# Patient Record
Sex: MALE | Race: WHITE | NOT HISPANIC OR LATINO | Employment: OTHER | URBAN - METROPOLITAN AREA
[De-identification: names, ages, dates, MRNs, and addresses within clinical notes are randomized per-mention and may not be internally consistent; named-entity substitution may affect disease eponyms.]

---

## 2017-06-15 ENCOUNTER — TELEPHONE (OUTPATIENT)
Dept: SURGERY | Facility: CLINIC | Age: 77
End: 2017-06-15

## 2017-06-16 ENCOUNTER — TELEPHONE (OUTPATIENT)
Dept: SURGERY | Facility: CLINIC | Age: 77
End: 2017-06-16

## 2017-07-12 ENCOUNTER — INITIAL CONSULT (OUTPATIENT)
Dept: SURGERY | Facility: CLINIC | Age: 77
End: 2017-07-12
Payer: MEDICARE

## 2017-07-12 VITALS
BODY MASS INDEX: 28.77 KG/M2 | HEIGHT: 66 IN | WEIGHT: 179 LBS | TEMPERATURE: 99 F | DIASTOLIC BLOOD PRESSURE: 66 MMHG | HEART RATE: 72 BPM | SYSTOLIC BLOOD PRESSURE: 124 MMHG

## 2017-07-12 DIAGNOSIS — R16.0 LIVER MASS, LEFT LOBE: ICD-10-CM

## 2017-07-12 DIAGNOSIS — K83.1 BILE DUCT STRICTURE: Primary | ICD-10-CM

## 2017-07-12 DIAGNOSIS — R93.2 ABNORMAL FINDINGS ON DIAGNOSTIC IMAGING OF LIVER AND BILIARY TRACT: ICD-10-CM

## 2017-07-12 DIAGNOSIS — K83.09 CHOLANGITIS: ICD-10-CM

## 2017-07-12 DIAGNOSIS — K83.8 DILATED INTRAHEPATIC BILE DUCT: ICD-10-CM

## 2017-07-12 PROCEDURE — 1159F MED LIST DOCD IN RCRD: CPT | Mod: S$GLB,,, | Performed by: NURSE PRACTITIONER

## 2017-07-12 PROCEDURE — 99205 OFFICE O/P NEW HI 60 MIN: CPT | Mod: S$GLB,,, | Performed by: NURSE PRACTITIONER

## 2017-07-12 PROCEDURE — 99999 PR PBB SHADOW E&M-EST. PATIENT-LVL III: CPT | Mod: PBBFAC,,, | Performed by: NURSE PRACTITIONER

## 2017-07-12 PROCEDURE — 1126F AMNT PAIN NOTED NONE PRSNT: CPT | Mod: S$GLB,,, | Performed by: NURSE PRACTITIONER

## 2017-07-12 RX ORDER — ESCITALOPRAM OXALATE 10 MG/1
10 TABLET ORAL DAILY
Status: ON HOLD | COMMUNITY
End: 2021-01-01 | Stop reason: ALTCHOICE

## 2017-07-12 RX ORDER — ASPIRIN 81 MG/1
81 TABLET ORAL DAILY
COMMUNITY
End: 2017-11-17

## 2017-07-12 RX ORDER — ALPRAZOLAM 0.25 MG/1
0.25 TABLET ORAL 2 TIMES DAILY PRN
COMMUNITY
End: 2019-04-18

## 2017-07-12 RX ORDER — LEVOTHYROXINE SODIUM 137 UG/1
137 TABLET ORAL DAILY
COMMUNITY

## 2017-07-12 RX ORDER — ISOSORBIDE DINITRATE 30 MG/1
20 TABLET ORAL 3 TIMES DAILY
Status: ON HOLD | COMMUNITY
End: 2017-10-18 | Stop reason: HOSPADM

## 2017-07-12 RX ORDER — CARVEDILOL 3.12 MG/1
3.12 TABLET ORAL 2 TIMES DAILY WITH MEALS
Status: ON HOLD | COMMUNITY
End: 2017-10-18 | Stop reason: HOSPADM

## 2017-07-12 RX ORDER — PANTOPRAZOLE SODIUM 40 MG/1
40 TABLET, DELAYED RELEASE ORAL 2 TIMES DAILY
COMMUNITY

## 2017-07-12 RX ORDER — ATORVASTATIN CALCIUM 80 MG/1
80 TABLET, FILM COATED ORAL DAILY
COMMUNITY
End: 2018-01-05

## 2017-07-12 RX ORDER — INDOMETHACIN 25 MG/1
25 CAPSULE ORAL 2 TIMES DAILY WITH MEALS
Status: ON HOLD | COMMUNITY
End: 2017-12-18 | Stop reason: HOSPADM

## 2017-07-12 NOTE — LETTER
July 13, 2017      Lyla Bryan MD  [address]  [place]  [facility] [place] [address]           [facility]  [address]  [place] [address]  Phone: [phone]          Patient: Robby Soriano   MR Number: [id number]   YOB: 1940   Date of Visit: 7/12/2017       Dear Dr. Lyla Bryan:    Thank you for referring Robby Soriano to Dr. Quach for evaluation. Attached you will find relevant portions of our assessment and plan of care.    If you have questions, please do not hesitate to call us. We look forward to following Robby Soriano along with you.    Sincerely,    Melissa Gallego, TOYIN    Enclosure      If you would like to receive this communication electronically, please contact [email or web address] or [phone] to request more information on [employer] access.    For providers and/or their staff who would like to refer a patient to [facility], please contact us through our one-stop-shop provider referral line, [facility], at [phone].    If you feel you have received this communication in error or would no longer like to receive these types of communications, please e-mail [email or web address]

## 2017-07-12 NOTE — PROGRESS NOTES
"History & Physical    SUBJECTIVE:     History of Present Illness:  Mr. Giles is a very pleasant 77 year old male referred by Dr. 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     Per Dr. Quach's note of same date: "outside ERC report and all imaging personally reviewed.   He presented with acute onset of cholangitis, WITHOUT jaundice. Imaging shows a mass in segment IV of the liver extending close to the hilum. Dr Nguyen found a stricture of the upper bile duct on ERC and with Spy Glass it was fibrotic. Biopsy is non-diagnostic but cholangiocarcinoma is strongly suspected on clinical grounds.   Outside MRCP is suboptimal with regard to the upstream right hepatic ducts and they also are not well visualized on ERC. The common and right PV and right HA do not appear involved. We will repeat MRC and, if needed, perform right PTC to define the upstream right biliary anatomy and if the sectoral right ducts are not involved, this should be resectable. Discussed with patient and family.    He has a sizeable but asymptomatic lower ML incisional hernia from open prostatectomy done in 2005. This is easily reducible. "    He feels well currently.  No recurrent episodes of f/c's since stent placed.  Eating well, stable weight, good energy.  %.  ECOG 0.  Builds wooden swings as a hobby which keeps him very active.      Review of patient's allergies indicates:  No Known Allergies    Current Outpatient Prescriptions   Medication Sig Dispense Refill    alprazolam (XANAX) 0.25 MG tablet Take 0.25 mg by mouth 3 (three) times daily.      aspirin (ECOTRIN) 81 MG EC tablet Take 81 mg by mouth once daily.      atorvastatin (LIPITOR) 80 MG tablet Take 80 mg by mouth once daily.      carvedilol (COREG) 3.125 MG tablet Take 3.125 mg by mouth 2 (two) times daily with meals.      escitalopram oxalate (LEXAPRO) 10 MG tablet Take 10 mg by mouth once daily.      indomethacin (INDOCIN) 25 MG capsule Take 25 mg by mouth 2 (two) times " "daily with meals.      isosorbide dinitrate (ISORDIL) 30 MG Tab Take 20 mg by mouth 3 (three) times daily.      levothyroxine (SYNTHROID) 100 MCG tablet Take 100 mcg by mouth once daily.      pantoprazole (PROTONIX) 40 MG tablet Take 40 mg by mouth once daily.       No current facility-administered medications for this visit.        Past Medical History:   Diagnosis Date    Cancer     Prostate/s/p prostatectomy    Coronary artery disease     GERD (gastroesophageal reflux disease)     Hyperlipidemia     Hypertension     Hypothyroidism      Past Surgical History:   Procedure Laterality Date    CAROTID ENDARTERECTOMY Bilateral     CORONARY ARTERY BYPASS GRAFT      PROSTATE SURGERY       History reviewed. No pertinent family history.  Social History   Substance Use Topics    Smoking status: Former Smoker     Types: Cigarettes     Start date: 1/1/1956     Quit date: 6/18/2013    Smokeless tobacco: Never Used    Alcohol use No        Review of Systems:  Review of Systems   Constitutional: Positive for chills and fever. Negative for activity change, appetite change, fatigue and unexpected weight change.        No additional F/C since stent   HENT: Negative.    Eyes: Negative.    Respiratory: Negative for cough, shortness of breath and wheezing.    Cardiovascular: Negative for chest pain, palpitations and leg swelling.   Gastrointestinal: Negative for abdominal distention, abdominal pain, constipation, diarrhea, nausea and vomiting.   Genitourinary: Negative.    Musculoskeletal: Negative.    Skin: Negative.    Neurological: Negative.    Psychiatric/Behavioral: Negative.        OBJECTIVE:     Vital Signs (Most Recent)  Temp: 98.6 °F (37 °C) (07/12/17 1058)  Pulse: 72 (07/12/17 1058)  BP: 124/66 (07/12/17 1058)  5' 6" (1.676 m)  81.2 kg (179 lb 0.2 oz)     Physical Exam:  Physical Exam   Constitutional: He is oriented to person, place, and time. He appears well-developed and well-nourished.   HENT:   Head: " Normocephalic and atraumatic.   Eyes: Conjunctivae are normal. No scleral icterus.   Neck: Normal range of motion. Neck supple.   Cardiovascular: Normal rate, regular rhythm, normal heart sounds and intact distal pulses.    Pulmonary/Chest: Effort normal and breath sounds normal.   Abdominal: Soft. Bowel sounds are normal. A hernia is present.       Incisional hernia   Musculoskeletal: Normal range of motion.   Neurological: He is alert and oriented to person, place, and time.   Skin: Skin is warm and dry.   Psychiatric: He has a normal mood and affect.         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   CAD, PVD s/p CABG and daisy carotid endarterectomy.    Lower abdominal incisional hernia.  Easily reducible.     PLAN:  Need better imaging of upstream bile duct anatomy.  Will obtain better MRCP.    If does not answer the question, will need PTC.   If confined to left side,will likely proceed with left sided hepatectomy.    RTC after MRCP to finalize plan.  Dr. Quach was available today and did presonally meet and evaluate patient and view recent diagnostic test and images.    He feels he will likely require surgery.    He will need cardiac clearance prior.

## 2017-07-12 NOTE — PROGRESS NOTES
Patient seen with Ida Gallego NP; outside ERC report and all imaging personally reviewed.   He presented with acute onset of cholangitis, WITHOUT jaundice. Imaging shows a mass in segment IV of the liver extending close to the hilum. Dr Nguyen found a stricture of the upper bile duct on ERC and with Spy Glass it was fibrotic. Biopsy is non-diagnostic but cholangiocarcinoma is strongly suspected on clinical grounds.    Outside MRCP is suboptimal with regard to the upstream right hepatic ducts and they also are not well visualized on ERC. The common and right PV and right HA do not appear involved. We will repeat MRC and, if needed, perform right PTC to define the upstream right biliary anatomy and if the sectoral right ducts are not involved, this should be resectable. Discussed with patient and family.     He has a sizeable but asymptomatic lower ML incisional hernia from open prostatectomy done in 2005. This is easily reducible.

## 2017-07-13 PROBLEM — R16.0 LIVER MASS, LEFT LOBE: Status: ACTIVE | Noted: 2017-07-13

## 2017-07-13 PROBLEM — K83.1 BILE DUCT STRICTURE: Status: ACTIVE | Noted: 2017-07-13

## 2017-07-14 ENCOUNTER — HOSPITAL ENCOUNTER (OUTPATIENT)
Dept: RADIOLOGY | Facility: HOSPITAL | Age: 77
Discharge: HOME | End: 2017-07-14
Attending: NURSE PRACTITIONER
Payer: MEDICARE

## 2017-07-14 DIAGNOSIS — K83.09 CHOLANGITIS: ICD-10-CM

## 2017-07-14 DIAGNOSIS — K83.1 BILE DUCT STRICTURE: ICD-10-CM

## 2017-07-14 DIAGNOSIS — R93.2 ABNORMAL FINDINGS ON DIAGNOSTIC IMAGING OF LIVER AND BILIARY TRACT: ICD-10-CM

## 2017-07-14 DIAGNOSIS — K83.8 DILATED INTRAHEPATIC BILE DUCT: ICD-10-CM

## 2017-07-14 PROCEDURE — 74181 MRI ABDOMEN W/O CONTRAST: CPT | Mod: TC

## 2017-07-14 PROCEDURE — 74181 MRI ABDOMEN W/O CONTRAST: CPT | Mod: 26,,, | Performed by: RADIOLOGY

## 2017-07-14 PROCEDURE — 76376 3D RENDER W/INTRP POSTPROCES: CPT | Mod: 26,,, | Performed by: RADIOLOGY

## 2017-07-17 ENCOUNTER — DOCUMENTATION ONLY (OUTPATIENT)
Dept: SURGERY | Facility: CLINIC | Age: 77
End: 2017-07-17

## 2017-07-18 NOTE — PATIENT CARE CONFERENCE
"[facility]    Upper GI Tumor Board Note      Date: 7/17/17  MRN: [id number]  [place] [facility] [place]er[place] Julián    Summary: "He presented with acute onset of cholangitis, WITHOUT jaundice. Imaging shows a mass in segment IV of the liver extending close to the hilum. Dr Nguyen found a stricture of the upper bile duct on ERC and with Spy Glass it was fibrotic. Biopsy is non-diagnostic but cholangiocarcinoma is strongly suspected on clinical grounds.     Outside MRCP is suboptimal with regard to the upstream right hepatic ducts and they also are not well visualized on ERC. The common and right PV and right HA do not appear involved. We will repeat MRC and, if needed, perform right PTC to define the upstream right biliary anatomy and if the sectoral right ducts are not involved, this should be resectable."    Updated MRCP images viewed.  Right side appears unaffected. Image quality good.      Recommendations: Left hepatectomy.      Consult: Surgery (done)    Metastatic site(s): none    [facility] Treatment Guidelines reviewed and care plan is consistent with guidelines, i.e., NCCN, NCL, PDQ, ASCO, AUA, etc.    Presentation at Cancer Conference: Prospective    Discussed possible entry into Clinical Trial: No    Physician Name: Melissa Gallego NP  "

## 2017-07-20 ENCOUNTER — TELEPHONE (OUTPATIENT)
Dept: SURGERY | Facility: CLINIC | Age: 77
End: 2017-07-20

## 2017-07-20 DIAGNOSIS — R16.0 LIVER MASS, LEFT LOBE: Primary | ICD-10-CM

## 2017-07-24 ENCOUNTER — OFFICE VISIT (OUTPATIENT)
Dept: SURGERY | Facility: CLINIC | Age: 77
End: 2017-07-24
Payer: MEDICARE

## 2017-07-24 ENCOUNTER — RESEARCH ENCOUNTER (OUTPATIENT)
Dept: RESEARCH | Facility: HOSPITAL | Age: 77
End: 2017-07-24

## 2017-07-24 ENCOUNTER — LAB VISIT (OUTPATIENT)
Dept: LAB | Facility: HOSPITAL | Age: 77
End: 2017-07-24
Attending: SURGERY
Payer: MEDICARE

## 2017-07-24 VITALS
DIASTOLIC BLOOD PRESSURE: 61 MMHG | TEMPERATURE: 98 F | HEART RATE: 69 BPM | OXYGEN SATURATION: 96 % | WEIGHT: 184.06 LBS | SYSTOLIC BLOOD PRESSURE: 116 MMHG | BODY MASS INDEX: 30.67 KG/M2 | HEIGHT: 65 IN

## 2017-07-24 DIAGNOSIS — C22.1 CHOLANGIOCARCINOMA: Primary | ICD-10-CM

## 2017-07-24 DIAGNOSIS — R16.0 LIVER MASS, LEFT LOBE: ICD-10-CM

## 2017-07-24 LAB
ALBUMIN SERPL BCP-MCNC: 3.1 G/DL
ALP SERPL-CCNC: 93 U/L
ALT SERPL W/O P-5'-P-CCNC: 13 U/L
ANION GAP SERPL CALC-SCNC: 8 MMOL/L
AST SERPL-CCNC: 13 U/L
BASOPHILS # BLD AUTO: 0.04 K/UL
BASOPHILS NFR BLD: 0.5 %
BILIRUB SERPL-MCNC: 0.6 MG/DL
BUN SERPL-MCNC: 24 MG/DL
CALCIUM SERPL-MCNC: 9.1 MG/DL
CHLORIDE SERPL-SCNC: 103 MMOL/L
CO2 SERPL-SCNC: 27 MMOL/L
CREAT SERPL-MCNC: 1.3 MG/DL
DIFFERENTIAL METHOD: ABNORMAL
EOSINOPHIL # BLD AUTO: 0.3 K/UL
EOSINOPHIL NFR BLD: 4.2 %
ERYTHROCYTE [DISTWIDTH] IN BLOOD BY AUTOMATED COUNT: 15.1 %
EST. GFR  (AFRICAN AMERICAN): >60 ML/MIN/1.73 M^2
EST. GFR  (NON AFRICAN AMERICAN): 52.6 ML/MIN/1.73 M^2
GLUCOSE SERPL-MCNC: 130 MG/DL
HCT VFR BLD AUTO: 31.8 %
HGB BLD-MCNC: 10.2 G/DL
LYMPHOCYTES # BLD AUTO: 1.9 K/UL
LYMPHOCYTES NFR BLD: 25.9 %
MCH RBC QN AUTO: 29.1 PG
MCHC RBC AUTO-ENTMCNC: 32.1 G/DL
MCV RBC AUTO: 91 FL
MONOCYTES # BLD AUTO: 0.7 K/UL
MONOCYTES NFR BLD: 8.7 %
NEUTROPHILS # BLD AUTO: 4.5 K/UL
NEUTROPHILS NFR BLD: 60.4 %
PLATELET # BLD AUTO: 113 K/UL
PMV BLD AUTO: 11.3 FL
POTASSIUM SERPL-SCNC: 4.8 MMOL/L
PROT SERPL-MCNC: 7.2 G/DL
RBC # BLD AUTO: 3.5 M/UL
SODIUM SERPL-SCNC: 138 MMOL/L
WBC # BLD AUTO: 7.46 K/UL

## 2017-07-24 PROCEDURE — 99999 PR PBB SHADOW E&M-EST. PATIENT-LVL III: CPT | Mod: PBBFAC,,, | Performed by: NURSE PRACTITIONER

## 2017-07-24 PROCEDURE — 99024 POSTOP FOLLOW-UP VISIT: CPT | Mod: S$GLB,,, | Performed by: NURSE PRACTITIONER

## 2017-07-24 NOTE — PROGRESS NOTES
Pt and three family members were approached by Grisel Ojeda in clinic regarding participation in [employer] program (IRB#[id number]). Pt was agreeable.   The ICF was reviewed with pt. The discussion included:   - participation is voluntary;   - pt can change his mind about participating at any time;   - if he changes his mind about participation, he can call us at contact info in ICF and we will discard samples remaining;   - samples that have been used prior to his notification will still be included in research;   - specimens collected will include blood, urine and tissue;   - blood and urine will be collected pre-operatively if possible;   - tissue will be collected from Pathology after routine tests have been conducted;   - Dr. Quach will perform procedure per his usual protocol - participation in [employer] program will not change amount of tissue removed;   - specimens will be stored with unique code that can only be linked to pt by [employer] staff;   - all medical information released to researchers will be stripped of identifiers;   - samples will not be released to outside researchers unless approved by internal committee;   - there is a small risk of loss of confidentiality, but we make every effort to ensure privacy;   - no other physical risks outside of those involved in standard of care procedure.     Pt did not have any questions. Pt willingly and independently signed ICF for [employer]. A copy of signed ICF will be mailed to pt with instructions to call with any questions that may arise or if he should change his mind regarding participation in [employer] program.

## 2017-07-26 NOTE — PROGRESS NOTES
Please see consult note dated 7/13/17 and Conference note dated 7/18/17.  He is seeing his cardiologist tomorrow for clearance.  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    Detailed discussion re: rational for surgery, details of surgery, expected PO course and associated risks.  Once all questions answered to his satisfaction, informed consent obtained.

## 2017-08-02 ENCOUNTER — TELEPHONE (OUTPATIENT)
Dept: SURGERY | Facility: CLINIC | Age: 77
End: 2017-08-02

## 2017-08-02 DIAGNOSIS — R16.0 LIVER MASS, LEFT LOBE: Primary | ICD-10-CM

## 2017-08-02 RX ORDER — ENOXAPARIN SODIUM 100 MG/ML
40 INJECTION SUBCUTANEOUS ONCE
Status: CANCELLED | OUTPATIENT
Start: 2017-08-02

## 2017-08-02 RX ORDER — SODIUM CHLORIDE 9 MG/ML
INJECTION, SOLUTION INTRAVENOUS CONTINUOUS
Status: CANCELLED | OUTPATIENT
Start: 2017-08-02

## 2017-08-02 RX ORDER — METOPROLOL TARTRATE 25 MG/1
50 TABLET, FILM COATED ORAL ONCE
Status: CANCELLED | OUTPATIENT
Start: 2017-08-02

## 2017-08-03 ENCOUNTER — HOSPITAL ENCOUNTER (INPATIENT)
Facility: HOSPITAL | Age: 77
LOS: 5 days | Discharge: HOME | DRG: 405 | End: 2017-08-08
Attending: SURGERY | Admitting: SURGERY
Payer: MEDICARE

## 2017-08-03 ENCOUNTER — ANESTHESIA EVENT (OUTPATIENT)
Dept: SURGERY | Facility: HOSPITAL | Age: 77
DRG: 405 | End: 2017-08-03
Payer: MEDICARE

## 2017-08-03 ENCOUNTER — ANESTHESIA (OUTPATIENT)
Dept: SURGERY | Facility: HOSPITAL | Age: 77
DRG: 405 | End: 2017-08-03
Payer: MEDICARE

## 2017-08-03 DIAGNOSIS — C80.1 MALIGNANT OBSTRUCTIVE JAUNDICE: ICD-10-CM

## 2017-08-03 DIAGNOSIS — K83.1 MALIGNANT OBSTRUCTIVE JAUNDICE: ICD-10-CM

## 2017-08-03 DIAGNOSIS — K83.1 BILE DUCT STRICTURE: ICD-10-CM

## 2017-08-03 DIAGNOSIS — R16.0 LIVER MASS, LEFT LOBE: ICD-10-CM

## 2017-08-03 DIAGNOSIS — C24.0 CHOLANGIOCARCINOMA AT HEPATIC HILUM: ICD-10-CM

## 2017-08-03 PROBLEM — I10 ESSENTIAL HYPERTENSION: Status: ACTIVE | Noted: 2017-08-03

## 2017-08-03 PROBLEM — I25.810 CORONARY ARTERY DISEASE INVOLVING CORONARY BYPASS GRAFT OF NATIVE HEART WITHOUT ANGINA PECTORIS: Status: ACTIVE | Noted: 2017-08-03

## 2017-08-03 LAB
ABO + RH BLD: NORMAL
ANION GAP SERPL CALC-SCNC: 11 MMOL/L
BASOPHILS # BLD AUTO: 0.01 K/UL
BASOPHILS # BLD AUTO: 0.02 K/UL
BASOPHILS NFR BLD: 0.1 %
BASOPHILS NFR BLD: 0.4 %
BLD GP AB SCN CELLS X3 SERPL QL: NORMAL
BUN SERPL-MCNC: 17 MG/DL
CALCIUM SERPL-MCNC: 8.5 MG/DL
CHLORIDE SERPL-SCNC: 108 MMOL/L
CO2 SERPL-SCNC: 21 MMOL/L
CREAT SERPL-MCNC: 1.2 MG/DL
DIFFERENTIAL METHOD: ABNORMAL
DIFFERENTIAL METHOD: ABNORMAL
EOSINOPHIL # BLD AUTO: 0 K/UL
EOSINOPHIL # BLD AUTO: 0.1 K/UL
EOSINOPHIL NFR BLD: 0.3 %
EOSINOPHIL NFR BLD: 0.9 %
ERYTHROCYTE [DISTWIDTH] IN BLOOD BY AUTOMATED COUNT: 14.9 %
ERYTHROCYTE [DISTWIDTH] IN BLOOD BY AUTOMATED COUNT: 15.2 %
EST. GFR  (AFRICAN AMERICAN): >60 ML/MIN/1.73 M^2
EST. GFR  (NON AFRICAN AMERICAN): 58 ML/MIN/1.73 M^2
GLUCOSE SERPL-MCNC: 114 MG/DL (ref 70–110)
GLUCOSE SERPL-MCNC: 140 MG/DL
GLUCOSE SERPL-MCNC: 166 MG/DL (ref 70–110)
GLUCOSE SERPL-MCNC: 172 MG/DL (ref 70–110)
GLUCOSE SERPL-MCNC: 180 MG/DL (ref 70–110)
GLUCOSE SERPL-MCNC: 191 MG/DL (ref 70–110)
GRAM STN SPEC: NORMAL
GRAM STN SPEC: NORMAL
HCO3 UR-SCNC: 22.4 MMOL/L (ref 24–28)
HCO3 UR-SCNC: 22.6 MMOL/L (ref 24–28)
HCO3 UR-SCNC: 22.8 MMOL/L (ref 24–28)
HCO3 UR-SCNC: 23.6 MMOL/L (ref 24–28)
HCO3 UR-SCNC: 25 MMOL/L (ref 24–28)
HCT VFR BLD AUTO: 22 %
HCT VFR BLD AUTO: 32.1 %
HCT VFR BLD CALC: 22 %PCV (ref 36–54)
HCT VFR BLD CALC: 23 %PCV (ref 36–54)
HCT VFR BLD CALC: 25 %PCV (ref 36–54)
HCT VFR BLD CALC: 26 %PCV (ref 36–54)
HCT VFR BLD CALC: 27 %PCV (ref 36–54)
HGB BLD-MCNC: 10.5 G/DL
HGB BLD-MCNC: 7.3 G/DL
INR PPP: 1.1
LYMPHOCYTES # BLD AUTO: 0.5 K/UL
LYMPHOCYTES # BLD AUTO: 0.5 K/UL
LYMPHOCYTES NFR BLD: 7.6 %
LYMPHOCYTES NFR BLD: 8.5 %
MAGNESIUM SERPL-MCNC: 1.5 MG/DL
MCH RBC QN AUTO: 28.6 PG
MCH RBC QN AUTO: 29.1 PG
MCHC RBC AUTO-ENTMCNC: 32.7 G/DL
MCHC RBC AUTO-ENTMCNC: 33.2 G/DL
MCV RBC AUTO: 86 FL
MCV RBC AUTO: 89 FL
MONOCYTES # BLD AUTO: 0.2 K/UL
MONOCYTES # BLD AUTO: 0.6 K/UL
MONOCYTES NFR BLD: 3.2 %
MONOCYTES NFR BLD: 8.5 %
NEUTROPHILS # BLD AUTO: 4.6 K/UL
NEUTROPHILS # BLD AUTO: 5.8 K/UL
NEUTROPHILS NFR BLD: 83.2 %
NEUTROPHILS NFR BLD: 87 %
PCO2 BLDA: 35.8 MMHG (ref 35–45)
PCO2 BLDA: 36.7 MMHG (ref 35–45)
PCO2 BLDA: 38.7 MMHG (ref 35–45)
PCO2 BLDA: 39.5 MMHG (ref 35–45)
PCO2 BLDA: 40.7 MMHG (ref 35–45)
PH SMN: 7.38 [PH] (ref 7.35–7.45)
PH SMN: 7.38 [PH] (ref 7.35–7.45)
PH SMN: 7.4 [PH] (ref 7.35–7.45)
PHOSPHATE SERPL-MCNC: 3.7 MG/DL
PLATELET # BLD AUTO: 168 K/UL
PLATELET # BLD AUTO: 193 K/UL
PMV BLD AUTO: 10 FL
PMV BLD AUTO: 10.4 FL
PO2 BLDA: 130 MMHG (ref 80–100)
PO2 BLDA: 158 MMHG (ref 80–100)
PO2 BLDA: 181 MMHG (ref 80–100)
PO2 BLDA: 184 MMHG (ref 80–100)
PO2 BLDA: 188 MMHG (ref 80–100)
POC BE: -1 MMOL/L
POC BE: -2 MMOL/L
POC BE: -2 MMOL/L
POC BE: -3 MMOL/L
POC BE: 0 MMOL/L
POC IONIZED CALCIUM: 1.04 MMOL/L (ref 1.06–1.42)
POC IONIZED CALCIUM: 1.06 MMOL/L (ref 1.06–1.42)
POC IONIZED CALCIUM: 1.12 MMOL/L (ref 1.06–1.42)
POC IONIZED CALCIUM: 1.18 MMOL/L (ref 1.06–1.42)
POC IONIZED CALCIUM: 1.24 MMOL/L (ref 1.06–1.42)
POC SATURATED O2: 100 % (ref 95–100)
POC SATURATED O2: 99 % (ref 95–100)
POC SATURATED O2: 99 % (ref 95–100)
POC TCO2: 23 MMOL/L (ref 23–27)
POC TCO2: 24 MMOL/L (ref 23–27)
POC TCO2: 24 MMOL/L (ref 23–27)
POC TCO2: 25 MMOL/L (ref 23–27)
POC TCO2: 26 MMOL/L (ref 23–27)
POCT GLUCOSE: 152 MG/DL (ref 70–110)
POTASSIUM BLD-SCNC: 4 MMOL/L (ref 3.5–5.1)
POTASSIUM BLD-SCNC: 4.3 MMOL/L (ref 3.5–5.1)
POTASSIUM BLD-SCNC: 4.4 MMOL/L (ref 3.5–5.1)
POTASSIUM BLD-SCNC: 4.5 MMOL/L (ref 3.5–5.1)
POTASSIUM BLD-SCNC: 4.6 MMOL/L (ref 3.5–5.1)
POTASSIUM SERPL-SCNC: 4.7 MMOL/L
PROTHROMBIN TIME: 11.8 SEC
RBC # BLD AUTO: 2.55 M/UL
RBC # BLD AUTO: 3.61 M/UL
SAMPLE: ABNORMAL
SODIUM BLD-SCNC: 138 MMOL/L (ref 136–145)
SODIUM BLD-SCNC: 139 MMOL/L (ref 136–145)
SODIUM BLD-SCNC: 139 MMOL/L (ref 136–145)
SODIUM BLD-SCNC: 140 MMOL/L (ref 136–145)
SODIUM BLD-SCNC: 140 MMOL/L (ref 136–145)
SODIUM SERPL-SCNC: 140 MMOL/L
WBC # BLD AUTO: 5.28 K/UL
WBC # BLD AUTO: 6.96 K/UL

## 2017-08-03 PROCEDURE — 25000003 PHARM REV CODE 250: Performed by: SURGERY

## 2017-08-03 PROCEDURE — P9045 ALBUMIN (HUMAN), 5%, 250 ML: HCPCS | Performed by: NURSE ANESTHETIST, CERTIFIED REGISTERED

## 2017-08-03 PROCEDURE — 71000039 HC RECOVERY, EACH ADD'L HOUR: Performed by: SURGERY

## 2017-08-03 PROCEDURE — 86920 COMPATIBILITY TEST SPIN: CPT

## 2017-08-03 PROCEDURE — S0028 INJECTION, FAMOTIDINE, 20 MG: HCPCS | Performed by: NURSE ANESTHETIST, CERTIFIED REGISTERED

## 2017-08-03 PROCEDURE — 86900 BLOOD TYPING SEROLOGIC ABO: CPT

## 2017-08-03 PROCEDURE — 37000009 HC ANESTHESIA EA ADD 15 MINS: Performed by: SURGERY

## 2017-08-03 PROCEDURE — 83735 ASSAY OF MAGNESIUM: CPT

## 2017-08-03 PROCEDURE — 87075 CULTR BACTERIA EXCEPT BLOOD: CPT

## 2017-08-03 PROCEDURE — 87070 CULTURE OTHR SPECIMN AEROBIC: CPT

## 2017-08-03 PROCEDURE — 88304 TISSUE EXAM BY PATHOLOGIST: CPT | Mod: 26,,, | Performed by: PATHOLOGY

## 2017-08-03 PROCEDURE — P9021 RED BLOOD CELLS UNIT: HCPCS

## 2017-08-03 PROCEDURE — 0FT40ZZ RESECTION OF GALLBLADDER, OPEN APPROACH: ICD-10-PCS | Performed by: SURGERY

## 2017-08-03 PROCEDURE — 87076 CULTURE ANAEROBE IDENT EACH: CPT

## 2017-08-03 PROCEDURE — 63600175 PHARM REV CODE 636 W HCPCS: Performed by: SURGERY

## 2017-08-03 PROCEDURE — 0F150ZB BYPASS RIGHT HEPATIC DUCT TO SMALL INTESTINE, OPEN APPROACH: ICD-10-PCS | Performed by: SURGERY

## 2017-08-03 PROCEDURE — 27000221 HC OXYGEN, UP TO 24 HOURS

## 2017-08-03 PROCEDURE — 27000191 HC C-V MONITORING

## 2017-08-03 PROCEDURE — 36000709 HC OR TIME LEV III EA ADD 15 MIN: Performed by: SURGERY

## 2017-08-03 PROCEDURE — 94760 N-INVAS EAR/PLS OXIMETRY 1: CPT

## 2017-08-03 PROCEDURE — C9290 INJ, BUPIVACAINE LIPOSOME: HCPCS | Performed by: SURGERY

## 2017-08-03 PROCEDURE — 25000242 PHARM REV CODE 250 ALT 637 W/ HCPCS: Performed by: ANESTHESIOLOGY

## 2017-08-03 PROCEDURE — 37000008 HC ANESTHESIA 1ST 15 MINUTES: Performed by: SURGERY

## 2017-08-03 PROCEDURE — 27800505 HC CATH, RADIAL ARTERY KIT: Performed by: NURSE ANESTHETIST, CERTIFIED REGISTERED

## 2017-08-03 PROCEDURE — 20600001 HC STEP DOWN PRIVATE ROOM

## 2017-08-03 PROCEDURE — 25000003 PHARM REV CODE 250: Performed by: NURSE ANESTHETIST, CERTIFIED REGISTERED

## 2017-08-03 PROCEDURE — 88305 TISSUE EXAM BY PATHOLOGIST: CPT | Performed by: PATHOLOGY

## 2017-08-03 PROCEDURE — 63600175 PHARM REV CODE 636 W HCPCS: Performed by: NURSE ANESTHETIST, CERTIFIED REGISTERED

## 2017-08-03 PROCEDURE — D9220A PRA ANESTHESIA: Mod: CRNA,,, | Performed by: NURSE ANESTHETIST, CERTIFIED REGISTERED

## 2017-08-03 PROCEDURE — 0FT90ZZ RESECTION OF COMMON BILE DUCT, OPEN APPROACH: ICD-10-PCS | Performed by: SURGERY

## 2017-08-03 PROCEDURE — 85610 PROTHROMBIN TIME: CPT

## 2017-08-03 PROCEDURE — C1751 CATH, INF, PER/CENT/MIDLINE: HCPCS | Performed by: NURSE ANESTHETIST, CERTIFIED REGISTERED

## 2017-08-03 PROCEDURE — 76998 US GUIDE INTRAOP: CPT | Mod: 26,,, | Performed by: SURGERY

## 2017-08-03 PROCEDURE — 82962 GLUCOSE BLOOD TEST: CPT | Performed by: SURGERY

## 2017-08-03 PROCEDURE — 87186 SC STD MICRODIL/AGAR DIL: CPT | Mod: 59

## 2017-08-03 PROCEDURE — D9220A PRA ANESTHESIA: Mod: ANES,,, | Performed by: ANESTHESIOLOGY

## 2017-08-03 PROCEDURE — 36556 INSERT NON-TUNNEL CV CATH: CPT | Mod: 59,,, | Performed by: ANESTHESIOLOGY

## 2017-08-03 PROCEDURE — 84100 ASSAY OF PHOSPHORUS: CPT

## 2017-08-03 PROCEDURE — C1729 CATH, DRAINAGE: HCPCS | Performed by: SURGERY

## 2017-08-03 PROCEDURE — 94799 UNLISTED PULMONARY SVC/PX: CPT

## 2017-08-03 PROCEDURE — 47711 EXCISION OF BILE DUCT TUMOR: CPT | Mod: 51,,, | Performed by: SURGERY

## 2017-08-03 PROCEDURE — 36620 INSERTION CATHETER ARTERY: CPT | Mod: 59,,, | Performed by: ANESTHESIOLOGY

## 2017-08-03 PROCEDURE — 88331 PATH CONSLTJ SURG 1 BLK 1SPC: CPT | Mod: 26,,, | Performed by: PATHOLOGY

## 2017-08-03 PROCEDURE — 47780 FUSE BILE DUCTS AND BOWEL: CPT | Mod: 51,,, | Performed by: SURGERY

## 2017-08-03 PROCEDURE — 47125 PARTIAL REMOVAL OF LIVER: CPT | Mod: ,,, | Performed by: SURGERY

## 2017-08-03 PROCEDURE — 87102 FUNGUS ISOLATION CULTURE: CPT

## 2017-08-03 PROCEDURE — 85025 COMPLETE CBC W/AUTO DIFF WBC: CPT | Mod: 91

## 2017-08-03 PROCEDURE — 71000033 HC RECOVERY, INTIAL HOUR: Performed by: SURGERY

## 2017-08-03 PROCEDURE — 80048 BASIC METABOLIC PNL TOTAL CA: CPT

## 2017-08-03 PROCEDURE — 27100025 HC TUBING, SET FLUID WARMER: Performed by: NURSE ANESTHETIST, CERTIFIED REGISTERED

## 2017-08-03 PROCEDURE — 87205 SMEAR GRAM STAIN: CPT

## 2017-08-03 PROCEDURE — 0FB20ZZ EXCISION OF LEFT LOBE LIVER, OPEN APPROACH: ICD-10-PCS | Performed by: SURGERY

## 2017-08-03 PROCEDURE — 88307 TISSUE EXAM BY PATHOLOGIST: CPT | Mod: 26,,, | Performed by: PATHOLOGY

## 2017-08-03 PROCEDURE — 36000708 HC OR TIME LEV III 1ST 15 MIN: Performed by: SURGERY

## 2017-08-03 PROCEDURE — 27201423 OPTIME MED/SURG SUP & DEVICES STERILE SUPPLY: Performed by: SURGERY

## 2017-08-03 PROCEDURE — 87077 CULTURE AEROBIC IDENTIFY: CPT

## 2017-08-03 PROCEDURE — 86850 RBC ANTIBODY SCREEN: CPT

## 2017-08-03 PROCEDURE — 94640 AIRWAY INHALATION TREATMENT: CPT

## 2017-08-03 RX ORDER — NALOXONE HCL 0.4 MG/ML
0.02 VIAL (ML) INJECTION
Status: DISCONTINUED | OUTPATIENT
Start: 2017-08-03 | End: 2017-08-08 | Stop reason: HOSPADM

## 2017-08-03 RX ORDER — PANTOPRAZOLE SODIUM 40 MG/10ML
40 INJECTION, POWDER, LYOPHILIZED, FOR SOLUTION INTRAVENOUS
Status: DISCONTINUED | OUTPATIENT
Start: 2017-08-04 | End: 2017-08-03

## 2017-08-03 RX ORDER — PHENYLEPHRINE HYDROCHLORIDE 10 MG/ML
INJECTION INTRAVENOUS
Status: DISCONTINUED | OUTPATIENT
Start: 2017-08-03 | End: 2017-08-03

## 2017-08-03 RX ORDER — GLYCOPYRROLATE 0.2 MG/ML
INJECTION INTRAMUSCULAR; INTRAVENOUS
Status: DISCONTINUED | OUTPATIENT
Start: 2017-08-03 | End: 2017-08-03

## 2017-08-03 RX ORDER — FENTANYL CITRATE 50 UG/ML
INJECTION, SOLUTION INTRAMUSCULAR; INTRAVENOUS
Status: DISCONTINUED | OUTPATIENT
Start: 2017-08-03 | End: 2017-08-03

## 2017-08-03 RX ORDER — ROCURONIUM BROMIDE 10 MG/ML
INJECTION, SOLUTION INTRAVENOUS
Status: DISCONTINUED | OUTPATIENT
Start: 2017-08-03 | End: 2017-08-03

## 2017-08-03 RX ORDER — PROPOFOL 10 MG/ML
VIAL (ML) INTRAVENOUS
Status: DISCONTINUED | OUTPATIENT
Start: 2017-08-03 | End: 2017-08-03

## 2017-08-03 RX ORDER — BUPIVACAINE HYDROCHLORIDE 2.5 MG/ML
INJECTION, SOLUTION EPIDURAL; INFILTRATION; INTRACAUDAL
Status: DISCONTINUED | OUTPATIENT
Start: 2017-08-03 | End: 2017-08-03 | Stop reason: HOSPADM

## 2017-08-03 RX ORDER — METOPROLOL TARTRATE 25 MG/1
50 TABLET, FILM COATED ORAL ONCE
Status: DISCONTINUED | OUTPATIENT
Start: 2017-08-03 | End: 2017-08-03

## 2017-08-03 RX ORDER — IPRATROPIUM BROMIDE AND ALBUTEROL SULFATE 2.5; .5 MG/3ML; MG/3ML
3 SOLUTION RESPIRATORY (INHALATION)
Status: COMPLETED | OUTPATIENT
Start: 2017-08-04 | End: 2017-08-05

## 2017-08-03 RX ORDER — CARVEDILOL 3.12 MG/1
3.12 TABLET ORAL 2 TIMES DAILY WITH MEALS
Status: DISCONTINUED | OUTPATIENT
Start: 2017-08-03 | End: 2017-08-08 | Stop reason: HOSPADM

## 2017-08-03 RX ORDER — HYDROMORPHONE HCL IN 0.9% NACL 6 MG/30 ML
PATIENT CONTROLLED ANALGESIA SYRINGE INTRAVENOUS
Status: DISPENSED
Start: 2017-08-03 | End: 2017-08-04

## 2017-08-03 RX ORDER — DIPHENHYDRAMINE HYDROCHLORIDE 50 MG/ML
12.5 INJECTION INTRAMUSCULAR; INTRAVENOUS EVERY 4 HOURS PRN
Status: DISCONTINUED | OUTPATIENT
Start: 2017-08-03 | End: 2017-08-08 | Stop reason: HOSPADM

## 2017-08-03 RX ORDER — PANTOPRAZOLE SODIUM 40 MG/1
40 TABLET, DELAYED RELEASE ORAL DAILY
Status: DISCONTINUED | OUTPATIENT
Start: 2017-08-04 | End: 2017-08-05

## 2017-08-03 RX ORDER — ASPIRIN 81 MG/1
81 TABLET ORAL DAILY
Status: DISCONTINUED | OUTPATIENT
Start: 2017-08-04 | End: 2017-08-05

## 2017-08-03 RX ORDER — ENOXAPARIN SODIUM 100 MG/ML
40 INJECTION SUBCUTANEOUS ONCE
Status: COMPLETED | OUTPATIENT
Start: 2017-08-03 | End: 2017-08-03

## 2017-08-03 RX ORDER — LIDOCAINE HCL/PF 100 MG/5ML
SYRINGE (ML) INTRAVENOUS
Status: DISCONTINUED | OUTPATIENT
Start: 2017-08-03 | End: 2017-08-03

## 2017-08-03 RX ORDER — CEFOXITIN SODIUM 2 G/50ML
2 INJECTION, SOLUTION INTRAVENOUS ONCE
Status: COMPLETED | OUTPATIENT
Start: 2017-08-03 | End: 2017-08-03

## 2017-08-03 RX ORDER — ONDANSETRON 2 MG/ML
INJECTION INTRAMUSCULAR; INTRAVENOUS
Status: DISCONTINUED | OUTPATIENT
Start: 2017-08-03 | End: 2017-08-03

## 2017-08-03 RX ORDER — SODIUM CHLORIDE 0.9 % (FLUSH) 0.9 %
3 SYRINGE (ML) INJECTION
Status: DISCONTINUED | OUTPATIENT
Start: 2017-08-03 | End: 2017-08-03

## 2017-08-03 RX ORDER — SODIUM CHLORIDE 9 MG/ML
INJECTION, SOLUTION INTRAVENOUS CONTINUOUS
Status: DISCONTINUED | OUTPATIENT
Start: 2017-08-03 | End: 2017-08-03

## 2017-08-03 RX ORDER — METOPROLOL TARTRATE 1 MG/ML
INJECTION, SOLUTION INTRAVENOUS
Status: DISCONTINUED | OUTPATIENT
Start: 2017-08-03 | End: 2017-08-03

## 2017-08-03 RX ORDER — IPRATROPIUM BROMIDE AND ALBUTEROL SULFATE 2.5; .5 MG/3ML; MG/3ML
3 SOLUTION RESPIRATORY (INHALATION) EVERY 4 HOURS PRN
Status: DISCONTINUED | OUTPATIENT
Start: 2017-08-03 | End: 2017-08-08 | Stop reason: HOSPADM

## 2017-08-03 RX ORDER — ALBUTEROL SULFATE 0.83 MG/ML
2.5 SOLUTION RESPIRATORY (INHALATION) EVERY 4 HOURS PRN
Status: DISCONTINUED | OUTPATIENT
Start: 2017-08-03 | End: 2017-08-08 | Stop reason: HOSPADM

## 2017-08-03 RX ORDER — HYDROMORPHONE HYDROCHLORIDE 2 MG/ML
INJECTION, SOLUTION INTRAMUSCULAR; INTRAVENOUS; SUBCUTANEOUS
Status: DISCONTINUED | OUTPATIENT
Start: 2017-08-03 | End: 2017-08-03

## 2017-08-03 RX ORDER — ALBUMIN HUMAN 50 G/1000ML
SOLUTION INTRAVENOUS CONTINUOUS PRN
Status: DISCONTINUED | OUTPATIENT
Start: 2017-08-03 | End: 2017-08-03

## 2017-08-03 RX ORDER — HYDROMORPHONE HYDROCHLORIDE 1 MG/ML
0.2 INJECTION, SOLUTION INTRAMUSCULAR; INTRAVENOUS; SUBCUTANEOUS EVERY 5 MIN PRN
Status: DISCONTINUED | OUTPATIENT
Start: 2017-08-03 | End: 2017-08-03 | Stop reason: HOSPADM

## 2017-08-03 RX ORDER — DEXAMETHASONE SODIUM PHOSPHATE 4 MG/ML
INJECTION, SOLUTION INTRA-ARTICULAR; INTRALESIONAL; INTRAMUSCULAR; INTRAVENOUS; SOFT TISSUE
Status: DISCONTINUED | OUTPATIENT
Start: 2017-08-03 | End: 2017-08-03

## 2017-08-03 RX ORDER — PROMETHAZINE HYDROCHLORIDE 25 MG/1
25 SUPPOSITORY RECTAL EVERY 6 HOURS PRN
Status: DISCONTINUED | OUTPATIENT
Start: 2017-08-03 | End: 2017-08-08 | Stop reason: HOSPADM

## 2017-08-03 RX ORDER — SODIUM CHLORIDE 9 MG/ML
INJECTION, SOLUTION INTRAVENOUS CONTINUOUS
Status: DISCONTINUED | OUTPATIENT
Start: 2017-08-03 | End: 2017-08-06

## 2017-08-03 RX ORDER — LEVOTHYROXINE SODIUM 100 UG/1
100 TABLET ORAL DAILY
Status: DISCONTINUED | OUTPATIENT
Start: 2017-08-04 | End: 2017-08-08 | Stop reason: HOSPADM

## 2017-08-03 RX ORDER — LABETALOL HYDROCHLORIDE 5 MG/ML
INJECTION, SOLUTION INTRAVENOUS
Status: DISCONTINUED | OUTPATIENT
Start: 2017-08-03 | End: 2017-08-03

## 2017-08-03 RX ORDER — NEOSTIGMINE METHYLSULFATE 1 MG/ML
INJECTION, SOLUTION INTRAVENOUS
Status: DISCONTINUED | OUTPATIENT
Start: 2017-08-03 | End: 2017-08-03

## 2017-08-03 RX ORDER — KETAMINE HYDROCHLORIDE 100 MG/ML
INJECTION, SOLUTION INTRAMUSCULAR; INTRAVENOUS
Status: DISCONTINUED | OUTPATIENT
Start: 2017-08-03 | End: 2017-08-03

## 2017-08-03 RX ORDER — ONDANSETRON 2 MG/ML
4 INJECTION INTRAMUSCULAR; INTRAVENOUS EVERY 8 HOURS PRN
Status: DISCONTINUED | OUTPATIENT
Start: 2017-08-03 | End: 2017-08-08 | Stop reason: HOSPADM

## 2017-08-03 RX ORDER — HYDROMORPHONE HCL IN 0.9% NACL 6 MG/30 ML
PATIENT CONTROLLED ANALGESIA SYRINGE INTRAVENOUS CONTINUOUS
Status: DISCONTINUED | OUTPATIENT
Start: 2017-08-03 | End: 2017-08-04

## 2017-08-03 RX ORDER — ENOXAPARIN SODIUM 100 MG/ML
40 INJECTION SUBCUTANEOUS
Status: DISCONTINUED | OUTPATIENT
Start: 2017-08-04 | End: 2017-08-08 | Stop reason: HOSPADM

## 2017-08-03 RX ORDER — FAMOTIDINE 10 MG/ML
INJECTION INTRAVENOUS
Status: DISCONTINUED | OUTPATIENT
Start: 2017-08-03 | End: 2017-08-03

## 2017-08-03 RX ORDER — ATORVASTATIN CALCIUM 20 MG/1
80 TABLET, FILM COATED ORAL DAILY
Status: DISCONTINUED | OUTPATIENT
Start: 2017-08-04 | End: 2017-08-05

## 2017-08-03 RX ADMIN — KETAMINE HYDROCHLORIDE 10 MG: 100 INJECTION, SOLUTION, CONCENTRATE INTRAMUSCULAR; INTRAVENOUS at 12:08

## 2017-08-03 RX ADMIN — FENTANYL CITRATE 25 MCG: 50 INJECTION, SOLUTION INTRAMUSCULAR; INTRAVENOUS at 10:08

## 2017-08-03 RX ADMIN — CEFOXITIN SODIUM 2 G: 2 INJECTION, SOLUTION INTRAVENOUS at 12:08

## 2017-08-03 RX ADMIN — LIDOCAINE HYDROCHLORIDE 75 MG: 20 INJECTION, SOLUTION INTRAVENOUS at 07:08

## 2017-08-03 RX ADMIN — EPHEDRINE SULFATE 10 MG: 50 INJECTION, SOLUTION INTRAMUSCULAR; INTRAVENOUS; SUBCUTANEOUS at 09:08

## 2017-08-03 RX ADMIN — PHENYLEPHRINE HYDROCHLORIDE 100 MCG: 10 INJECTION INTRAVENOUS at 10:08

## 2017-08-03 RX ADMIN — PHENYLEPHRINE HYDROCHLORIDE 100 MCG: 10 INJECTION INTRAVENOUS at 11:08

## 2017-08-03 RX ADMIN — FENTANYL CITRATE 25 MCG: 50 INJECTION, SOLUTION INTRAMUSCULAR; INTRAVENOUS at 01:08

## 2017-08-03 RX ADMIN — KETAMINE HYDROCHLORIDE 10 MG: 100 INJECTION, SOLUTION, CONCENTRATE INTRAMUSCULAR; INTRAVENOUS at 10:08

## 2017-08-03 RX ADMIN — METOPROLOL TARTRATE 1 MG: 5 INJECTION, SOLUTION INTRAVENOUS at 11:08

## 2017-08-03 RX ADMIN — METOPROLOL TARTRATE 1 MG: 5 INJECTION, SOLUTION INTRAVENOUS at 10:08

## 2017-08-03 RX ADMIN — KETAMINE HYDROCHLORIDE 20 MG: 100 INJECTION, SOLUTION, CONCENTRATE INTRAMUSCULAR; INTRAVENOUS at 09:08

## 2017-08-03 RX ADMIN — EPHEDRINE SULFATE 10 MG: 50 INJECTION, SOLUTION INTRAMUSCULAR; INTRAVENOUS; SUBCUTANEOUS at 08:08

## 2017-08-03 RX ADMIN — CARVEDILOL 3.12 MG: 3.12 TABLET, FILM COATED ORAL at 08:08

## 2017-08-03 RX ADMIN — DEXAMETHASONE SODIUM PHOSPHATE 4 MG: 4 INJECTION, SOLUTION INTRAMUSCULAR; INTRAVENOUS at 08:08

## 2017-08-03 RX ADMIN — FENTANYL CITRATE 25 MCG: 50 INJECTION, SOLUTION INTRAMUSCULAR; INTRAVENOUS at 07:08

## 2017-08-03 RX ADMIN — CALCIUM CHLORIDE 0.5 G: 100 INJECTION, SOLUTION INTRAVENOUS at 12:08

## 2017-08-03 RX ADMIN — ROCURONIUM BROMIDE 10 MG: 10 INJECTION, SOLUTION INTRAVENOUS at 10:08

## 2017-08-03 RX ADMIN — NEOSTIGMINE METHYLSULFATE 5 MG: 1 INJECTION INTRAVENOUS at 01:08

## 2017-08-03 RX ADMIN — SODIUM CHLORIDE, SODIUM GLUCONATE, SODIUM ACETATE, POTASSIUM CHLORIDE, MAGNESIUM CHLORIDE, SODIUM PHOSPHATE, DIBASIC, AND POTASSIUM PHOSPHATE: .53; .5; .37; .037; .03; .012; .00082 INJECTION, SOLUTION INTRAVENOUS at 12:08

## 2017-08-03 RX ADMIN — ROCURONIUM BROMIDE 50 MG: 10 INJECTION, SOLUTION INTRAVENOUS at 07:08

## 2017-08-03 RX ADMIN — PHENYLEPHRINE HYDROCHLORIDE 200 MCG: 10 INJECTION INTRAVENOUS at 11:08

## 2017-08-03 RX ADMIN — PHENYLEPHRINE HYDROCHLORIDE 100 MCG: 10 INJECTION INTRAVENOUS at 12:08

## 2017-08-03 RX ADMIN — PIPERACILLIN SODIUM,TAZOBACTAM SODIUM 4.5 G: 4; .5 INJECTION, POWDER, FOR SOLUTION INTRAVENOUS at 03:08

## 2017-08-03 RX ADMIN — FENTANYL CITRATE 50 MCG: 50 INJECTION, SOLUTION INTRAMUSCULAR; INTRAVENOUS at 09:08

## 2017-08-03 RX ADMIN — CEFOXITIN SODIUM 2 G: 2 INJECTION, SOLUTION INTRAVENOUS at 08:08

## 2017-08-03 RX ADMIN — SODIUM CHLORIDE, SODIUM GLUCONATE, SODIUM ACETATE, POTASSIUM CHLORIDE, MAGNESIUM CHLORIDE, SODIUM PHOSPHATE, DIBASIC, AND POTASSIUM PHOSPHATE: .53; .5; .37; .037; .03; .012; .00082 INJECTION, SOLUTION INTRAVENOUS at 11:08

## 2017-08-03 RX ADMIN — METOPROLOL TARTRATE 2 MG: 5 INJECTION, SOLUTION INTRAVENOUS at 01:08

## 2017-08-03 RX ADMIN — ROCURONIUM BROMIDE 30 MG: 10 INJECTION, SOLUTION INTRAVENOUS at 08:08

## 2017-08-03 RX ADMIN — EPHEDRINE SULFATE 10 MG: 50 INJECTION, SOLUTION INTRAMUSCULAR; INTRAVENOUS; SUBCUTANEOUS at 12:08

## 2017-08-03 RX ADMIN — EPHEDRINE SULFATE 5 MG: 50 INJECTION, SOLUTION INTRAMUSCULAR; INTRAVENOUS; SUBCUTANEOUS at 10:08

## 2017-08-03 RX ADMIN — ROCURONIUM BROMIDE 5 MG: 10 INJECTION, SOLUTION INTRAVENOUS at 12:08

## 2017-08-03 RX ADMIN — FENTANYL CITRATE 125 MCG: 50 INJECTION, SOLUTION INTRAMUSCULAR; INTRAVENOUS at 07:08

## 2017-08-03 RX ADMIN — METOPROLOL TARTRATE 1 MG: 5 INJECTION, SOLUTION INTRAVENOUS at 01:08

## 2017-08-03 RX ADMIN — FENTANYL CITRATE 50 MCG: 50 INJECTION, SOLUTION INTRAMUSCULAR; INTRAVENOUS at 08:08

## 2017-08-03 RX ADMIN — EPHEDRINE SULFATE 10 MG: 50 INJECTION, SOLUTION INTRAMUSCULAR; INTRAVENOUS; SUBCUTANEOUS at 10:08

## 2017-08-03 RX ADMIN — FAMOTIDINE 20 MG: 10 INJECTION, SOLUTION INTRAVENOUS at 08:08

## 2017-08-03 RX ADMIN — SODIUM CHLORIDE, SODIUM GLUCONATE, SODIUM ACETATE, POTASSIUM CHLORIDE, MAGNESIUM CHLORIDE, SODIUM PHOSPHATE, DIBASIC, AND POTASSIUM PHOSPHATE: .53; .5; .37; .037; .03; .012; .00082 INJECTION, SOLUTION INTRAVENOUS at 08:08

## 2017-08-03 RX ADMIN — ENOXAPARIN SODIUM 40 MG: 100 INJECTION SUBCUTANEOUS at 06:08

## 2017-08-03 RX ADMIN — SODIUM CHLORIDE: 0.9 INJECTION, SOLUTION INTRAVENOUS at 02:08

## 2017-08-03 RX ADMIN — KETAMINE HYDROCHLORIDE 30 MG: 100 INJECTION, SOLUTION, CONCENTRATE INTRAMUSCULAR; INTRAVENOUS at 07:08

## 2017-08-03 RX ADMIN — ALBUMIN (HUMAN): 12.5 SOLUTION INTRAVENOUS at 11:08

## 2017-08-03 RX ADMIN — ALBUTEROL SULFATE 2.5 MG: 2.5 SOLUTION RESPIRATORY (INHALATION) at 07:08

## 2017-08-03 RX ADMIN — PIPERACILLIN SODIUM,TAZOBACTAM SODIUM 4.5 G: 4; .5 INJECTION, POWDER, FOR SOLUTION INTRAVENOUS at 11:08

## 2017-08-03 RX ADMIN — ONDANSETRON 4 MG: 2 INJECTION INTRAMUSCULAR; INTRAVENOUS at 08:08

## 2017-08-03 RX ADMIN — HYDROMORPHONE HYDROCHLORIDE 0.4 MG: 2 INJECTION, SOLUTION INTRAMUSCULAR; INTRAVENOUS; SUBCUTANEOUS at 01:08

## 2017-08-03 RX ADMIN — SODIUM CHLORIDE 70 ML/HR: 0.9 INJECTION, SOLUTION INTRAVENOUS at 06:08

## 2017-08-03 RX ADMIN — LABETALOL HYDROCHLORIDE 5 MG: 5 INJECTION, SOLUTION INTRAVENOUS at 01:08

## 2017-08-03 RX ADMIN — Medication: at 02:08

## 2017-08-03 RX ADMIN — ROCURONIUM BROMIDE 10 MG: 10 INJECTION, SOLUTION INTRAVENOUS at 09:08

## 2017-08-03 RX ADMIN — GLYCOPYRROLATE 0.6 MG: 0.2 INJECTION, SOLUTION INTRAMUSCULAR; INTRAVENOUS at 01:08

## 2017-08-03 RX ADMIN — CEFOXITIN SODIUM 2 G: 2 INJECTION, SOLUTION INTRAVENOUS at 10:08

## 2017-08-03 RX ADMIN — PROPOFOL 150 MG: 10 INJECTION, EMULSION INTRAVENOUS at 07:08

## 2017-08-03 RX ADMIN — Medication 10 MG: at 10:08

## 2017-08-03 RX ADMIN — FENTANYL CITRATE 50 MCG: 50 INJECTION, SOLUTION INTRAMUSCULAR; INTRAVENOUS at 10:08

## 2017-08-03 NOTE — NURSING TRANSFER
Nursing Transfer Note      8/3/2017     Transfer From: PACU    Transfer via stretcher    Transfer with O2, cardiac monitoring, IVF's, A-line, Ibarra    Transported by RN and PCT    Medicines sent: N/A    Chart send with patient: Yes    Notified: Family in room    Patient reassessed at: 8/3/17 1606    Upon arrival to floor: cardiac monitor applied, patient oriented to room, call bell in reach and bed in lowest position

## 2017-08-03 NOTE — ANESTHESIA PROCEDURE NOTES
Central Line    Diagnosis: Hepatic mass  Patient location during procedure: done in OR  Procedure start time: 8/3/2017 7:41 AM  Timeout: 8/3/2017 7:40 AM  Procedure end time: 8/3/2017 7:50 AM  Staffing  Anesthesiologist: NENA OCONNOR JR  Resident/CRNA: SUGAR CARRASCO  Performed: resident/CRNA   Anesthesiologist was present at the time of the procedure.  Preanesthetic Checklist  Completed: patient identified, site marked, surgical consent, pre-op evaluation, timeout performed, IV checked, risks and benefits discussed, monitors and equipment checked and anesthesia consent given  Indication  Indication: hemodynamic monitoring, vascular access, med administration     Anesthesia   general anesthesia    Central Line  Skin Prep: skin prepped with ChloraPrep, skin prep agent completely dried prior to procedure  maximum sterile barriers used during central venous catheter insertion  hand hygiene performed prior to central venous catheter insertion  Location: right internal jugular,   Catheter type: triple lumen  Catheter Size: 12 Fr  Inserted Catheter Length: 15 cm  Ultrasound: vascular probe with ultrasound  Vessel Caliber: large, patent  Vascular Doppler:  not done, compressibility normal  Needle advanced into vessel with real time Ultrasound guidance.  Guidewire confirmed in vessel.  Sterile sheath used.   Manometry: Venous cannualation confirmed by visual estimation of blood vessel pressure using manometry.  Insertion Attempts: 1   Securement:line sutured, chlorhexidine patch, sterile dressing applied and blood return through all ports     Post-Procedure  Adverse Events:none

## 2017-08-03 NOTE — BRIEF OP NOTE
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

## 2017-08-03 NOTE — BRIEF OP NOTE
[facility]  Brief Operative Note    SUMMARY     Surgery Date: 8/3/2017     Surgeon(s) and Role:     * Liberty Lentz MD - Resident - Assisting     * Dallas Quach MD - Primary        Pre-op Diagnosis:  Liver mass, left lobe [R16.0]    Post-op Diagnosis:  Post-Op Diagnosis Codes:     * Liver mass, left lobe [R16.0]    Procedure(s) (LRB):  RESECTION-HEPATIC; Resection Bile Duct (Left)  JERSON-N-Y    Anesthesia: General    Description of Procedure: Left lobectomy with resection of middle and left hepatic veins. Resection of common bile duct and anastomosis of right hepatic duct with a hepaticojejunostomy. Arbuckle maneuver x 15minutes.     Description of the findings of the procedure: Frozen path revealed a negative bile duct margin. 2 units of prbc given intraop.     Estimated Blood Loss: 800 mL         Specimens:   Specimen (12h ago through future)    Start     Ordered    08/03/17 1335  Specimen to Pathology - Surgery  Once     Comments:  Specimen to pathology: 1.) Gallbladder (permanent)2.) Hailey hepatis lymph node (permanent)3.) Distal bile duct margin (frozen)4.) Left liver with common hepatic duct and proximal right hepatic duct (permanent)5.) Proximal right hepatic duct margin. 2 blue sutures sunday right sectoral margin (frozen)      08/03/17 8745

## 2017-08-03 NOTE — PROGRESS NOTES
CXR complete. Pt tolerated well. Pt released from anesthesia, to be transferred to room 607, [facility]. Wife updated and sent to pt's room.     1515- spoke to circulating RN, Irasema, who returned page for Surgery resident who was scrubbed in case, per MD, pt must remain strict NPO, but may have chloraseptic spray prn sore throat.

## 2017-08-03 NOTE — NURSING TRANSFER
Nursing Transfer Note      8/3/2017     Transfer To: 607 from pacu 11    Transfer via bed    Transfer with 2L to O2 and CM    Transported by pacu RN and PCT    Medicines sent: none    Chart send with patient: Yes    Notified: spouse

## 2017-08-03 NOTE — PROGRESS NOTES
Respiratory at bedside for incentive spirometer teaching. 1250 preop.   0645- Labs not drawn. Pt refused [employer].

## 2017-08-03 NOTE — ANESTHESIA PREPROCEDURE EVALUATION
08/03/2017  Robby Soriano is a 77 y.o., male.    Anesthesia Evaluation         Review of Systems  Cardiovascular:   Hypertension CAD     Hepatic/GI:   GERD    Endocrine:   Hypothyroidism        Physical Exam  General:  Obesity    Airway/Jaw/Neck:  Airway Findings: Mouth Opening: Normal Tongue: Normal  General Airway Assessment: Adult  Mallampati: II  TM Distance: Normal, at least 6 cm        Eyes/Ears/Nose:  EYES/EARS/NOSE FINDINGS: Normal   Dental:  Dental Findings: upper partial dentures, lower partial dentures   Chest/Lungs:  Chest/Lungs Clear    Heart/Vascular:  Heart Findings: Normal Heart murmur: negative Vascular Findings: Normal    Abdomen:  Abdomen Findings: Normal    Musculoskeletal:  Musculoskeletal Findings: Normal   Skin:  Skin Findings: Normal    Mental Status:  Mental Status Findings: Normal        Anesthesia Plan  Type of Anesthesia, risks & benefits discussed:  Anesthesia Type:  general  Patient's Preference:   Intra-op Monitoring Plan:   Intra-op Monitoring Plan Comments:   Post Op Pain Control Plan:   Post Op Pain Control Plan Comments:   Induction:   IV  Beta Blocker:  Patient is on a Beta-Blocker and has received one dose within the past 24 hours (No further documentation required).       Informed Consent: Patient understands risks and agrees with Anesthesia plan.  Questions answered. Anesthesia consent signed with patient.  ASA Score: 3     Day of Surgery Review of History & Physical:    H&P update referred to the surgeon.         Ready For Surgery From Anesthesia Perspective.

## 2017-08-03 NOTE — TRANSFER OF CARE
"Anesthesia Transfer of Care Note    Patient: Robby Soriano    Procedure(s) Performed: Procedure(s) (LRB):  RESECTION-HEPATIC; Resection Bile Duct (Left)  JERSON-N-Y    Patient location: PACU    Anesthesia Type: epidural    Transport from OR: Transported from OR on 6-10 L/min O2 by face mask with adequate spontaneous ventilation    Post pain: adequate analgesia    Post assessment: no apparent anesthetic complications    Post vital signs: stable    Level of consciousness: sedated    Nausea/Vomiting: no nausea/vomiting    Complications: none    Transfer of care protocol was followed      Last vitals:   Visit Vitals  BP (!) 174/82   Pulse 80   Temp 36.6 °C (97.9 °F) (Axillary)   Resp 18   Ht 5' 5" (1.651 m)   Wt 83 kg (183 lb)   SpO2 100%   BMI 30.45 kg/m²     "

## 2017-08-03 NOTE — OR NURSING
Family updated at 0854 via Cora, nurse liaison.   Family updated at 1051 via Cora, nurse liaison.   Family updated at 1255 via Cora, nurse liaison.    Specimen to pathology:   1.) Gallbladder (permanent)  2.) Hailey hepatis lymph node (permanent)  3.) Distal bile duct margin (frozen)  4.) Left liver with common hepatic duct and proximal right hepatic duct (permanent)  5.) Proximal right hepatic duct margin. 2 blue sutures sunday right sectoral margin (frozen)

## 2017-08-03 NOTE — INTERVAL H&P NOTE
The patient has been examined and the H&P has been reviewed:    I concur with the findings and no changes have occurred since H&P was written.   No recurrence of fever and chills. No other interval symptoms  He took all of his regular meds at 3 am except for his baby ASA and his lipitor  Cardiologist's note read  Vitals:    08/03/17 0559   BP: (!) 144/61   Pulse: 63   Resp: 16   Temp: 97.6 °F (36.4 °C)     Chest clear to ausc  Heart: RR&R with no m  Neuro: alert, oriented    Operative plan reviewed again with patient and family. He understands and agrees    Anesthesia/Surgery risks, benefits and alternative options discussed and understood by patient/family.          Active Hospital Problems    Diagnosis  POA    Liver mass, left lobe [R16.0]  Yes      Resolved Hospital Problems    Diagnosis Date Resolved POA   No resolved problems to display.

## 2017-08-04 PROBLEM — D64.9 ANEMIA: Status: ACTIVE | Noted: 2017-08-04

## 2017-08-04 PROBLEM — I25.810 CORONARY ARTERY DISEASE INVOLVING CORONARY BYPASS GRAFT OF NATIVE HEART WITHOUT ANGINA PECTORIS: Status: ACTIVE | Noted: 2017-08-04

## 2017-08-04 LAB
ALBUMIN SERPL BCP-MCNC: 2.8 G/DL
ALP SERPL-CCNC: 67 U/L
ALT SERPL W/O P-5'-P-CCNC: 659 U/L
ANION GAP SERPL CALC-SCNC: 10 MMOL/L
AST SERPL-CCNC: 665 U/L
BASOPHILS # BLD AUTO: 0.02 K/UL
BASOPHILS NFR BLD: 0.2 %
BILIRUB SERPL-MCNC: 1.5 MG/DL
BUN SERPL-MCNC: 22 MG/DL
CALCIUM SERPL-MCNC: 8.6 MG/DL
CHLORIDE SERPL-SCNC: 107 MMOL/L
CO2 SERPL-SCNC: 21 MMOL/L
CREAT SERPL-MCNC: 1.2 MG/DL
DIFFERENTIAL METHOD: ABNORMAL
EOSINOPHIL # BLD AUTO: 0 K/UL
EOSINOPHIL NFR BLD: 0 %
ERYTHROCYTE [DISTWIDTH] IN BLOOD BY AUTOMATED COUNT: 15 %
EST. GFR  (AFRICAN AMERICAN): >60 ML/MIN/1.73 M^2
EST. GFR  (NON AFRICAN AMERICAN): 58 ML/MIN/1.73 M^2
GLUCOSE SERPL-MCNC: 130 MG/DL
HCT VFR BLD AUTO: 32.3 %
HGB BLD-MCNC: 10.5 G/DL
INR PPP: 1.2
LYMPHOCYTES # BLD AUTO: 0.9 K/UL
LYMPHOCYTES NFR BLD: 8 %
MAGNESIUM SERPL-MCNC: 1.5 MG/DL
MCH RBC QN AUTO: 29.2 PG
MCHC RBC AUTO-ENTMCNC: 32.5 G/DL
MCV RBC AUTO: 90 FL
MONOCYTES # BLD AUTO: 1.1 K/UL
MONOCYTES NFR BLD: 9.7 %
NEUTROPHILS # BLD AUTO: 9.4 K/UL
NEUTROPHILS NFR BLD: 81.9 %
PHOSPHATE SERPL-MCNC: 4.6 MG/DL
PLATELET # BLD AUTO: 153 K/UL
PMV BLD AUTO: 10.3 FL
POTASSIUM SERPL-SCNC: 4.7 MMOL/L
PROT SERPL-MCNC: 5.8 G/DL
PROTHROMBIN TIME: 12.1 SEC
RBC # BLD AUTO: 3.6 M/UL
SODIUM SERPL-SCNC: 138 MMOL/L
WBC # BLD AUTO: 11.51 K/UL

## 2017-08-04 PROCEDURE — 97535 SELF CARE MNGMENT TRAINING: CPT

## 2017-08-04 PROCEDURE — 80053 COMPREHEN METABOLIC PANEL: CPT

## 2017-08-04 PROCEDURE — G8978 MOBILITY CURRENT STATUS: HCPCS | Mod: CK

## 2017-08-04 PROCEDURE — 25000003 PHARM REV CODE 250: Performed by: STUDENT IN AN ORGANIZED HEALTH CARE EDUCATION/TRAINING PROGRAM

## 2017-08-04 PROCEDURE — 85610 PROTHROMBIN TIME: CPT

## 2017-08-04 PROCEDURE — 25000003 PHARM REV CODE 250: Performed by: SURGERY

## 2017-08-04 PROCEDURE — 94640 AIRWAY INHALATION TREATMENT: CPT

## 2017-08-04 PROCEDURE — 25000242 PHARM REV CODE 250 ALT 637 W/ HCPCS: Performed by: NURSE PRACTITIONER

## 2017-08-04 PROCEDURE — 94770 HC EXHALED C02 TEST: CPT

## 2017-08-04 PROCEDURE — 85025 COMPLETE CBC W/AUTO DIFF WBC: CPT

## 2017-08-04 PROCEDURE — 83735 ASSAY OF MAGNESIUM: CPT

## 2017-08-04 PROCEDURE — 27000221 HC OXYGEN, UP TO 24 HOURS

## 2017-08-04 PROCEDURE — 20600001 HC STEP DOWN PRIVATE ROOM

## 2017-08-04 PROCEDURE — 99900035 HC TECH TIME PER 15 MIN (STAT)

## 2017-08-04 PROCEDURE — 94760 N-INVAS EAR/PLS OXIMETRY 1: CPT

## 2017-08-04 PROCEDURE — 97161 PT EVAL LOW COMPLEX 20 MIN: CPT

## 2017-08-04 PROCEDURE — 97165 OT EVAL LOW COMPLEX 30 MIN: CPT

## 2017-08-04 PROCEDURE — 63600175 PHARM REV CODE 636 W HCPCS: Performed by: STUDENT IN AN ORGANIZED HEALTH CARE EDUCATION/TRAINING PROGRAM

## 2017-08-04 PROCEDURE — G8979 MOBILITY GOAL STATUS: HCPCS | Mod: CJ

## 2017-08-04 PROCEDURE — 94799 UNLISTED PULMONARY SVC/PX: CPT

## 2017-08-04 PROCEDURE — 84100 ASSAY OF PHOSPHORUS: CPT

## 2017-08-04 PROCEDURE — 63600175 PHARM REV CODE 636 W HCPCS: Performed by: SURGERY

## 2017-08-04 RX ORDER — OXYCODONE HYDROCHLORIDE 5 MG/1
5 TABLET ORAL EVERY 4 HOURS PRN
Status: DISCONTINUED | OUTPATIENT
Start: 2017-08-04 | End: 2017-08-08 | Stop reason: HOSPADM

## 2017-08-04 RX ORDER — MAGNESIUM SULFATE HEPTAHYDRATE 40 MG/ML
2 INJECTION, SOLUTION INTRAVENOUS ONCE
Status: COMPLETED | OUTPATIENT
Start: 2017-08-04 | End: 2017-08-04

## 2017-08-04 RX ORDER — TAMSULOSIN HYDROCHLORIDE 0.4 MG/1
0.4 CAPSULE ORAL DAILY
Status: DISCONTINUED | OUTPATIENT
Start: 2017-08-05 | End: 2017-08-08 | Stop reason: HOSPADM

## 2017-08-04 RX ORDER — OXYCODONE HYDROCHLORIDE 5 MG/1
10 TABLET ORAL EVERY 4 HOURS PRN
Status: DISCONTINUED | OUTPATIENT
Start: 2017-08-04 | End: 2017-08-08 | Stop reason: HOSPADM

## 2017-08-04 RX ORDER — HYDROMORPHONE HYDROCHLORIDE 1 MG/ML
0.5 INJECTION, SOLUTION INTRAMUSCULAR; INTRAVENOUS; SUBCUTANEOUS EVERY 6 HOURS PRN
Status: DISCONTINUED | OUTPATIENT
Start: 2017-08-04 | End: 2017-08-08 | Stop reason: HOSPADM

## 2017-08-04 RX ADMIN — SODIUM CHLORIDE: 0.9 INJECTION, SOLUTION INTRAVENOUS at 06:08

## 2017-08-04 RX ADMIN — MAGNESIUM SULFATE IN WATER 2 G: 40 INJECTION, SOLUTION INTRAVENOUS at 06:08

## 2017-08-04 RX ADMIN — CARVEDILOL 3.12 MG: 3.12 TABLET, FILM COATED ORAL at 08:08

## 2017-08-04 RX ADMIN — SODIUM CHLORIDE 500 ML: 0.9 INJECTION, SOLUTION INTRAVENOUS at 05:08

## 2017-08-04 RX ADMIN — CARVEDILOL 3.12 MG: 3.12 TABLET, FILM COATED ORAL at 04:08

## 2017-08-04 RX ADMIN — OXYCODONE HYDROCHLORIDE 10 MG: 5 TABLET ORAL at 08:08

## 2017-08-04 RX ADMIN — IPRATROPIUM BROMIDE AND ALBUTEROL SULFATE 3 ML: .5; 3 SOLUTION RESPIRATORY (INHALATION) at 12:08

## 2017-08-04 RX ADMIN — PANTOPRAZOLE SODIUM 40 MG: 40 TABLET, DELAYED RELEASE ORAL at 08:08

## 2017-08-04 RX ADMIN — OXYCODONE HYDROCHLORIDE 10 MG: 5 TABLET ORAL at 11:08

## 2017-08-04 RX ADMIN — ASPIRIN 81 MG: 81 TABLET, COATED ORAL at 08:08

## 2017-08-04 RX ADMIN — PIPERACILLIN SODIUM,TAZOBACTAM SODIUM 4.5 G: 4; .5 INJECTION, POWDER, FOR SOLUTION INTRAVENOUS at 02:08

## 2017-08-04 RX ADMIN — HYDROMORPHONE HYDROCHLORIDE 0.5 MG: 1 INJECTION, SOLUTION INTRAMUSCULAR; INTRAVENOUS; SUBCUTANEOUS at 01:08

## 2017-08-04 RX ADMIN — LEVOTHYROXINE SODIUM 100 MCG: 100 TABLET ORAL at 06:08

## 2017-08-04 RX ADMIN — OXYCODONE HYDROCHLORIDE 10 MG: 5 TABLET ORAL at 10:08

## 2017-08-04 RX ADMIN — OXYCODONE HYDROCHLORIDE 10 MG: 5 TABLET ORAL at 04:08

## 2017-08-04 RX ADMIN — HYDROMORPHONE HYDROCHLORIDE 0.5 MG: 1 INJECTION, SOLUTION INTRAMUSCULAR; INTRAVENOUS; SUBCUTANEOUS at 06:08

## 2017-08-04 RX ADMIN — IPRATROPIUM BROMIDE AND ALBUTEROL SULFATE 3 ML: .5; 3 SOLUTION RESPIRATORY (INHALATION) at 07:08

## 2017-08-04 RX ADMIN — ATORVASTATIN CALCIUM 80 MG: 20 TABLET, FILM COATED ORAL at 08:08

## 2017-08-04 RX ADMIN — ENOXAPARIN SODIUM 40 MG: 100 INJECTION SUBCUTANEOUS at 04:08

## 2017-08-04 RX ADMIN — PIPERACILLIN SODIUM,TAZOBACTAM SODIUM 4.5 G: 4; .5 INJECTION, POWDER, FOR SOLUTION INTRAVENOUS at 08:08

## 2017-08-04 NOTE — PLAN OF CARE
Problem: Patient Care Overview  Goal: Plan of Care Review  Outcome: Ongoing (interventions implemented as appropriate)  Review care plan with patient. Patient verbalized understand. Patient AAO. Reviewed medication with patient. Patient ambulated in monreal. Patient up to chair for several hours. Patient remains free from falls or injury at this time. Midline incision remains intact. KAIDEN output recorded. V/S stable, on room air. IVF infusing. Tolerating clear liquid diet. Pain controlled with pain medication. Frequent rounds made. Call light in reach. Bed in low position. TM

## 2017-08-04 NOTE — OP NOTE
DATE OF PROCEDURE:  08/03/2017    OPERATING SURGEON:  Dallas Quach M.D.    ASSISTANT:  Liberty Lentz M.D. 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    OPERATIVE PROCEDURES:  Left hepatic resection; resection of extrahepatic bile   duct; Eron-Y right hepaticojejunostomy.    PROCEDURE IN DETAIL:  The patient is placed in the supine position on the   operating table.  Adequate general endotracheal anesthesia is induced.  The   abdomen is prepped and draped in sterile fashion and entered through an upper   midline incision.  The abdomen is explored.  There is no ascites.  Peritoneal   surfaces and omentum are unremarkable.  There is a large malignant appearing   mass in the left liver, centered in segment 4.  The background appearance and   texture of the right liver is normal.  The patient has a suprapubic midline   hernia, which is not incarcerated.  No other significant findings are noted.    The gallbladder is removed.  Intraoperative hepatic ultrasound is performed.  No   lesions in the right liver are noted.  The large left liver lesion extends to   the middle hepatic vein.  There is no evidence of hepatic vein tumor thrombus or   portal vein tumor thrombus.  The tumor does appear to be resectable.  The hilar   plate is lowered.  On the left aspect of the hilar plate, it is evident that   the tumor is extending into the soft tissue of the abby hepatis and involves   the hepatic duct bifurcation, but the right hepatic duct above the bifurcation   is soft and does not appear to be involved.  The hepatic artery is isolated.    The right hepatic artery is mobilized to the liver and is not involved by tumor.    The left hepatic artery is divided between 2-0 silk ties with an additional   3-0 silk suture ligature on the proximal stump.   The common bile duct is   encircled with a vessel loop and beneath it, the common portal vein, left portal   vein, and right portal vein are isolated.  The bifurcation itself is normal,   but the left portal vein is involved about 1 cm from the bifurcation.  Clamps   are applied across the left portal vein at its takeoff and the vein is divided   and both stumps are oversewn with 4-0 Prolene.  The common bile duct is then   divided.  Cultures are obtained.  The indwelling stent is removed.  The distal   margin is submitted for frozen section and is benign.  The proximal and distal   stumps are oversewn with 2-0 silk.  The common duct is mobilized up toward the   hepatic hilum.  The right hepatic duct is divided about 1.5 cm above the hepatic   duct hilum at a point where the duct is entirely soft.  A circumferential   proximal margin is obtained and submitted to Pathology and is benign.  This   extends our resection up to the sectoral division into the anterior and   posterior right sectoral divisions.  The liver has demarcated nicely.  I have   previously partially mobilized the right liver to bring it more into the field.    Dissection around the hepatic veins is performed and the space between the   middle and right hepatic vein is opened.  Using the ultrasound and palpation to   guide us, the capsule of the liver is scored to complete a left hepatic   resection including the middle hepatic vein and this will provide a 1 to 1.5 cm   gross margin around the tumor.  Parenchymal transection is carried out using the   LigaSure.  Additional hemostasis is obtained with clips, with 2-0 silk ties,   and with the Bovie.  Portal inflow occlusion of 15 minutes duration is carried   out.  The middle and left hepatic veins are taken using the Endo-ZULEYKA vascular   stapler.  Blood loss is modest.  Low CVP technique is used.  After hemostasis is   assured, we liberalized the patient's intravenous fluids.  The patient was    anemic before the surgery and he does require 2 units of packed red blood cells.    A Eron limb of jejunum is then fashioned and brought in retrocolic fashion to   the hepatic hilum.  Two right hepaticojejunostomies are performed using 5-0 PDS   and loupe magnification anastomosing both the anterior and posterior right   sectoral ducts.  The anastomosis is technically difficult because of the high   location, but it is satisfactory.  Small bowel continuity is restored with an   enteroenterostomy.  The transverse enterotomies are closed with interrupted 3-0   Vicryl.  The mesenteric defect is closed with interrupted 3-0 silk.  The   mesocolon is tacked around the jejunal limb with interrupted 3-0 silk.  The   operative field is inspected.  Hemostasis is good.  A Nu-Knit patch is applied   to the transection margin and the liver.  A 15 Federico drain is brought through a   separate stab incision and left adjacent to our anastomosis and the resection   margin.  This has been a difficult procedure.  The patient has tolerated it   well, but in view of the length and complexity of the procedure, I elected not   to proceed with repair of his suprapubic midline hernia.  We will apply a Velcro   binder to the patient postoperatively to help stabilize his lower abdominal   wall.  Exparel solution is infiltrated into the deep muscular layers on either   side of the incision.  The abdominal incision is closed in layers in the usual   fashion.  A sterile dressing is applied.  The patient tolerates the procedure   well and is brought to the Recovery Room in stable condition.      JODI  dd: 08/03/2017 19:03:51 (CDT)  td: 08/03/2017 19:51:19 (IVANA)  Doc ID   #[id number]  Job ID #[id number]    CC:

## 2017-08-04 NOTE — PLAN OF CARE
Problem: Physical Therapy Goal  Goal: Physical Therapy Goal  Goals to be met by: 2017     Patient will increase functional independence with mobility by performin. Supine to sit with Set-up [place]  2. Sit to supine with Set-up [place]  3. Sit to stand transfer with Supervision  4. Bed to chair transfer with Supervision with/without AD  5. Gait  x 200 feet with Supervision with/without RW  6. Lower extremity exercise program x20 reps per handout, with independence    Outcome: Ongoing (interventions implemented as appropriate)  Goals set

## 2017-08-04 NOTE — PT/OT/SLP EVAL
"Occupational Therapy  Evaluation    Robby Soriano   MRN: [id number]   Admitting Diagnosis: Cholangiocarcinoma at hepatic hilum    OT Date of Treatment: 08/04/17   OT Start Time: 1109  OT Stop Time: 1140  OT Total Time (min): 31 min    Billable Minutes:  Evaluation 15  Self Care/Home Management 16    Diagnosis: Cholangiocarcinoma at hepatic hilum   S/p liver resection    Past Medical History:   Diagnosis Date    Cancer     Prostate/s/p prostatectomy    Coronary artery disease     GERD (gastroesophageal reflux disease)     Hyperlipidemia     Hypertension     Hypothyroidism       Past Surgical History:   Procedure Laterality Date    CAROTID ENDARTERECTOMY Bilateral     CORONARY ARTERY BYPASS GRAFT      PROSTATE SURGERY       General Precautions: Standard, fall  Orthopedic Precautions: N/A  Braces: N/A    Do you have any cultural, spiritual, Mu-ism conflicts, given your current situation?: no     Patient History:  Living Environment  Living Environment Comment: Pt lives w/ wife in a trailer w/ 6 MARIAH and ramp access, walk in shower w/ built in shower seat. Pt was (I) w/ ADL and ambulation. Pt owns 3 prong "chair" cane and rollator but has not been using them.  Equipment Currently Used at Home: none    Dominant hand: right    Subjective:  Communicated with NSG prior to session.  "It's hard to get back in the bed."  Chief Complaint: abd pain  Patient/Family stated goals: to return to home    Pain/Comfort  Pain Rating 1: 3/10  Location 1: abdomen  Pain Addressed 1: Pre-medicate for activity, Reposition, Distraction  Pain Rating Post-Intervention 1: 3/10    Objective:  Patient found with: KAIDEN drain, peripheral IV, pulse ox (continuous)    Cognitive Exam:  Oriented to: Person, Place, Time and Situation  Follows Commands/attention: Follows multistep  commands  Communication: clear/fluent  Memory:  No Deficits noted  Safety awareness/insight to disability: intact  Coping skills/emotional control: Appropriate to " "situation    Sensation:   Intact    Upper Extremity Range of Motion:  Right Upper Extremity: WFL  Left Upper Extremity: WFL    Upper Extremity Strength:  Right Upper Extremity: WFL  Left Upper Extremity: WFL   Strength: WFL    Fine motor coordination:   Intact    Gross motor coordination: WFL    Functional Mobility:  Bed Mobility:  Supine to Sit: Stand by Assistance    Transfers:  Sit <> Stand Assistance: Contact Guard Assistance  Sit <> Stand Assistive Device: Rolling Walker  Bed <> Chair Technique:  (functional mobility)  Bed <> Chair Transfer Assistance: Contact Guard Assistance  Bed <> Chair Assistive Device: Rolling Walker    Activities of Daily Living:  Feeding Level of Assistance: Set-up Assistance  UE Dressing Level of Assistance: Minimum assistance  LE Dressing Level of Assistance: Moderate assistance  Grooming Position: Standing  Grooming Level of Assistance: Contact guard assistance (simulated)      Therapeutic Activities and Exercises:  OT kasia. Pt instructed on safety w/ LBD.    AM-PAC 6 CLICK ADL  How much help from another person does this patient currently need?  1 = Unable, Total/Dependent Assistance  2 = A lot, Maximum/Moderate Assistance  3 = A little, Minimum/Contact Guard/Supervision  4 = None, Modified [place]/Independent    Putting on and taking off regular lower body clothing? : 2  Bathing (including washing, rinsing, drying)?: 3  Toileting, which includes using toilet, bedpan, or urinal? : 3  Putting on and taking off regular upper body clothing?: 3  Taking care of personal grooming such as brushing teeth?: 3  Eating meals?: 4  Total Score: 18    AM-PAC Raw Score CMS "G-Code Modifier Level of Impairment Assistance   6 % Total / Unable   7 - 9 CM 80 - 100% Maximal Assist   10-14 CL 60 - 80% Moderate Assist   15 - 19 CK 40 - 60% Moderate Assist   20 - 22 CJ 20 - 40% Minimal Assist   23 CI 1-20% SBA / CGA   24 CH 0% Independent/ Mod I     Patient left up in chair with all lines " intact, call button in reach and wife present    Assessment:  Robby Soriano is a 77 y.o. 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 Pt will benefit from OT services to increase (I) and safety w/ ADL and t/f's.    Rehab identified problem list/impairments: Rehab identified problem list/impairments: weakness, impaired endurance, impaired self care skills, impaired functional mobilty, gait instability, impaired balance, decreased safety awareness, pain, impaired skin    Rehab potential is good.    Activity tolerance: Good    Discharge recommendations: Discharge Facility/Level Of Care Needs: home with home health     Barriers to discharge: Barriers to Discharge: None    Equipment recommendations:  (TBD)     GOALS:    Occupational Therapy Goals        Problem: Occupational Therapy Goal    Goal Priority Disciplines Outcome Interventions   Occupational Therapy Goal     OT, PT/OT Ongoing (interventions implemented as appropriate)    Description:  Goals to be met by: 2 weeks     Patient will increase functional independence with ADLs by performing:    UE Dressing with Modified [place].  LE Dressing with Supervision.  Grooming while standing with Supervision.  Toileting from bedside commode with Supervision for hygiene and clothing management.   Toilet transfer to bedside commode with Supervision w/ AD.                  PLAN:  Patient to be seen 3 x/week to address the above listed problems via self-care/home management, therapeutic activities, therapeutic exercises  Plan of Care expires: 09/03/17  Plan of Care reviewed with: patient, spouse  ELLEN Harmon  08/04/2017

## 2017-08-04 NOTE — PROGRESS NOTES
Valenzuela removed at 0830. Patient hasn't voided. Bladder scanned 209ml. Paging on-call.  On-call Robby Rutherford ordered  bolus and if no urine in an 1 1/2 place valenzuela.

## 2017-08-04 NOTE — ANESTHESIA POSTPROCEDURE EVALUATION
"Anesthesia Post Evaluation    Patient: Robby Soriano    Procedure(s) Performed: Procedure(s) (LRB):  RESECTION-HEPATIC; Resection Bile Duct (Left)  JERSON-N-Y    Final Anesthesia Type: general  Patient location during evaluation: PACU  Patient participation: Yes- Able to Participate  Level of consciousness: awake and alert  Post-procedure vital signs: reviewed and stable  Pain management: adequate  Airway patency: patent  PONV status at discharge: No PONV  Anesthetic complications: no      Cardiovascular status: blood pressure returned to baseline  Respiratory status: spontaneous ventilation and room air  Hydration status: euvolemic  Follow-up not needed.        Visit Vitals  BP (!) 156/65 (BP Location: Left arm, Patient Position: Lying, BP Method: Automatic)   Pulse 73   Temp 36.6 °C (97.8 °F) (Oral)   Resp 16   Ht 5' 5" (1.651 m)   Wt 83 kg (183 lb)   SpO2 97%   BMI 30.45 kg/m²       Pain/Jeremy Score: Pain Assessment Performed: Yes (8/4/2017  7:20 AM)  Presence of Pain: complains of pain/discomfort (8/4/2017  7:20 AM)  Pain Rating Prior to Med Admin: 6 (8/4/2017  8:11 AM)  Pain Rating Post Med Admin: 0 (8/3/2017  5:29 AM)  Jeremy Score: 9 (8/3/2017  3:00 PM)      "

## 2017-08-04 NOTE — PROGRESS NOTES
[facility]  General Surgery  Progress Note    Subjective:     History of Present Illness:  No notes on file    Post-Op Info:  Procedure(s) (LRB):  RESECTION-HEPATIC; Resection Bile Duct (Left)  ERON-N-Y   1 Day Post-Op     Interval History: Pod 1 s/p hepatic resection, Eron-Y reconstruction. NAEON. Pain controlled, on pathway    Medications:  Continuous Infusions:   sodium chloride 0.9% 125 mL/hr at 08/03/17 1429     Scheduled Meds:   albuterol-ipratropium 2.5mg-0.5mg/3mL  3 mL Nebulization Q6H WAKE    aspirin  81 mg Oral Daily    atorvastatin  80 mg Oral Daily    carvedilol  3.125 mg Oral BID WM    enoxparin  40 mg Subcutaneous Q24H    levothyroxine  100 mcg Oral Daily    magnesium sulfate IVPB  2 g Intravenous Once    pantoprazole  40 mg Oral Daily    piperacillin-tazobactam 4.5 g in dextrose 5 % 100 mL IVPB (ready to mix system)  4.5 g Intravenous Q8H     PRN Meds:albuterol sulfate, albuterol-ipratropium 2.5mg-0.5mg/3mL, diphenhydrAMINE, HYDROmorphone, naloxone, ondansetron, oxycodone, oxycodone, promethazine     Review of patient's allergies indicates:  No Known Allergies  Objective:     Vital Signs (Most Recent):  Temp: 97.8 °F (36.6 °C) (08/04/17 0720)  Pulse: 72 (08/04/17 0720)  Resp: 15 (08/04/17 0720)  BP: (!) 156/65 (08/04/17 0720)  SpO2: 98 % (08/04/17 0720) Vital Signs (24h Range):  Temp:  [97.8 °F (36.6 °C)-98.8 °F (37.1 °C)] 97.8 °F (36.6 °C)  Pulse:  [68-80] 72  Resp:  [10-18] 15  SpO2:  [95 %-100 %] 98 %  BP: (127-174)/(63-82) 156/65  Arterial Line BP: (121)/(35) 121/35     Weight: 83 kg (183 lb)  Body mass index is 30.45 kg/m².    Intake/Output - Last 3 Shifts       08/02 0700 - 08/03 0659 08/03 0700 - 08/04 0659 08/04 0700 - 08/05 0659    P.O.  0     I.V. (mL/kg) 150 (1.8) 5610.4 (67.6)     Blood  700     IV Piggyback  200     Total Intake(mL/kg) 150 (1.8) 6510.4 (78.4)     Urine (mL/kg/hr)  1550 (0.8)     Drains  190 (0.1)     Blood  800 (0.4)     Total Output   2540       Net +150 +3970.4                   Physical Exam  A&O, NAD  RRR  No signs of respiratory distress  ABD: s/AppTTP, non-distended  Dressing c/d/i    Significant Labs:  CBC:   Recent Labs  Lab 08/04/17  0404   WBC 11.51   RBC 3.60*   HGB 10.5*   HCT 32.3*      MCV 90   MCH 29.2   MCHC 32.5     CMP:   Recent Labs  Lab 08/04/17  0404   *   CALCIUM 8.6*   ALBUMIN 2.8*   PROT 5.8*      K 4.7   CO2 21*      BUN 22   CREATININE 1.2   ALKPHOS 67   *   *   BILITOT 1.5*       Significant Diagnostics:  I have reviewed all pertinent imaging results/findings within the past 24 hours.    Assessment/Plan:     Coronary artery disease involving coronary bypass graft of native heart without angina pectoris    Home meds        Essential hypertension    monitoring  Will resume home meds if needed        Liver mass, left lobe    S/p hepatic resection, hepaticoj  Pathway D1    - d/c valenzuela,, IVF @ 80, d/c central line  - CLD today  - OOB with Pt/OT  - DVT/GI ppx              Robby Rutherford MD  General Surgery  [facility]

## 2017-08-04 NOTE — PLAN OF CARE
Problem: Patient Care Overview  Goal: Plan of Care Review  Outcome: Ongoing (interventions implemented as appropriate)  POC reviewed with pt and spouse, both acknowledged understanding. Pt AAO x 4 during the shift. Pt remains free of falls/injuries. Pt on telemetry remains SR. Pt tolerating NPO diet. Pt's incisional pain controlled with prescribed meds, pca pump. Pt voids per urinal and output recorded. KAIDEN drain bloody output recorded. Pt wearing SCD pumps and was instructed to use the IS. No acute events throughout shift. No distress noted, will continue to monitor.

## 2017-08-04 NOTE — SUBJECTIVE & OBJECTIVE
Interval History: Pod 1 s/p hepatic resection, Eron-Y reconstruction. NAEON. Pain controlled, on pathway    Medications:  Continuous Infusions:   sodium chloride 0.9% 125 mL/hr at 08/03/17 1429     Scheduled Meds:   albuterol-ipratropium 2.5mg-0.5mg/3mL  3 mL Nebulization Q6H WAKE    aspirin  81 mg Oral Daily    atorvastatin  80 mg Oral Daily    carvedilol  3.125 mg Oral BID WM    enoxparin  40 mg Subcutaneous Q24H    levothyroxine  100 mcg Oral Daily    magnesium sulfate IVPB  2 g Intravenous Once    pantoprazole  40 mg Oral Daily    piperacillin-tazobactam 4.5 g in dextrose 5 % 100 mL IVPB (ready to mix system)  4.5 g Intravenous Q8H     PRN Meds:albuterol sulfate, albuterol-ipratropium 2.5mg-0.5mg/3mL, diphenhydrAMINE, HYDROmorphone, naloxone, ondansetron, oxycodone, oxycodone, promethazine     Review of patient's allergies indicates:  No Known Allergies  Objective:     Vital Signs (Most Recent):  Temp: 97.8 °F (36.6 °C) (08/04/17 0720)  Pulse: 72 (08/04/17 0720)  Resp: 15 (08/04/17 0720)  BP: (!) 156/65 (08/04/17 0720)  SpO2: 98 % (08/04/17 0720) Vital Signs (24h Range):  Temp:  [97.8 °F (36.6 °C)-98.8 °F (37.1 °C)] 97.8 °F (36.6 °C)  Pulse:  [68-80] 72  Resp:  [10-18] 15  SpO2:  [95 %-100 %] 98 %  BP: (127-174)/(63-82) 156/65  Arterial Line BP: (121)/(35) 121/35     Weight: 83 kg (183 lb)  Body mass index is 30.45 kg/m².    Intake/Output - Last 3 Shifts       08/02 0700 - 08/03 0659 08/03 0700 - 08/04 0659 08/04 0700 - 08/05 0659    P.O.  0     I.V. (mL/kg) 150 (1.8) 5610.4 (67.6)     Blood  700     IV Piggyback  200     Total Intake(mL/kg) 150 (1.8) 6510.4 (78.4)     Urine (mL/kg/hr)  1550 (0.8)     Drains  190 (0.1)     Blood  800 (0.4)     Total Output   2540      Net +150 +3970.4                   Physical Exam  A&O, NAD  RRR  No signs of respiratory distress  ABD: s/AppTTP, non-distended  Dressing c/d/i    Significant Labs:  CBC:   Recent Labs  Lab 08/04/17  0404   WBC 11.51   RBC 3.60*   HGB  10.5*   HCT 32.3*      MCV 90   MCH 29.2   MCHC 32.5     CMP:   Recent Labs  Lab 08/04/17  0404   *   CALCIUM 8.6*   ALBUMIN 2.8*   PROT 5.8*      K 4.7   CO2 21*      BUN 22   CREATININE 1.2   ALKPHOS 67   *   *   BILITOT 1.5*       Significant Diagnostics:  I have reviewed all pertinent imaging results/findings within the past 24 hours.

## 2017-08-04 NOTE — PLAN OF CARE
Patient is a 77 year old male admitted from home and underwent Left Liver Resection, Resection of Extrahepatic Bile Duct, Eron Y Right Hepaticojejunostomy 8/3/2017.  Patient is expected to discharge home with no needs +/- 8/8/2017.    Patient lives in a one story home with a ramp to enter with his wife, Allyn.  Patient's son will drive them home and will obtain anything his parents need after discharge.  Education on pain control, IS, activity and nutrition done and [facility] Healthcare Packet given with understanding verbalized.  Will continue to follow for needs.    PCP  Lyla Bryan MD  [address] [facility] [place] [address]  [phone]      [facility] - [place], [place] - [address]  [address]  [place] [address]  Phone: [phone] Fax: [phone]      Extended Emergency Contact Information  Primary Emergency Contact: BoAllyn  Address: [address] [place] [address] [facility]  Home Phone: [phone]  Relation: Spouse  Secondary Emergency Contact: Robby Canas Jr  Address: [address] [place] [address] [place]  Mobile Phone: [phone]  Relation: Son       08/04/17 1306   Discharge Assessment   Assessment Type Discharge Planning Assessment   Confirmed/corrected address and phone number on facesheet? Yes   Assessment information obtained from? Patient   Expected Length of Stay (days) 5   Communicated expected length of stay with patient/caregiver yes   Prior to hospitilization cognitive status: Alert/Oriented   Prior to hospitalization functional status: Independent   Current cognitive status: Alert/Oriented   Current Functional Status: Independent;Assistive Equipment;Needs Assistance   Arrived From home or self-care   Lives With spouse   Able to Return to Prior Arrangements yes   Is patient able to care for self after discharge? Yes   How many people do you have in your home that can  help with your care after discharge? 1   Who are your caregiver(s) and their phone number(s)? wife   Patient's perception of discharge disposition home or selfcare   Does the patient currently use HME? No   Equipment Currently Used at Home cane, quad;rollator   Do you have any problems affording any of your prescribed medications? No   Is the patient taking medications as prescribed? yes   Do you have any financial concerns preventing you from receiving the healthcare you need? No   Does the patient have transportation to healthcare appointments? Yes   Transportation Available family or friend will provide   Discharge Plan A Home with family   Discharge Plan B Home with family;Home Health   Patient/Family In Agreement With Plan yes

## 2017-08-04 NOTE — PT/OT/SLP EVAL
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  S/p liver resection    Past Medical History:   Diagnosis Date    Cancer     Prostate/s/p prostatectomy    Coronary artery disease     GERD (gastroesophageal reflux disease)     Hyperlipidemia     Hypertension     Hypothyroidism       Past Surgical History:   Procedure Laterality Date    CAROTID ENDARTERECTOMY Bilateral     CORONARY ARTERY BYPASS GRAFT      PROSTATE SURGERY         Referring physician: Liberty Lentz MD  Date referred to PT: 8/3/2017    General Precautions: Standard, fall  Orthopedic Precautions: N/A   Braces:              Patient History:  Lives With: spouse  Living Arrangements: mobile home  Home Accessibility: stairs to enter home (ramp access)  Number of Stairs to Enter Home: 5  Stair Railings at Home: outside, present at both sides  Living Environment Comment: Pt.'s spouse available to assist pt. as needed.  Equipment Currently Used at Home: rollator (built-in shower seat)      Previous Level of Function:  Ambulation Skills: independent  Transfer Skills: independent  ADL Skills: independent  Work/Leisure Activity: independent    Subjective:  Communicated with nursing prior to session.    Chief Complaint: abd. pain  Patient goals: to go home    Pain/Comfort  Pain Rating 1: 3/10  Location 1: abdomen  Pain Addressed 1: Pre-medicate for activity, Reposition, Cessation of Activity  Pain Rating Post-Intervention 1: 3/10      Objective:   Patient found with: KAIDEN drain, peripheral IV, telemetry, pulse ox (continuous)     Cognitive Exam:  Oriented to: Person, Place, Time and Situation    Follows Commands/attention: Follows multistep  commands  Communication: clear/fluent  Safety  awareness/insight to disability: intact    Physical Exam:  Postural examination/scapula alignment: No postural abnormalities identified    Skin integrity: Visible skin intact  Edema: None noted     Sensation:   Intact    Upper Extremity Range of Motion:  Right Upper Extremity: WFL  Left Upper Extremity: WFL    Upper Extremity Strength:  Right Upper Extremity: WFL  Left Upper Extremity: WFL    Lower Extremity Range of Motion:  Right Lower Extremity: WFL  Left Lower Extremity: WFL    Lower Extremity Strength:  Right Lower Extremity: WFL  Left Lower Extremity: WFL     Fine motor coordination:  Intact    Gross motor coordination: WFL    Functional Mobility:  Bed Mobility:  Rolling/Turning Right: Stand by assistance  Scooting/Bridging: Stand by Assistance  Supine to Sit: Stand by Assistance    Transfers:  Sit <> Stand Assistance: Contact Guard Assistance  Sit <> Stand Assistive Device: Rolling Walker  Bed <> Chair Technique: Stand Pivot  Bed <> Chair Assistance: Contact Guard Assistance  Bed <> Chair Assistive Device: Rolling Walker    Gait:   Gait Distance: 150'  Assistance 1: Contact Guard Assistance  Gait Assistive Device: Rolling walker  Gait Pattern: swing-through gait  Gait Deviation(s): decreased katherine, decreased stride length    Stairs:      Balance:   Static Sit: GOOD: Takes MODERATE challenges from all directions  Dynamic Sit: FAIR+: Maintains balance through MINIMAL excursions of active trunk motion  Static Stand: GOOD: Takes MODERATE challenges from all directions  Dynamic stand: FAIR: Needs CONTACT GUARD during gait    Therapeutic Activities and Exercises:  Discussed PT POC    AM-PAC 6 CLICK MOBILITY  How much help from another person does this patient currently need?   1 = Unable, Total/Dependent Assistance  2 = A lot, Maximum/Moderate Assistance  3 = A little, Minimum/Contact Guard/Supervision  4 = None, Modified Stark/Independent    Turning over in bed (including adjusting bedclothes, sheets and  blankets)?: 4  Sitting down on and standing up from a chair with arms (e.g., wheelchair, bedside commode, etc.): 3  Moving from lying on back to sitting on the side of the bed?: 3  Moving to and from a bed to a chair (including a wheelchair)?: 3  Need to walk in hospital room?: 3  Climbing 3-5 steps with a railing?: 3  Total Score: 19     AM-PAC Raw Score CMS G-Code Modifier Level of Impairment Assistance   6 % Total / Unable   7 - 9 CM 80 - 100% Maximal Assist   10 - 14 CL 60 - 80% Moderate Assist   15 - 19 CK 40 - 60% Moderate Assist   20 - 22 CJ 20 - 40% Minimal Assist   23 CI 1-20% SBA / CGA   24 CH 0% Independent/ Mod I     Patient left up in chair with all lines intact and call button in reach.    Assessment:   Robby Soriano is a 77 y.o. 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 pain. Pt. cooperative and tolerated treatment well. Pt. would benefit from continued PT to increase strength/endurance and improve functional mobility.    Rehab identified problem list/impairments: Rehab identified problem list/impairments: weakness, impaired endurance, impaired self care skills, impaired functional mobilty, gait instability, impaired balance, pain    Rehab potential is good.    Activity tolerance: Good    Discharge recommendations: Discharge Facility/Level Of Care Needs: home     Barriers to discharge: Barriers to Discharge: None    Equipment recommendations: Equipment Needed After Discharge: none     GOALS:    Physical Therapy Goals        Problem: Physical Therapy Goal    Goal Priority Disciplines Outcome Goal Variances Interventions   Physical Therapy Goal     PT/OT, PT Ongoing (interventions implemented as appropriate)     Description:  Goals to be met by: 2017     Patient will increase functional independence with mobility by performin. Supine to sit with Set-up Eaton  2. Sit to supine with Set-up Eaton  3. Sit to stand transfer  with Supervision  4. Bed to chair transfer with Supervision with/without AD  5. Gait  x 200 feet with Supervision with/without RW  6. Lower extremity exercise program x20 reps per handout, with independence                      PLAN:    Patient to be seen 3 x/week to address the above listed problems via gait training, therapeutic activities, therapeutic exercises  Plan of Care expires: 09/03/17  Plan of Care reviewed with: patient, spouse    Functional Assessment Tool Used: AM-PAC  Score: 19  Functional Limitation: Mobility: Walking and moving around  Mobility: Walking and Moving Around Current Status (): CK  Mobility: Walking and Moving Around Goal Status (): URI Vela, PT  08/04/2017

## 2017-08-04 NOTE — ASSESSMENT & PLAN NOTE
S/p hepatic resection, hepaticoj  Pathway D1    - d/c valenzuela,, IVF @ 80, d/c central line  - CLD today  - OOB with Pt/OT  - DVT/GI ppx

## 2017-08-05 PROBLEM — N99.89 POSTOPERATIVE URINARY RETENTION: Status: ACTIVE | Noted: 2017-08-05

## 2017-08-05 PROBLEM — R33.8 POSTOPERATIVE URINARY RETENTION: Status: ACTIVE | Noted: 2017-08-05

## 2017-08-05 LAB
ALBUMIN SERPL BCP-MCNC: 2.6 G/DL
ALP SERPL-CCNC: 80 U/L
ALT SERPL W/O P-5'-P-CCNC: 802 U/L
ANION GAP SERPL CALC-SCNC: 7 MMOL/L
AST SERPL-CCNC: 510 U/L
BACTERIA SPEC AEROBE CULT: NORMAL
BASOPHILS # BLD AUTO: 0.04 K/UL
BASOPHILS NFR BLD: 0.4 %
BILIRUB FLD-MCNC: 9.6 MG/DL
BILIRUB SERPL-MCNC: 1.6 MG/DL
BODY FLUID SOURCE, BILIRUBIN: NORMAL
BUN SERPL-MCNC: 22 MG/DL
CALCIUM SERPL-MCNC: 8.2 MG/DL
CHLORIDE SERPL-SCNC: 107 MMOL/L
CO2 SERPL-SCNC: 23 MMOL/L
CREAT SERPL-MCNC: 1 MG/DL
DIFFERENTIAL METHOD: ABNORMAL
EOSINOPHIL # BLD AUTO: 0 K/UL
EOSINOPHIL NFR BLD: 0.3 %
ERYTHROCYTE [DISTWIDTH] IN BLOOD BY AUTOMATED COUNT: 15 %
EST. GFR  (AFRICAN AMERICAN): >60 ML/MIN/1.73 M^2
EST. GFR  (NON AFRICAN AMERICAN): >60 ML/MIN/1.73 M^2
GLUCOSE SERPL-MCNC: 125 MG/DL
HCT VFR BLD AUTO: 29.8 %
HGB BLD-MCNC: 9.8 G/DL
LYMPHOCYTES # BLD AUTO: 0.9 K/UL
LYMPHOCYTES NFR BLD: 8.2 %
MAGNESIUM SERPL-MCNC: 1.9 MG/DL
MCH RBC QN AUTO: 28.7 PG
MCHC RBC AUTO-ENTMCNC: 32.9 G/DL
MCV RBC AUTO: 87 FL
MONOCYTES # BLD AUTO: 1.3 K/UL
MONOCYTES NFR BLD: 11.4 %
NEUTROPHILS # BLD AUTO: 8.8 K/UL
NEUTROPHILS NFR BLD: 79.3 %
PHOSPHATE SERPL-MCNC: 2.6 MG/DL
PLATELET # BLD AUTO: 137 K/UL
PMV BLD AUTO: 10.4 FL
POTASSIUM SERPL-SCNC: 4.4 MMOL/L
PROT SERPL-MCNC: 5.7 G/DL
RBC # BLD AUTO: 3.41 M/UL
SODIUM SERPL-SCNC: 137 MMOL/L
WBC # BLD AUTO: 11.03 K/UL

## 2017-08-05 PROCEDURE — 84100 ASSAY OF PHOSPHORUS: CPT

## 2017-08-05 PROCEDURE — 63600175 PHARM REV CODE 636 W HCPCS: Performed by: STUDENT IN AN ORGANIZED HEALTH CARE EDUCATION/TRAINING PROGRAM

## 2017-08-05 PROCEDURE — 94760 N-INVAS EAR/PLS OXIMETRY 1: CPT

## 2017-08-05 PROCEDURE — S0028 INJECTION, FAMOTIDINE, 20 MG: HCPCS | Performed by: STUDENT IN AN ORGANIZED HEALTH CARE EDUCATION/TRAINING PROGRAM

## 2017-08-05 PROCEDURE — 25000003 PHARM REV CODE 250: Performed by: SURGERY

## 2017-08-05 PROCEDURE — 36415 COLL VENOUS BLD VENIPUNCTURE: CPT

## 2017-08-05 PROCEDURE — 82247 BILIRUBIN TOTAL: CPT

## 2017-08-05 PROCEDURE — 63600175 PHARM REV CODE 636 W HCPCS: Performed by: SURGERY

## 2017-08-05 PROCEDURE — 27000221 HC OXYGEN, UP TO 24 HOURS

## 2017-08-05 PROCEDURE — 94799 UNLISTED PULMONARY SVC/PX: CPT

## 2017-08-05 PROCEDURE — 80053 COMPREHEN METABOLIC PANEL: CPT

## 2017-08-05 PROCEDURE — 25000242 PHARM REV CODE 250 ALT 637 W/ HCPCS: Performed by: NURSE PRACTITIONER

## 2017-08-05 PROCEDURE — 85025 COMPLETE CBC W/AUTO DIFF WBC: CPT

## 2017-08-05 PROCEDURE — 20600001 HC STEP DOWN PRIVATE ROOM

## 2017-08-05 PROCEDURE — 94640 AIRWAY INHALATION TREATMENT: CPT

## 2017-08-05 PROCEDURE — 25000003 PHARM REV CODE 250: Performed by: STUDENT IN AN ORGANIZED HEALTH CARE EDUCATION/TRAINING PROGRAM

## 2017-08-05 PROCEDURE — 83735 ASSAY OF MAGNESIUM: CPT

## 2017-08-05 RX ORDER — DEXTROSE MONOHYDRATE, SODIUM CHLORIDE, AND POTASSIUM CHLORIDE 50; 1.49; 4.5 G/1000ML; G/1000ML; G/1000ML
INJECTION, SOLUTION INTRAVENOUS CONTINUOUS
Status: DISCONTINUED | OUTPATIENT
Start: 2017-08-05 | End: 2017-08-08

## 2017-08-05 RX ORDER — LABETALOL HYDROCHLORIDE 5 MG/ML
15 INJECTION, SOLUTION INTRAVENOUS EVERY 4 HOURS PRN
Status: DISCONTINUED | OUTPATIENT
Start: 2017-08-05 | End: 2017-08-08 | Stop reason: HOSPADM

## 2017-08-05 RX ORDER — FAMOTIDINE 10 MG/ML
20 INJECTION INTRAVENOUS 2 TIMES DAILY
Status: DISCONTINUED | OUTPATIENT
Start: 2017-08-05 | End: 2017-08-08 | Stop reason: HOSPADM

## 2017-08-05 RX ORDER — KETOROLAC TROMETHAMINE 15 MG/ML
30 INJECTION, SOLUTION INTRAMUSCULAR; INTRAVENOUS EVERY 8 HOURS
Status: COMPLETED | OUTPATIENT
Start: 2017-08-05 | End: 2017-08-06

## 2017-08-05 RX ORDER — LANOLIN ALCOHOL/MO/W.PET/CERES
800 CREAM (GRAM) TOPICAL DAILY
Status: COMPLETED | OUTPATIENT
Start: 2017-08-05 | End: 2017-08-05

## 2017-08-05 RX ORDER — SODIUM,POTASSIUM PHOSPHATES 280-250MG
2 POWDER IN PACKET (EA) ORAL ONCE
Status: COMPLETED | OUTPATIENT
Start: 2017-08-05 | End: 2017-08-05

## 2017-08-05 RX ORDER — LABETALOL HYDROCHLORIDE 5 MG/ML
15 INJECTION, SOLUTION INTRAVENOUS EVERY 4 HOURS
Status: DISCONTINUED | OUTPATIENT
Start: 2017-08-05 | End: 2017-08-05

## 2017-08-05 RX ORDER — HYDRALAZINE HYDROCHLORIDE 20 MG/ML
10 INJECTION INTRAMUSCULAR; INTRAVENOUS EVERY 6 HOURS PRN
Status: DISCONTINUED | OUTPATIENT
Start: 2017-08-05 | End: 2017-08-08 | Stop reason: HOSPADM

## 2017-08-05 RX ADMIN — TAMSULOSIN HYDROCHLORIDE 0.4 MG: 0.4 CAPSULE ORAL at 09:08

## 2017-08-05 RX ADMIN — MAGNESIUM OXIDE TAB 400 MG (241.3 MG ELEMENTAL MG) 800 MG: 400 (241.3 MG) TAB at 09:08

## 2017-08-05 RX ADMIN — KETOROLAC TROMETHAMINE 30 MG: 15 INJECTION, SOLUTION INTRAMUSCULAR; INTRAVENOUS at 09:08

## 2017-08-05 RX ADMIN — OXYCODONE HYDROCHLORIDE 5 MG: 5 TABLET ORAL at 06:08

## 2017-08-05 RX ADMIN — ASPIRIN 81 MG: 81 TABLET, COATED ORAL at 09:08

## 2017-08-05 RX ADMIN — PIPERACILLIN SODIUM,TAZOBACTAM SODIUM 4.5 G: 4; .5 INJECTION, POWDER, FOR SOLUTION INTRAVENOUS at 11:08

## 2017-08-05 RX ADMIN — CARVEDILOL 3.12 MG: 3.12 TABLET, FILM COATED ORAL at 09:08

## 2017-08-05 RX ADMIN — LEVOTHYROXINE SODIUM 100 MCG: 100 TABLET ORAL at 06:08

## 2017-08-05 RX ADMIN — OXYCODONE HYDROCHLORIDE 10 MG: 5 TABLET ORAL at 10:08

## 2017-08-05 RX ADMIN — SODIUM CHLORIDE: 0.9 INJECTION, SOLUTION INTRAVENOUS at 04:08

## 2017-08-05 RX ADMIN — DEXTROSE MONOHYDRATE, SODIUM CHLORIDE, AND POTASSIUM CHLORIDE: 50; 4.5; 1.49 INJECTION, SOLUTION INTRAVENOUS at 05:08

## 2017-08-05 RX ADMIN — IPRATROPIUM BROMIDE AND ALBUTEROL SULFATE 3 ML: .5; 3 SOLUTION RESPIRATORY (INHALATION) at 07:08

## 2017-08-05 RX ADMIN — FAMOTIDINE 20 MG: 10 INJECTION, SOLUTION INTRAVENOUS at 09:08

## 2017-08-05 RX ADMIN — CARVEDILOL 3.12 MG: 3.12 TABLET, FILM COATED ORAL at 05:08

## 2017-08-05 RX ADMIN — HYDROMORPHONE HYDROCHLORIDE 0.5 MG: 1 INJECTION, SOLUTION INTRAMUSCULAR; INTRAVENOUS; SUBCUTANEOUS at 12:08

## 2017-08-05 RX ADMIN — IPRATROPIUM BROMIDE AND ALBUTEROL SULFATE 3 ML: .5; 3 SOLUTION RESPIRATORY (INHALATION) at 01:08

## 2017-08-05 RX ADMIN — POTASSIUM & SODIUM PHOSPHATES POWDER PACK 280-160-250 MG 2 PACKET: 280-160-250 PACK at 09:08

## 2017-08-05 RX ADMIN — PANTOPRAZOLE SODIUM 40 MG: 40 TABLET, DELAYED RELEASE ORAL at 09:08

## 2017-08-05 RX ADMIN — PIPERACILLIN SODIUM,TAZOBACTAM SODIUM 4.5 G: 4; .5 INJECTION, POWDER, FOR SOLUTION INTRAVENOUS at 04:08

## 2017-08-05 RX ADMIN — ENOXAPARIN SODIUM 40 MG: 100 INJECTION SUBCUTANEOUS at 04:08

## 2017-08-05 RX ADMIN — ATORVASTATIN CALCIUM 80 MG: 20 TABLET, FILM COATED ORAL at 09:08

## 2017-08-05 RX ADMIN — KETOROLAC TROMETHAMINE 30 MG: 15 INJECTION, SOLUTION INTRAMUSCULAR; INTRAVENOUS at 04:08

## 2017-08-05 RX ADMIN — LABETALOL HYDROCHLORIDE 15 MG: 5 INJECTION INTRAVENOUS at 10:08

## 2017-08-05 RX ADMIN — PIPERACILLIN SODIUM,TAZOBACTAM SODIUM 4.5 G: 4; .5 INJECTION, POWDER, FOR SOLUTION INTRAVENOUS at 12:08

## 2017-08-05 NOTE — PLAN OF CARE
Problem: Patient Care Overview  Goal: Plan of Care Review  Outcome: Ongoing (interventions implemented as appropriate)  POC reviewed with patient, who verbalized understanding. AAOx4. Remains free of falls and injury. VSS, BP >160, MD aware, order >170 BP hydralazine order in place. Abdominal ML with Telfa in place, x1 KAIDEN drain, with serosanginous drainage. Ibarra in place, due to, urinary retention.Tolerating clear liquid diet. Denies nausea. Moderate incisional pain, due to, falling onto bed earlier in the day, sight still intact, pain controlled with PRN pain medication.  IVF infusing.  No BM. Up with assist to BC. Telemetry monitor in room. TEDS and SCDS in place. No acute events. No distress noted. Spouse at bedside. Call bell in reach. Will continue to monitor.

## 2017-08-05 NOTE — PLAN OF CARE
Problem: Patient Care Overview  Goal: Plan of Care Review  Outcome: Ongoing (interventions implemented as appropriate)  Patient A/Ox4. Care plan reviewed with patient-verbalizes understanding. VSS. Patient NPO d/t gastric distention-NGT placed today (Left nostril), patient tolerated well. Pain well managed with medication. Incision clean, dry, and intact. KAIDEN drain intact and patent-small output. Patient no longer on Tele. Patient moved from PCU room 607 to room 604-ambulated with walker and 1 assist. Patient ambulates/up with assist. Patient remains free of falls. Spouse at bedside. Patient states no other needs at this time. WCTM.

## 2017-08-05 NOTE — PROGRESS NOTES
[facility]  General Surgery  Progress Note    Subjective:     History of Present Illness:  No notes on file    Post-Op Info:  Procedure(s) (LRB):  RESECTION-HEPATIC; Resection Bile Duct (Left)  ERON-N-Y   2 Days Post-Op     Interval History: Pod 2 s/p hepatic resection, Eron-Y reconstruction. NAEON. Pain controlled, on pathway    Medications:  Continuous Infusions:   sodium chloride 0.9% 125 mL/hr at 08/05/17 0451     Scheduled Meds:   albuterol-ipratropium 2.5mg-0.5mg/3mL  3 mL Nebulization Q6H WAKE    aspirin  81 mg Oral Daily    atorvastatin  80 mg Oral Daily    carvedilol  3.125 mg Oral BID WM    enoxparin  40 mg Subcutaneous Q24H    levothyroxine  100 mcg Oral Daily    magnesium oxide  800 mg Oral Daily    pantoprazole  40 mg Oral Daily    piperacillin-tazobactam 4.5 g in dextrose 5 % 100 mL IVPB (ready to mix system)  4.5 g Intravenous Q8H    potassium, sodium phosphates  2 packet Oral Once    tamsulosin  0.4 mg Oral Daily     PRN Meds:albuterol sulfate, albuterol-ipratropium 2.5mg-0.5mg/3mL, diphenhydrAMINE, hydrALAZINE, HYDROmorphone, naloxone, ondansetron, oxycodone, oxycodone, promethazine     Review of patient's allergies indicates:  No Known Allergies  Objective:     Vital Signs (Most Recent):  Temp: 97.5 °F (36.4 °C) (08/05/17 0807)  Pulse: 90 (08/05/17 0709)  Resp: 19 (08/05/17 0709)  BP: (!) 152/69 (08/05/17 0807)  SpO2: 97 % (08/05/17 0709) Vital Signs (24h Range):  Temp:  [97.3 °F (36.3 °C)-99 °F (37.2 °C)] 97.5 °F (36.4 °C)  Pulse:  [] 90  Resp:  [16-20] 19  SpO2:  [90 %-98 %] 97 %  BP: ()/(52-76) 152/69     Weight: 83 kg (183 lb)  Body mass index is 30.45 kg/m².    Intake/Output - Last 3 Shifts       08/03 0700 - 08/04 0659 08/04 0700 - 08/05 0659 08/05 0700 - 08/06 0659    P.O. 0 1320     I.V. (mL/kg) 5610.4 (67.6) 1979.2 (23.8)     Blood 700      IV Piggyback 200 200     Total Intake(mL/kg) 6510.4 (78.4) 3499.2 (42.2)     Urine (mL/kg/hr) 1550 (0.8)  675 (0.3) 200 (1.3)    Drains 190 (0.1) 190 (0.1)     Blood 800 (0.4)      Total Output 2540 865 200    Net +3970.4 +2634.2 -200                 Physical Exam    A&O, NAD  RRR  No signs of respiratory distress  ABD: s/AppTTP, non-distended  Incision c/d/i    Significant Labs:  CBC:     Recent Labs  Lab 08/05/17  0504   WBC 11.03   RBC 3.41*   HGB 9.8*   HCT 29.8*   *   MCV 87   MCH 28.7   MCHC 32.9     CMP:     Recent Labs  Lab 08/05/17  0504   *   CALCIUM 8.2*   ALBUMIN 2.6*   PROT 5.7*      K 4.4   CO2 23      BUN 22   CREATININE 1.0   ALKPHOS 80   *   *   BILITOT 1.6*       Significant Diagnostics:  I have reviewed all pertinent imaging results/findings within the past 24 hours.    Assessment/Plan:     Postoperative urinary retention    Restarted valenzuela    - flomax        Coronary artery disease involving coronary bypass graft of native heart without angina pectoris    Home meds        Essential hypertension    monitoring  Will resume home meds if needed        Liver mass, left lobe    S/p hepatic resection, hepatico-j  Pathway D2    - KAIDEN output darker today, will send for bili  - FLD   - restarted valenzuela  - CLD today  - OOB with Pt/OT  - DVT/GI ppx              Robby Rutherford MD  General Surgery  [facility]

## 2017-08-05 NOTE — SUBJECTIVE & OBJECTIVE
Interval History: Pod 2 s/p hepatic resection, Eron-Y reconstruction. NAEON. Pain controlled, on pathway    Medications:  Continuous Infusions:   sodium chloride 0.9% 125 mL/hr at 08/05/17 0451     Scheduled Meds:   albuterol-ipratropium 2.5mg-0.5mg/3mL  3 mL Nebulization Q6H WAKE    aspirin  81 mg Oral Daily    atorvastatin  80 mg Oral Daily    carvedilol  3.125 mg Oral BID WM    enoxparin  40 mg Subcutaneous Q24H    levothyroxine  100 mcg Oral Daily    magnesium oxide  800 mg Oral Daily    pantoprazole  40 mg Oral Daily    piperacillin-tazobactam 4.5 g in dextrose 5 % 100 mL IVPB (ready to mix system)  4.5 g Intravenous Q8H    potassium, sodium phosphates  2 packet Oral Once    tamsulosin  0.4 mg Oral Daily     PRN Meds:albuterol sulfate, albuterol-ipratropium 2.5mg-0.5mg/3mL, diphenhydrAMINE, hydrALAZINE, HYDROmorphone, naloxone, ondansetron, oxycodone, oxycodone, promethazine     Review of patient's allergies indicates:  No Known Allergies  Objective:     Vital Signs (Most Recent):  Temp: 97.5 °F (36.4 °C) (08/05/17 0807)  Pulse: 90 (08/05/17 0709)  Resp: 19 (08/05/17 0709)  BP: (!) 152/69 (08/05/17 0807)  SpO2: 97 % (08/05/17 0709) Vital Signs (24h Range):  Temp:  [97.3 °F (36.3 °C)-99 °F (37.2 °C)] 97.5 °F (36.4 °C)  Pulse:  [] 90  Resp:  [16-20] 19  SpO2:  [90 %-98 %] 97 %  BP: ()/(52-76) 152/69     Weight: 83 kg (183 lb)  Body mass index is 30.45 kg/m².    Intake/Output - Last 3 Shifts       08/03 0700 - 08/04 0659 08/04 0700 - 08/05 0659 08/05 0700 - 08/06 0659    P.O. 0 1320     I.V. (mL/kg) 5610.4 (67.6) 1979.2 (23.8)     Blood 700      IV Piggyback 200 200     Total Intake(mL/kg) 6510.4 (78.4) 3499.2 (42.2)     Urine (mL/kg/hr) 1550 (0.8) 675 (0.3) 200 (1.3)    Drains 190 (0.1) 190 (0.1)     Blood 800 (0.4)      Total Output 2540 865 200    Net +3970.4 +2634.2 -200                 Physical Exam    A&O, NAD  RRR  No signs of respiratory distress  ABD: s/AppTTP,  non-distended  Incision c/d/i    Significant Labs:  CBC:     Recent Labs  Lab 08/05/17  0504   WBC 11.03   RBC 3.41*   HGB 9.8*   HCT 29.8*   *   MCV 87   MCH 28.7   MCHC 32.9     CMP:     Recent Labs  Lab 08/05/17  0504   *   CALCIUM 8.2*   ALBUMIN 2.6*   PROT 5.7*      K 4.4   CO2 23      BUN 22   CREATININE 1.0   ALKPHOS 80   *   *   BILITOT 1.6*       Significant Diagnostics:  I have reviewed all pertinent imaging results/findings within the past 24 hours.

## 2017-08-06 PROBLEM — K56.7 POSTOPERATIVE ILEUS: Status: ACTIVE | Noted: 2017-08-06

## 2017-08-06 PROBLEM — K91.89 POSTOPERATIVE ILEUS: Status: ACTIVE | Noted: 2017-08-06

## 2017-08-06 LAB
ALBUMIN SERPL BCP-MCNC: 2.4 G/DL
ALP SERPL-CCNC: 84 U/L
ALT SERPL W/O P-5'-P-CCNC: 514 U/L
ANION GAP SERPL CALC-SCNC: 7 MMOL/L
AST SERPL-CCNC: 190 U/L
BASOPHILS # BLD AUTO: 0.03 K/UL
BASOPHILS NFR BLD: 0.3 %
BILIRUB SERPL-MCNC: 1.1 MG/DL
BUN SERPL-MCNC: 17 MG/DL
CALCIUM SERPL-MCNC: 8.2 MG/DL
CHLORIDE SERPL-SCNC: 106 MMOL/L
CO2 SERPL-SCNC: 24 MMOL/L
CREAT SERPL-MCNC: 0.9 MG/DL
DIFFERENTIAL METHOD: ABNORMAL
EOSINOPHIL # BLD AUTO: 0.4 K/UL
EOSINOPHIL NFR BLD: 4.4 %
ERYTHROCYTE [DISTWIDTH] IN BLOOD BY AUTOMATED COUNT: 14.7 %
EST. GFR  (AFRICAN AMERICAN): >60 ML/MIN/1.73 M^2
EST. GFR  (NON AFRICAN AMERICAN): >60 ML/MIN/1.73 M^2
GLUCOSE SERPL-MCNC: 125 MG/DL
HCT VFR BLD AUTO: 28.6 %
HGB BLD-MCNC: 9.4 G/DL
LYMPHOCYTES # BLD AUTO: 1.2 K/UL
LYMPHOCYTES NFR BLD: 12.1 %
MAGNESIUM SERPL-MCNC: 1.9 MG/DL
MCH RBC QN AUTO: 29.1 PG
MCHC RBC AUTO-ENTMCNC: 32.9 G/DL
MCV RBC AUTO: 89 FL
MONOCYTES # BLD AUTO: 0.9 K/UL
MONOCYTES NFR BLD: 8.9 %
NEUTROPHILS # BLD AUTO: 7.3 K/UL
NEUTROPHILS NFR BLD: 74.1 %
PHOSPHATE SERPL-MCNC: 1.8 MG/DL
PLATELET # BLD AUTO: 117 K/UL
PMV BLD AUTO: 10.2 FL
POCT GLUCOSE: 159 MG/DL (ref 70–110)
POTASSIUM SERPL-SCNC: 3.9 MMOL/L
PROT SERPL-MCNC: 5.5 G/DL
RBC # BLD AUTO: 3.23 M/UL
SODIUM SERPL-SCNC: 137 MMOL/L
WBC # BLD AUTO: 9.84 K/UL

## 2017-08-06 PROCEDURE — 20600001 HC STEP DOWN PRIVATE ROOM

## 2017-08-06 PROCEDURE — 25000003 PHARM REV CODE 250: Performed by: SURGERY

## 2017-08-06 PROCEDURE — 84100 ASSAY OF PHOSPHORUS: CPT

## 2017-08-06 PROCEDURE — 25000003 PHARM REV CODE 250: Performed by: STUDENT IN AN ORGANIZED HEALTH CARE EDUCATION/TRAINING PROGRAM

## 2017-08-06 PROCEDURE — 63600175 PHARM REV CODE 636 W HCPCS: Performed by: SURGERY

## 2017-08-06 PROCEDURE — 85025 COMPLETE CBC W/AUTO DIFF WBC: CPT

## 2017-08-06 PROCEDURE — S0028 INJECTION, FAMOTIDINE, 20 MG: HCPCS | Performed by: STUDENT IN AN ORGANIZED HEALTH CARE EDUCATION/TRAINING PROGRAM

## 2017-08-06 PROCEDURE — 83735 ASSAY OF MAGNESIUM: CPT

## 2017-08-06 PROCEDURE — 80053 COMPREHEN METABOLIC PANEL: CPT

## 2017-08-06 PROCEDURE — 63600175 PHARM REV CODE 636 W HCPCS: Performed by: STUDENT IN AN ORGANIZED HEALTH CARE EDUCATION/TRAINING PROGRAM

## 2017-08-06 PROCEDURE — 36415 COLL VENOUS BLD VENIPUNCTURE: CPT

## 2017-08-06 RX ORDER — BISACODYL 10 MG
10 SUPPOSITORY, RECTAL RECTAL ONCE
Status: COMPLETED | OUTPATIENT
Start: 2017-08-06 | End: 2017-08-06

## 2017-08-06 RX ORDER — ASPIRIN 81 MG/1
81 TABLET ORAL DAILY
Status: DISCONTINUED | OUTPATIENT
Start: 2017-08-06 | End: 2017-08-08 | Stop reason: HOSPADM

## 2017-08-06 RX ORDER — ATORVASTATIN CALCIUM 20 MG/1
80 TABLET, FILM COATED ORAL DAILY
Status: DISCONTINUED | OUTPATIENT
Start: 2017-08-06 | End: 2017-08-08 | Stop reason: HOSPADM

## 2017-08-06 RX ORDER — ISOSORBIDE DINITRATE 10 MG/1
20 TABLET ORAL 3 TIMES DAILY
Status: DISCONTINUED | OUTPATIENT
Start: 2017-08-06 | End: 2017-08-06

## 2017-08-06 RX ADMIN — KETOROLAC TROMETHAMINE 30 MG: 15 INJECTION, SOLUTION INTRAMUSCULAR; INTRAVENOUS at 06:08

## 2017-08-06 RX ADMIN — PIPERACILLIN SODIUM,TAZOBACTAM SODIUM 4.5 G: 4; .5 INJECTION, POWDER, FOR SOLUTION INTRAVENOUS at 05:08

## 2017-08-06 RX ADMIN — LEVOTHYROXINE SODIUM 100 MCG: 100 TABLET ORAL at 06:08

## 2017-08-06 RX ADMIN — DEXTROSE MONOHYDRATE, SODIUM CHLORIDE, AND POTASSIUM CHLORIDE: 50; 4.5; 1.49 INJECTION, SOLUTION INTRAVENOUS at 12:08

## 2017-08-06 RX ADMIN — BISACODYL 10 MG: 10 SUPPOSITORY RECTAL at 10:08

## 2017-08-06 RX ADMIN — ASPIRIN 81 MG: 81 TABLET, COATED ORAL at 10:08

## 2017-08-06 RX ADMIN — DEXTROSE MONOHYDRATE, SODIUM CHLORIDE, AND POTASSIUM CHLORIDE: 50; 4.5; 1.49 INJECTION, SOLUTION INTRAVENOUS at 10:08

## 2017-08-06 RX ADMIN — ENOXAPARIN SODIUM 40 MG: 100 INJECTION SUBCUTANEOUS at 05:08

## 2017-08-06 RX ADMIN — TAMSULOSIN HYDROCHLORIDE 0.4 MG: 0.4 CAPSULE ORAL at 09:08

## 2017-08-06 RX ADMIN — LABETALOL HYDROCHLORIDE 15 MG: 5 INJECTION INTRAVENOUS at 08:08

## 2017-08-06 RX ADMIN — SODIUM PHOSPHATE, MONOBASIC, MONOHYDRATE 39.99 MMOL: 276; 142 INJECTION, SOLUTION INTRAVENOUS at 11:08

## 2017-08-06 RX ADMIN — CARVEDILOL 3.12 MG: 3.12 TABLET, FILM COATED ORAL at 10:08

## 2017-08-06 RX ADMIN — ATORVASTATIN CALCIUM 80 MG: 20 TABLET, FILM COATED ORAL at 10:08

## 2017-08-06 RX ADMIN — FAMOTIDINE 20 MG: 10 INJECTION, SOLUTION INTRAVENOUS at 10:08

## 2017-08-06 RX ADMIN — PIPERACILLIN SODIUM,TAZOBACTAM SODIUM 4.5 G: 4; .5 INJECTION, POWDER, FOR SOLUTION INTRAVENOUS at 12:08

## 2017-08-06 RX ADMIN — ISOSORBIDE DINITRATE 20 MG: 10 TABLET ORAL at 01:08

## 2017-08-06 RX ADMIN — CARVEDILOL 3.12 MG: 3.12 TABLET, FILM COATED ORAL at 05:08

## 2017-08-06 RX ADMIN — PIPERACILLIN SODIUM,TAZOBACTAM SODIUM 4.5 G: 4; .5 INJECTION, POWDER, FOR SOLUTION INTRAVENOUS at 07:08

## 2017-08-06 RX ADMIN — FAMOTIDINE 20 MG: 10 INJECTION, SOLUTION INTRAVENOUS at 08:08

## 2017-08-06 NOTE — SUBJECTIVE & OBJECTIVE
Interval History: Pod 3 s/p hepatic resection, Eron-Y reconstruction. C/o abdominal distension and belching yesterday, no N/V. KUB showed likely delayed gastric emptying. Now NPO and NG tube. Improved this morning    Medications:  Continuous Infusions:   dextrose 5 % and 0.45 % NaCl with KCl 20 mEq 125 mL/hr at 08/06/17 0011     Scheduled Meds:   aspirin  81 mg Oral Daily    atorvastatin  80 mg Oral Daily    carvedilol  3.125 mg Oral BID WM    enoxparin  40 mg Subcutaneous Q24H    famotidine (PF)  20 mg Intravenous BID    isosorbide dinitrate  20 mg Oral TID    levothyroxine  100 mcg Oral Daily    piperacillin-tazobactam 4.5 g in dextrose 5 % 100 mL IVPB (ready to mix system)  4.5 g Intravenous Q8H    tamsulosin  0.4 mg Oral Daily     PRN Meds:albuterol sulfate, albuterol-ipratropium 2.5mg-0.5mg/3mL, diphenhydrAMINE, hydrALAZINE, HYDROmorphone, labetalol, naloxone, ondansetron, oxycodone, oxycodone, promethazine     Review of patient's allergies indicates:  No Known Allergies  Objective:     Vital Signs (Most Recent):  Temp: 97.6 °F (36.4 °C) (08/06/17 0756)  Pulse: 78 (08/06/17 0756)  Resp: 14 (08/06/17 0756)  BP: (!) 144/66 (08/06/17 0756)  SpO2: 95 % (08/06/17 0756) Vital Signs (24h Range):  Temp:  [97.6 °F (36.4 °C)-99 °F (37.2 °C)] 97.6 °F (36.4 °C)  Pulse:  [] 78  Resp:  [14-18] 14  SpO2:  [93 %-97 %] 95 %  BP: (142-175)/(58-80) 144/66     Weight: 83 kg (183 lb)  Body mass index is 30.45 kg/m².    Intake/Output - Last 3 Shifts       08/04 0700 - 08/05 0659 08/05 0700 - 08/06 0659 08/06 0700 - 08/07 0659    P.O. 1320      I.V. (mL/kg) 1979.2 (23.8) 1375 (16.6)     Blood       IV Piggyback 200 200     Total Intake(mL/kg) 3499.2 (42.2) 1575 (19)     Urine (mL/kg/hr) 675 (0.3) 1775 (0.9)     Drains 190 (0.1) 1240 (0.6)     Blood       Total Output 865 3015      Net +2634.2 -1440                   Physical Exam    A&O, NAD  RRR  No signs of respiratory distress  NGT in place with bilious  output  ABD: s/AppTTP, non-distended  Incision c/d/i    Significant Labs:  CBC:     Recent Labs  Lab 08/06/17  0432   WBC 9.84   RBC 3.23*   HGB 9.4*   HCT 28.6*   *   MCV 89   MCH 29.1   MCHC 32.9     CMP:     Recent Labs  Lab 08/06/17  0432   *   CALCIUM 8.2*   ALBUMIN 2.4*   PROT 5.5*      K 3.9   CO2 24      BUN 17   CREATININE 0.9   ALKPHOS 84   *   *   BILITOT 1.1*       Significant Diagnostics:  I have reviewed all pertinent imaging results/findings within the past 24 hours.

## 2017-08-06 NOTE — PLAN OF CARE
Problem: Patient Care Overview  Goal: Plan of Care Review  Outcome: Ongoing (interventions implemented as appropriate)  Patient A/Ox4. Care plan reviewed with patient-verbalizes understanding. VSS.  No complaints of pain. Incision clean, dry, and intact. KAIDEN drain intact and patent. Moderate ss output. NGT intact and patent. Output moderate. No reports of nausea. Patient on Tele-NSR. Patient's BP down to 90/55 after taking isosorbide,resolved spontaneously after approx 30 min. Patient ambulates/up with assist. Patient remains free of falls. Patient OOB with assistance, ambulates to bathroom. Spouse at bedside. Patient states no other needs at this time. WCTM.

## 2017-08-06 NOTE — PROGRESS NOTES
[facility]  General Surgery  Progress Note    Subjective:     History of Present Illness:  No notes on file    Post-Op Info:  Procedure(s) (LRB):  RESECTION-HEPATIC; Resection Bile Duct (Left)  ERON-N-Y   3 Days Post-Op     Interval History: Pod 3 s/p hepatic resection, Eron-Y reconstruction. C/o abdominal distension and belching yesterday, no N/V. KUB showed likely delayed gastric emptying. Now NPO and NG tube. Improved this morning    Medications:  Continuous Infusions:   dextrose 5 % and 0.45 % NaCl with KCl 20 mEq 125 mL/hr at 08/06/17 0011     Scheduled Meds:   aspirin  81 mg Oral Daily    atorvastatin  80 mg Oral Daily    carvedilol  3.125 mg Oral BID WM    enoxparin  40 mg Subcutaneous Q24H    famotidine (PF)  20 mg Intravenous BID    isosorbide dinitrate  20 mg Oral TID    levothyroxine  100 mcg Oral Daily    piperacillin-tazobactam 4.5 g in dextrose 5 % 100 mL IVPB (ready to mix system)  4.5 g Intravenous Q8H    tamsulosin  0.4 mg Oral Daily     PRN Meds:albuterol sulfate, albuterol-ipratropium 2.5mg-0.5mg/3mL, diphenhydrAMINE, hydrALAZINE, HYDROmorphone, labetalol, naloxone, ondansetron, oxycodone, oxycodone, promethazine     Review of patient's allergies indicates:  No Known Allergies  Objective:     Vital Signs (Most Recent):  Temp: 97.6 °F (36.4 °C) (08/06/17 0756)  Pulse: 78 (08/06/17 0756)  Resp: 14 (08/06/17 0756)  BP: (!) 144/66 (08/06/17 0756)  SpO2: 95 % (08/06/17 0756) Vital Signs (24h Range):  Temp:  [97.6 °F (36.4 °C)-99 °F (37.2 °C)] 97.6 °F (36.4 °C)  Pulse:  [] 78  Resp:  [14-18] 14  SpO2:  [93 %-97 %] 95 %  BP: (142-175)/(58-80) 144/66     Weight: 83 kg (183 lb)  Body mass index is 30.45 kg/m².    Intake/Output - Last 3 Shifts       08/04 0700 - 08/05 0659 08/05 0700 - 08/06 0659 08/06 0700 - 08/07 0659    P.O. 1320      I.V. (mL/kg) 1979.2 (23.8) 1375 (16.6)     Blood       IV Piggyback 200 200     Total Intake(mL/kg) 3499.2 (42.2) 1575 (19)     Urine  (mL/kg/hr) 675 (0.3) 1775 (0.9)     Drains 190 (0.1) 1240 (0.6)     Blood       Total Output 865 3015      Net +2634.2 -1440                   Physical Exam    A&O, NAD  RRR  No signs of respiratory distress  NGT in place with bilious output  ABD: s/AppTTP, non-distended  Incision c/d/i    Significant Labs:  CBC:     Recent Labs  Lab 08/06/17  0432   WBC 9.84   RBC 3.23*   HGB 9.4*   HCT 28.6*   *   MCV 89   MCH 29.1   MCHC 32.9     CMP:     Recent Labs  Lab 08/06/17  0432   *   CALCIUM 8.2*   ALBUMIN 2.4*   PROT 5.5*      K 3.9   CO2 24      BUN 17   CREATININE 0.9   ALKPHOS 84   *   *   BILITOT 1.1*       Significant Diagnostics:  I have reviewed all pertinent imaging results/findings within the past 24 hours.    Assessment/Plan:     Postoperative ileus    Improving on NGT and NPO    - f/u outputs to d/c drain later        Postoperative urinary retention    D/c valenzuela today, f/u voiding  - flomax        Coronary artery disease involving coronary bypass graft of native heart without angina pectoris    Home meds        Essential hypertension    monitoring  Home meds        Liver mass, left lobe    S/p hepatic resection, hepatico-j  Pathway D3,  Now with ileus    - NPO  - NGT  - OOB with Pt/OT  - DVT/GI ppx              Robby Rutherford MD  General Surgery  [facility]

## 2017-08-06 NOTE — ASSESSMENT & PLAN NOTE
S/p hepatic resection, hepatico-j  Pathway D3,  Now with ileus    - NPO  - NGT  - OOB with Pt/OT  - DVT/GI ppx

## 2017-08-06 NOTE — PLAN OF CARE
Problem: Patient Care Overview  Goal: Plan of Care Review  Outcome: Ongoing (interventions implemented as appropriate)  POC reviewed with patient, who verbalized understanding. AAOx4. Remains free of falls and injury. VSS, BP >160, MD aware, Dr. Rutherford, ordered 15mg labetalol PRN. Patient BP < 160 achieved. Abdominal ML with staples intact, x1 KAIDEN drain, with serosanginous drainage. Ibarra in place, due to, urinary retention with positive urine output. NPO with NG placed to suction with moderate light brown output. Denies nausea, belching. Moderate incisional pain relieved with Toradol scheduled.  IVF infusing.  No BM. Up with assist to BC. Telemetry monitor SR. TEDS and SCDS in place. No acute events. No distress noted. Spouse at bedside. Call bell in reach. Will continue to monitor.

## 2017-08-07 LAB
ALBUMIN SERPL BCP-MCNC: 2.4 G/DL
ALP SERPL-CCNC: 82 U/L
ALT SERPL W/O P-5'-P-CCNC: 314 U/L
ANION GAP SERPL CALC-SCNC: 7 MMOL/L
AST SERPL-CCNC: 72 U/L
BACTERIA SPEC ANAEROBE CULT: NORMAL
BASOPHILS # BLD AUTO: 0.04 K/UL
BASOPHILS NFR BLD: 0.5 %
BILIRUB SERPL-MCNC: 1.3 MG/DL
BLD PROD TYP BPU: NORMAL
BLOOD UNIT EXPIRATION DATE: NORMAL
BLOOD UNIT TYPE CODE: 6200
BLOOD UNIT TYPE: NORMAL
BUN SERPL-MCNC: 12 MG/DL
CALCIUM SERPL-MCNC: 8.1 MG/DL
CHLORIDE SERPL-SCNC: 103 MMOL/L
CO2 SERPL-SCNC: 24 MMOL/L
CODING SYSTEM: NORMAL
CREAT SERPL-MCNC: 0.9 MG/DL
CRP SERPL-MCNC: 129.95 MG/L
DIFFERENTIAL METHOD: ABNORMAL
DISPENSE STATUS: NORMAL
EOSINOPHIL # BLD AUTO: 0.3 K/UL
EOSINOPHIL NFR BLD: 3.1 %
ERYTHROCYTE [DISTWIDTH] IN BLOOD BY AUTOMATED COUNT: 15 %
EST. GFR  (AFRICAN AMERICAN): >60 ML/MIN/1.73 M^2
EST. GFR  (NON AFRICAN AMERICAN): >60 ML/MIN/1.73 M^2
GLUCOSE SERPL-MCNC: 137 MG/DL
HCT VFR BLD AUTO: 26.6 %
HGB BLD-MCNC: 9 G/DL
LYMPHOCYTES # BLD AUTO: 1 K/UL
LYMPHOCYTES NFR BLD: 12.3 %
MAGNESIUM SERPL-MCNC: 1.7 MG/DL
MCH RBC QN AUTO: 28.8 PG
MCHC RBC AUTO-ENTMCNC: 33.8 G/DL
MCV RBC AUTO: 85 FL
MONOCYTES # BLD AUTO: 0.8 K/UL
MONOCYTES NFR BLD: 9.5 %
NEUTROPHILS # BLD AUTO: 6 K/UL
NEUTROPHILS NFR BLD: 74.3 %
PHOSPHATE SERPL-MCNC: 1.8 MG/DL
PLATELET # BLD AUTO: 137 K/UL
PMV BLD AUTO: 10.2 FL
POTASSIUM SERPL-SCNC: 3.5 MMOL/L
PROT SERPL-MCNC: 5.4 G/DL
RBC # BLD AUTO: 3.12 M/UL
SODIUM SERPL-SCNC: 134 MMOL/L
TRANS ERYTHROCYTES VOL PATIENT: NORMAL ML
WBC # BLD AUTO: 8 K/UL

## 2017-08-07 PROCEDURE — 25000003 PHARM REV CODE 250: Performed by: SURGERY

## 2017-08-07 PROCEDURE — G8980 MOBILITY D/C STATUS: HCPCS | Mod: CK

## 2017-08-07 PROCEDURE — 97110 THERAPEUTIC EXERCISES: CPT

## 2017-08-07 PROCEDURE — 20600001 HC STEP DOWN PRIVATE ROOM

## 2017-08-07 PROCEDURE — S0028 INJECTION, FAMOTIDINE, 20 MG: HCPCS | Performed by: STUDENT IN AN ORGANIZED HEALTH CARE EDUCATION/TRAINING PROGRAM

## 2017-08-07 PROCEDURE — G8979 MOBILITY GOAL STATUS: HCPCS | Mod: CJ

## 2017-08-07 PROCEDURE — 80053 COMPREHEN METABOLIC PANEL: CPT

## 2017-08-07 PROCEDURE — 84100 ASSAY OF PHOSPHORUS: CPT

## 2017-08-07 PROCEDURE — 85025 COMPLETE CBC W/AUTO DIFF WBC: CPT

## 2017-08-07 PROCEDURE — 63600175 PHARM REV CODE 636 W HCPCS: Performed by: STUDENT IN AN ORGANIZED HEALTH CARE EDUCATION/TRAINING PROGRAM

## 2017-08-07 PROCEDURE — 36415 COLL VENOUS BLD VENIPUNCTURE: CPT

## 2017-08-07 PROCEDURE — 63600175 PHARM REV CODE 636 W HCPCS: Performed by: SURGERY

## 2017-08-07 PROCEDURE — 25000003 PHARM REV CODE 250: Performed by: STUDENT IN AN ORGANIZED HEALTH CARE EDUCATION/TRAINING PROGRAM

## 2017-08-07 PROCEDURE — 97116 GAIT TRAINING THERAPY: CPT

## 2017-08-07 PROCEDURE — 97535 SELF CARE MNGMENT TRAINING: CPT

## 2017-08-07 PROCEDURE — 86141 C-REACTIVE PROTEIN HS: CPT

## 2017-08-07 PROCEDURE — 83735 ASSAY OF MAGNESIUM: CPT

## 2017-08-07 RX ORDER — MAGNESIUM SULFATE HEPTAHYDRATE 40 MG/ML
2 INJECTION, SOLUTION INTRAVENOUS ONCE
Status: COMPLETED | OUTPATIENT
Start: 2017-08-07 | End: 2017-08-07

## 2017-08-07 RX ADMIN — DEXTROSE MONOHYDRATE, SODIUM CHLORIDE, AND POTASSIUM CHLORIDE: 50; 4.5; 1.49 INJECTION, SOLUTION INTRAVENOUS at 01:08

## 2017-08-07 RX ADMIN — PIPERACILLIN SODIUM,TAZOBACTAM SODIUM 4.5 G: 4; .5 INJECTION, POWDER, FOR SOLUTION INTRAVENOUS at 03:08

## 2017-08-07 RX ADMIN — SODIUM PHOSPHATE, MONOBASIC, MONOHYDRATE 39.99 MMOL: 276; 142 INJECTION, SOLUTION INTRAVENOUS at 09:08

## 2017-08-07 RX ADMIN — CARVEDILOL 3.12 MG: 3.12 TABLET, FILM COATED ORAL at 08:08

## 2017-08-07 RX ADMIN — PIPERACILLIN SODIUM,TAZOBACTAM SODIUM 4.5 G: 4; .5 INJECTION, POWDER, FOR SOLUTION INTRAVENOUS at 12:08

## 2017-08-07 RX ADMIN — HYDRALAZINE HYDROCHLORIDE 10 MG: 20 INJECTION INTRAMUSCULAR; INTRAVENOUS at 12:08

## 2017-08-07 RX ADMIN — ASPIRIN 81 MG: 81 TABLET, COATED ORAL at 08:08

## 2017-08-07 RX ADMIN — HYDROMORPHONE HYDROCHLORIDE 0.5 MG: 1 INJECTION, SOLUTION INTRAMUSCULAR; INTRAVENOUS; SUBCUTANEOUS at 04:08

## 2017-08-07 RX ADMIN — ENOXAPARIN SODIUM 40 MG: 100 INJECTION SUBCUTANEOUS at 04:08

## 2017-08-07 RX ADMIN — CARVEDILOL 3.12 MG: 3.12 TABLET, FILM COATED ORAL at 04:08

## 2017-08-07 RX ADMIN — OXYCODONE HYDROCHLORIDE 10 MG: 5 TABLET ORAL at 08:08

## 2017-08-07 RX ADMIN — OXYCODONE HYDROCHLORIDE 10 MG: 5 TABLET ORAL at 01:08

## 2017-08-07 RX ADMIN — TAMSULOSIN HYDROCHLORIDE 0.4 MG: 0.4 CAPSULE ORAL at 08:08

## 2017-08-07 RX ADMIN — ATORVASTATIN CALCIUM 80 MG: 20 TABLET, FILM COATED ORAL at 08:08

## 2017-08-07 RX ADMIN — MAGNESIUM SULFATE IN WATER 2 G: 40 INJECTION, SOLUTION INTRAVENOUS at 08:08

## 2017-08-07 RX ADMIN — LEVOTHYROXINE SODIUM 100 MCG: 100 TABLET ORAL at 06:08

## 2017-08-07 RX ADMIN — FAMOTIDINE 20 MG: 10 INJECTION, SOLUTION INTRAVENOUS at 08:08

## 2017-08-07 RX ADMIN — HYDRALAZINE HYDROCHLORIDE 10 MG: 20 INJECTION INTRAMUSCULAR; INTRAVENOUS at 01:08

## 2017-08-07 NOTE — PROGRESS NOTES
[facility]  General Surgery  Progress Note    Subjective:     History of Present Illness:  No notes on file    Post-Op Info:  Procedure(s) (LRB):  RESECTION-HEPATIC; Resection Bile Duct (Left)  ERON-N-Y   4 Days Post-Op     Interval History: Pod 4 s/p hepatic resection, Eron-Y reconstruction. Improved on NGT with lower output today. GUILHERME DLE CASTILLO    Medications:  Continuous Infusions:   dextrose 5 % and 0.45 % NaCl with KCl 20 mEq 100 mL/hr at 08/06/17 2216     Scheduled Meds:   aspirin  81 mg Oral Daily    atorvastatin  80 mg Oral Daily    carvedilol  3.125 mg Oral BID WM    enoxparin  40 mg Subcutaneous Q24H    famotidine (PF)  20 mg Intravenous BID    levothyroxine  100 mcg Oral Daily    piperacillin-tazobactam 4.5 g in dextrose 5 % 100 mL IVPB (ready to mix system)  4.5 g Intravenous Q8H    tamsulosin  0.4 mg Oral Daily     PRN Meds:albuterol sulfate, albuterol-ipratropium 2.5mg-0.5mg/3mL, diphenhydrAMINE, hydrALAZINE, HYDROmorphone, labetalol, naloxone, ondansetron, oxycodone, oxycodone, promethazine     Review of patient's allergies indicates:  No Known Allergies  Objective:     Vital Signs (Most Recent):  Temp: 98 °F (36.7 °C) (08/07/17 0426)  Pulse: 74 (08/07/17 0700)  Resp: 16 (08/07/17 0426)  BP: (!) 150/69 (08/07/17 0426)  SpO2: 95 % (08/07/17 0426) Vital Signs (24h Range):  Temp:  [97.4 °F (36.3 °C)-98.1 °F (36.7 °C)] 98 °F (36.7 °C)  Pulse:  [71-89] 74  Resp:  [14-17] 16  SpO2:  [93 %-96 %] 95 %  BP: ()/(54-91) 150/69     Weight: 83 kg (183 lb)  Body mass index is 30.45 kg/m².    Intake/Output - Last 3 Shifts       08/05 0700 - 08/06 0659 08/06 0700 - 08/07 0659 08/07 0700 - 08/08 0659    P.O.  0     I.V. (mL/kg) 1375 (16.6) 1370 (16.5)     IV Piggyback 200      Total Intake(mL/kg) 1575 (19) 1370 (16.5)     Urine (mL/kg/hr) 1775 (0.9) 1075 (0.5)     Drains 1240 (0.6) 505 (0.3)     Stool  0 (0)     Total Output 3015 1580      Net -1440 -210             Urine Occurrence   1 x     Stool Occurrence  1 x           Physical Exam    A&O, NAD  RRR  No signs of respiratory distress  NGT in place with bilious output  ABD: s/AppTTP, non-distended  Incision c/d/i    Significant Labs:  CBC:     Recent Labs  Lab 08/07/17  0500   WBC 8.00   RBC 3.12*   HGB 9.0*   HCT 26.6*   *   MCV 85   MCH 28.8   MCHC 33.8     CMP:     Recent Labs  Lab 08/07/17  0500   *   CALCIUM 8.1*   ALBUMIN 2.4*   PROT 5.4*   *   K 3.5   CO2 24      BUN 12   CREATININE 0.9   ALKPHOS 82   *   AST 72*   BILITOT 1.3*       Significant Diagnostics:  I have reviewed all pertinent imaging results/findings within the past 24 hours.    Assessment/Plan:     Postoperative ileus    resolving  -D/c NGT today        Postoperative urinary retention    resolved  - flomax        Coronary artery disease involving coronary bypass graft of native heart without angina pectoris    Home meds        Essential hypertension    monitoring  Home meds        Liver mass, left lobe    S/p hepatic resection, hepatico-j  Pathway D4,  Now with resolving ileus    - d/c NGT today  - Will resume CLD if tolerating NG out  - OOB with Pt/OT  - DVT/GI ppx              Robby Rutherford MD  General Surgery  [facility]

## 2017-08-07 NOTE — ASSESSMENT & PLAN NOTE
S/p hepatic resection, hepatico-j  Pathway D4,  Now with resolving ileus    - d/c NGT today  - Will resume CLD if tolerating NG out  - OOB with Pt/OT  - DVT/GI ppx

## 2017-08-07 NOTE — SUBJECTIVE & OBJECTIVE
Interval History: Pod 4 s/p hepatic resection, Eron-Y reconstruction. Improved on NGT with lower output today. GUILHERME DEL CASTILLO    Medications:  Continuous Infusions:   dextrose 5 % and 0.45 % NaCl with KCl 20 mEq 100 mL/hr at 08/06/17 2216     Scheduled Meds:   aspirin  81 mg Oral Daily    atorvastatin  80 mg Oral Daily    carvedilol  3.125 mg Oral BID WM    enoxparin  40 mg Subcutaneous Q24H    famotidine (PF)  20 mg Intravenous BID    levothyroxine  100 mcg Oral Daily    piperacillin-tazobactam 4.5 g in dextrose 5 % 100 mL IVPB (ready to mix system)  4.5 g Intravenous Q8H    tamsulosin  0.4 mg Oral Daily     PRN Meds:albuterol sulfate, albuterol-ipratropium 2.5mg-0.5mg/3mL, diphenhydrAMINE, hydrALAZINE, HYDROmorphone, labetalol, naloxone, ondansetron, oxycodone, oxycodone, promethazine     Review of patient's allergies indicates:  No Known Allergies  Objective:     Vital Signs (Most Recent):  Temp: 98 °F (36.7 °C) (08/07/17 0426)  Pulse: 74 (08/07/17 0700)  Resp: 16 (08/07/17 0426)  BP: (!) 150/69 (08/07/17 0426)  SpO2: 95 % (08/07/17 0426) Vital Signs (24h Range):  Temp:  [97.4 °F (36.3 °C)-98.1 °F (36.7 °C)] 98 °F (36.7 °C)  Pulse:  [71-89] 74  Resp:  [14-17] 16  SpO2:  [93 %-96 %] 95 %  BP: ()/(54-91) 150/69     Weight: 83 kg (183 lb)  Body mass index is 30.45 kg/m².    Intake/Output - Last 3 Shifts       08/05 0700 - 08/06 0659 08/06 0700 - 08/07 0659 08/07 0700 - 08/08 0659    P.O.  0     I.V. (mL/kg) 1375 (16.6) 1370 (16.5)     IV Piggyback 200      Total Intake(mL/kg) 1575 (19) 1370 (16.5)     Urine (mL/kg/hr) 1775 (0.9) 1075 (0.5)     Drains 1240 (0.6) 505 (0.3)     Stool  0 (0)     Total Output 3015 1580      Net -1440 -210             Urine Occurrence  1 x     Stool Occurrence  1 x           Physical Exam    A&O, NAD  RRR  No signs of respiratory distress  NGT in place with bilious output  ABD: s/AppTTP, non-distended  Incision c/d/i    Significant Labs:  CBC:     Recent Labs  Lab  08/07/17  0500   WBC 8.00   RBC 3.12*   HGB 9.0*   HCT 26.6*   *   MCV 85   MCH 28.8   MCHC 33.8     CMP:     Recent Labs  Lab 08/07/17  0500   *   CALCIUM 8.1*   ALBUMIN 2.4*   PROT 5.4*   *   K 3.5   CO2 24      BUN 12   CREATININE 0.9   ALKPHOS 82   *   AST 72*   BILITOT 1.3*       Significant Diagnostics:  I have reviewed all pertinent imaging results/findings within the past 24 hours.

## 2017-08-07 NOTE — PT/OT/SLP PROGRESS
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      Pain/Comfort  Pain Rating 1:  (pt. did not rate)    Objective:   Patient found with: peripheral IV    Functional Mobility:  Bed Mobility:   Rolling/Turning to Left:  (pt. found in bedside chair)    Transfers:  Sit <> Stand Assistance: Stand By Assistance  Sit <> Stand Assistive Device: Rolling Walker  Bed <> Chair Technique: Stand Pivot  Bed <> Chair Assistance: Stand By Assistance  Bed <> Chair Assistive Device: Rolling Walker    Gait:   Gait Distance: 250'  Assistance 1: Stand by Assistance, Contact Guard Assistance  Gait Assistive Device: Rolling walker  Gait Pattern: swing-through gait  Gait Deviation(s): decreased katherine, decreased stride length    Stairs:      Balance:   Static Sit: FAIR+: Able to take MINIMAL challenges from all directions  Dynamic Sit: FAIR+: Maintains balance through MINIMAL excursions of active trunk motion  Static Stand: FAIR+: Takes MINIMAL challenges from all directions  Dynamic stand: FAIR: Needs CONTACT GUARD during gait     Therapeutic Activities and Exercises:  Pt. Performed (B) LE therex x10 reps seated in bedside chair with AROM     AM-PAC 6 CLICK MOBILITY  How much help from another person does this patient currently need?   1 = Unable, Total/Dependent Assistance  2 = A lot, Maximum/Moderate Assistance  3 = A little, Minimum/Contact Guard/Supervision  4 = None, Modified [place]/Independent    Turning over in bed (including adjusting bedclothes, sheets and blankets)?: 4  Sitting down on and  standing up from a chair with arms (e.g., wheelchair, bedside commode, etc.): 3  Moving from lying on back to sitting on the side of the bed?: 3  Moving to and from a bed to a chair (including a wheelchair)?: 3  Need to walk in hospital room?: 3  Climbing 3-5 steps with a railing?: 3  Total Score: 19    AM-PAC Raw Score CMS G-Code Modifier Level of Impairment Assistance   6 % Total / Unable   7 - 9 CM 80 - 100% Maximal Assist   10 - 14 CL 60 - 80% Moderate Assist   15 - 19 CK 40 - 60% Moderate Assist   20 - 22 CJ 20 - 40% Minimal Assist   23 CI 1-20% SBA / CGA   24 CH 0% Independent/ Mod I     Patient left up in chair with all lines intact and call button in reach.    Assessment:  Robby Soriano is a 77 y.o. 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 Pt. cooperative and tolerated treatment well. Pt. progressing with mobility. Pt. would benefit from continued PT to address functional deficits.    Rehab identified problem list/impairments: Rehab identified problem list/impairments: weakness, impaired endurance, pain, impaired functional mobilty, gait instability, impaired balance    Rehab potential is good.    Activity tolerance: Fair    Discharge recommendations: Discharge Facility/Level Of Care Needs: home     Barriers to discharge: Barriers to Discharge: None    Equipment recommendations: Equipment Needed After Discharge: none     GOALS:    Physical Therapy Goals        Problem: Physical Therapy Goal    Goal Priority Disciplines Outcome Goal Variances Interventions   Physical Therapy Goal     PT/OT, PT Ongoing (interventions implemented as appropriate)     Description:  Goals to be met by: 2017     Patient will increase functional independence with mobility by performin. Supine to sit with Set-up [place]  2. Sit to supine with Set-up [place]  3. Sit to stand transfer with Supervision  4. Bed to chair transfer with  Supervision with/without AD  5. Gait  x 200 feet with Supervision with/without RW  6. Lower extremity exercise program x20 reps per handout, with independence                      PLAN:    Patient to be seen 3 x/week  to address the above listed problems via gait training, therapeutic activities, therapeutic exercises  Plan of Care expires: 09/03/17  Plan of Care reviewed with: patient, spouse         Robby Centenokins, PT  08/07/2017

## 2017-08-07 NOTE — PLAN OF CARE
Patient is POD #4 from Liver Resection, Eron Y Hepaticojejunostomy.  Patient with resolving ileus from the weekend, NG tube discontinued, will start on clear liquids later today.  Patient is expected to discharge home with no needs +/- 8/9/2017.  Will continue to follow for needs.       08/07/17 1235   Right Care Assessment   Can the patient answer the patient profile reliably? Yes, cognitively intact   How often would a person be available to care for the patient? Whenever needed   Describe the patient's ability to walk at the present time. Minor restrictions or changes   How does the patient rate their overall health at the present time? Good   Number of comorbid conditions (as recorded on the chart) Three   During the past month, has the patient often been bothered by feeling down, depressed or hopeless? No   During the past month, has the patient often been bothered by little interest or pleasure in doing things? No

## 2017-08-07 NOTE — PLAN OF CARE
Problem: Physical Therapy Goal  Goal: Physical Therapy Goal  Goals to be met by: 2017     Patient will increase functional independence with mobility by performin. Supine to sit with Set-up [place]  2. Sit to supine with Set-up [place]  3. Sit to stand transfer with Supervision  4. Bed to chair transfer with Supervision with/without AD  5. Gait  x 200 feet with Supervision with/without RW  6. Lower extremity exercise program x20 reps per handout, with independence     Outcome: Ongoing (interventions implemented as appropriate)  Pt. Progressing towards goals

## 2017-08-07 NOTE — PLAN OF CARE
Problem: Patient Care Overview  Goal: Plan of Care Review  Outcome: Ongoing (interventions implemented as appropriate)  POC reviewed with patient, who verbalized understanding. AAOx4. Remains free of falls and injury. VSS, BP >160, PRN hypertension medication given. Patient BP < 160 achieved. Abdominal ML with staples intact, x1 KAIDEN drain, with serosanginous drainage. NPO with NG placed to suction with small light brown output. Denies nausea. Patient states back pain relieved with position change.  IVF infusing. x1 BM. Up with assist to BC. Telemetry monitor SR. TEDS and SCDS in place. No acute events. No distress noted. Spouse at bedside. Call bell in reach. Will continue to monitor.

## 2017-08-07 NOTE — PT/OT/SLP PROGRESS
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  Im ready to walk.  I cant eat.  Pain/Comfort  Pain Rating 1: 3/10  Location 1: abdomen  Pain Addressed 1: Reposition, Distraction  Pain Rating Post-Intervention 1: 3/10    Objective:  Patient found with: peripheral IV, KAIDEN drain     Functional Mobility:     Transfers:   Sit <> Stand Assistance: Contact Guard Assistance  Sit <> Stand Assistive Device: Rolling Walker    Functional Ambulation: Patient with functional mobility in room, bathroom and hallway with rolling walker.   Activities of Daily Living:     UE Dressing Level of Assistance: Contact guard  LE Dressing Level of Assistance: Minimum assistance  Grooming Position: Standing at sink  Grooming Level of Assistance: Contact guard assistance   Therapeutic Activities and Exercises:  Patient provided therex of 50 alternating forward punches, 40 alternating  upward punches and extended arm alphabet with good return demonstration.       AM-PAC 6 CLICK ADL   How much help from another person does this patient currently need?   1 = Unable, Total/Dependent Assistance  2 = A lot, Maximum/Moderate Assistance  3 = A little, Minimum/Contact Guard/Supervision  4 = None, Modified [place]/Independent    Putting on and taking off regular lower body clothing? : 3  Bathing (including washing, rinsing, drying)?: 3  Toileting, which includes using toilet, bedpan, or urinal? : 3  Putting on and taking off regular upper body clothing?:  "3  Taking care of personal grooming such as brushing teeth?: 3  Eating meals?: 4  Total Score: 19     AM-PAC Raw Score CMS "G-Code Modifier Level of Impairment Assistance   6 % Total / Unable   7 - 8 CM 80 - 100% Maximal Assist   9-13 CL 60 - 80% Moderate Assist   14 - 19 CK 40 - 60% Moderate Assist   20 - 22 CJ 20 - 40% Minimal Assist   23 CI 1-20% SBA / CGA   24 CH 0% Independent/ Mod I       Patient left up in chair with all lines intact, call button in reach and wife present    ASSESSMENT:  Robby Soriano is a 77 y.o. 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 Patient continues to require assistance with lower body dressing in sitting and standing.    Rehab identified problem list/impairments: Rehab identified problem list/impairments: weakness, impaired endurance, impaired self care skills, impaired functional mobilty, gait instability, impaired balance, decreased safety awareness, pain, impaired skin    Rehab potential is good.    Activity tolerance: Good    Discharge recommendations: Discharge Facility/Level Of Care Needs: home     Barriers to discharge: Barriers to Discharge: None    Equipment recommendations: none     GOALS:    Occupational Therapy Goals        Problem: Occupational Therapy Goal    Goal Priority Disciplines Outcome Interventions   Occupational Therapy Goal     OT, PT/OT Ongoing (interventions implemented as appropriate)    Description:  Goals to be met by: 2 weeks     Patient will increase functional independence with ADLs by performing:    UE Dressing with Modified [place].  LE Dressing with Supervision.  Grooming while standing with Supervision.  Toileting from bedside commode with Supervision for hygiene and clothing management.   Toilet transfer to bedside commode with Supervision w/ AD.                      Plan:  Patient to be seen 3 x/week to address the above listed problems via self-care/home management, therapeutic activities, therapeutic " exercises  Plan of Care expires: 09/03/17  Plan of Care reviewed with: patient, spouse         Blessing Roca, OT  08/07/2017

## 2017-08-07 NOTE — PLAN OF CARE
Problem: Patient Care Overview  Goal: Plan of Care Review  Outcome: Ongoing (interventions implemented as appropriate)  Patient education provided, spouse and patient verbalize understanding. AAOx4, VSS. Midline incision with staples CDI. KAIDEN drain with serosanguinous output recorded. Ambulated with patient in hallway X3 during shift, remains free of any falls/trauma. Patient tolerating clear liquid diet well, reports no N/V. Patient states pain as well controlled with PRN meds. Patient states no other needs at this time, call light in reach, WCTM.

## 2017-08-07 NOTE — PLAN OF CARE
Problem: Occupational Therapy Goal  Goal: Occupational Therapy Goal  Goals to be met by: 2 weeks     Patient will increase functional independence with ADLs by performing:    UE Dressing with Modified [place].  LE Dressing with Supervision.  Grooming while standing with Supervision.  Toileting from bedside commode with Supervision for hygiene and clothing management.   Toilet transfer to bedside commode with Supervision w/ AD.     Outcome: Ongoing (interventions implemented as appropriate)  Goals remain appropriate.

## 2017-08-08 VITALS
WEIGHT: 183 LBS | DIASTOLIC BLOOD PRESSURE: 62 MMHG | BODY MASS INDEX: 30.49 KG/M2 | RESPIRATION RATE: 16 BRPM | TEMPERATURE: 98 F | HEIGHT: 65 IN | SYSTOLIC BLOOD PRESSURE: 138 MMHG | HEART RATE: 88 BPM | OXYGEN SATURATION: 94 %

## 2017-08-08 PROBLEM — N99.89 POSTOPERATIVE URINARY RETENTION: Status: RESOLVED | Noted: 2017-08-05 | Resolved: 2017-08-08

## 2017-08-08 PROBLEM — K83.1 MALIGNANT OBSTRUCTIVE JAUNDICE: Status: RESOLVED | Noted: 2017-08-03 | Resolved: 2017-08-08

## 2017-08-08 PROBLEM — K91.89 POSTOPERATIVE ILEUS: Status: RESOLVED | Noted: 2017-08-06 | Resolved: 2017-08-08

## 2017-08-08 PROBLEM — K83.1 BILE DUCT STRICTURE: Status: RESOLVED | Noted: 2017-07-13 | Resolved: 2017-08-08

## 2017-08-08 PROBLEM — R16.0 LIVER MASS, LEFT LOBE: Status: RESOLVED | Noted: 2017-07-13 | Resolved: 2017-08-08

## 2017-08-08 PROBLEM — R33.8 POSTOPERATIVE URINARY RETENTION: Status: RESOLVED | Noted: 2017-08-05 | Resolved: 2017-08-08

## 2017-08-08 PROBLEM — C80.1 MALIGNANT OBSTRUCTIVE JAUNDICE: Status: RESOLVED | Noted: 2017-08-03 | Resolved: 2017-08-08

## 2017-08-08 PROBLEM — K56.7 POSTOPERATIVE ILEUS: Status: RESOLVED | Noted: 2017-08-06 | Resolved: 2017-08-08

## 2017-08-08 LAB
ALBUMIN SERPL BCP-MCNC: 2.3 G/DL
ALP SERPL-CCNC: 93 U/L
ALT SERPL W/O P-5'-P-CCNC: 223 U/L
ANION GAP SERPL CALC-SCNC: 6 MMOL/L
AST SERPL-CCNC: 49 U/L
BASOPHILS # BLD AUTO: 0.02 K/UL
BASOPHILS NFR BLD: 0.3 %
BILIRUB SERPL-MCNC: 1.4 MG/DL
BUN SERPL-MCNC: 10 MG/DL
CALCIUM SERPL-MCNC: 8.2 MG/DL
CHLORIDE SERPL-SCNC: 106 MMOL/L
CO2 SERPL-SCNC: 26 MMOL/L
CREAT SERPL-MCNC: 0.8 MG/DL
DIFFERENTIAL METHOD: ABNORMAL
EOSINOPHIL # BLD AUTO: 0.6 K/UL
EOSINOPHIL NFR BLD: 7.6 %
ERYTHROCYTE [DISTWIDTH] IN BLOOD BY AUTOMATED COUNT: 15.1 %
EST. GFR  (AFRICAN AMERICAN): >60 ML/MIN/1.73 M^2
EST. GFR  (NON AFRICAN AMERICAN): >60 ML/MIN/1.73 M^2
GLUCOSE SERPL-MCNC: 115 MG/DL
HCT VFR BLD AUTO: 28.2 %
HGB BLD-MCNC: 9.2 G/DL
LYMPHOCYTES # BLD AUTO: 1.2 K/UL
LYMPHOCYTES NFR BLD: 15 %
MAGNESIUM SERPL-MCNC: 2 MG/DL
MCH RBC QN AUTO: 28.8 PG
MCHC RBC AUTO-ENTMCNC: 32.6 G/DL
MCV RBC AUTO: 88 FL
MONOCYTES # BLD AUTO: 0.9 K/UL
MONOCYTES NFR BLD: 11 %
NEUTROPHILS # BLD AUTO: 5.3 K/UL
NEUTROPHILS NFR BLD: 66 %
PHOSPHATE SERPL-MCNC: 2.7 MG/DL
PLATELET # BLD AUTO: 161 K/UL
PMV BLD AUTO: 10.2 FL
POTASSIUM SERPL-SCNC: 3.6 MMOL/L
PROT SERPL-MCNC: 5.4 G/DL
RBC # BLD AUTO: 3.2 M/UL
SODIUM SERPL-SCNC: 138 MMOL/L
WBC # BLD AUTO: 7.98 K/UL

## 2017-08-08 PROCEDURE — 97530 THERAPEUTIC ACTIVITIES: CPT

## 2017-08-08 PROCEDURE — S0028 INJECTION, FAMOTIDINE, 20 MG: HCPCS | Performed by: STUDENT IN AN ORGANIZED HEALTH CARE EDUCATION/TRAINING PROGRAM

## 2017-08-08 PROCEDURE — 63600175 PHARM REV CODE 636 W HCPCS: Performed by: SURGERY

## 2017-08-08 PROCEDURE — 25000003 PHARM REV CODE 250: Performed by: STUDENT IN AN ORGANIZED HEALTH CARE EDUCATION/TRAINING PROGRAM

## 2017-08-08 PROCEDURE — 36415 COLL VENOUS BLD VENIPUNCTURE: CPT

## 2017-08-08 PROCEDURE — 83735 ASSAY OF MAGNESIUM: CPT

## 2017-08-08 PROCEDURE — 85025 COMPLETE CBC W/AUTO DIFF WBC: CPT

## 2017-08-08 PROCEDURE — 25000003 PHARM REV CODE 250: Performed by: SURGERY

## 2017-08-08 PROCEDURE — 97535 SELF CARE MNGMENT TRAINING: CPT

## 2017-08-08 PROCEDURE — 80053 COMPREHEN METABOLIC PANEL: CPT

## 2017-08-08 PROCEDURE — 84100 ASSAY OF PHOSPHORUS: CPT

## 2017-08-08 RX ORDER — ISOSORBIDE DINITRATE 10 MG/1
20 TABLET ORAL 3 TIMES DAILY
Status: DISCONTINUED | OUTPATIENT
Start: 2017-08-08 | End: 2017-08-08 | Stop reason: HOSPADM

## 2017-08-08 RX ORDER — OXYCODONE HYDROCHLORIDE 5 MG/1
5 TABLET ORAL EVERY 4 HOURS PRN
Qty: 31 TABLET | Refills: 0 | Status: SHIPPED | OUTPATIENT
Start: 2017-08-08 | End: 2017-12-13

## 2017-08-08 RX ORDER — POLYETHYLENE GLYCOL 3350 17 G/17G
17 POWDER, FOR SOLUTION ORAL DAILY
Qty: 119 G | Refills: 0 | Status: ON HOLD | OUTPATIENT
Start: 2017-08-08 | End: 2017-10-18 | Stop reason: HOSPADM

## 2017-08-08 RX ORDER — POTASSIUM CHLORIDE 20 MEQ/15ML
40 SOLUTION ORAL ONCE
Status: COMPLETED | OUTPATIENT
Start: 2017-08-08 | End: 2017-08-08

## 2017-08-08 RX ADMIN — POTASSIUM CHLORIDE 40 MEQ: 1.5 SOLUTION ORAL at 08:08

## 2017-08-08 RX ADMIN — PIPERACILLIN SODIUM,TAZOBACTAM SODIUM 4.5 G: 4; .5 INJECTION, POWDER, FOR SOLUTION INTRAVENOUS at 10:08

## 2017-08-08 RX ADMIN — ASPIRIN 81 MG: 81 TABLET, COATED ORAL at 09:08

## 2017-08-08 RX ADMIN — PIPERACILLIN SODIUM,TAZOBACTAM SODIUM 4.5 G: 4; .5 INJECTION, POWDER, FOR SOLUTION INTRAVENOUS at 12:08

## 2017-08-08 RX ADMIN — OXYCODONE HYDROCHLORIDE 10 MG: 5 TABLET ORAL at 10:08

## 2017-08-08 RX ADMIN — ATORVASTATIN CALCIUM 80 MG: 20 TABLET, FILM COATED ORAL at 09:08

## 2017-08-08 RX ADMIN — OXYCODONE HYDROCHLORIDE 5 MG: 5 TABLET ORAL at 04:08

## 2017-08-08 RX ADMIN — LEVOTHYROXINE SODIUM 100 MCG: 100 TABLET ORAL at 06:08

## 2017-08-08 RX ADMIN — TAMSULOSIN HYDROCHLORIDE 0.4 MG: 0.4 CAPSULE ORAL at 09:08

## 2017-08-08 RX ADMIN — CARVEDILOL 3.12 MG: 3.12 TABLET, FILM COATED ORAL at 10:08

## 2017-08-08 RX ADMIN — ISOSORBIDE DINITRATE 20 MG: 10 TABLET ORAL at 09:08

## 2017-08-08 RX ADMIN — FAMOTIDINE 20 MG: 10 INJECTION, SOLUTION INTRAVENOUS at 10:08

## 2017-08-08 RX ADMIN — DEXTROSE MONOHYDRATE, SODIUM CHLORIDE, AND POTASSIUM CHLORIDE: 50; 4.5; 1.49 INJECTION, SOLUTION INTRAVENOUS at 02:08

## 2017-08-08 NOTE — SUBJECTIVE & OBJECTIVE
Interval History: Pod 4 s/p hepatic resection, Eron-Y reconstruction. No N/V after Ng removal yesterday. Tolerating diet.GUILHERME DEL CASTILLO  Ready to go home    Medications:  Continuous Infusions:     Scheduled Meds:   aspirin  81 mg Oral Daily    atorvastatin  80 mg Oral Daily    carvedilol  3.125 mg Oral BID WM    enoxparin  40 mg Subcutaneous Q24H    famotidine (PF)  20 mg Intravenous BID    isosorbide dinitrate  20 mg Oral TID    levothyroxine  100 mcg Oral Daily    piperacillin-tazobactam 4.5 g in dextrose 5 % 100 mL IVPB (ready to mix system)  4.5 g Intravenous Q8H    tamsulosin  0.4 mg Oral Daily     PRN Meds:albuterol sulfate, albuterol-ipratropium 2.5mg-0.5mg/3mL, diphenhydrAMINE, hydrALAZINE, HYDROmorphone, labetalol, naloxone, ondansetron, oxycodone, oxycodone, promethazine     Review of patient's allergies indicates:  No Known Allergies  Objective:     Vital Signs (Most Recent):  Temp: 98 °F (36.7 °C) (08/08/17 1126)  Pulse: 90 (08/08/17 1126)  Resp: 16 (08/08/17 1126)  BP: (!) 112/55 (08/08/17 1126)  SpO2: (!) 94 % (08/08/17 1126) Vital Signs (24h Range):  Temp:  [97.8 °F (36.6 °C)-98.7 °F (37.1 °C)] 98 °F (36.7 °C)  Pulse:  [72-90] 90  Resp:  [16-17] 16  SpO2:  [94 %-97 %] 94 %  BP: (112-183)/(55-80) 112/55     Weight: 83 kg (183 lb)  Body mass index is 30.45 kg/m².    Intake/Output - Last 3 Shifts       08/06 0700 - 08/07 0659 08/07 0700 - 08/08 0659 08/08 0700 - 08/09 0659    P.O. 0 790     I.V. (mL/kg) 1370 (16.5) 2385 (28.7)     IV Piggyback  550     Total Intake(mL/kg) 1370 (16.5) 3725 (44.9)     Urine (mL/kg/hr) 1075 (0.5) 1300 (0.7)     Drains 505 (0.3) 220 (0.1)     Stool 0 (0)      Total Output 1580 1520      Net -210 +2205             Urine Occurrence 1 x      Stool Occurrence 1 x            Physical Exam    A&O, NAD  RRR  No signs of respiratory distress  ABD: s/AppTTP, non-distended  Incision c/d/i    Significant Labs:  CBC:     Recent Labs  Lab 08/08/17  0406   WBC 7.98   RBC 3.20*    HGB 9.2*   HCT 28.2*      MCV 88   MCH 28.8   MCHC 32.6     CMP:     Recent Labs  Lab 08/08/17  0406   *   CALCIUM 8.2*   ALBUMIN 2.3*   PROT 5.4*      K 3.6   CO2 26      BUN 10   CREATININE 0.8   ALKPHOS 93   *   AST 49*   BILITOT 1.4*       Significant Diagnostics:  I have reviewed all pertinent imaging results/findings within the past 24 hours.

## 2017-08-08 NOTE — ASSESSMENT & PLAN NOTE
S/p hepatic resection and hepaticojejunostomy     - Ileus resolved, tolerating diet  - Pain controlled  - Urinary retention resolved  - ready to go home today

## 2017-08-08 NOTE — PROGRESS NOTES
Pt discharged home with wife and son. Reviewed discharge instructions, prescriptions, follow-up appts, and conducted medication reconciliation. Pt expressed understanding and has no questions at this time. Pt instructed not to take narcotics when driving. VSS on room air. Wheel chair ordered. Will continue to monitor.

## 2017-08-08 NOTE — PHYSICIAN QUERY
PT Name: Robby Soriano  MR #: [id number]    Physician Query Form - Postoperative Relationship Clarification     CDS/: Kalpana NICHOLS, RN, CCDS              Contact information: [email or web address]    This form is a permanent document in the medical record.     Query Date: August 8, 2017    Dear Provider,    By submitting this query, we are merely seeking further clarification of documentation. Please utilize your independent clinical judgment when addressing the question(s) below.    The Medical Record contains the following:    Supporting Clinical Findings   Location in Medical Record      Postoperative ileus    Improving on NGT and NPO              - f/u outputs to d/c drain later     Postoperative urinary retention    D/c valenzuela today, f/u voiding              - flomax        General Surgery Progress 8/6/17       Please clarify the term post operative   [ xx ] Condition occurred in the post operative period (not a complication of surgery)   [  ] Condition is a complication of surgery   [  ] Other intended meaning (please specify) ____________________________

## 2017-08-08 NOTE — PLAN OF CARE
Patient discharged home today with no needs.    Future Appointments  Date Time Provider Department Center   8/21/2017 2:00 PM Melissa Gallego NP [facility] [place]          08/08/17 9385   Final Note   Assessment Type Final Discharge Note   Discharge Disposition Home   Hospital Follow Up  Appt(s) scheduled? Yes   Any social issues identified prior to discharge? No   Did you assess the readiness or willingness of the family or caregiver to support self management of care? Yes   Right Care Referral Info   Post Acute Recommendation No Care

## 2017-08-08 NOTE — PT/OT/SLP PROGRESS
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  "I'm going home today."  Pain/Comfort  Pain Rating 1: 3/10  Location 1: abdomen  Pain Addressed 1: Pre-medicate for activity, Reposition, Distraction  Pain Rating Post-Intervention 1: 3/10    Objective:  Patient found with: peripheral IV     Functional Mobility:  Transfers:   Sit <> Stand Assistance: Supervision  Sit <> Stand Assistive Device: Rolling Walker  Toilet Transfer Technique:  (functional mobility)  Toilet Transfer Assistance: Supervision  Toilet Transfer Assistive Device: Rolling Walker    Functional Ambulation: w/in room, bathroom and hallway w/ use of RW and (S).    Activities of Daily Living:  UE Dressing Level of Assistance: Modified independent  LE Dressing Level of Assistance: Supervision  Grooming Position: Standing  Grooming Level of Assistance: Supervision (simulated)  Toileting Where Assessed: Bedside commode  Toileting Level of Assistance: Modified independent      Therapeutic Activities and Exercises:  Pt performed standing / reaching tasks w/ (S) and use of RW.    AM-PAC 6 CLICK ADL   How much help from another person does this patient currently need?   1 = Unable, Total/Dependent Assistance  2 = A lot, Maximum/Moderate Assistance  3 = A little, Minimum/Contact Guard/Supervision  4 = None, Modified [place]/Independent    Putting on and taking off regular lower body clothing? : 3  Bathing (including washing, rinsing, drying)?: 3  Toileting, which includes using toilet, bedpan, or urinal? : 4  Putting on " "and taking off regular upper body clothing?: 4  Taking care of personal grooming such as brushing teeth?: 4  Eating meals?: 4  Total Score: 22     AM-PAC Raw Score CMS "G-Code Modifier Level of Impairment Assistance   6 % Total / Unable   7 - 8 CM 80 - 100% Maximal Assist   9-13 CL 60 - 80% Moderate Assist   14 - 19 CK 40 - 60% Moderate Assist   20 - 22 CJ 20 - 40% Minimal Assist   23 CI 1-20% SBA / CGA   24 CH 0% Independent/ Mod I     Patient left up in chair with all lines intact, call button in reach and wife present    ASSESSMENT:  Robby Soriano is a 77 y.o. 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 Pt is Mod (I) - (S) w/ ADL. Pt is safe to d/c home w/ wife.    Discharge recommendations: Discharge Facility/Level Of Care Needs: home     Barriers to discharge: Barriers to Discharge: None    Equipment recommendations: none     GOALS:    Occupational Therapy Goals     Not on file          Multidisciplinary Problems (Resolved)        Problem: Occupational Therapy Goal    Goal Priority Disciplines Outcome Interventions   Occupational Therapy Goal   (Resolved)     OT, PT/OT Outcome(s) achieved    Description:  Goals to be met by: 2 weeks     Patient will increase functional independence with ADLs by performing:    UE Dressing with Modified [place].  LE Dressing with Supervision.  Grooming while standing with Supervision.  Toileting from bedside commode with Supervision for hygiene and clothing management.   Toilet transfer to bedside commode with Supervision w/ AD.                  Plan:  D/C acute OT services.  Plan of Care reviewed with: patient, spouse  ELLEN Harmon  08/08/2017  "

## 2017-08-08 NOTE — PLAN OF CARE
Problem: Patient Care Overview  Goal: Plan of Care Review  Outcome: Ongoing (interventions implemented as appropriate)  POC reviewed with patient, who verbalized understanding. AAOx4. Remains free of falls and injury. VSS, BP >160, PRN hypertension medication given. VSS. Abdominal ML with staples intact, x1 KAIDEN drain, with serosanginous drainage. Tolerating clear liquids. Denies nausea. Patient states back pain relieved with position change and up in chair with PRN oral pain medication.  IVF infusing. Voiding per urinal, No BM, but passing gas. Up with assist to bathroom. TEDS and SCDS in place. No acute events. No distress noted. Spouse at bedside. Call bell in reach. Will continue to monitor.

## 2017-08-08 NOTE — PROGRESS NOTES
[facility]  General Surgery  Progress Note    Subjective:     History of Present Illness:  No notes on file    Post-Op Info:  Procedure(s) (LRB):  RESECTION-HEPATIC; Resection Bile Duct (Left)  ERON-N-Y   5 Days Post-Op     Interval History: Pod 4 s/p hepatic resection, Eron-Y reconstruction. No N/V after Ng removal yesterday. Tolerating diet.NAEON, GUILHERME  Ready to go home    Medications:  Continuous Infusions:     Scheduled Meds:   aspirin  81 mg Oral Daily    atorvastatin  80 mg Oral Daily    carvedilol  3.125 mg Oral BID WM    enoxparin  40 mg Subcutaneous Q24H    famotidine (PF)  20 mg Intravenous BID    isosorbide dinitrate  20 mg Oral TID    levothyroxine  100 mcg Oral Daily    piperacillin-tazobactam 4.5 g in dextrose 5 % 100 mL IVPB (ready to mix system)  4.5 g Intravenous Q8H    tamsulosin  0.4 mg Oral Daily     PRN Meds:albuterol sulfate, albuterol-ipratropium 2.5mg-0.5mg/3mL, diphenhydrAMINE, hydrALAZINE, HYDROmorphone, labetalol, naloxone, ondansetron, oxycodone, oxycodone, promethazine     Review of patient's allergies indicates:  No Known Allergies  Objective:     Vital Signs (Most Recent):  Temp: 98 °F (36.7 °C) (08/08/17 1126)  Pulse: 90 (08/08/17 1126)  Resp: 16 (08/08/17 1126)  BP: (!) 112/55 (08/08/17 1126)  SpO2: (!) 94 % (08/08/17 1126) Vital Signs (24h Range):  Temp:  [97.8 °F (36.6 °C)-98.7 °F (37.1 °C)] 98 °F (36.7 °C)  Pulse:  [72-90] 90  Resp:  [16-17] 16  SpO2:  [94 %-97 %] 94 %  BP: (112-183)/(55-80) 112/55     Weight: 83 kg (183 lb)  Body mass index is 30.45 kg/m².    Intake/Output - Last 3 Shifts       08/06 0700 - 08/07 0659 08/07 0700 - 08/08 0659 08/08 0700 - 08/09 0659    P.O. 0 790     I.V. (mL/kg) 1370 (16.5) 2385 (28.7)     IV Piggyback  550     Total Intake(mL/kg) 1370 (16.5) 3725 (44.9)     Urine (mL/kg/hr) 1075 (0.5) 1300 (0.7)     Drains 505 (0.3) 220 (0.1)     Stool 0 (0)      Total Output 1580 1520      Net -210 +2205             Urine  Occurrence 1 x      Stool Occurrence 1 x            Physical Exam    A&O, NAD  RRR  No signs of respiratory distress  ABD: s/AppTTP, non-distended  Incision c/d/i    Significant Labs:  CBC:     Recent Labs  Lab 08/08/17  0406   WBC 7.98   RBC 3.20*   HGB 9.2*   HCT 28.2*      MCV 88   MCH 28.8   MCHC 32.6     CMP:     Recent Labs  Lab 08/08/17  0406   *   CALCIUM 8.2*   ALBUMIN 2.3*   PROT 5.4*      K 3.6   CO2 26      BUN 10   CREATININE 0.8   ALKPHOS 93   *   AST 49*   BILITOT 1.4*       Significant Diagnostics:  I have reviewed all pertinent imaging results/findings within the past 24 hours.    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

## 2017-08-08 NOTE — DISCHARGE SUMMARY
[facility]  Department of Surgery  Discharge Summary      Patient Name: Robby Soriano  MRN: [id number]  Admission Date: 8/3/2017  Hospital Length of Stay: 5 days  Discharge Date and Time: 8/8/2017  1:13 PM  Attending Physician: No att. providers found   Discharging Provider: Robby Rutherford MD  Primary Care Provider: Lyla Bryan MD    HPI:   No notes on file    Procedure(s) (LRB):  RESECTION-HEPATIC; Resection Bile Duct (Left)  JERSON-N-Y      Hospital Course:   No notes on file   Pt was admitted following L hepatectomy and hepatico-jejunostomy reconstruction, the patient tolerated the procedure well without apparent complication. He was treated with prophylactic IV antibiotics for 5 days per culture results because of his previous biliary stent. He did well initially without apparent complication, but did develop an Ileus which was managed with NGT and NPO. This resolved quickly, however, and he was advanced to a clear liquid diet which he tolerated without further distension or nausea. Afterwards he resumed a regular diet without issue. 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 [C22.1] 08/03/2017 Yes    Coronary artery disease involving coronary bypass graft of native heart without angina pectoris [I25.810] 08/04/2017 Yes    Anemia [D64.9] 08/04/2017 Yes    Essential hypertension [I10] 08/03/2017 Yes      Problems Resolved During this Admission:    Diagnosis Date Noted Date Resolved POA    Postoperative ileus [K91.89, K56.7] 08/06/2017 08/08/2017 No    Postoperative urinary retention [N99.89, R33.8] 08/05/2017 08/08/2017 No    Malignant obstructive jaundice [K83.1, C80.1] 08/03/2017 08/08/2017 Yes    Liver mass, left lobe [R16.0] 07/13/2017 08/08/2017 Yes     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  Service: General Surgery      Discharged Condition: good    Disposition: Home or Self Care    Follow Up:  Follow-up Information     Dallas Quach MD. Schedule an appointment as soon as possible for a visit in 2 weeks.    Specialty:  General Surgery  Why:  For wound re-check  Contact information:  [address]  [facility] [id number]  [phone]                 Patient Instructions:     Diet general     Activity as tolerated     Shower on day dressing removed (No bath)   Order Comments: No bathing or swimming. Shower with normal soap and water.     Lifting restrictions   Order Comments: No lifting greater than 10 pounds for 6 weeks from day of surgery.  No pushing/pulling such as vacuuming or raking.  No straining, avoid constipation and take stool softeners as described and laxatives as needed.  No driving while on narcotics and until you can react quickly without pain.     Call MD for:  temperature >100.4     Call MD for:  persistent nausea and vomiting or diarrhea     Call MD for:  severe uncontrolled pain     Call MD for:  redness, tenderness, or signs of infection (pain, swelling, redness, odor or green/yellow discharge around incision site)     Call MD for:  difficulty breathing or increased cough     Call MD for:  severe persistent headache     Call MD for:  worsening rash     Call MD for:  persistent dizziness, light-headedness, or visual disturbances     Call MD for:  increased confusion or weakness     Remove dressing in 24 hours   Order Comments: Steri strips will fall off on their own       Medications:  Reconciled Home Medications:   Discharge Medication List as of 8/8/2017 11:15 AM      START taking these medications    Details   oxycodone (ROXICODONE) 5 MG immediate release tablet Take 1 tablet (5 mg total) by mouth every 4  (four) hours as needed for Pain., Starting Tue 8/8/2017, Print      polyethylene glycol (GLYCOLAX) 17 gram/dose powder Take 17 g by mouth once daily., Starting Tue 8/8/2017, Normal         CONTINUE these medications which have NOT CHANGED    Details   alprazolam (XANAX) 0.25 MG tablet Take 0.25 mg by mouth 3 (three) times daily., Historical Med      aspirin (ECOTRIN) 81 MG EC tablet Take 81 mg by mouth once daily., Historical Med      atorvastatin (LIPITOR) 80 MG tablet Take 80 mg by mouth once daily., Historical Med      carvedilol (COREG) 3.125 MG tablet Take 3.125 mg by mouth 2 (two) times daily with meals., Historical Med      escitalopram oxalate (LEXAPRO) 10 MG tablet Take 10 mg by mouth once daily., Historical Med      indomethacin (INDOCIN) 25 MG capsule Take 25 mg by mouth 2 (two) times daily with meals., Historical Med      isosorbide dinitrate (ISORDIL) 30 MG Tab Take 20 mg by mouth 3 (three) times daily., Historical Med      levothyroxine (SYNTHROID) 100 MCG tablet Take 100 mcg by mouth once daily., Historical Med      pantoprazole (PROTONIX) 40 MG tablet Take 40 mg by mouth once daily., Historical Med           Time spent on the discharge of patient: 15 minutes    Robby Rutherford MD  Department of Surgery  [facility]

## 2017-08-08 NOTE — PROGRESS NOTES
Tolerated full liquids well yesterday  Ambulating  Wants to go home  Chest clear  Abd: soft, non-distended. Incision looks good. Federico output serosanguinous with no bile. Will remove    Home today if he tolerated regular diet

## 2017-08-08 NOTE — PHYSICIAN QUERY
"PT Name: Robby Soriano  MR #: [id number]    Physician Query Form - Hematology Clarification      CDS/: Kalpana NICHOLS, RN, CCDS              Contact information: [email or web address]    This form is a permanent document in the medical record.      Query Date: August 8, 2017    By submitting this query, we are merely seeking further clarification of documentation. Please utilize your independent clinical judgment when addressing the question(s) below.    The Medical record contains the following:   Indicators  Supporting Clinical Findings Location in Medical Record    "Anemia" documented      x H & H =  Hemoglobin = 7.3--> 10.5        Hematocrit 22.0--> 32.1     Hematocrit = 27--> 23  Lab: Hematology 8/3/17     Lab: Point of Care 8/3/17 0826--> 1247    BP =                     HR=      "GI bleeding" documented      x Acute bleeding (Non GI site)  Estimated Blood Loss: 800 mL  Surgery Brief Op note 8/4/17 @ 0332 AM    x Transfusion(s)  Transfused 2 units PRBCs  Lab: Blood Bank 8/3/17    Treatment:      x Other:   Cholangiocarcinoma of left liver with involvement of   hepatic duct bifurcation.  Surgery Op note 8/3/17 @   9:19 PM     Provider, please specify diagnosis or diagnoses associated with above clinical findings.    [ xxx ] Acute blood loss anemia expected post-operatively    [  ] Acute blood loss anemia    [ xxx ] Anemia of chronic disease (Specify chronic disease)      [  ] Neoplastic disease      [  ] Other (Specify) _______________________________     [  ] Clinically Undetermined     [  ] Other Hematological Diagnosis (please specify): _________________________________    [  ] Clinically Undetermined       Please document in your progress notes daily for the duration of treatment, until resolved, and include in your discharge summary.                                                                                                      "

## 2017-08-09 NOTE — PT/OT/SLP DISCHARGE
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  Please refer to prior PT noted date on 2017 for functional status.     Assessment:   Patient has not met goals.  GOALS:    Physical Therapy Goals     Not on file          Multidisciplinary Problems (Resolved)        Problem: Physical Therapy Goal    Goal Priority Disciplines Outcome Goal Variances Interventions   Physical Therapy Goal   (Resolved)     PT/OT, PT Outcome(s) achieved     Description:  Goals to be met by: 2017     Patient will increase functional independence with mobility by performin. Supine to sit with Set-up Caret  2. Sit to supine with Set-up Caret  3. Sit to stand transfer with Supervision  4. Bed to chair transfer with Supervision with/without AD  5. Gait  x 200 feet with Supervision with/without RW  6. Lower extremity exercise program x20 reps per handout, with independence                    Reasons for Discontinuation of Therapy Services  Transfer to alternate level of care.      Plan:  Patient Discharged to: home. Pt. denied need for HH PT.     Robby Vela, PT  2017

## 2017-08-20 ENCOUNTER — TREATMENT PLANNING (OUTPATIENT)
Dept: SURGERY | Facility: HOSPITAL | Age: 77
End: 2017-08-20

## 2017-08-20 NOTE — PROGRESS NOTES
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 I called patient and informed him. Will speak to Dr Lawrence Nguyen-----does he need a course of corticosteroids?

## 2017-08-21 ENCOUNTER — OFFICE VISIT (OUTPATIENT)
Dept: SURGERY | Facility: CLINIC | Age: 77
End: 2017-08-21
Payer: MEDICARE

## 2017-08-21 ENCOUNTER — TELEPHONE (OUTPATIENT)
Dept: SURGERY | Facility: CLINIC | Age: 77
End: 2017-08-21

## 2017-08-21 ENCOUNTER — LAB VISIT (OUTPATIENT)
Dept: LAB | Facility: HOSPITAL | Age: 77
End: 2017-08-21
Payer: MEDICARE

## 2017-08-21 VITALS
TEMPERATURE: 98 F | DIASTOLIC BLOOD PRESSURE: 60 MMHG | WEIGHT: 175.69 LBS | BODY MASS INDEX: 29.27 KG/M2 | HEART RATE: 85 BPM | HEIGHT: 65 IN | SYSTOLIC BLOOD PRESSURE: 116 MMHG

## 2017-08-21 DIAGNOSIS — Z09 SURGERY FOLLOW-UP: Primary | ICD-10-CM

## 2017-08-21 DIAGNOSIS — C22.1 CHOLANGIOCARCINOMA: ICD-10-CM

## 2017-08-21 DIAGNOSIS — C22.1 CHOLANGIOCARCINOMA: Primary | ICD-10-CM

## 2017-08-21 LAB
ALBUMIN SERPL BCP-MCNC: 2.3 G/DL
ALP SERPL-CCNC: 202 U/L
ALT SERPL W/O P-5'-P-CCNC: 40 U/L
ANION GAP SERPL CALC-SCNC: 8 MMOL/L
AST SERPL-CCNC: 39 U/L
BASOPHILS # BLD AUTO: 0.06 K/UL
BASOPHILS NFR BLD: 0.8 %
BILIRUB SERPL-MCNC: 0.9 MG/DL
BUN SERPL-MCNC: 18 MG/DL
CALCIUM SERPL-MCNC: 8.5 MG/DL
CHLORIDE SERPL-SCNC: 97 MMOL/L
CO2 SERPL-SCNC: 25 MMOL/L
CREAT SERPL-MCNC: 1.1 MG/DL
DIFFERENTIAL METHOD: ABNORMAL
EOSINOPHIL # BLD AUTO: 0.2 K/UL
EOSINOPHIL NFR BLD: 2.1 %
ERYTHROCYTE [DISTWIDTH] IN BLOOD BY AUTOMATED COUNT: 15.7 %
EST. GFR  (AFRICAN AMERICAN): >60 ML/MIN/1.73 M^2
EST. GFR  (NON AFRICAN AMERICAN): >60 ML/MIN/1.73 M^2
GLUCOSE SERPL-MCNC: 132 MG/DL
HCT VFR BLD AUTO: 28.4 %
HGB BLD-MCNC: 9.5 G/DL
LYMPHOCYTES # BLD AUTO: 1.3 K/UL
LYMPHOCYTES NFR BLD: 18.8 %
MCH RBC QN AUTO: 28.8 PG
MCHC RBC AUTO-ENTMCNC: 33.5 G/DL
MCV RBC AUTO: 86 FL
MONOCYTES # BLD AUTO: 1 K/UL
MONOCYTES NFR BLD: 13.5 %
NEUTROPHILS # BLD AUTO: 4.6 K/UL
NEUTROPHILS NFR BLD: 64.5 %
PLATELET # BLD AUTO: 366 K/UL
PMV BLD AUTO: 9.3 FL
POTASSIUM SERPL-SCNC: 4.4 MMOL/L
PROT SERPL-MCNC: 6.6 G/DL
RBC # BLD AUTO: 3.3 M/UL
SODIUM SERPL-SCNC: 130 MMOL/L
WBC # BLD AUTO: 7.13 K/UL

## 2017-08-21 PROCEDURE — 80053 COMPREHEN METABOLIC PANEL: CPT

## 2017-08-21 PROCEDURE — 99024 POSTOP FOLLOW-UP VISIT: CPT | Mod: S$GLB,,, | Performed by: NURSE PRACTITIONER

## 2017-08-21 PROCEDURE — 36415 COLL VENOUS BLD VENIPUNCTURE: CPT

## 2017-08-21 PROCEDURE — 85025 COMPLETE CBC W/AUTO DIFF WBC: CPT

## 2017-08-21 PROCEDURE — 99999 PR PBB SHADOW E&M-EST. PATIENT-LVL III: CPT | Mod: PBBFAC,,, | Performed by: NURSE PRACTITIONER

## 2017-08-21 RX ORDER — ISOSORBIDE MONONITRATE 30 MG/1
TABLET, EXTENDED RELEASE ORAL
Status: ON HOLD | COMMUNITY
Start: 2017-08-09 | End: 2017-10-18 | Stop reason: HOSPADM

## 2017-08-23 ENCOUNTER — TELEPHONE (OUTPATIENT)
Dept: SURGERY | Facility: CLINIC | Age: 77
End: 2017-08-23

## 2017-08-23 DIAGNOSIS — K83.09 SCLEROSING CHOLANGITIS: Primary | ICD-10-CM

## 2017-08-23 NOTE — TELEPHONE ENCOUNTER
----- Message from Amando Agee sent at 8/23/2017  9:14 AM CDT -----  Contact: [place]//Dr Patrick Wood states that (s)he needs to speak with nurse in ref to pathology and op notes for the pt being faxed over//please call back at [phone] for any questions//thank you    Fax: [phone]

## 2017-08-28 ENCOUNTER — DOCUMENTATION ONLY (OUTPATIENT)
Dept: SURGERY | Facility: CLINIC | Age: 77
End: 2017-08-28

## 2017-08-28 NOTE — PROGRESS NOTES
Mr. Soriano presents to clinic for PO viist after undergoing a left hepatic resection with extrahepatic bile duct resection.  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      FINAL PATHOLOGIC DIAGNOSIS  1. GALL BLADDER, CHOLECYSTECTOMY:  Chronic cholecystitis  2. LYMPH NODE, SHANIKA HEPATIS, EXCISION:  Benign adipose tissue  No lymph node identified  3. DISTAL BILE DUCT MARGIN, RESECTION: Benign portion of bile duct with chronic inflammation  Negative for malignancy  4. 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 PROXIMAL RIGHT HEPATIC DUCT MARGIN, RESECTION:  Benign portion of bile duct with chronic inflammation  Negative for malignancy  Comment  Multiple sections from the liver mass show extensive acute and chronic inflammation (abscess forming mass).  Report There are frequent areas of bile ducts concentrically surrounded by fibrosis with lymphoplasmacystic infiltrate, which  is diffusely and strongly positive for IgG4 (more than 200/HPF). Focally phlebitis is also seen. There is absence of  liver parenchyma (collapse of parenchyma) in the area of mass formed by inflammatory infiltrate.  The surrounding liver parenchyma shows minimal fibrosis and minimal portal tract inflammation.  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  Please also correlate with clinical and imaging findings.    He is doing ok.  Making progress but very slowly.  Feels a bit weak.  Appetite has not  returned.  No N/V/F/C.    PE:  NAD  BBD cl  HRRR  Abd: stables removed.  No r/s/d.      PLAN:  Encouraged increased activity  Encouraged small, frequent high protein meals.    RTC 1 week  Will need IgG4 level.  Discuss in [facility] re: need for prednisone.

## 2017-08-29 NOTE — PATIENT CARE CONFERENCE
[facility]    Upper GI Tumor Board Note      Date: 8/28/17  MRN: [id number]  Site: Main  Presenter: Quach    Summary: He presented with acute onset of cholangitis, WITHOUT jaundice. Imaging shows a mass in segment IV of the liver extending close to the hilum. ERCP showed stricture of the upper bile duct on ERC and with Spy Glass it was fibrotic. Biopsy is non-diagnostic but cholangiocarcinoma is strongly suspected on clinical grounds.  Underwent hepatic resection and bile duct resection.      FINAL PATHOLOGIC DIAGNOSIS  1. GALL BLADDER, CHOLECYSTECTOMY:  Chronic cholecystitis  2. LYMPH NODE, SHANIKA HEPATIS, EXCISION:  Benign adipose tissue  No lymph node identified  3. DISTAL BILE DUCT MARGIN, RESECTION: Benign portion of bile duct with chronic inflammation  Negative for malignancy  4. 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 PROXIMAL RIGHT HEPATIC DUCT MARGIN, RESECTION:  Benign portion of bile duct with chronic inflammation  Negative for malignancy  Comment  Multiple sections from the liver mass show extensive acute and chronic inflammation (abscess forming mass).  There are frequent areas of bile ducts concentrically surrounded by fibrosis with lymphoplasmacystic infiltrate, which  is diffusely and strongly positive for IgG4 (more than 200/HPF). Focally phlebitis is also seen. There is absence of  liver parenchyma (collapse of parenchyma) in the area of mass formed by inflammatory infiltrate.  The surrounding liver parenchyma shows minimal fibrosis and minimal portal tract inflammation.  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  Please also correlate with clinical and imaging findings.    Recommendations: Check IgG4 level per tx.  Tx with Prednisone x 3 months and follow with close surveillance of LFTs and IgG4 levels.      [place] Treatment Guidelines reviewed and care plan is consistent with guidelines, i.e., NCCN, NCL, PDQ, ASCO, AUA, etc.    Presentation at Cancer Conference: Prospective    Discussed possible entry into Clinical Trial: No    Physician Name: Melissa Gallego NP

## 2017-08-31 ENCOUNTER — CONFERENCE (OUTPATIENT)
Dept: TRANSPLANT | Facility: CLINIC | Age: 77
End: 2017-08-31

## 2017-08-31 NOTE — TELEPHONE ENCOUNTER
Liver Pathology Conference:    Liver Biopsy: no cancer/ IgG schlerosing cholangitis forming a mass/  Plasma cells noted

## 2017-09-05 DIAGNOSIS — K83.09 SCLEROSING CHOLANGITIS: Primary | ICD-10-CM

## 2017-09-06 ENCOUNTER — LAB VISIT (OUTPATIENT)
Dept: LAB | Facility: HOSPITAL | Age: 77
End: 2017-09-06
Attending: SURGERY
Payer: MEDICARE

## 2017-09-06 ENCOUNTER — OFFICE VISIT (OUTPATIENT)
Dept: SURGERY | Facility: CLINIC | Age: 77
End: 2017-09-06
Payer: MEDICARE

## 2017-09-06 VITALS
BODY MASS INDEX: 27.85 KG/M2 | SYSTOLIC BLOOD PRESSURE: 108 MMHG | WEIGHT: 167.13 LBS | DIASTOLIC BLOOD PRESSURE: 63 MMHG | HEIGHT: 65 IN | HEART RATE: 85 BPM

## 2017-09-06 DIAGNOSIS — K83.09 SCLEROSING CHOLANGITIS: Primary | ICD-10-CM

## 2017-09-06 DIAGNOSIS — K83.09 AUTOIMMUNE CHOLANGITIS: ICD-10-CM

## 2017-09-06 DIAGNOSIS — K83.09 SCLEROSING CHOLANGITIS: ICD-10-CM

## 2017-09-06 DIAGNOSIS — Z09 POSTOP CHECK: ICD-10-CM

## 2017-09-06 PROBLEM — C24.0 CHOLANGIOCARCINOMA AT HEPATIC HILUM: Status: RESOLVED | Noted: 2017-08-03 | Resolved: 2017-09-06

## 2017-09-06 PROCEDURE — 82787 IGG 1 2 3 OR 4 EACH: CPT | Mod: 59

## 2017-09-06 PROCEDURE — 99999 PR PBB SHADOW E&M-EST. PATIENT-LVL III: CPT | Mod: PBBFAC,,, | Performed by: SURGERY

## 2017-09-06 PROCEDURE — 36415 COLL VENOUS BLD VENIPUNCTURE: CPT

## 2017-09-06 PROCEDURE — 99024 POSTOP FOLLOW-UP VISIT: CPT | Mod: S$GLB,,, | Performed by: SURGERY

## 2017-09-06 RX ORDER — PROMETHAZINE HYDROCHLORIDE 25 MG/1
25 TABLET ORAL EVERY 6 HOURS PRN
Qty: 40 TABLET | Refills: 1 | Status: SHIPPED | OUTPATIENT
Start: 2017-09-06 | End: 2017-11-27

## 2017-09-06 RX ORDER — ONDANSETRON 8 MG/1
8 TABLET, ORALLY DISINTEGRATING ORAL EVERY 6 HOURS PRN
Qty: 30 TABLET | Refills: 3 | Status: SHIPPED | OUTPATIENT
Start: 2017-09-06 | End: 2019-04-18

## 2017-09-06 RX ORDER — PREDNISONE 5 MG/1
35 TABLET ORAL DAILY
Qty: 196 TABLET | Refills: 0 | Status: SHIPPED | OUTPATIENT
Start: 2017-09-06 | End: 2017-09-16

## 2017-09-06 RX ORDER — PREDNISONE 20 MG/1
40 TABLET ORAL DAILY
Qty: 30 TABLET | Refills: 2 | Status: SHIPPED | OUTPATIENT
Start: 2017-09-06 | End: 2017-10-06

## 2017-09-06 RX ORDER — PREDNISONE 5 MG/1
35 TABLET ORAL DAILY
Qty: 196 TABLET | Refills: 0 | Status: SHIPPED | OUTPATIENT
Start: 2017-09-06 | End: 2017-09-06 | Stop reason: SDUPTHER

## 2017-09-06 RX ORDER — PREDNISONE 20 MG/1
40 TABLET ORAL DAILY
Qty: 60 TABLET | Refills: 0 | Status: SHIPPED | OUTPATIENT
Start: 2017-09-06 | End: 2017-09-06 | Stop reason: SDUPTHER

## 2017-09-06 NOTE — PROGRESS NOTES
"Patient seen today after Left hepatic resection with resection of extrahepatic bile duct and marci-en-Y right hepaticojejunostomy on 8/3/2017.    Doing well but still with complaints of some "nausea" though not actually vomiting or dry heaving, and really more a continued lack of appetite upon further questioning.  Actually improving on Marinol which he started just under a week ago.  Ate first sandwich yesterday.  Well-healed incision otherwise and weight stable today.    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      PLAN:  · Steroid taper prednisone 40 mg daily x1 month then decrease by 5 mg weekly.  · Rx given today.  · Return to clinic in 1 month with repeat LFTs and IgG4.     Stephanie Jama MD    "

## 2017-09-07 DIAGNOSIS — K83.09 AUTOIMMUNE CHOLANGITIS: Primary | ICD-10-CM

## 2017-09-07 LAB
FUNGUS SPEC CULT: NORMAL
IGG1 SER-MCNC: 618 MG/DL
IGG2 SER-MCNC: 510 MG/DL
IGG3 SER-MCNC: 72 MG/DL
IGG4 SER-MCNC: 67 MG/DL
IGG4 SER-MCNC: 67 MG/DL

## 2017-10-11 ENCOUNTER — HOSPITAL ENCOUNTER (INPATIENT)
Facility: HOSPITAL | Age: 77
LOS: 7 days | Discharge: HOME | DRG: 871 | End: 2017-10-18
Attending: SURGERY | Admitting: SURGERY
Payer: MEDICARE

## 2017-10-11 ENCOUNTER — OFFICE VISIT (OUTPATIENT)
Dept: SURGERY | Facility: CLINIC | Age: 77
DRG: 871 | End: 2017-10-11
Payer: MEDICARE

## 2017-10-11 VITALS
DIASTOLIC BLOOD PRESSURE: 64 MMHG | HEIGHT: 65 IN | TEMPERATURE: 98 F | SYSTOLIC BLOOD PRESSURE: 144 MMHG | HEART RATE: 90 BPM

## 2017-10-11 DIAGNOSIS — K83.09 AUTOIMMUNE CHOLANGITIS: ICD-10-CM

## 2017-10-11 DIAGNOSIS — K83.09 AUTOIMMUNE CHOLANGITIS: Primary | ICD-10-CM

## 2017-10-11 DIAGNOSIS — R06.09 EXERTIONAL DYSPNEA: ICD-10-CM

## 2017-10-11 DIAGNOSIS — R78.81 BACTEREMIA: ICD-10-CM

## 2017-10-11 DIAGNOSIS — R79.89 TROPONIN LEVEL ELEVATED: ICD-10-CM

## 2017-10-11 DIAGNOSIS — I25.10 CAD (CORONARY ARTERY DISEASE): ICD-10-CM

## 2017-10-11 DIAGNOSIS — I49.9 CARDIAC RHYTHM DISORDER OR DISTURBANCE OR CHANGE: ICD-10-CM

## 2017-10-11 DIAGNOSIS — D89.84 IGG4-RELATED SCLEROSING CHOLANGITIS: ICD-10-CM

## 2017-10-11 DIAGNOSIS — J96.02 ACUTE RESPIRATORY FAILURE WITH HYPOXIA AND HYPERCARBIA: ICD-10-CM

## 2017-10-11 DIAGNOSIS — K83.09 IGG4-RELATED SCLEROSING CHOLANGITIS: ICD-10-CM

## 2017-10-11 DIAGNOSIS — J96.01 ACUTE RESPIRATORY FAILURE WITH HYPOXIA AND HYPERCARBIA: ICD-10-CM

## 2017-10-11 DIAGNOSIS — I24.9 ACS (ACUTE CORONARY SYNDROME): ICD-10-CM

## 2017-10-11 DIAGNOSIS — K83.1 OBSTRUCTIVE JAUNDICE: Primary | ICD-10-CM

## 2017-10-11 DIAGNOSIS — I95.9 HYPOTENSION, UNSPECIFIED HYPOTENSION TYPE: ICD-10-CM

## 2017-10-11 DIAGNOSIS — R06.02 SHORTNESS OF BREATH: ICD-10-CM

## 2017-10-11 DIAGNOSIS — I25.810 CORONARY ARTERY DISEASE INVOLVING CORONARY BYPASS GRAFT OF NATIVE HEART WITHOUT ANGINA PECTORIS: ICD-10-CM

## 2017-10-11 LAB
ABO + RH BLD: NORMAL
ALBUMIN SERPL BCP-MCNC: 1.5 G/DL
ALP SERPL-CCNC: 202 U/L
ALT SERPL W/O P-5'-P-CCNC: 115 U/L
APTT BLDCRRT: 24.1 SEC
AST SERPL-CCNC: 107 U/L
BACTERIA #/AREA URNS AUTO: ABNORMAL /HPF
BILIRUB DIRECT SERPL-MCNC: 4.8 MG/DL
BILIRUB SERPL-MCNC: 8 MG/DL
BILIRUB UR QL STRIP: ABNORMAL
BLD GP AB SCN CELLS X3 SERPL QL: NORMAL
BNP SERPL-MCNC: 304 PG/ML
CA-I BLDV-SCNC: 1.07 MMOL/L
CLARITY UR REFRACT.AUTO: ABNORMAL
COLOR UR AUTO: ABNORMAL
CRP SERPL-MCNC: 236.3 MG/L
DIASTOLIC DYSFUNCTION: YES
ESTIMATED PA SYSTOLIC PRESSURE: 32.16
GLUCOSE UR QL STRIP: NEGATIVE
HGB UR QL STRIP: ABNORMAL
HYALINE CASTS UR QL AUTO: 1 /LPF
INR PPP: 1.1
KETONES UR QL STRIP: NEGATIVE
LACTATE SERPL-SCNC: 7.3 MMOL/L
LEUKOCYTE ESTERASE UR QL STRIP: NEGATIVE
MAGNESIUM SERPL-MCNC: 1.3 MG/DL
MICROSCOPIC COMMENT: ABNORMAL
MITRAL VALVE REGURGITATION: ABNORMAL
NITRITE UR QL STRIP: NEGATIVE
PH UR STRIP: 5 [PH] (ref 5–8)
PHOSPHATE SERPL-MCNC: 2.8 MG/DL
PROT SERPL-MCNC: 5 G/DL
PROT UR QL STRIP: ABNORMAL
PROTHROMBIN TIME: 11.4 SEC
RBC #/AREA URNS AUTO: 9 /HPF (ref 0–4)
RETIRED EF AND QEF - SEE NOTES: 60 (ref 55–65)
SP GR UR STRIP: 1.02 (ref 1–1.03)
TRICUSPID VALVE REGURGITATION: ABNORMAL
TROPONIN I SERPL DL<=0.01 NG/ML-MCNC: 0.11 NG/ML
URN SPEC COLLECT METH UR: ABNORMAL
UROBILINOGEN UR STRIP-ACNC: 4 EU/DL
WBC #/AREA URNS AUTO: 0 /HPF (ref 0–5)

## 2017-10-11 PROCEDURE — 93005 ELECTROCARDIOGRAM TRACING: CPT

## 2017-10-11 PROCEDURE — 93306 TTE W/DOPPLER COMPLETE: CPT | Mod: 26,,, | Performed by: INTERNAL MEDICINE

## 2017-10-11 PROCEDURE — 99024 POSTOP FOLLOW-UP VISIT: CPT | Mod: S$GLB,,, | Performed by: NURSE PRACTITIONER

## 2017-10-11 PROCEDURE — 83605 ASSAY OF LACTIC ACID: CPT

## 2017-10-11 PROCEDURE — S0077 INJECTION, CLINDAMYCIN PHOSP: HCPCS | Performed by: SURGERY

## 2017-10-11 PROCEDURE — A4216 STERILE WATER/SALINE, 10 ML: HCPCS | Performed by: NURSE PRACTITIONER

## 2017-10-11 PROCEDURE — 84484 ASSAY OF TROPONIN QUANT: CPT

## 2017-10-11 PROCEDURE — 85610 PROTHROMBIN TIME: CPT

## 2017-10-11 PROCEDURE — 86900 BLOOD TYPING SEROLOGIC ABO: CPT

## 2017-10-11 PROCEDURE — 87040 BLOOD CULTURE FOR BACTERIA: CPT

## 2017-10-11 PROCEDURE — 87077 CULTURE AEROBIC IDENTIFY: CPT | Mod: 59

## 2017-10-11 PROCEDURE — 86901 BLOOD TYPING SEROLOGIC RH(D): CPT

## 2017-10-11 PROCEDURE — 93010 ELECTROCARDIOGRAM REPORT: CPT | Mod: ,,, | Performed by: INTERNAL MEDICINE

## 2017-10-11 PROCEDURE — 99999 PR PBB SHADOW E&M-EST. PATIENT-LVL III: CPT | Mod: PBBFAC,,, | Performed by: NURSE PRACTITIONER

## 2017-10-11 PROCEDURE — 82330 ASSAY OF CALCIUM: CPT

## 2017-10-11 PROCEDURE — 87186 SC STD MICRODIL/AGAR DIL: CPT

## 2017-10-11 PROCEDURE — 94760 N-INVAS EAR/PLS OXIMETRY 1: CPT

## 2017-10-11 PROCEDURE — 94799 UNLISTED PULMONARY SVC/PX: CPT

## 2017-10-11 PROCEDURE — 25500020 PHARM REV CODE 255: Performed by: STUDENT IN AN ORGANIZED HEALTH CARE EDUCATION/TRAINING PROGRAM

## 2017-10-11 PROCEDURE — 87076 CULTURE ANAEROBE IDENT EACH: CPT

## 2017-10-11 PROCEDURE — P9045 ALBUMIN (HUMAN), 5%, 250 ML: HCPCS | Performed by: SURGERY

## 2017-10-11 PROCEDURE — 11000001 HC ACUTE MED/SURG PRIVATE ROOM

## 2017-10-11 PROCEDURE — 25000003 PHARM REV CODE 250: Performed by: STUDENT IN AN ORGANIZED HEALTH CARE EDUCATION/TRAINING PROGRAM

## 2017-10-11 PROCEDURE — 84100 ASSAY OF PHOSPHORUS: CPT | Mod: 91

## 2017-10-11 PROCEDURE — 85730 THROMBOPLASTIN TIME PARTIAL: CPT

## 2017-10-11 PROCEDURE — 80076 HEPATIC FUNCTION PANEL: CPT

## 2017-10-11 PROCEDURE — 94640 AIRWAY INHALATION TREATMENT: CPT

## 2017-10-11 PROCEDURE — 86140 C-REACTIVE PROTEIN: CPT

## 2017-10-11 PROCEDURE — 25500020 PHARM REV CODE 255: Performed by: SURGERY

## 2017-10-11 PROCEDURE — 83880 ASSAY OF NATRIURETIC PEPTIDE: CPT

## 2017-10-11 PROCEDURE — 63600175 PHARM REV CODE 636 W HCPCS: Performed by: SURGERY

## 2017-10-11 PROCEDURE — 86920 COMPATIBILITY TEST SPIN: CPT

## 2017-10-11 PROCEDURE — 93306 TTE W/DOPPLER COMPLETE: CPT

## 2017-10-11 PROCEDURE — 25000003 PHARM REV CODE 250: Performed by: NURSE PRACTITIONER

## 2017-10-11 PROCEDURE — 25000242 PHARM REV CODE 250 ALT 637 W/ HCPCS: Performed by: SURGERY

## 2017-10-11 PROCEDURE — 25000003 PHARM REV CODE 250: Performed by: SURGERY

## 2017-10-11 PROCEDURE — 81001 URINALYSIS AUTO W/SCOPE: CPT

## 2017-10-11 PROCEDURE — 99900035 HC TECH TIME PER 15 MIN (STAT)

## 2017-10-11 PROCEDURE — 36415 COLL VENOUS BLD VENIPUNCTURE: CPT

## 2017-10-11 PROCEDURE — 83735 ASSAY OF MAGNESIUM: CPT | Mod: 91

## 2017-10-11 RX ORDER — ATORVASTATIN CALCIUM 20 MG/1
80 TABLET, FILM COATED ORAL DAILY
Status: DISCONTINUED | OUTPATIENT
Start: 2017-10-12 | End: 2017-10-12

## 2017-10-11 RX ORDER — PREDNISONE 5 MG/1
TABLET ORAL
Status: ON HOLD | COMMUNITY
Start: 2017-10-02 | End: 2017-10-18 | Stop reason: HOSPADM

## 2017-10-11 RX ORDER — ALPRAZOLAM 0.25 MG/1
0.25 TABLET ORAL 3 TIMES DAILY PRN
Status: DISCONTINUED | OUTPATIENT
Start: 2017-10-11 | End: 2017-10-12

## 2017-10-11 RX ORDER — HEPARIN SODIUM 5000 [USP'U]/ML
5000 INJECTION, SOLUTION INTRAVENOUS; SUBCUTANEOUS EVERY 8 HOURS
Status: DISCONTINUED | OUTPATIENT
Start: 2017-10-12 | End: 2017-10-12

## 2017-10-11 RX ORDER — HYDROMORPHONE HYDROCHLORIDE 1 MG/ML
0.5 INJECTION, SOLUTION INTRAMUSCULAR; INTRAVENOUS; SUBCUTANEOUS EVERY 6 HOURS PRN
Status: DISCONTINUED | OUTPATIENT
Start: 2017-10-11 | End: 2017-10-11

## 2017-10-11 RX ORDER — SODIUM CHLORIDE 9 MG/ML
INJECTION, SOLUTION INTRAVENOUS CONTINUOUS
Status: DISCONTINUED | OUTPATIENT
Start: 2017-10-11 | End: 2017-10-13

## 2017-10-11 RX ORDER — LEVOTHYROXINE SODIUM 100 UG/1
100 TABLET ORAL DAILY
Status: DISCONTINUED | OUTPATIENT
Start: 2017-10-12 | End: 2017-10-12

## 2017-10-11 RX ORDER — ESCITALOPRAM OXALATE 10 MG/1
10 TABLET ORAL DAILY
Status: DISCONTINUED | OUTPATIENT
Start: 2017-10-12 | End: 2017-10-12

## 2017-10-11 RX ORDER — HYDROMORPHONE HYDROCHLORIDE 1 MG/ML
0.5 INJECTION, SOLUTION INTRAMUSCULAR; INTRAVENOUS; SUBCUTANEOUS EVERY 6 HOURS PRN
Status: DISCONTINUED | OUTPATIENT
Start: 2017-10-11 | End: 2017-10-12

## 2017-10-11 RX ORDER — FLUCONAZOLE 2 MG/ML
400 INJECTION, SOLUTION INTRAVENOUS
Status: DISCONTINUED | OUTPATIENT
Start: 2017-10-11 | End: 2017-10-15

## 2017-10-11 RX ORDER — ONDANSETRON 8 MG/1
8 TABLET, ORALLY DISINTEGRATING ORAL EVERY 8 HOURS PRN
Status: DISCONTINUED | OUTPATIENT
Start: 2017-10-11 | End: 2017-10-11

## 2017-10-11 RX ORDER — IPRATROPIUM BROMIDE AND ALBUTEROL SULFATE 2.5; .5 MG/3ML; MG/3ML
3 SOLUTION RESPIRATORY (INHALATION)
Status: DISCONTINUED | OUTPATIENT
Start: 2017-10-11 | End: 2017-10-13

## 2017-10-11 RX ORDER — HYDROMORPHONE HYDROCHLORIDE 1 MG/ML
1 INJECTION, SOLUTION INTRAMUSCULAR; INTRAVENOUS; SUBCUTANEOUS
Status: DISCONTINUED | OUTPATIENT
Start: 2017-10-11 | End: 2017-10-11

## 2017-10-11 RX ORDER — HYDROMORPHONE HYDROCHLORIDE 1 MG/ML
0.5 INJECTION, SOLUTION INTRAMUSCULAR; INTRAVENOUS; SUBCUTANEOUS
Status: DISCONTINUED | OUTPATIENT
Start: 2017-10-11 | End: 2017-10-11

## 2017-10-11 RX ORDER — CLINDAMYCIN PHOSPHATE 600 MG/50ML
600 INJECTION, SOLUTION INTRAVENOUS
Status: DISCONTINUED | OUTPATIENT
Start: 2017-10-11 | End: 2017-10-12

## 2017-10-11 RX ORDER — ONDANSETRON 2 MG/ML
4 INJECTION INTRAMUSCULAR; INTRAVENOUS EVERY 12 HOURS PRN
Status: DISCONTINUED | OUTPATIENT
Start: 2017-10-11 | End: 2017-10-11

## 2017-10-11 RX ORDER — DEXTROSE MONOHYDRATE, SODIUM CHLORIDE, AND POTASSIUM CHLORIDE 50; 1.49; 4.5 G/1000ML; G/1000ML; G/1000ML
INJECTION, SOLUTION INTRAVENOUS CONTINUOUS
Status: DISCONTINUED | OUTPATIENT
Start: 2017-10-11 | End: 2017-10-11

## 2017-10-11 RX ORDER — CARVEDILOL 3.12 MG/1
3.12 TABLET ORAL 2 TIMES DAILY WITH MEALS
Status: DISCONTINUED | OUTPATIENT
Start: 2017-10-11 | End: 2017-10-12

## 2017-10-11 RX ORDER — ALBUMIN HUMAN 50 G/1000ML
25 SOLUTION INTRAVENOUS ONCE
Status: COMPLETED | OUTPATIENT
Start: 2017-10-11 | End: 2017-10-11

## 2017-10-11 RX ORDER — SODIUM CHLORIDE 0.9 % (FLUSH) 0.9 %
3 SYRINGE (ML) INJECTION EVERY 8 HOURS
Status: DISCONTINUED | OUTPATIENT
Start: 2017-10-11 | End: 2017-10-18 | Stop reason: HOSPADM

## 2017-10-11 RX ORDER — ONDANSETRON 2 MG/ML
4 INJECTION INTRAMUSCULAR; INTRAVENOUS EVERY 6 HOURS PRN
Status: DISCONTINUED | OUTPATIENT
Start: 2017-10-11 | End: 2017-10-18 | Stop reason: HOSPADM

## 2017-10-11 RX ORDER — PREDNISONE 5 MG/1
5 TABLET ORAL DAILY
Status: DISCONTINUED | OUTPATIENT
Start: 2017-10-12 | End: 2017-10-11

## 2017-10-11 RX ORDER — SODIUM CHLORIDE 0.9 % (FLUSH) 0.9 %
3 SYRINGE (ML) INJECTION EVERY 8 HOURS
Status: DISCONTINUED | OUTPATIENT
Start: 2017-10-11 | End: 2017-10-11

## 2017-10-11 RX ORDER — ALPRAZOLAM 0.25 MG/1
0.25 TABLET ORAL 3 TIMES DAILY
Status: DISCONTINUED | OUTPATIENT
Start: 2017-10-11 | End: 2017-10-11

## 2017-10-11 RX ADMIN — Medication 3 ML: at 03:10

## 2017-10-11 RX ADMIN — SODIUM CHLORIDE, SODIUM LACTATE, POTASSIUM CHLORIDE, AND CALCIUM CHLORIDE 1000 ML: .6; .31; .03; .02 INJECTION, SOLUTION INTRAVENOUS at 12:10

## 2017-10-11 RX ADMIN — ALBUMIN (HUMAN) 25 G: 12.5 SOLUTION INTRAVENOUS at 03:10

## 2017-10-11 RX ADMIN — IPRATROPIUM BROMIDE AND ALBUTEROL SULFATE 3 ML: .5; 3 SOLUTION RESPIRATORY (INHALATION) at 09:10

## 2017-10-11 RX ADMIN — AMPICILLIN SODIUM AND SULBACTAM SODIUM 3 G: 2; 1 INJECTION, POWDER, FOR SOLUTION INTRAMUSCULAR; INTRAVENOUS at 07:10

## 2017-10-11 RX ADMIN — FLUCONAZOLE 400 MG: 2 INJECTION INTRAVENOUS at 03:10

## 2017-10-11 RX ADMIN — CLINDAMYCIN IN 5 PERCENT DEXTROSE 600 MG: 12 INJECTION, SOLUTION INTRAVENOUS at 10:10

## 2017-10-11 RX ADMIN — POTASSIUM PHOSPHATE, MONOBASIC AND POTASSIUM PHOSPHATE, DIBASIC 15 MMOL: 224; 236 INJECTION, SOLUTION, CONCENTRATE INTRAVENOUS at 10:10

## 2017-10-11 RX ADMIN — HYDROMORPHONE HYDROCHLORIDE 0.5 MG: 1 INJECTION, SOLUTION INTRAMUSCULAR; INTRAVENOUS; SUBCUTANEOUS at 10:10

## 2017-10-11 RX ADMIN — IOHEXOL 15 ML: 350 INJECTION, SOLUTION INTRAVENOUS at 03:10

## 2017-10-11 RX ADMIN — CARVEDILOL 3.12 MG: 3.12 TABLET, FILM COATED ORAL at 05:10

## 2017-10-11 RX ADMIN — SODIUM CHLORIDE: 0.9 INJECTION, SOLUTION INTRAVENOUS at 03:10

## 2017-10-11 RX ADMIN — HYDROMORPHONE HYDROCHLORIDE 0.5 MG: 1 INJECTION, SOLUTION INTRAMUSCULAR; INTRAVENOUS; SUBCUTANEOUS at 01:10

## 2017-10-11 RX ADMIN — CALCIUM GLUCONATE 1000 MG: 94 INJECTION, SOLUTION INTRAVENOUS at 11:10

## 2017-10-11 RX ADMIN — IOHEXOL 100 ML: 350 INJECTION, SOLUTION INTRAVENOUS at 07:10

## 2017-10-11 RX ADMIN — IOHEXOL 15 ML: 350 INJECTION, SOLUTION INTRAVENOUS at 02:10

## 2017-10-11 RX ADMIN — ONDANSETRON 4 MG: 2 INJECTION INTRAMUSCULAR; INTRAVENOUS at 05:10

## 2017-10-11 RX ADMIN — SODIUM CHLORIDE 1000 ML: 0.9 INJECTION, SOLUTION INTRAVENOUS at 01:10

## 2017-10-11 NOTE — H&P
[facility]  General Surgery  History & Physical    Patient Name: Robby Soriano  MRN: [id number]  Admission Date: 10/11/2017  Attending Physician: Dallas Quach MD   Primary Care Provider: Lyla Bryan MD    Patient information was obtained from patient and ER records.     Subjective:     Chief Complaint/Reason for Admission: Jaundice    History of Present Illness:  Mr. 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 He is currently on a steroid taper. He recuperated slowly but was doing well with forward progress until yesterday when he become easily fatigued and last night he had chills x 45 minutes.   He didnot feel well in clinic today. Of note, he also reports increasingly dark urine in the past week. Tbili was also noted to be 5.4 today in clinic.     No current facility-administered medications on file prior to encounter.      Current Outpatient Prescriptions on File Prior to Encounter   Medication Sig    alprazolam (XANAX) 0.25 MG tablet Take 0.25 mg by mouth 3 (three) times daily.    aspirin (ECOTRIN) 81 MG EC tablet Take 81 mg by mouth once daily.    atorvastatin (LIPITOR) 80 MG tablet Take 80 mg by mouth once daily.    carvedilol (COREG) 3.125 MG tablet Take 3.125 mg by mouth 2 (two) times daily with meals.    escitalopram oxalate (LEXAPRO) 10 MG tablet Take 10 mg by mouth once daily.    indomethacin (INDOCIN) 25 MG capsule Take 25 mg by mouth 2 (two) times daily with meals.    isosorbide dinitrate (ISORDIL) 30 MG Tab Take 20 mg by mouth 3 (three) times daily.    isosorbide mononitrate (IMDUR) 30 MG 24 hr tablet     levothyroxine (SYNTHROID) 100 MCG tablet Take 100 mcg by mouth once daily.    ondansetron (ZOFRAN-ODT) 8 MG TbDL Take 1 tablet (8 mg total) by mouth every 6 (six) hours as needed.     oxycodone (ROXICODONE) 5 MG immediate release tablet Take 1 tablet (5 mg total) by mouth every 4 (four) hours as needed for Pain.    pantoprazole (PROTONIX) 40 MG tablet Take 40 mg by mouth once daily.    polyethylene glycol (GLYCOLAX) 17 gram/dose powder Take 17 g by mouth once daily.    predniSONE (DELTASONE) 5 MG tablet     promethazine (PHENERGAN) 25 MG tablet Take 1 tablet (25 mg total) by mouth every 6 (six) hours as needed for Nausea.       Review of patient's allergies indicates:  No Known Allergies    Past Medical History:   Diagnosis Date    Cancer     Prostate/s/p prostatectomy    Coronary artery disease     GERD (gastroesophageal reflux disease)     Hyperlipidemia     Hypertension     Hypothyroidism      Past Surgical History:   Procedure Laterality Date    CAROTID ENDARTERECTOMY Bilateral     CORONARY ARTERY BYPASS GRAFT      PROSTATE SURGERY       Family History     None        Social History Main Topics    Smoking status: Former Smoker     Types: Cigarettes     Start date: 1/1/1956     Quit date: 6/18/2013    Smokeless tobacco: Never Used    Alcohol use No    Drug use: Unknown    Sexual activity: Not on file     Review of Systems   Constitutional: Positive for appetite change, fatigue and fever. Negative for chills, diaphoresis and unexpected weight change.   HENT: Negative.    Eyes: Negative.    Respiratory: Positive for shortness of breath. Negative for chest tightness and wheezing.    Cardiovascular: Negative.    Gastrointestinal: Negative.    Endocrine: Negative.    Genitourinary: Negative.    Musculoskeletal: Negative.    Skin: Positive for color change (jaundice).   Allergic/Immunologic: Negative.    Neurological: Negative.    Psychiatric/Behavioral: Negative.      Objective:     Vital Signs (Most Recent):  Temp: 98.3 °F (36.8 °C) (10/11/17 1552)  Pulse: 91 (10/11/17 1552)  Resp: 16 (10/11/17 1552)  BP: (!) 118/57 (10/11/17 1552)  SpO2: 95 % (10/11/17 1552) Vital Signs (24h  Range):  Temp:  [97.5 °F (36.4 °C)-98.4 °F (36.9 °C)] 98.3 °F (36.8 °C)  Pulse:  [] 91  Resp:  [16-18] 16  SpO2:  [95 %-96 %] 95 %  BP: (118-144)/(57-64) 118/57     Weight: 75.3 kg (165 lb 15 oz)  Body mass index is 27.61 kg/m².    Physical Exam   Constitutional: He is oriented to person, place, and time. He appears well-developed and well-nourished.   HENT:   Head: Atraumatic.   Eyes: EOM are normal.   Neck: Neck supple.   Cardiovascular: Normal rate and regular rhythm.    Pulmonary/Chest: Effort normal and breath sounds normal.   Abdominal: Soft. He exhibits no distension. There is no tenderness. There is no guarding.   Musculoskeletal: Normal range of motion.   Neurological: He is alert and oriented to person, place, and time.   Psychiatric: He has a normal mood and affect. His behavior is normal.       Significant Labs:  CBC:   Recent Labs  Lab 10/11/17  1007   WBC 12.52   RBC 3.47*   HGB 9.6*   HCT 30.4*   *   MCV 88   MCH 27.7   MCHC 31.6*     BMP:   Recent Labs  Lab 10/11/17  1007 10/11/17  1310   *  --    *  --    K 5.2*  --      --    CO2 22*  --    BUN 37*  --    CREATININE 1.3  --    CALCIUM 8.2*  --    MG 1.6 1.3*     LFTs:   Recent Labs  Lab 10/11/17  1310   *   *   ALKPHOS 202*   BILITOT 8.0*   PROT 5.0*   ALBUMIN 1.5*       Significant Diagnostics:  I have reviewed all pertinent imaging results/findings within the past 24 hours.    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 Will likely need MRCP in future  - Cont steroid taper    Kade Jansen MD  General Surgery  [facility]

## 2017-10-11 NOTE — PROGRESS NOTES
Mr. Giles presents for scheduled PO visit.  He had been making good forward progress with energy and appetite since last visit until yesterday.  Yesterday he felt fatigued and walked less than normal.  Last night he had an episode of chills for 45 minutes.    Today he is in a WC and is very cold and wrapped in blankets.  In NAD but appears ill.  Ashen color.  ? Scleral icterus.  States urine is darker lately.     I fear he may have recurrence of OJ.  WIll check labs, including IGG4 and bili.  Admit for possible recurrence of OJ and r/o cholangitis.

## 2017-10-11 NOTE — PROGRESS NOTES
History & Physical    SUBJECTIVE:     History of Present Illness:  Mr. Soriano present to clinic for PO visit today.    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    He was [place] [facility]  He recuperated slowly but was doing well with forward progress until yesterday when he become easily fatigued and last night he had chills x 45 minutes.   He does not feel well in clinic today.  He is in WC and wrapped in multiple blankets.   Reports dark urine.    Review of patient's allergies indicates:  No Known Allergies    No current facility-administered medications for this visit.      No current outpatient prescriptions on file.     Facility-Administered Medications Ordered in Other Visits   Medication Dose Route Frequency Provider Last Rate Last Dose    0.9%  NaCl infusion   Intravenous Continuous Gopal Mercedes MD        alprazolam tablet 0.25 mg  0.25 mg Oral TID Miriam Valderrama NP        [START ON 10/12/2017] atorvastatin tablet 80 mg  80 mg Oral Daily Miriam Valderrama NP        carvedilol tablet 3.125 mg  3.125 mg Oral BID WM Miriam Valderrama NP        dextrose 5 % and 0.45 % NaCl with KCl 20 mEq infusion   Intravenous Continuous Miriam Valderrama NP        [START ON 10/12/2017] escitalopram oxalate tablet 10 mg  10 mg Oral Daily Miriam Valderrama NP        hydromorphone injection 0.5 mg  0.5 mg Intravenous Q3H PRN Miriam Valderrama NP        HYDROmorphone injection 1 mg  1 mg Intravenous Q3H PRN Miriam Valderrama NP        lactated ringers bolus 1,000 mL  1,000 mL Intravenous Once Miriam Valderrama NP        [START ON 10/12/2017] levothyroxine tablet 100 mcg  100 mcg Oral Daily Miriam Valderrama NP        ondansetron disintegrating tablet 8 mg  8 mg Oral Q8H PRN Gopal Mercedes MD        ondansetron disintegrating tablet 8 mg  8 mg Oral Q8H  "PRN Miriam Valderrama NP        ondansetron injection 4 mg  4 mg Intravenous Q12H PRN Miriam Valderrama NP        [START ON 10/12/2017] predniSONE tablet 5 mg  5 mg Oral Daily Miriam Valderrama NP        promethazine (PHENERGAN) 6.25 mg in dextrose 5 % 50 mL IVPB  6.25 mg Intravenous Q6H PRN Gopal Mercedes MD        sodium chloride 0.9% flush 3 mL  3 mL Intravenous Q8H Gopal Mercedes MD        sodium chloride 0.9% flush 3 mL  3 mL Intravenous Q8H Miriam Valderrama NP           Past Medical History:   Diagnosis Date    Cancer     Prostate/s/p prostatectomy    Coronary artery disease     GERD (gastroesophageal reflux disease)     Hyperlipidemia     Hypertension     Hypothyroidism      Past Surgical History:   Procedure Laterality Date    CAROTID ENDARTERECTOMY Bilateral     CORONARY ARTERY BYPASS GRAFT      PROSTATE SURGERY       No family history on file.  Social History   Substance Use Topics    Smoking status: Former Smoker     Types: Cigarettes     Start date: 1/1/1956     Quit date: 6/18/2013    Smokeless tobacco: Never Used    Alcohol use No        Review of Systems:  Review of Systems   Constitutional: Positive for chills and fatigue.   HENT: Negative.    Respiratory: Negative.    Cardiovascular: Negative.    Gastrointestinal: Negative.    Genitourinary:        Dark urine   Musculoskeletal: Negative.    Skin: Positive for color change.   Neurological: Positive for weakness.   Psychiatric/Behavioral: Positive for dysphoric mood.       OBJECTIVE:     Vital Signs (Most Recent)  Temp: 98.4 °F (36.9 °C) (10/11/17 0931)  Pulse: 90 (10/11/17 0931)  BP: (!) 144/64 (10/11/17 0931)  5' 5" (1.651 m)        Physical Exam:  Physical Exam   Constitutional: He is oriented to person, place, and time.   In WC.  Looks ill   HENT:   Head: Normocephalic and atraumatic.   Eyes: Conjunctivae are normal. Scleral icterus is present.   Neck: Normal range of motion. Neck supple.   Cardiovascular: " Normal rate and regular rhythm.    Pulmonary/Chest: Effort normal and breath sounds normal.   Abdominal: Soft. Bowel sounds are normal. He exhibits no distension.   Musculoskeletal: Normal range of motion.   Neurological: He is alert and oriented to person, place, and time.   Skin: Skin is warm and dry.   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  New onset POJ    PLAN:  Admit for w/u and to r/o cholangitis: ERCP, CT, labs....   Notified hospital service and GI.

## 2017-10-12 PROBLEM — J96.02 ACUTE RESPIRATORY FAILURE WITH HYPOXIA AND HYPERCARBIA: Status: ACTIVE | Noted: 2017-10-12

## 2017-10-12 PROBLEM — J96.01 ACUTE RESPIRATORY FAILURE WITH HYPOXIA AND HYPERCARBIA: Status: ACTIVE | Noted: 2017-10-12

## 2017-10-12 PROBLEM — R06.02 SHORTNESS OF BREATH: Status: ACTIVE | Noted: 2017-10-12

## 2017-10-12 PROBLEM — I95.9 HYPOTENSION: Status: ACTIVE | Noted: 2017-10-12

## 2017-10-12 PROBLEM — I24.89 DEMAND ISCHEMIA OF MYOCARDIUM: Status: ACTIVE | Noted: 2017-10-12

## 2017-10-12 LAB
ALBUMIN SERPL BCP-MCNC: 1.6 G/DL
ALBUMIN SERPL BCP-MCNC: 1.8 G/DL
ALLENS TEST: ABNORMAL
ALLENS TEST: ABNORMAL
ALP SERPL-CCNC: 151 U/L
ALP SERPL-CCNC: 260 U/L
ALT SERPL W/O P-5'-P-CCNC: 245 U/L
ALT SERPL W/O P-5'-P-CCNC: 294 U/L
AMORPH CRY UR QL COMP ASSIST: NORMAL
ANION GAP SERPL CALC-SCNC: 18 MMOL/L
ANION GAP SERPL CALC-SCNC: 8 MMOL/L
ANISOCYTOSIS BLD QL SMEAR: SLIGHT
AST SERPL-CCNC: 302 U/L
AST SERPL-CCNC: 459 U/L
BACTERIA #/AREA URNS AUTO: NORMAL /HPF
BASO STIPL BLD QL SMEAR: ABNORMAL
BASOPHILS # BLD AUTO: 0 K/UL
BASOPHILS # BLD AUTO: 0 K/UL
BASOPHILS # BLD AUTO: 0.02 K/UL
BASOPHILS # BLD AUTO: ABNORMAL K/UL
BASOPHILS NFR BLD: 0 %
BASOPHILS NFR BLD: 0.1 %
BILIRUB SERPL-MCNC: 12.5 MG/DL
BILIRUB SERPL-MCNC: 18.1 MG/DL
BILIRUB UR QL STRIP: ABNORMAL
BLD PROD TYP BPU: NORMAL
BLD PROD TYP BPU: NORMAL
BLOOD UNIT EXPIRATION DATE: NORMAL
BLOOD UNIT EXPIRATION DATE: NORMAL
BLOOD UNIT TYPE CODE: 6200
BLOOD UNIT TYPE CODE: 6200
BLOOD UNIT TYPE: NORMAL
BLOOD UNIT TYPE: NORMAL
BUN SERPL-MCNC: 48 MG/DL
BUN SERPL-MCNC: 50 MG/DL
BURR CELLS BLD QL SMEAR: ABNORMAL
CA-I BLDV-SCNC: 0.93 MMOL/L
CALCIUM SERPL-MCNC: 7.5 MG/DL
CALCIUM SERPL-MCNC: 8.1 MG/DL
CHLORIDE SERPL-SCNC: 105 MMOL/L
CHLORIDE SERPL-SCNC: 109 MMOL/L
CK MB SERPL-MCNC: 41.2 NG/ML
CK MB SERPL-MCNC: 51.7 NG/ML
CK MB SERPL-MCNC: 57.8 NG/ML
CK MB SERPL-RTO: 7 %
CK MB SERPL-RTO: 7.1 %
CK MB SERPL-RTO: 7.5 %
CK SERPL-CCNC: 549 U/L
CK SERPL-CCNC: 727 U/L
CK SERPL-CCNC: 822 U/L
CLARITY UR REFRACT.AUTO: ABNORMAL
CO2 SERPL-SCNC: 12 MMOL/L
CO2 SERPL-SCNC: 19 MMOL/L
CODING SYSTEM: NORMAL
CODING SYSTEM: NORMAL
COLOR UR AUTO: ABNORMAL
CREAT SERPL-MCNC: 1 MG/DL
CREAT SERPL-MCNC: 1.3 MG/DL
DACRYOCYTES BLD QL SMEAR: ABNORMAL
DELSYS: ABNORMAL
DELSYS: ABNORMAL
DIFFERENTIAL METHOD: ABNORMAL
DISPENSE STATUS: NORMAL
DISPENSE STATUS: NORMAL
DOHLE BOD BLD QL SMEAR: PRESENT
DOHLE BOD BLD QL SMEAR: PRESENT
EOSINOPHIL # BLD AUTO: 0 K/UL
EOSINOPHIL # BLD AUTO: ABNORMAL K/UL
EOSINOPHIL NFR BLD: 0 %
EOSINOPHIL NFR BLD: 1 %
ERYTHROCYTE [DISTWIDTH] IN BLOOD BY AUTOMATED COUNT: 16.8 %
ERYTHROCYTE [DISTWIDTH] IN BLOOD BY AUTOMATED COUNT: 17 %
ERYTHROCYTE [DISTWIDTH] IN BLOOD BY AUTOMATED COUNT: 17.3 %
ERYTHROCYTE [DISTWIDTH] IN BLOOD BY AUTOMATED COUNT: 17.4 %
ERYTHROCYTE [DISTWIDTH] IN BLOOD BY AUTOMATED COUNT: 17.7 %
ERYTHROCYTE [DISTWIDTH] IN BLOOD BY AUTOMATED COUNT: 18.2 %
EST. GFR  (AFRICAN AMERICAN): >60 ML/MIN/1.73 M^2
EST. GFR  (AFRICAN AMERICAN): >60 ML/MIN/1.73 M^2
EST. GFR  (NON AFRICAN AMERICAN): 52.6 ML/MIN/1.73 M^2
EST. GFR  (NON AFRICAN AMERICAN): >60 ML/MIN/1.73 M^2
FLOW: 3
FLOW: 3
GLUCOSE SERPL-MCNC: 117 MG/DL
GLUCOSE SERPL-MCNC: 158 MG/DL
GLUCOSE UR QL STRIP: NEGATIVE
GRAM STN SPEC: NORMAL
HCO3 UR-SCNC: 15.2 MMOL/L (ref 24–28)
HCT VFR BLD AUTO: 20.9 %
HCT VFR BLD AUTO: 21.6 %
HCT VFR BLD AUTO: 23.6 %
HCT VFR BLD AUTO: 24.8 %
HCT VFR BLD AUTO: 25 %
HCT VFR BLD AUTO: 25 %
HCT VFR BLD CALC: 19 %PCV (ref 36–54)
HGB BLD-MCNC: 6.4 G/DL
HGB BLD-MCNC: 6.8 G/DL
HGB BLD-MCNC: 7.8 G/DL
HGB BLD-MCNC: 8 G/DL
HGB BLD-MCNC: 8 G/DL
HGB BLD-MCNC: 8.3 G/DL
HGB UR QL STRIP: ABNORMAL
HYALINE CASTS UR QL AUTO: 0 /LPF
HYPOCHROMIA BLD QL SMEAR: ABNORMAL
HYPOCHROMIA BLD QL SMEAR: ABNORMAL
INR PPP: 1.2
KETONES UR QL STRIP: NEGATIVE
LACTATE SERPL-SCNC: 2 MMOL/L
LACTATE SERPL-SCNC: 2.1 MMOL/L
LACTATE SERPL-SCNC: 5.1 MMOL/L
LACTATE SERPL-SCNC: 8.9 MMOL/L
LDH SERPL L TO P-CCNC: 8.7 MMOL/L (ref 0.36–1.25)
LEUKOCYTE ESTERASE UR QL STRIP: NEGATIVE
LYMPHOCYTES # BLD AUTO: 0.8 K/UL
LYMPHOCYTES # BLD AUTO: 1 K/UL
LYMPHOCYTES # BLD AUTO: 1.2 K/UL
LYMPHOCYTES # BLD AUTO: ABNORMAL K/UL
LYMPHOCYTES NFR BLD: 2 %
LYMPHOCYTES NFR BLD: 3.6 %
LYMPHOCYTES NFR BLD: 4 %
LYMPHOCYTES NFR BLD: 4 %
LYMPHOCYTES NFR BLD: 4.4 %
LYMPHOCYTES NFR BLD: 5 %
MAGNESIUM SERPL-MCNC: 2 MG/DL
MCH RBC QN AUTO: 28.7 PG
MCH RBC QN AUTO: 29 PG
MCH RBC QN AUTO: 29 PG
MCH RBC QN AUTO: 29.2 PG
MCHC RBC AUTO-ENTMCNC: 30.6 G/DL
MCHC RBC AUTO-ENTMCNC: 31.5 G/DL
MCHC RBC AUTO-ENTMCNC: 32 G/DL
MCHC RBC AUTO-ENTMCNC: 32.3 G/DL
MCHC RBC AUTO-ENTMCNC: 33.1 G/DL
MCHC RBC AUTO-ENTMCNC: 33.2 G/DL
MCV RBC AUTO: 88 FL
MCV RBC AUTO: 88 FL
MCV RBC AUTO: 90 FL
MCV RBC AUTO: 91 FL
MCV RBC AUTO: 93 FL
MCV RBC AUTO: 94 FL
METAMYELOCYTES NFR BLD MANUAL: 2 %
MICROSCOPIC COMMENT: NORMAL
MODE: ABNORMAL
MODE: ABNORMAL
MONOCYTES # BLD AUTO: 0.5 K/UL
MONOCYTES # BLD AUTO: 0.8 K/UL
MONOCYTES # BLD AUTO: 0.8 K/UL
MONOCYTES # BLD AUTO: ABNORMAL K/UL
MONOCYTES NFR BLD: 1 %
MONOCYTES NFR BLD: 2.6 %
MONOCYTES NFR BLD: 2.8 %
MONOCYTES NFR BLD: 3.4 %
MONOCYTES NFR BLD: 5 %
MONOCYTES NFR BLD: 6 %
NEUTROPHILS # BLD AUTO: 19.5 K/UL
NEUTROPHILS # BLD AUTO: 21.7 K/UL
NEUTROPHILS # BLD AUTO: 27.3 K/UL
NEUTROPHILS NFR BLD: 83 %
NEUTROPHILS NFR BLD: 87 %
NEUTROPHILS NFR BLD: 90 %
NEUTROPHILS NFR BLD: 92.2 %
NEUTROPHILS NFR BLD: 93.1 %
NEUTROPHILS NFR BLD: 93.1 %
NEUTS BAND NFR BLD MANUAL: 7 %
NEUTS BAND NFR BLD MANUAL: 7 %
NITRITE UR QL STRIP: NEGATIVE
NRBC BLD-RTO: ABNORMAL /100 WBC
OVALOCYTES BLD QL SMEAR: ABNORMAL
PCO2 BLDA: 27 MMHG (ref 35–45)
PH SMN: 7.36 [PH] (ref 7.35–7.45)
PH UR STRIP: 5 [PH] (ref 5–8)
PHOSPHATE SERPL-MCNC: 4.8 MG/DL
PLATELET # BLD AUTO: 114 K/UL
PLATELET # BLD AUTO: 132 K/UL
PLATELET # BLD AUTO: 60 K/UL
PLATELET # BLD AUTO: 80 K/UL
PLATELET # BLD AUTO: 83 K/UL
PLATELET # BLD AUTO: 85 K/UL
PLATELET BLD QL SMEAR: ABNORMAL
PMV BLD AUTO: 10.1 FL
PMV BLD AUTO: 10.1 FL
PMV BLD AUTO: 10.3 FL
PMV BLD AUTO: 10.3 FL
PMV BLD AUTO: 10.7 FL
PMV BLD AUTO: 9.9 FL
PO2 BLDA: 72 MMHG (ref 80–100)
POC BE: -10 MMOL/L
POC IONIZED CALCIUM: 1.1 MMOL/L (ref 1.06–1.42)
POC SATURATED O2: 94 % (ref 95–100)
POC TCO2: 16 MMOL/L (ref 23–27)
POCT GLUCOSE: 112 MG/DL (ref 70–110)
POIKILOCYTOSIS BLD QL SMEAR: SLIGHT
POLYCHROMASIA BLD QL SMEAR: ABNORMAL
POTASSIUM BLD-SCNC: 5.5 MMOL/L (ref 3.5–5.1)
POTASSIUM SERPL-SCNC: 4.5 MMOL/L
POTASSIUM SERPL-SCNC: 5.6 MMOL/L
PROT SERPL-MCNC: 4.6 G/DL
PROT SERPL-MCNC: 5.1 G/DL
PROT UR QL STRIP: ABNORMAL
PROTHROMBIN TIME: 12.3 SEC
RBC # BLD AUTO: 2.23 M/UL
RBC # BLD AUTO: 2.33 M/UL
RBC # BLD AUTO: 2.69 M/UL
RBC # BLD AUTO: 2.74 M/UL
RBC # BLD AUTO: 2.76 M/UL
RBC # BLD AUTO: 2.84 M/UL
RBC #/AREA URNS AUTO: 2 /HPF (ref 0–4)
SAMPLE: ABNORMAL
SAMPLE: ABNORMAL
SCHISTOCYTES BLD QL SMEAR: PRESENT
SITE: ABNORMAL
SITE: ABNORMAL
SODIUM BLD-SCNC: 135 MMOL/L (ref 136–145)
SODIUM SERPL-SCNC: 135 MMOL/L
SODIUM SERPL-SCNC: 136 MMOL/L
SP GR UR STRIP: >=1.03 (ref 1–1.03)
SP02: 94
SP02: 94
SPHEROCYTES BLD QL SMEAR: ABNORMAL
SQUAMOUS #/AREA URNS AUTO: 1 /HPF
TOXIC GRANULES BLD QL SMEAR: PRESENT
TOXIC GRANULES BLD QL SMEAR: PRESENT
TRANS ERYTHROCYTES VOL PATIENT: NORMAL ML
TRANS ERYTHROCYTES VOL PATIENT: NORMAL ML
TROPONIN I SERPL DL<=0.01 NG/ML-MCNC: 12.13 NG/ML
TROPONIN I SERPL DL<=0.01 NG/ML-MCNC: 16.07 NG/ML
TROPONIN I SERPL DL<=0.01 NG/ML-MCNC: 2.93 NG/ML
TROPONIN I SERPL DL<=0.01 NG/ML-MCNC: 28.15 NG/ML
URN SPEC COLLECT METH UR: ABNORMAL
UROBILINOGEN UR STRIP-ACNC: 4 EU/DL
WBC # BLD AUTO: 20.89 K/UL
WBC # BLD AUTO: 23.78 K/UL
WBC # BLD AUTO: 27.33 K/UL
WBC # BLD AUTO: 28.32 K/UL
WBC # BLD AUTO: 29.8 K/UL
WBC # BLD AUTO: 33.77 K/UL
WBC #/AREA URNS AUTO: 0 /HPF (ref 0–5)

## 2017-10-12 PROCEDURE — 25000003 PHARM REV CODE 250: Performed by: NURSE PRACTITIONER

## 2017-10-12 PROCEDURE — 20000000 HC ICU ROOM

## 2017-10-12 PROCEDURE — 83605 ASSAY OF LACTIC ACID: CPT

## 2017-10-12 PROCEDURE — 25000003 PHARM REV CODE 250: Performed by: SURGERY

## 2017-10-12 PROCEDURE — 82330 ASSAY OF CALCIUM: CPT

## 2017-10-12 PROCEDURE — 87040 BLOOD CULTURE FOR BACTERIA: CPT | Mod: 59

## 2017-10-12 PROCEDURE — 02HV33Z INSERTION OF INFUSION DEVICE INTO SUPERIOR VENA CAVA, PERCUTANEOUS APPROACH: ICD-10-PCS | Performed by: SURGERY

## 2017-10-12 PROCEDURE — 63600175 PHARM REV CODE 636 W HCPCS: Performed by: SURGERY

## 2017-10-12 PROCEDURE — 82803 BLOOD GASES ANY COMBINATION: CPT

## 2017-10-12 PROCEDURE — 27000221 HC OXYGEN, UP TO 24 HOURS

## 2017-10-12 PROCEDURE — A4216 STERILE WATER/SALINE, 10 ML: HCPCS | Performed by: NURSE PRACTITIONER

## 2017-10-12 PROCEDURE — 85014 HEMATOCRIT: CPT

## 2017-10-12 PROCEDURE — 25000003 PHARM REV CODE 250: Performed by: STUDENT IN AN ORGANIZED HEALTH CARE EDUCATION/TRAINING PROGRAM

## 2017-10-12 PROCEDURE — 84484 ASSAY OF TROPONIN QUANT: CPT

## 2017-10-12 PROCEDURE — 85007 BL SMEAR W/DIFF WBC COUNT: CPT | Mod: 91

## 2017-10-12 PROCEDURE — 83735 ASSAY OF MAGNESIUM: CPT

## 2017-10-12 PROCEDURE — 36430 TRANSFUSION BLD/BLD COMPNT: CPT

## 2017-10-12 PROCEDURE — 84484 ASSAY OF TROPONIN QUANT: CPT | Mod: 91

## 2017-10-12 PROCEDURE — 93005 ELECTROCARDIOGRAM TRACING: CPT

## 2017-10-12 PROCEDURE — 85025 COMPLETE CBC W/AUTO DIFF WBC: CPT | Mod: 91

## 2017-10-12 PROCEDURE — 85027 COMPLETE CBC AUTOMATED: CPT | Mod: 91

## 2017-10-12 PROCEDURE — 80053 COMPREHEN METABOLIC PANEL: CPT | Mod: 91

## 2017-10-12 PROCEDURE — 63600175 PHARM REV CODE 636 W HCPCS: Performed by: STUDENT IN AN ORGANIZED HEALTH CARE EDUCATION/TRAINING PROGRAM

## 2017-10-12 PROCEDURE — 83605 ASSAY OF LACTIC ACID: CPT | Mod: 91

## 2017-10-12 PROCEDURE — 82553 CREATINE MB FRACTION: CPT | Mod: 91

## 2017-10-12 PROCEDURE — 87186 SC STD MICRODIL/AGAR DIL: CPT | Mod: 59

## 2017-10-12 PROCEDURE — 87076 CULTURE ANAEROBE IDENT EACH: CPT

## 2017-10-12 PROCEDURE — 84100 ASSAY OF PHOSPHORUS: CPT

## 2017-10-12 PROCEDURE — 36600 WITHDRAWAL OF ARTERIAL BLOOD: CPT

## 2017-10-12 PROCEDURE — 87077 CULTURE AEROBIC IDENTIFY: CPT

## 2017-10-12 PROCEDURE — S0030 INJECTION, METRONIDAZOLE: HCPCS | Performed by: SURGERY

## 2017-10-12 PROCEDURE — 85610 PROTHROMBIN TIME: CPT

## 2017-10-12 PROCEDURE — 84295 ASSAY OF SERUM SODIUM: CPT

## 2017-10-12 PROCEDURE — 25000003 PHARM REV CODE 250

## 2017-10-12 PROCEDURE — 94640 AIRWAY INHALATION TREATMENT: CPT

## 2017-10-12 PROCEDURE — 81001 URINALYSIS AUTO W/SCOPE: CPT

## 2017-10-12 PROCEDURE — 99223 1ST HOSP IP/OBS HIGH 75: CPT | Mod: 24,25,, | Performed by: SURGERY

## 2017-10-12 PROCEDURE — 87205 SMEAR GRAM STAIN: CPT

## 2017-10-12 PROCEDURE — 87086 URINE CULTURE/COLONY COUNT: CPT

## 2017-10-12 PROCEDURE — 93010 ELECTROCARDIOGRAM REPORT: CPT | Mod: ,,, | Performed by: INTERNAL MEDICINE

## 2017-10-12 PROCEDURE — 25000242 PHARM REV CODE 250 ALT 637 W/ HCPCS: Performed by: SURGERY

## 2017-10-12 PROCEDURE — 80053 COMPREHEN METABOLIC PANEL: CPT

## 2017-10-12 PROCEDURE — 36415 COLL VENOUS BLD VENIPUNCTURE: CPT

## 2017-10-12 PROCEDURE — P9021 RED BLOOD CELLS UNIT: HCPCS

## 2017-10-12 PROCEDURE — 0F9030Z DRAINAGE OF LIVER WITH DRAINAGE DEVICE, PERCUTANEOUS APPROACH: ICD-10-PCS | Performed by: RADIOLOGY

## 2017-10-12 PROCEDURE — 36556 INSERT NON-TUNNEL CV CATH: CPT | Mod: 79,,, | Performed by: SURGERY

## 2017-10-12 PROCEDURE — 87070 CULTURE OTHR SPECIMN AEROBIC: CPT

## 2017-10-12 PROCEDURE — 87075 CULTR BACTERIA EXCEPT BLOOD: CPT | Mod: 59

## 2017-10-12 PROCEDURE — 84132 ASSAY OF SERUM POTASSIUM: CPT

## 2017-10-12 RX ORDER — MAGNESIUM SULFATE HEPTAHYDRATE 40 MG/ML
2 INJECTION, SOLUTION INTRAVENOUS
Status: DISCONTINUED | OUTPATIENT
Start: 2017-10-12 | End: 2017-10-16

## 2017-10-12 RX ORDER — LEVOTHYROXINE SODIUM ANHYDROUS 100 UG/5ML
50 INJECTION, POWDER, LYOPHILIZED, FOR SOLUTION INTRAVENOUS DAILY
Status: DISCONTINUED | OUTPATIENT
Start: 2017-10-12 | End: 2017-10-18

## 2017-10-12 RX ORDER — FENTANYL CITRATE 50 UG/ML
25 INJECTION, SOLUTION INTRAMUSCULAR; INTRAVENOUS ONCE
Status: COMPLETED | OUTPATIENT
Start: 2017-10-12 | End: 2017-10-12

## 2017-10-12 RX ORDER — LIDOCAINE HYDROCHLORIDE 20 MG/ML
INJECTION, SOLUTION INFILTRATION; PERINEURAL
Status: COMPLETED
Start: 2017-10-12 | End: 2017-10-12

## 2017-10-12 RX ORDER — IBUPROFEN 200 MG
200 TABLET ORAL EVERY 12 HOURS PRN
Status: DISCONTINUED | OUTPATIENT
Start: 2017-10-12 | End: 2017-10-15

## 2017-10-12 RX ORDER — CEFEPIME HYDROCHLORIDE 1 G/50ML
1 INJECTION, SOLUTION INTRAVENOUS
Status: CANCELLED | OUTPATIENT
Start: 2017-10-12

## 2017-10-12 RX ORDER — POTASSIUM CHLORIDE 7.45 MG/ML
40 INJECTION INTRAVENOUS
Status: DISCONTINUED | OUTPATIENT
Start: 2017-10-12 | End: 2017-10-12

## 2017-10-12 RX ORDER — POTASSIUM CHLORIDE 7.45 MG/ML
60 INJECTION INTRAVENOUS
Status: DISCONTINUED | OUTPATIENT
Start: 2017-10-12 | End: 2017-10-12

## 2017-10-12 RX ORDER — POTASSIUM CHLORIDE 7.45 MG/ML
80 INJECTION INTRAVENOUS
Status: DISCONTINUED | OUTPATIENT
Start: 2017-10-12 | End: 2017-10-12

## 2017-10-12 RX ORDER — MAGNESIUM SULFATE HEPTAHYDRATE 40 MG/ML
4 INJECTION, SOLUTION INTRAVENOUS
Status: DISCONTINUED | OUTPATIENT
Start: 2017-10-12 | End: 2017-10-16

## 2017-10-12 RX ORDER — METOPROLOL TARTRATE 1 MG/ML
5 INJECTION, SOLUTION INTRAVENOUS EVERY 4 HOURS
Status: DISCONTINUED | OUTPATIENT
Start: 2017-10-12 | End: 2017-10-12

## 2017-10-12 RX ORDER — METRONIDAZOLE 500 MG/100ML
500 INJECTION, SOLUTION INTRAVENOUS
Status: DISCONTINUED | OUTPATIENT
Start: 2017-10-12 | End: 2017-10-12

## 2017-10-12 RX ORDER — ALPRAZOLAM 0.25 MG/1
0.25 TABLET ORAL EVERY 12 HOURS PRN
Status: DISCONTINUED | OUTPATIENT
Start: 2017-10-12 | End: 2017-10-18 | Stop reason: HOSPADM

## 2017-10-12 RX ORDER — HYDROCODONE BITARTRATE AND ACETAMINOPHEN 500; 5 MG/1; MG/1
TABLET ORAL
Status: DISCONTINUED | OUTPATIENT
Start: 2017-10-12 | End: 2017-10-12

## 2017-10-12 RX ORDER — METOPROLOL TARTRATE 1 MG/ML
5 INJECTION, SOLUTION INTRAVENOUS EVERY 6 HOURS
Status: CANCELLED | OUTPATIENT
Start: 2017-10-12

## 2017-10-12 RX ORDER — FENTANYL CITRATE 50 UG/ML
50 INJECTION, SOLUTION INTRAMUSCULAR; INTRAVENOUS ONCE
Status: COMPLETED | OUTPATIENT
Start: 2017-10-12 | End: 2017-10-12

## 2017-10-12 RX ORDER — METOPROLOL TARTRATE 1 MG/ML
5 INJECTION, SOLUTION INTRAVENOUS EVERY 6 HOURS
Status: DISCONTINUED | OUTPATIENT
Start: 2017-10-12 | End: 2017-10-15

## 2017-10-12 RX ADMIN — PIPERACILLIN AND TAZOBACTAM 4.5 G: 4; .5 INJECTION, POWDER, LYOPHILIZED, FOR SOLUTION INTRAVENOUS; PARENTERAL at 09:10

## 2017-10-12 RX ADMIN — SODIUM CHLORIDE 1000 ML: 0.9 INJECTION, SOLUTION INTRAVENOUS at 10:10

## 2017-10-12 RX ADMIN — HEPARIN SODIUM 5000 UNITS: 5000 INJECTION, SOLUTION INTRAVENOUS; SUBCUTANEOUS at 05:10

## 2017-10-12 RX ADMIN — Medication 3 ML: at 02:10

## 2017-10-12 RX ADMIN — Medication 3 ML: at 09:10

## 2017-10-12 RX ADMIN — Medication 3 ML: at 05:10

## 2017-10-12 RX ADMIN — FLUCONAZOLE 400 MG: 2 INJECTION INTRAVENOUS at 03:10

## 2017-10-12 RX ADMIN — METRONIDAZOLE 500 MG: 500 INJECTION, SOLUTION INTRAVENOUS at 09:10

## 2017-10-12 RX ADMIN — AMPICILLIN SODIUM AND SULBACTAM SODIUM 3 G: 2; 1 INJECTION, POWDER, FOR SOLUTION INTRAMUSCULAR; INTRAVENOUS at 02:10

## 2017-10-12 RX ADMIN — VANCOMYCIN HYDROCHLORIDE 1750 MG: 10 INJECTION, POWDER, LYOPHILIZED, FOR SOLUTION INTRAVENOUS at 05:10

## 2017-10-12 RX ADMIN — FENTANYL CITRATE 50 MCG: 50 INJECTION, SOLUTION INTRAMUSCULAR; INTRAVENOUS at 05:10

## 2017-10-12 RX ADMIN — SODIUM CHLORIDE: 0.9 INJECTION, SOLUTION INTRAVENOUS at 09:10

## 2017-10-12 RX ADMIN — PIPERACILLIN AND TAZOBACTAM 4.5 G: 4; .5 INJECTION, POWDER, LYOPHILIZED, FOR SOLUTION INTRAVENOUS; PARENTERAL at 01:10

## 2017-10-12 RX ADMIN — IPRATROPIUM BROMIDE AND ALBUTEROL SULFATE 3 ML: .5; 3 SOLUTION RESPIRATORY (INHALATION) at 07:10

## 2017-10-12 RX ADMIN — SODIUM CHLORIDE 1000 ML: 0.9 INJECTION, SOLUTION INTRAVENOUS at 02:10

## 2017-10-12 RX ADMIN — HYDROCORTISONE SODIUM SUCCINATE 100 MG: 100 INJECTION, POWDER, FOR SOLUTION INTRAMUSCULAR; INTRAVENOUS at 06:10

## 2017-10-12 RX ADMIN — LIDOCAINE HYDROCHLORIDE 400 MG: 20 INJECTION, SOLUTION INFILTRATION; PERINEURAL at 06:10

## 2017-10-12 RX ADMIN — HYDROCORTISONE SODIUM SUCCINATE 100 MG: 100 INJECTION, POWDER, FOR SOLUTION INTRAMUSCULAR; INTRAVENOUS at 09:10

## 2017-10-12 RX ADMIN — IPRATROPIUM BROMIDE AND ALBUTEROL SULFATE 3 ML: .5; 3 SOLUTION RESPIRATORY (INHALATION) at 12:10

## 2017-10-12 RX ADMIN — PIPERACILLIN AND TAZOBACTAM 4.5 G: 4; .5 INJECTION, POWDER, LYOPHILIZED, FOR SOLUTION INTRAVENOUS; PARENTERAL at 05:10

## 2017-10-12 RX ADMIN — LEVOTHYROXINE SODIUM ANHYDROUS 50 MCG: 100 INJECTION, POWDER, LYOPHILIZED, FOR SOLUTION INTRAVENOUS at 08:10

## 2017-10-12 RX ADMIN — METOPROLOL TARTRATE 5 MG: 5 INJECTION INTRAVENOUS at 05:10

## 2017-10-12 RX ADMIN — FENTANYL CITRATE 25 MCG: 50 INJECTION, SOLUTION INTRAMUSCULAR; INTRAVENOUS at 05:10

## 2017-10-12 NOTE — NURSING
Pt to IR via bed at 1600 pm today w/ portable telemetry, pulse oxymetry and O2 via Venti-Mask at 55%.  Prior to departure, plan of care reviewed with pt and pt's wife.  Verbalization of understanding obtained.  Pt presents to IR, informed consent obtained, time-out procedure performed. IR staff perform procedure, and pt tolerates well.  Two grenade drains placed, drain #1 w/ brown drainage.  Drain #2  w/ bloody drainage.  Pt tolerates procedure well. Pt returns to SICU at 1800 pm via bed, and SICU service notified of completion of procedure. Pt's wife notified of pt's return to unit and presents to bedside. Post-procedure, plan of care reviewed but reinforcement of education required.

## 2017-10-12 NOTE — H&P
History & Physical  Surgical Intensive Care    SUBJECTIVE:     Chief Complaint/Reason for Admission: Hypotension and respiratory distress with positive blood cultures    History of Present Illness:  Patient is a 77 y.o. 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 Past medical history is significant for hypothyroidism, hypertension, hyperlipidemia, CAD s/p CABG, anemia and autoimmune cholangitis. Patient was seen in clinic on 10/11/2017 for post-op follow up. Patient stated he was fatigued, experiencing chills the night before follow up. He looked ashen in clinic and had scleral icterus. He was subsequently admitted to [facility]. On the floor, blood cultures came back positive for gram positive rods and gram negative rods. CT scan yesterday evening showed 7.2cm gas collection in the hepatic dome and an adjacent gas and fluid collection in the resection bed 2.7cm. Patient received 2L NS for fluid resuscitation. Patient became hypotensive on the floor, desated down to 80s. Lactic 8.9, Troponin elevated at 2.9. No changes on EKG. He was stepped up to the SICU. Noted to be quite jaundice with total bilirubin 18, increased from 5.4 in clinic. Started on broad spectrum antibiotics, placed on non-rebreather mask at 15L, placed right radial arterial catheter and right internal jugular central venous catheter. H/H decreased to 6.4/20.9 from 9.6/30.4, transfused 2 units pRBCs. Cardiology consulted for increased troponin, determined to be secondary to demand ischemia. IR called regarding fluid collection. Continue on broad spectrum antibiotics.    PTA Medications   Medication Sig    alprazolam (XANAX) 0.25 MG tablet Take 0.25 mg by mouth 3 (three) times daily.    aspirin (ECOTRIN) 81 MG EC tablet Take 81 mg by mouth once daily.    atorvastatin (LIPITOR) 80 MG tablet Take 80 mg by mouth once daily.    carvedilol (COREG) 3.125 MG tablet Take 3.125 mg by  mouth 2 (two) times daily with meals.    escitalopram oxalate (LEXAPRO) 10 MG tablet Take 10 mg by mouth once daily.    indomethacin (INDOCIN) 25 MG capsule Take 25 mg by mouth 2 (two) times daily with meals.    isosorbide dinitrate (ISORDIL) 30 MG Tab Take 20 mg by mouth 3 (three) times daily.    isosorbide mononitrate (IMDUR) 30 MG 24 hr tablet     levothyroxine (SYNTHROID) 100 MCG tablet Take 100 mcg by mouth once daily.    ondansetron (ZOFRAN-ODT) 8 MG TbDL Take 1 tablet (8 mg total) by mouth every 6 (six) hours as needed.    oxycodone (ROXICODONE) 5 MG immediate release tablet Take 1 tablet (5 mg total) by mouth every 4 (four) hours as needed for Pain.    pantoprazole (PROTONIX) 40 MG tablet Take 40 mg by mouth once daily.    polyethylene glycol (GLYCOLAX) 17 gram/dose powder Take 17 g by mouth once daily.    predniSONE (DELTASONE) 5 MG tablet     promethazine (PHENERGAN) 25 MG tablet Take 1 tablet (25 mg total) by mouth every 6 (six) hours as needed for Nausea.       Review of patient's allergies indicates:  No Known Allergies    Past Medical History:   Diagnosis Date    Cancer     Prostate/s/p prostatectomy    Coronary artery disease     GERD (gastroesophageal reflux disease)     Hyperlipidemia     Hypertension     Hypothyroidism      Past Surgical History:   Procedure Laterality Date    CAROTID ENDARTERECTOMY Bilateral     CORONARY ARTERY BYPASS GRAFT      PROSTATE SURGERY       History reviewed. No pertinent family history.  Social History   Substance Use Topics    Smoking status: Former Smoker     Types: Cigarettes     Start date: 1/1/1956     Quit date: 6/18/2013    Smokeless tobacco: Never Used    Alcohol use No        Review of Systems:  Review of Systems   Constitutional: Positive for chills and diaphoresis. Negative for fever.   Respiratory: Positive for shortness of breath. Negative for cough and wheezing.    Cardiovascular: Negative for chest pain and palpitations.    Gastrointestinal: Negative for abdominal pain, nausea and vomiting.   Musculoskeletal: Positive for myalgias.   Skin: Negative for rash.        Jaundice.   Neurological: Positive for weakness.     OBJECTIVE:     Vital Signs (Most Recent)  Temp: 97.6 °F (36.4 °C) (10/12/17 0715)  Pulse: 90 (10/12/17 0800)  Resp: 18 (10/12/17 0759)  BP: (!) 96/53 (10/12/17 0600)  SpO2: 99 % (10/12/17 0800)  Ventilator Data (Last 24H):     Oxygen Concentration (%):  [55] 55      Physical Exam   Constitutional: He appears well-developed and well-nourished.   HENT:   Head: Normocephalic and atraumatic.   Cardiovascular: Normal rate and normal heart sounds.  Exam reveals no friction rub.    No murmur heard.  Pulmonary/Chest: He is in respiratory distress. He has no wheezes. He has no rales.   Abdominal: Soft. He exhibits no distension. There is no tenderness.   Neurological: He is alert.   Lethargic but oriented x3, appropriately responds to questions.   Skin: Skin is warm. There is pallor.   Jaundice.     Lines/Drains:       Peripheral IV - Single Lumen 10/11/17 1400 Left Forearm (Active)   Site Assessment Clean;Dry;Intact;No redness;No swelling 10/12/2017  7:00 AM   Line Status Infusing 10/12/2017  7:00 AM   Dressing Status Clean;Dry;Intact 10/12/2017  7:00 AM   Dressing Change Due 10/15/17 10/11/2017  7:32 PM   Site Change Due 10/15/17 10/11/2017  7:32 PM   Reason Not Rotated Not due 10/12/2017  7:00 AM   Number of days: 0            Peripheral IV - Single Lumen 10/11/17 1945 Right Upper Arm (Active)   Site Assessment Clean;Dry;Intact 10/12/2017  7:00 AM   Line Status Infusing 10/12/2017  7:00 AM   Dressing Status Clean;Dry;Intact 10/12/2017  7:00 AM   Dressing Change Due 10/15/17 10/11/2017  7:32 PM   Site Change Due 10/15/17 10/11/2017  7:32 PM   Reason Not Rotated Not due 10/12/2017  7:00 AM   Number of days: 0            Urethral Catheter 10/12/17 0451 (Active)   Site Assessment Clean;Intact 10/12/2017  7:00 AM   Collection  "Container Urimeter 10/12/2017  7:00 AM   Securement Method secured to top of thigh w/ adhesive device 10/12/2017  7:00 AM   Catheter Care Performed yes 10/12/2017  7:00 AM   Reason for Continuing Urinary Catheterization Critically ill in ICU requiring intensive monitoring 10/12/2017  7:00 AM   CAUTI Prevention Bundle StatLock in place w 1" slack;Intact seal between catheter & drainage tubing;Drainage bag off the floor;Green sheeting clip in use;No dependent loops or kinks;Drainage bag not overfilled (<2/3 full);Drainage bag below bladder 10/12/2017  7:00 AM   Output (mL) 50 mL 10/12/2017  7:00 AM   Number of days: 0       Laboratory  CBC:   Recent Labs  Lab 10/12/17  0628   WBC 33.77*   RBC 2.23*   HGB 6.4*   HCT 20.9*   *   MCV 94   MCH 28.7   MCHC 30.6*     CMP:   Recent Labs  Lab 10/12/17  0403   *   CALCIUM 8.1*   ALBUMIN 1.8*   PROT 5.1*   *   K 5.6*   CO2 12*      BUN 50*   CREATININE 1.3   ALKPHOS 260*   *   *   BILITOT 18.1*     LFTs:   Recent Labs  Lab 10/12/17  0403   *   *   ALKPHOS 260*   BILITOT 18.1*   PROT 5.1*   ALBUMIN 1.8*     Coagulation:   Recent Labs  Lab 10/11/17  1310 10/12/17  0402   LABPROT 11.4 12.3   INR 1.1 1.2   APTT 24.1  --      Cardiac markers:   Recent Labs  Lab 10/12/17  0403 10/12/17  0628   CPKMB  --  41.2*   TROPONINI 2.930*  --      ABGs:   Recent Labs  Lab 10/12/17  0400   PH 7.360   PCO2 27.0*   PO2 72*   HCO3 15.2*   POCSATURATED 94*   BE -10       Recent Labs  Lab 10/12/17  0500   COLORU [place]   SPECGRAV >=1.030*   PHUR 5.0   PROTEINUA 1+*   BACTERIA None   NITRITE Negative   LEUKOCYTESUR Negative   UROBILINOGEN 4.0   HYALINECASTS 0       Chest X-Ray: X-ray Chest 1 View    Result Date: 10/12/2017   No significant detrimental interval change in the appearance of the chest since 10/12/17 at 4:05 AM.  No post procedure pneumothorax. Electronically signed by: Dallas Barrientos MD Date:     10/12/17 Time:    08:03     X-ray Chest 1 " View    Result Date: 10/12/2017  No interval detrimental change.  Persistent gas density projecting over the right upper quadrant, likely related to patient's known intrahepatic air/fluid collection. Electronically signed by: APRIL HIDALGO MD Date:     10/12/17 Time:    04:41     X-ray Chest 1 View    Result Date: 10/11/2017  No detrimental change or radiographic acute intrathoracic process seen on this single view. Electronically signed by: DASHAWN TANNER MD, MD Date:     10/11/17 Time:    15:22         ASSESSMENT/PLAN:   Patient is a 77 y.o. 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 Past medical history is significant for hypothyroidism, hypertension, hyperlipidemia, CAD s/p CABG, anemia and autoimmune cholangitis    Plan:    Neuro:   -not sedated, in no pain    Pulmonary:   -currently on non-rebreather, increased work of breathing but maintaining O2 sats>95%    Oxygen Concentration (%):  [55] 55    Recent Labs  Lab 10/12/17  0400   PH 7.360   PCO2 27.0*   PO2 72*   HCO3 15.2*   POCSATURATED 94*   BE -10       Cardiac:  -MAP goal >65  -HDS not requiring pressors, able to maintain BP with fluid resuscitation    Renal:   -Ibarra in place  - monitor UOP, urine blood tinged, urinalysis and culture pending   -Bun/Cr 50/1.3    Fluids/Electrolytes/Nutrition/GI:   -Nutritional status: NPO for possible procedure later today with IR   -replace lytes PRN  -maintenance fluids Ns@125ml/hr  -Bowel regimen per primary team  - GI prophylaxis per primary team    Hematology/Oncology:  -H/H 6.4/20.9, transfused 2 units pRBCs this morning, CBC q4  -INR/Plts 1.2/114  -Anticoagulation: held in anticipation of possibly going to IR today    Infectious Disease:   -hypotensive and hypoxic most likely secondary to sepsis, patient remains febrile  - blood cultures positive for gram positive and gram negative rods, urine cultures pending  -WBC 33.77  - lactate  critical at 8.9 on admission, now at 5.1, continue to trend  -Abx: fluconazole, zosyn, vanc  - possibly to IR today for drainage of liver abscess  - fluid boluses for sepsis as needed    Endocrine:  -Glucose goal of 120-150    Dispo:  - Septic step up from the floor, IR consulted for drainage of fluid collection, on broad spectrum antibiotics, aggressive fluid resuscitation, continue care in the ICU setting    Shayna Saeed, PGY-1  General Surgery  [phone]  10/12/2017

## 2017-10-12 NOTE — NURSING
Pt. C/o sob has some scattered exp wheezing , HR sustaining in at 137-140 after 1 liter fluid bolus, pt. Place on 02 at 2liters n/c surgery intern called and notified of pt. Change of status..stated she would be up to see pt. And put in new orders

## 2017-10-12 NOTE — CONSULTS
[facility]  Cardiology  Consult Note    Patient Name: Robby Soriano  MRN: [id number]  Admission Date: 10/11/2017  Hospital Length of Stay: 1 days  Code Status: Prior   Attending Provider: Dallas Quach MD   Consulting Provider: Anant Gomez MD  Primary Care Physician: Lyla Bryan MD  Principal Problem:Obstructive jaundice    Patient information was obtained from patient and past medical records.     Inpatient consult to Cardiology  Consult performed by: ANANT GOMEZ  Consult ordered by: ZAY MCDOWELL  Reason for consult: troponinemia        Subjective:     Chief Complaint:  Elevated troponin in light of sepsis     HPI:   Mr. 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 He was given 2L of NS, started on broad spectrum abx (van/zosyn/flagyl) after his blood cultures came back positive for gram positive and gram negative rods, and transferred from the floor to the unit for hypotension and acute hypoxia (desaturated down into the 80's). Of note, the patient's tbilirubin had increased from 8 to 18, he is anemic at 6.8 a drop from his baseline of 9 and his lactic acid drawn is 8.9. Currently, the patient complains of mild SOB (he is on a non-rebreather) and he denies any chest pain. He reports that prior to this hospitalization, he did not have any chest pain with exertion. He denies any leg swelling or orthopnea on this admission either.     Cardiology was consulted for his troponin level of 2.9.         Past Medical History:   Diagnosis Date    Cancer     Prostate/s/p  prostatectomy    Coronary artery disease     GERD (gastroesophageal reflux disease)     Hyperlipidemia     Hypertension     Hypothyroidism        Past Surgical History:   Procedure Laterality Date    CAROTID ENDARTERECTOMY Bilateral     CORONARY ARTERY BYPASS GRAFT      PROSTATE SURGERY         Review of patient's allergies indicates:  No Known Allergies    No current facility-administered medications on file prior to encounter.      Current Outpatient Prescriptions on File Prior to Encounter   Medication Sig    alprazolam (XANAX) 0.25 MG tablet Take 0.25 mg by mouth 3 (three) times daily.    aspirin (ECOTRIN) 81 MG EC tablet Take 81 mg by mouth once daily.    atorvastatin (LIPITOR) 80 MG tablet Take 80 mg by mouth once daily.    carvedilol (COREG) 3.125 MG tablet Take 3.125 mg by mouth 2 (two) times daily with meals.    escitalopram oxalate (LEXAPRO) 10 MG tablet Take 10 mg by mouth once daily.    indomethacin (INDOCIN) 25 MG capsule Take 25 mg by mouth 2 (two) times daily with meals.    isosorbide dinitrate (ISORDIL) 30 MG Tab Take 20 mg by mouth 3 (three) times daily.    isosorbide mononitrate (IMDUR) 30 MG 24 hr tablet     levothyroxine (SYNTHROID) 100 MCG tablet Take 100 mcg by mouth once daily.    ondansetron (ZOFRAN-ODT) 8 MG TbDL Take 1 tablet (8 mg total) by mouth every 6 (six) hours as needed.    oxycodone (ROXICODONE) 5 MG immediate release tablet Take 1 tablet (5 mg total) by mouth every 4 (four) hours as needed for Pain.    pantoprazole (PROTONIX) 40 MG tablet Take 40 mg by mouth once daily.    polyethylene glycol (GLYCOLAX) 17 gram/dose powder Take 17 g by mouth once daily.    predniSONE (DELTASONE) 5 MG tablet     promethazine (PHENERGAN) 25 MG tablet Take 1 tablet (25 mg total) by mouth every 6 (six) hours as needed for Nausea.     Family History     None        Social History Main Topics    Smoking status: Former Smoker     Types: Cigarettes     Start date: 1/1/1956     Quit  date: 6/18/2013    Smokeless tobacco: Never Used    Alcohol use No    Drug use: Unknown    Sexual activity: Not on file     Review of Systems   Constitution: Positive for chills and fever.   HENT: Negative for hoarse voice and sore throat.    Cardiovascular: Negative for chest pain and dyspnea on exertion.   Respiratory: Positive for shortness of breath.    Musculoskeletal: Negative for back pain, falls and joint pain.        Complained of Right arm pain   Gastrointestinal: Positive for jaundice. Negative for abdominal pain, diarrhea and nausea.   Genitourinary: Positive for hematuria.   Neurological: Negative for dizziness, headaches and numbness.     Objective:     Vital Signs (Most Recent):  Temp: 98 °F (36.7 °C) (10/12/17 0500)  Pulse: 97 (10/12/17 0530)  Resp: (!) 24 (10/12/17 0530)  BP: (!) 91/50 (10/12/17 0530)  SpO2: 100 % (10/12/17 0530) Vital Signs (24h Range):  Temp:  [97.5 °F (36.4 °C)-101.2 °F (38.4 °C)] 98 °F (36.7 °C)  Pulse:  [] 97  Resp:  [16-49] 24  SpO2:  [93 %-100 %] 100 %  BP: ()/(50-65) 91/50     Weight: 75.3 kg (166 lb 0.1 oz)  Body mass index is 26 kg/m².    SpO2: 100 %  O2 Device (Oxygen Therapy): nasal cannula      Intake/Output Summary (Last 24 hours) at 10/12/17 0617  Last data filed at 10/12/17 0500   Gross per 24 hour   Intake              250 ml   Output              525 ml   Net             -275 ml       Lines/Drains/Airways     Drain                 Urethral Catheter 10/12/17 0451 less than 1 day          Peripheral Intravenous Line                 Peripheral IV - Single Lumen 10/11/17 1400 Left Forearm less than 1 day         Peripheral IV - Single Lumen 10/11/17 1945 Right Upper Arm less than 1 day                Physical Exam   Constitutional: He is oriented to person, place, and time. He appears well-developed and well-nourished. He appears distressed (mild distress).   HENT:   Head: Normocephalic and atraumatic.   Eyes: EOM are normal. Pupils are equal, round,  and reactive to light.   Neck: Normal range of motion. Neck supple. No JVD present.   Cardiovascular: Regular rhythm, normal heart sounds and intact distal pulses.    No murmur heard.  Tachycardic    Pulmonary/Chest: Breath sounds normal.   tachypnic   Abdominal: Soft. Bowel sounds are normal. He exhibits distension. He exhibits no mass. There is no tenderness.   jaundiced    Musculoskeletal: Normal range of motion. He exhibits no edema.   Neurological: He is alert and oriented to person, place, and time. He has normal reflexes.   Skin: Skin is warm. No erythema. No pallor.   Warm extremities in keeping with sepsis   Vitals reviewed.      Significant Labs:   ABG:   Recent Labs  Lab 10/12/17  0400   PH 7.360   PCO2 27.0*   HCO3 15.2*   POCSATURATED 94*   BE -10   , Blood Culture:   Recent Labs  Lab 10/11/17  1309 10/11/17  1317   LABBLOO Gram stain leola bottle: Gram positive rods  Results called to and read back by:Enrique Palomo RN 10/11/2017  21:54  Gram stain aer bottle: Gram negative rods   Results called to and read back by:Enrique Palomo RN 10/12/2017  02:41 Gram stain leola bottle:  Gram variable rods  Results called to and read back by:Enrique Palomo RN 10/11/2017  23:09  Gram stain aer bottle: Gram negative rods   Results called to and read back by:Enrique Palomo RN 10/12/2017  02:41   , BMP:   Recent Labs  Lab 10/11/17  1007 10/11/17  1310 10/12/17  0403   *  --  117*   *  --  135*   K 5.2*  --  5.6*     --  105   CO2 22*  --  12*   BUN 37*  --  50*   CREATININE 1.3  --  1.3   CALCIUM 8.2*  --  8.1*   MG 1.6 1.3* 2.0   , CMP   Recent Labs  Lab 10/11/17  1007 10/11/17  1310 10/12/17  0403   *  --  135*   K 5.2*  --  5.6*     --  105   CO2 22*  --  12*   *  --  117*   BUN 37*  --  50*   CREATININE 1.3  --  1.3   CALCIUM 8.2*  --  8.1*   PROT 5.4* 5.0* 5.1*   ALBUMIN 1.6* 1.5* 1.8*   BILITOT 5.4* 8.0* 18.1*   ALKPHOS 213* 202* 260*   AST 91* 107* 459*   ALT 98*  115* 294*   ANIONGAP 11  --  18*   ESTGFRAFRICA >60.0  --  >60.0   EGFRNONAA 52.6*  --  52.6*   , CBC   Recent Labs  Lab 10/11/17  1007  10/12/17  0402   WBC 12.52  --  27.33*   HGB 9.6*  --  6.8*   HCT 30.4*  < > 21.6*   *  --  132*   < > = values in this interval not displayed., INR   Recent Labs  Lab 10/11/17  1310 10/12/17  0402   INR 1.1 1.2   , Lipid Panel No results for input(s): CHOL, HDL, LDLCALC, TRIG, CHOLHDL in the last 48 hours.,   Pathology Results  (Last 10 years)    None       and Troponin   Recent Labs  Lab 10/11/17  1310 10/12/17  0403   TROPONINI 0.112* 2.930*       Significant Imaging: Echocardiogram:   2D echo with color flow doppler:   Results for orders placed or performed during the hospital encounter of 10/11/17   2D echo with color flow doppler   Result Value Ref Range    EF 60 55 - 65    Mitral Valve Regurgitation TRIVIAL     Diastolic Dysfunction Yes (A)     Est. PA Systolic Pressure 32.16     Tricuspid Valve Regurgitation TRIVIAL    , EKG: shows sinus tachycardia with incomplete intraventicular conduction delay and X-Ray: CXR: X-Ray Chest 1 View (CXR):   Results for orders placed or performed during the hospital encounter of 10/11/17   X-Ray Chest 1 View    Narrative    Technique: AP chest radiograph.    Comparison: CT 10/11/2017, radiograph 10/11/2017.    Findings:    Mediastinal structures are midline.  Cardiac silhouette is stable in size.  There are postsurgical changes of previous CABG.  There is an elevated right hemidiaphragm.  There is slightly increased interstitial markings throughout both lungs, nonspecific.  No pneumothorax or pleural effusions.  No definite focal consolidation.  Rounded gas density projects over the right upper quadrant, presumably known air/fluid intrahepatic collection.  No acute osseous abnormalities.    Impression    No interval detrimental change.  Persistent gas density projecting over the right upper quadrant, likely related to patient's known  intrahepatic air/fluid collection.      Electronically signed by: ARPIL HIDALGO MD  Date:     10/12/17  Time:    04:41      Assessment and Plan:     Demand ischemia of myocardium    Patient is a 76 yo male with a past medical hx of CAD s/p CABG in 13 here with severe sepsis secondary to cholangitis and gram negative and positive bacteremia  - Troponin elevation to 2.9 in light of normal EF with LVH and presence of diastolic dysfunction likely related to increased O2 consumption in the setting of sepsis   - In addition patient is anemic with an unexplained drop in Hb from 9.6 to 6.8 (not enough of a drop to be explained by hematuria alone. Possible hepatic infarct? However blood of nearly 3 units? Regardless would transfuse to keep hb above 8.   - Unless contraindicated would continue aspirin.  - No need for statin at this time as there are elevated LFT's  - Not ACS, no need for heparin and DAPT at this time.   - Treat underlying sepsis as per primary team, IVF 30 cc's/kg at least, broad spectrum abx and source control (ERCP).    - Would consider patient for outpatient stress test will able to.            VTE Risk Mitigation         Ordered     heparin (porcine) injection 5,000 Units  Every 8 hours     Route:  Subcutaneous        10/11/17 1256     High Risk of VTE  Once      10/11/17 1141     Place sequential compression device  Until discontinued      10/11/17 1141     Place YONY hose  Until discontinued      10/11/17 1141          Thank you for your consult.  Please contact us if you have any additional questions.     Anant Bustamante MD  Cardiology   [facility]

## 2017-10-12 NOTE — ASSESSMENT & PLAN NOTE
- Cardiology consulted; appreciated recs  - 2D echo from 10/11/17: normal EF (60-65%) with severe LA enlargement  - Continue aspirin  - Transfuse blood for anemia

## 2017-10-12 NOTE — HPI
Mr. 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 He is currently on a steroid taper. He recuperated slowly but was doing well with forward progress until yesterday when he become easily fatigued and last night he had chills x 45 minutes.   He didnot feel well in clinic today. Of note, he also reports increasingly dark urine in the past week. Tbili was also noted to be 5.4 today in clinic.

## 2017-10-12 NOTE — PROCEDURES
"Robby Soriano is a 77 y.o. male patient.    Temp: 97.6 °F (36.4 °C) (10/12/17 0715)  Pulse: 90 (10/12/17 1000)  Resp: (!) 21 (10/12/17 1000)  BP: (!) 96/53 (10/12/17 0600)  SpO2: 99 % (10/12/17 1000)  Weight: 75.3 kg (166 lb 0.1 oz) (10/11/17 1932)  Height: 5' 7" (170.2 cm) (10/11/17 1932)       Arterial Line  Date/Time: 10/12/2017 10:03 AM  Location procedure was performed: [facility] CRITICAL CARE SURGERY  Performed by: ZAY MCDOWELL  Authorized by: ZAY MCDOWELL   Pre-op Diagnosis: hypotension  Post-operative diagnosis: hypotension  Consent Done: Yes  Consent: Verbal consent obtained. Written consent obtained.  Risks and benefits: risks, benefits and alternatives were discussed  Consent given by: patient  Patient understanding: patient states understanding of the procedure being performed  Patient consent: the patient's understanding of the procedure matches consent given  Procedure consent: procedure consent matches procedure scheduled  Relevant documents: relevant documents present and verified  Test results: test results available and properly labeled  Site marked: the operative site was marked  Required items: required blood products, implants, devices, and special equipment available  Patient identity confirmed: , MRN, name and verbally with patient  Time out: Immediately prior to procedure a "time out" was called to verify the correct patient, procedure, equipment, support staff and site/side marked as required.  Preparation: Patient was prepped and draped in the usual sterile fashion.  Indications: respiratory failure and hemodynamic monitoring  Location: right radial  Anesthesia: local infiltration    Anesthesia:  Local Anesthetic: lidocaine 2% without epinephrine  Anesthetic total: 3 mL  Patient sedated: no  Needle gauge: 20  Seldinger technique: Seldinger technique used  Number of attempts: 1  Complications: No  Estimated blood loss (mL): 2  Specimens: No  Implants: No  Post-procedure: line " sutured and dressing applied  Post-procedure CMS: normal  Patient tolerance: Patient tolerated the procedure well with no immediate complications        Shayna Saeed, PGY-1  General Surgery  [phone]

## 2017-10-12 NOTE — PROCEDURES
"Robby Soriano is a 77 y.o. male patient.    Temp: 97.6 °F (36.4 °C) (10/12/17 0715)  Pulse: 90 (10/12/17 1000)  Resp: (!) 21 (10/12/17 1000)  BP: (!) 96/53 (10/12/17 0600)  SpO2: 99 % (10/12/17 1000)  Weight: 75.3 kg (166 lb 0.1 oz) (10/11/17 1932)  Height: 5' 7" (170.2 cm) (10/11/17 1932)       Central Line  Date/Time: 10/12/2017 10:06 AM  Location procedure was performed: [facility] CRITICAL CARE SURGERY  Performed by: SHAYNA SAEED  Pre-operative Diagnosis: sepsis  Post-operative diagnosis: sepsis  Consent Done: Yes  Time out: Immediately prior to procedure a "time out" was called to verify the correct patient, procedure, equipment, support staff and site/side marked as required.  Indications: vascular access and med administration  Anesthesia: local infiltration    Anesthesia:  Local Anesthetic: lidocaine 2% without epinephrine  Preparation: skin prepped with ChloraPrep  Skin prep agent dried: skin prep agent completely dried prior to procedure  Sterile barriers: all five maximum sterile barriers used - cap, mask, sterile gown, sterile gloves, and large sterile sheet  Hand hygiene: hand hygiene performed prior to central venous catheter insertion  Location details: right internal jugular  Catheter type: triple lumen  Catheter size: 7 Fr  Catheter Length: 16cm    Ultrasound guidance: yes  Vessel Caliber: large, patent, compressibility normal  Vascular Doppler: not done  Needle advanced into vessel with real time Ultrasound guidance.  Guidewire confirmed in vessel.  Sterile sheath used.  Manometry: Yes  Number of attempts: 1  Assessment: placement verified by x-ray and no pneumothorax on x-ray  Complications: none  Estimated blood loss (mL): 5  Specimens: No  Implants: No  Post-procedure: line sutured,  chlorhexidine patch,  sterile dressing applied and blood return through all ports  Complications: No        Shayna Saeed, PGY-1  General Surgery  [phone]    "

## 2017-10-12 NOTE — ASSESSMENT & PLAN NOTE
76 yo M with autoimmune sclerosing cholangitis who underwent left hepatic resection; resection of extrahepatic bile duct; Eron-Y right hepaticojejunostomy on 8/3/17 who presented to clinic with chills, now with sepsis and hepatic abscess    Plan:  - Broad spectrum antibiotics (vanc and zosyn for now) for bacteremia with GNR and GP bacteria  - T bili elevated to 18.1---> possible PTC drain? But in setting of liver abscess, will hold off for now  - Keep valenzuela in place for strict I&Os  - Serial lactates  - GI prophylaxis  - Anemia may be due to chronic illness; may have been hemo-concentrated prior to admission; will give 2u PRBCs with history of CAD  - Central line and a-line for access and closer monitoring as well as frequent ABGs  - Stress dose steroids to be given IV  - Synthroid IV daily  - Continue ICU care; monitor respiratory status; may need to be intubated

## 2017-10-12 NOTE — SUBJECTIVE & OBJECTIVE
Interval History: Transferred to ICU for worsening sepsis with sustained tachycardia, fevers, and diaphoresis. T bili elevated to 18.1 (previously 8.0, direct bili 4.8) but denies pain or discomfort. Was short of breath but that has improved. Denies any nausea. Ibarra placed overnight.    Medications:  Continuous Infusions:   sodium chloride 0.9% 125 mL/hr at 10/11/17 1500     Scheduled Meds:   albuterol-ipratropium 2.5mg-0.5mg/3mL  3 mL Nebulization Q6H WAKE    fluconazole (DIFLUCAN) IVPB  400 mg Intravenous Q24H    heparin (porcine)  5,000 Units Subcutaneous Q8H    hydrocortisone sod succ (PF)  100 mg Intravenous Q8H    levothyroxine  50 mcg Intravenous Daily    magnesium sulfate IVPB  3 g Intravenous Once    metoprolol  5 mg Intravenous Q4H    metronidazole  500 mg Intravenous Q8H    piperacillin-tazobactam 4.5 g in dextrose 5 % 100 mL IVPB (ready to mix system)  4.5 g Intravenous Q8H    sodium chloride 0.9%  3 mL Intravenous Q8H    vancomycin (VANCOCIN) IVPB  1,250 mg Intravenous Q12H     PRN Meds:sodium chloride, alprazolam, calcium gluconate IVPB, calcium gluconate IVPB, calcium gluconate IVPB, HYDROmorphone, magnesium sulfate IVPB, magnesium sulfate IVPB, ondansetron, potassium chloride **AND** potassium chloride **AND** potassium chloride, promethazine (PHENERGAN) IVPB     Review of patient's allergies indicates:  No Known Allergies  Objective:     Vital Signs (Most Recent):  Temp: 97.6 °F (36.4 °C) (10/12/17 0715)  Pulse: 90 (10/12/17 0800)  Resp: 18 (10/12/17 0759)  BP: (!) 96/53 (10/12/17 0600)  SpO2: 99 % (10/12/17 0800) Vital Signs (24h Range):  Temp:  [97.5 °F (36.4 °C)-101.2 °F (38.4 °C)] 97.6 °F (36.4 °C)  Pulse:  [] 90  Resp:  [16-49] 18  SpO2:  [93 %-100 %] 99 %  BP: ()/(50-65) 96/53  Arterial Line BP: ()/(39-53) 95/42     Weight: 75.3 kg (166 lb 0.1 oz)  Body mass index is 26 kg/m².    Intake/Output - Last 3 Shifts       10/10 0700 - 10/11 0659 10/11 0700 - 10/12 0659  10/12 0700 - 10/13 0659    Blood  250     Total Intake(mL/kg)  250 (3.3)     Urine (mL/kg/hr)  575 50 (0.3)    Total Output   575 50    Net   -325 -50           Urine Occurrence  3 x           Physical Exam   Constitutional: He is oriented to person, place, and time. He appears well-developed. He appears distressed.   Mild distress this morning   HENT:   Head: Normocephalic and atraumatic.   Eyes: EOM are normal. Scleral icterus is present.   Neck: Neck supple.   Cardiovascular: Regular rhythm and normal heart sounds.  Tachycardia present.    Tachycardic in the 110s   Pulmonary/Chest: No respiratory distress. He has no wheezes.   Guppy breathing, noted and tachypneic   Abdominal: Soft. He exhibits no distension. There is no tenderness. There is no rebound.       Well-healed upper midline incision   Musculoskeletal: He exhibits no edema or deformity.   Neurological: He is alert and oriented to person, place, and time.   Skin: Skin is warm and dry.       Significant Labs:  CBC:   Recent Labs  Lab 10/12/17  0628   WBC 33.77*   RBC 2.23*   HGB 6.4*   HCT 20.9*   *   MCV 94   MCH 28.7   MCHC 30.6*     CMP:   Recent Labs  Lab 10/12/17  0403   *   CALCIUM 8.1*   ALBUMIN 1.8*   PROT 5.1*   *   K 5.6*   CO2 12*      BUN 50*   CREATININE 1.3   ALKPHOS 260*   *   *   BILITOT 18.1*     Coagulation:   Recent Labs  Lab 10/11/17  1310 10/12/17  0402   LABPROT 11.4 12.3   INR 1.1 1.2   APTT 24.1  --      ABGs:   Recent Labs  Lab 10/12/17  0400   PH 7.360   PCO2 27.0*   PO2 72*   HCO3 15.2*   POCSATURATED 94*   BE -10       Recent Labs  Lab 10/12/17  0500   COLORU [place]   SPECGRAV >=1.030*   PHUR 5.0   PROTEINUA 1+*   BACTERIA None   NITRITE Negative   LEUKOCYTESUR Negative   UROBILINOGEN 4.0   HYALINECASTS 0       Significant Diagnostics:  CXR 10/12/17:     Narrative     Right jugular origin vascular catheter is now noted, its tip in the superior vena cava just superior to its junction with  the right atrium.  No pneumothorax.  Postoperative changes are again noted in the thorax.  Heart size is normal, as is the appearance of the pulmonary vascularity.  Lung zones are clear, and free of superimposed air space consolidation or volume loss.  No pleural fluid of any substantial volume is seen on either side.  An oval subdiaphragmatic lucency in the right upper quadrant is again noted, and has been seen on a recent abdominal CT exam of 10/11/17 to relate to gas within the liver, likely a hepatic abscess.   Impression      No significant detrimental interval change in the appearance of the chest since 10/12/17 at 4:05 AM.  No post procedure pneumothorax.

## 2017-10-12 NOTE — NURSING
Pt. Currently has temp of 101.2 , surgery intern notified , ordered to re-notified surgery if temp goes beyond 101.5, will continue to monitor for further elevators

## 2017-10-12 NOTE — PROGRESS NOTES
[facility]  General Surgery  Progress Note    Subjective:     History of Present Illness:  Mr. 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 He is currently on a steroid taper. He recuperated slowly but was doing well with forward progress until yesterday when he become easily fatigued and last night he had chills x 45 minutes.   He didnot feel well in clinic today. Of note, he also reports increasingly dark urine in the past week. Tbili was also noted to be 5.4 today in clinic.     Post-Op Info:  * No surgery found *         Interval History: Transferred to ICU for worsening sepsis with sustained tachycardia, fevers, and diaphoresis. T bili elevated to 18.1 (previously 8.0, direct bili 4.8) but denies pain or discomfort. Was short of breath but that has improved. Denies any nausea. Ibarra placed overnight.    Medications:  Continuous Infusions:   sodium chloride 0.9% 125 mL/hr at 10/11/17 1500     Scheduled Meds:   albuterol-ipratropium 2.5mg-0.5mg/3mL  3 mL Nebulization Q6H WAKE    fluconazole (DIFLUCAN) IVPB  400 mg Intravenous Q24H    heparin (porcine)  5,000 Units Subcutaneous Q8H    hydrocortisone sod succ (PF)  100 mg Intravenous Q8H    levothyroxine  50 mcg Intravenous Daily    magnesium sulfate IVPB  3 g Intravenous Once    metoprolol  5 mg Intravenous Q4H    metronidazole  500 mg Intravenous Q8H    piperacillin-tazobactam 4.5 g in dextrose 5 % 100 mL IVPB (ready to mix system)  4.5 g Intravenous Q8H    sodium chloride 0.9%  3 mL Intravenous Q8H    vancomycin (VANCOCIN) IVPB  1,250 mg Intravenous Q12H     PRN Meds:sodium chloride, alprazolam, calcium gluconate IVPB, calcium gluconate IVPB, calcium gluconate IVPB, HYDROmorphone, magnesium sulfate IVPB, magnesium sulfate IVPB, ondansetron, potassium  chloride **AND** potassium chloride **AND** potassium chloride, promethazine (PHENERGAN) IVPB     Review of patient's allergies indicates:  No Known Allergies  Objective:     Vital Signs (Most Recent):  Temp: 97.6 °F (36.4 °C) (10/12/17 0715)  Pulse: 90 (10/12/17 0800)  Resp: 18 (10/12/17 0759)  BP: (!) 96/53 (10/12/17 0600)  SpO2: 99 % (10/12/17 0800) Vital Signs (24h Range):  Temp:  [97.5 °F (36.4 °C)-101.2 °F (38.4 °C)] 97.6 °F (36.4 °C)  Pulse:  [] 90  Resp:  [16-49] 18  SpO2:  [93 %-100 %] 99 %  BP: ()/(50-65) 96/53  Arterial Line BP: ()/(39-53) 95/42     Weight: 75.3 kg (166 lb 0.1 oz)  Body mass index is 26 kg/m².    Intake/Output - Last 3 Shifts       10/10 0700 - 10/11 0659 10/11 0700 - 10/12 0659 10/12 0700 - 10/13 0659    Blood  250     Total Intake(mL/kg)  250 (3.3)     Urine (mL/kg/hr)  575 50 (0.3)    Total Output   575 50    Net   -325 -50           Urine Occurrence  3 x           Physical Exam   Constitutional: He is oriented to person, place, and time. He appears well-developed. He appears distressed.   Mild distress this morning   HENT:   Head: Normocephalic and atraumatic.   Eyes: EOM are normal. Scleral icterus is present.   Neck: Neck supple.   Cardiovascular: Regular rhythm and normal heart sounds.  Tachycardia present.    Tachycardic in the 110s   Pulmonary/Chest: No respiratory distress. He has no wheezes.   Guppy breathing, noted and tachypneic   Abdominal: Soft. He exhibits no distension. There is no tenderness. There is no rebound.       Well-healed upper midline incision   Musculoskeletal: He exhibits no edema or deformity.   Neurological: He is alert and oriented to person, place, and time.   Skin: Skin is warm and dry.       Significant Labs:  CBC:   Recent Labs  Lab 10/12/17  0628   WBC 33.77*   RBC 2.23*   HGB 6.4*   HCT 20.9*   *   MCV 94   MCH 28.7   MCHC 30.6*     CMP:   Recent Labs  Lab 10/12/17  0403   *   CALCIUM 8.1*   ALBUMIN 1.8*   PROT 5.1*    *   K 5.6*   CO2 12*      BUN 50*   CREATININE 1.3   ALKPHOS 260*   *   *   BILITOT 18.1*     Coagulation:   Recent Labs  Lab 10/11/17  1310 10/12/17  0402   LABPROT 11.4 12.3   INR 1.1 1.2   APTT 24.1  --      ABGs:   Recent Labs  Lab 10/12/17  0400   PH 7.360   PCO2 27.0*   PO2 72*   HCO3 15.2*   POCSATURATED 94*   BE -10       Recent Labs  Lab 10/12/17  0500   COLORU Migdalia   SPECGRAV >=1.030*   PHUR 5.0   PROTEINUA 1+*   BACTERIA None   NITRITE Negative   LEUKOCYTESUR Negative   UROBILINOGEN 4.0   HYALINECASTS 0       Significant Diagnostics:  CXR 10/12/17:     Narrative     Right jugular origin vascular catheter is now noted, its tip in the superior vena cava just superior to its junction with the right atrium.  No pneumothorax.  Postoperative changes are again noted in the thorax.  Heart size is normal, as is the appearance of the pulmonary vascularity.  Lung zones are clear, and free of superimposed air space consolidation or volume loss.  No pleural fluid of any substantial volume is seen on either side.  An oval subdiaphragmatic lucency in the right upper quadrant is again noted, and has been seen on a recent abdominal CT exam of 10/11/17 to relate to gas within the liver, likely a hepatic abscess.   Impression      No significant detrimental interval change in the appearance of the chest since 10/12/17 at 4:05 AM.  No post procedure pneumothorax.     Assessment/Plan:     * Obstructive jaundice    78 yo M with autoimmune sclerosing cholangitis who underwent left hepatic resection; resection of extrahepatic bile duct; Eron-Y right hepaticojejunostomy on 8/3/17 who presented to clinic with chills, now with sepsis and hepatic abscess    Plan:  - Broad spectrum antibiotics (vanc and zosyn for now) for bacteremia with GNR and GP bacteria  - T bili elevated to 18.1---> possible PTC drain? But in setting of liver abscess, will hold off for now  - Keep valenzuela in place for strict I&Os  -  Serial lactates  - GI prophylaxis  - Anemia may be due to chronic illness; may have been hemo-concentrated prior to admission; will give 2u PRBCs with history of CAD  - Central line and a-line for access and closer monitoring as well as frequent ABGs  - Stress dose steroids to be given IV  - Synthroid IV daily  - Continue ICU care; monitor respiratory status; may need to be intubated        IgG4-related sclerosing cholangitis    - See above        Demand ischemia of myocardium    - Cardiology consulted; appreciated recs  - 2D echo from 10/11/17: normal EF (60-65%) with severe LA enlargement  - Continue aspirin  - Transfuse blood for anemia              Gopal Mercedes MD  General Surgery  [facility]

## 2017-10-12 NOTE — NURSING
0344 rapid response called pt.now  diaphrectic blood glucose taken 112, pt. Is still alert and oriented but fatigue....

## 2017-10-12 NOTE — PLAN OF CARE
Patient is a 77 year old male, well known to Dr Soriano's service, admitted from home through Dr Quach's clinic 10/11/2017.  Patient transferred to ICU this morning from the fifth floor with suspected sepsis.  Patient going to  today and is not expected to discharge until next week with needs to be determined.    Patient lives in a mobile home with a ramp to enter with his wife.  Patient's son will drive him home and wife will care for him and obtain anything he needs after discharge.  Will continue to follow for needs.    PCP  Lyla Bryan MD  [address] [facility] [place] [address]  [phone]      [id number] - [place], [place] - [address]  [address]  [place] [address]  Phone: [phone] Fax: [phone]    Extended Emergency Contact Information  Primary Emergency Contact: Allyn Soriano  Address: [address] [place] [address] [facility]  Home Phone: [phone]  Relation: Spouse  Secondary Emergency Contact: Robby Canas Jr  Address: [address] [place] [address] [place]  Mobile Phone: [phone]  Relation: Son       10/12/17 1459   Discharge Assessment   Assessment Type Discharge Planning Assessment   Confirmed/corrected address and phone number on facesheet? Yes   Assessment information obtained from? Patient   Expected Length of Stay (days) 5   Communicated expected length of stay with patient/caregiver yes   Prior to hospitilization cognitive status: Alert/Oriented   Prior to hospitalization functional status: Independent   Current cognitive status: Alert/Oriented   Current Functional Status: Independent;Assistive Equipment;Needs Assistance   Lives With spouse   Patient's perception of discharge disposition home or selfcare   Equipment Currently Used at Home cane, quad;rollator   Do you have any problems affording any of your prescribed medications? No   Is the patient taking  medications as prescribed? yes   Does the patient have transportation home? Yes   Discharge Plan A Home with family   Discharge Plan B Home with family;Home Health   Patient/Family In Agreement With Plan yes

## 2017-10-12 NOTE — NURSING
Pt. Heart sustaining in the 140's at rest, pt. Assessed nad noted and denies any discomforts, surgery intern notified stated she will be on the floor to see the pt. Will continue to monitor

## 2017-10-12 NOTE — HOSPITAL COURSE
Patient was started on aspirin and plavix and then started on argatroban as per Primary team for HIT? Platelets dropped from 120 to 60's.

## 2017-10-12 NOTE — PROCEDURES
Radiology Post-Procedure Note    Pre Op Diagnosis: hepatic abscess, hx of sclerosing cholangitis s/p hepatic resection    Post Op Diagnosis: same    Procedure: hepatic abscess drainage    Procedure performed by: Myriam LOPEZ, Rena Mcdonald    Written Informed Consent Obtained: Yes    Specimen Removed: YES thick dark brown fluid from the anterior collection and dark bloody fluid from the posterior/dome collection    Estimated Blood Loss: less than 50     Findings: CT was used for localization of two abnormal fluid collections. A needle was inserted into each fluid collection and thick dark brown fluid was aspirated from the anterior collection and dark bloody fluid was obtained from the posterior/dome collection.  A wire was inserted into each collection and the tract was dilated.  A 8.0 Monegasque all-purpose drainage catheter was inserted into each collection and a pigtail loop of the distal end was formed.  The catheters were sutured into place and approximately  5 mL fluid was removed from each collection initially for C/S.  Each catheter was connected to a suction bulb.     A specimens were sent to the lab for further analysis and culture.    The patient tolerated procedure well and there were no complications. Please see procedure report under Imaging for further details.    Harry Miguel MD  Staff Radiologist  Department of Radiology  Pager: [phone]

## 2017-10-12 NOTE — SUBJECTIVE & OBJECTIVE
Past Medical History:   Diagnosis Date    Cancer     Prostate/s/p prostatectomy    Coronary artery disease     GERD (gastroesophageal reflux disease)     Hyperlipidemia     Hypertension     Hypothyroidism        Past Surgical History:   Procedure Laterality Date    CAROTID ENDARTERECTOMY Bilateral     CORONARY ARTERY BYPASS GRAFT      PROSTATE SURGERY         Review of patient's allergies indicates:  No Known Allergies    No current facility-administered medications on file prior to encounter.      Current Outpatient Prescriptions on File Prior to Encounter   Medication Sig    alprazolam (XANAX) 0.25 MG tablet Take 0.25 mg by mouth 3 (three) times daily.    aspirin (ECOTRIN) 81 MG EC tablet Take 81 mg by mouth once daily.    atorvastatin (LIPITOR) 80 MG tablet Take 80 mg by mouth once daily.    carvedilol (COREG) 3.125 MG tablet Take 3.125 mg by mouth 2 (two) times daily with meals.    escitalopram oxalate (LEXAPRO) 10 MG tablet Take 10 mg by mouth once daily.    indomethacin (INDOCIN) 25 MG capsule Take 25 mg by mouth 2 (two) times daily with meals.    isosorbide dinitrate (ISORDIL) 30 MG Tab Take 20 mg by mouth 3 (three) times daily.    isosorbide mononitrate (IMDUR) 30 MG 24 hr tablet     levothyroxine (SYNTHROID) 100 MCG tablet Take 100 mcg by mouth once daily.    ondansetron (ZOFRAN-ODT) 8 MG TbDL Take 1 tablet (8 mg total) by mouth every 6 (six) hours as needed.    oxycodone (ROXICODONE) 5 MG immediate release tablet Take 1 tablet (5 mg total) by mouth every 4 (four) hours as needed for Pain.    pantoprazole (PROTONIX) 40 MG tablet Take 40 mg by mouth once daily.    polyethylene glycol (GLYCOLAX) 17 gram/dose powder Take 17 g by mouth once daily.    predniSONE (DELTASONE) 5 MG tablet     promethazine (PHENERGAN) 25 MG tablet Take 1 tablet (25 mg total) by mouth every 6 (six) hours as needed for Nausea.     Family History     None        Social History Main Topics    Smoking status:  Former Smoker     Types: Cigarettes     Start date: 1/1/1956     Quit date: 6/18/2013    Smokeless tobacco: Never Used    Alcohol use No    Drug use: Unknown    Sexual activity: Not on file     Review of Systems   Constitution: Positive for chills and fever.   HENT: Negative for hoarse voice and sore throat.    Cardiovascular: Negative for chest pain and dyspnea on exertion.   Respiratory: Positive for shortness of breath.    Musculoskeletal: Negative for back pain, falls and joint pain.        Complained of Right arm pain   Gastrointestinal: Positive for jaundice. Negative for abdominal pain, diarrhea and nausea.   Genitourinary: Positive for hematuria.   Neurological: Negative for dizziness, headaches and numbness.     Objective:     Vital Signs (Most Recent):  Temp: 98 °F (36.7 °C) (10/12/17 0500)  Pulse: 97 (10/12/17 0530)  Resp: (!) 24 (10/12/17 0530)  BP: (!) 91/50 (10/12/17 0530)  SpO2: 100 % (10/12/17 0530) Vital Signs (24h Range):  Temp:  [97.5 °F (36.4 °C)-101.2 °F (38.4 °C)] 98 °F (36.7 °C)  Pulse:  [] 97  Resp:  [16-49] 24  SpO2:  [93 %-100 %] 100 %  BP: ()/(50-65) 91/50     Weight: 75.3 kg (166 lb 0.1 oz)  Body mass index is 26 kg/m².    SpO2: 100 %  O2 Device (Oxygen Therapy): nasal cannula      Intake/Output Summary (Last 24 hours) at 10/12/17 0617  Last data filed at 10/12/17 0500   Gross per 24 hour   Intake              250 ml   Output              525 ml   Net             -275 ml       Lines/Drains/Airways     Drain                 Urethral Catheter 10/12/17 0451 less than 1 day          Peripheral Intravenous Line                 Peripheral IV - Single Lumen 10/11/17 1400 Left Forearm less than 1 day         Peripheral IV - Single Lumen 10/11/17 1945 Right Upper Arm less than 1 day                Physical Exam   Constitutional: He is oriented to person, place, and time. He appears well-developed and well-nourished. He appears distressed (mild distress).   HENT:   Head:  Normocephalic and atraumatic.   Eyes: EOM are normal. Pupils are equal, round, and reactive to light.   Neck: Normal range of motion. Neck supple. No JVD present.   Cardiovascular: Regular rhythm, normal heart sounds and intact distal pulses.    No murmur heard.  Tachycardic    Pulmonary/Chest: Breath sounds normal.   tachypnic   Abdominal: Soft. Bowel sounds are normal. He exhibits distension. He exhibits no mass. There is no tenderness.   jaundiced    Musculoskeletal: Normal range of motion. He exhibits no edema.   Neurological: He is alert and oriented to person, place, and time. He has normal reflexes.   Skin: Skin is warm. No erythema. No pallor.   Warm extremities in keeping with sepsis   Vitals reviewed.      Significant Labs:   ABG:   Recent Labs  Lab 10/12/17  0400   PH 7.360   PCO2 27.0*   HCO3 15.2*   POCSATURATED 94*   BE -10   , Blood Culture:   Recent Labs  Lab 10/11/17  1309 10/11/17  1317   LABBLOO Gram stain leola bottle: Gram positive rods  Results called to and read back by:Enrique Palomo RN 10/11/2017  21:54  Gram stain aer bottle: Gram negative rods   Results called to and read back by:Enrique Palomo RN 10/12/2017  02:41 Gram stain leola bottle:  Gram variable rods  Results called to and read back by:Enrique Palomo RN 10/11/2017  23:09  Gram stain aer bottle: Gram negative rods   Results called to and read back by:Enrique Palomo RN 10/12/2017  02:41   , BMP:   Recent Labs  Lab 10/11/17  1007 10/11/17  1310 10/12/17  0403   *  --  117*   *  --  135*   K 5.2*  --  5.6*     --  105   CO2 22*  --  12*   BUN 37*  --  50*   CREATININE 1.3  --  1.3   CALCIUM 8.2*  --  8.1*   MG 1.6 1.3* 2.0   , CMP   Recent Labs  Lab 10/11/17  1007 10/11/17  1310 10/12/17  0403   *  --  135*   K 5.2*  --  5.6*     --  105   CO2 22*  --  12*   *  --  117*   BUN 37*  --  50*   CREATININE 1.3  --  1.3   CALCIUM 8.2*  --  8.1*   PROT 5.4* 5.0* 5.1*   ALBUMIN 1.6* 1.5* 1.8*    BILITOT 5.4* 8.0* 18.1*   ALKPHOS 213* 202* 260*   AST 91* 107* 459*   ALT 98* 115* 294*   ANIONGAP 11  --  18*   ESTGFRAFRICA >60.0  --  >60.0   EGFRNONAA 52.6*  --  52.6*   , CBC   Recent Labs  Lab 10/11/17  1007  10/12/17  0402   WBC 12.52  --  27.33*   HGB 9.6*  --  6.8*   HCT 30.4*  < > 21.6*   *  --  132*   < > = values in this interval not displayed., INR   Recent Labs  Lab 10/11/17  1310 10/12/17  0402   INR 1.1 1.2   , Lipid Panel No results for input(s): CHOL, HDL, LDLCALC, TRIG, CHOLHDL in the last 48 hours.,   Pathology Results  (Last 10 years)    None       and Troponin   Recent Labs  Lab 10/11/17  1310 10/12/17  0403   TROPONINI 0.112* 2.930*       Significant Imaging: Echocardiogram:   2D echo with color flow doppler:   Results for orders placed or performed during the hospital encounter of 10/11/17   2D echo with color flow doppler   Result Value Ref Range    EF 60 55 - 65    Mitral Valve Regurgitation TRIVIAL     Diastolic Dysfunction Yes (A)     Est. PA Systolic Pressure 32.16     Tricuspid Valve Regurgitation TRIVIAL    , EKG: shows sinus tachycardia with incomplete intraventicular conduction delay and X-Ray: CXR: X-Ray Chest 1 View (CXR):   Results for orders placed or performed during the hospital encounter of 10/11/17   X-Ray Chest 1 View    Narrative    Technique: AP chest radiograph.    Comparison: CT 10/11/2017, radiograph 10/11/2017.    Findings:    Mediastinal structures are midline.  Cardiac silhouette is stable in size.  There are postsurgical changes of previous CABG.  There is an elevated right hemidiaphragm.  There is slightly increased interstitial markings throughout both lungs, nonspecific.  No pneumothorax or pleural effusions.  No definite focal consolidation.  Rounded gas density projects over the right upper quadrant, presumably known air/fluid intrahepatic collection.  No acute osseous abnormalities.    Impression    No interval detrimental change.  Persistent gas  density projecting over the right upper quadrant, likely related to patient's known intrahepatic air/fluid collection.      Electronically signed by: APRIL HIDALGO MD  Date:     10/12/17  Time:    04:41

## 2017-10-12 NOTE — NURSING
Patient was taken down for stress test, before he went I hung the albumin and the Diflucan. While he was gone to test the medications were stopped, and when he got back to room I restarted them. Then CT called for him and I was unable to hang anymore medications for the patient. I called the on call for Main and let her know that the patient had been out of the room for tests and I had been unable catch up on IV medications. I also let her know that the patient's urine sample was bright red, and that I was sending it for culture.

## 2017-10-12 NOTE — H&P
Inpatient Radiology Pre-procedure Note    History of Present Illness:    Robby Soriano is a 77 y.o. male who presents for CT guided liver abscess drainage.    Admission H&P reviewed.  Past Medical History:   Diagnosis Date    Cancer     Prostate/s/p prostatectomy    Coronary artery disease     GERD (gastroesophageal reflux disease)     Hyperlipidemia     Hypertension     Hypothyroidism      Past Surgical History:   Procedure Laterality Date    CAROTID ENDARTERECTOMY Bilateral     CORONARY ARTERY BYPASS GRAFT      PROSTATE SURGERY         Review of Systems:   As documented in primary team H&P    Home Meds:   Prior to Admission medications    Medication Sig Start Date End Date Taking? Authorizing Provider   alprazolam (XANAX) 0.25 MG tablet Take 0.25 mg by mouth 3 (three) times daily.    Historical Provider, MD   aspirin (ECOTRIN) 81 MG EC tablet Take 81 mg by mouth once daily.    Historical Provider, MD   atorvastatin (LIPITOR) 80 MG tablet Take 80 mg by mouth once daily.    Historical Provider, MD   carvedilol (COREG) 3.125 MG tablet Take 3.125 mg by mouth 2 (two) times daily with meals.    Historical Provider, MD   escitalopram oxalate (LEXAPRO) 10 MG tablet Take 10 mg by mouth once daily.    Historical Provider, MD   indomethacin (INDOCIN) 25 MG capsule Take 25 mg by mouth 2 (two) times daily with meals.    Historical Provider, MD   isosorbide dinitrate (ISORDIL) 30 MG Tab Take 20 mg by mouth 3 (three) times daily.    Historical Provider, MD   isosorbide mononitrate (IMDUR) 30 MG 24 hr tablet  8/9/17   Historical Provider, MD   levothyroxine (SYNTHROID) 100 MCG tablet Take 100 mcg by mouth once daily.    Historical Provider, MD   ondansetron (ZOFRAN-ODT) 8 MG TbDL Take 1 tablet (8 mg total) by mouth every 6 (six) hours as needed. 9/6/17   Stephanie Jama MD   oxycodone (ROXICODONE) 5 MG immediate release tablet Take 1 tablet (5 mg total) by mouth every 4 (four) hours as needed for Pain. 8/8/17   Liberty  MD Tor   pantoprazole (PROTONIX) 40 MG tablet Take 40 mg by mouth once daily.    Historical Provider, MD   polyethylene glycol (GLYCOLAX) 17 gram/dose powder Take 17 g by mouth once daily. 8/8/17   Liberty Lentz MD   predniSONE (DELTASONE) 5 MG tablet  10/2/17   Historical Provider, MD   promethazine (PHENERGAN) 25 MG tablet Take 1 tablet (25 mg total) by mouth every 6 (six) hours as needed for Nausea. 9/6/17   Stephanie Jama MD     Scheduled Meds:    albuterol-ipratropium 2.5mg-0.5mg/3mL  3 mL Nebulization Q6H WAKE    fluconazole (DIFLUCAN) IVPB  400 mg Intravenous Q24H    hydrocortisone sodium succinate  100 mg Intravenous Q8H    levothyroxine  50 mcg Intravenous Daily    magnesium sulfate IVPB  3 g Intravenous Once    metoprolol  5 mg Intravenous Q6H    piperacillin-tazobactam 4.5 g in dextrose 5 % 100 mL IVPB (ready to mix system)  4.5 g Intravenous Q8H    sodium chloride 0.9%  3 mL Intravenous Q8H    [START ON 10/13/2017] vancomycin (VANCOCIN) IVPB  1,250 mg Intravenous Q24H     Continuous Infusions:    sodium chloride 0.9% 125 mL/hr at 10/12/17 1500     PRN Meds:sodium chloride, alprazolam, calcium gluconate IVPB, calcium gluconate IVPB, calcium gluconate IVPB, HYDROmorphone, magnesium sulfate IVPB, magnesium sulfate IVPB, ondansetron, potassium chloride **AND** potassium chloride **AND** potassium chloride, promethazine (PHENERGAN) IVPB     Anticoagulants/Antiplatelets: Heparin, last dose at 536 AM    Allergies: Review of patient's allergies indicates:  No Known Allergies  Sedation Hx: have not been any systemic reactions    Labs:    Recent Labs  Lab 10/12/17  0402   INR 1.2       Recent Labs  Lab 10/12/17  1130   WBC 29.80*   HGB 8.0*   HCT 25.0*   MCV 91   PLT 83*      Recent Labs  Lab 10/11/17  1310 10/12/17  0403   GLU  --  117*   NA  --  135*   K  --  5.6*   CL  --  105   CO2  --  12*   BUN  --  50*   CREATININE  --  1.3   CALCIUM  --  8.1*   MG 1.3* 2.0   * 294*   * 459*    ALBUMIN 1.5* 1.8*   BILITOT 8.0* 18.1*   BILIDIR 4.8*  --          Vitals:  Temp: 97.6 °F (36.4 °C) (10/12/17 1500)  Pulse: 78 (10/12/17 1500)  Resp: 19 (10/12/17 1500)  BP: (!) 96/53 (10/12/17 0600)  SpO2: 99 % (10/12/17 1500)     Physical Exam:  ASA: 3  Mallampati: 2    General: no acute distress  Mental Status: alert and oriented to person, place and time  HEENT: normocephalic, atraumatic, jaundice  Chest: on O2, unlabored breathing  Heart: regular heart rate  Abdomen: nondistended  Extremity: moves all extremities    Plan: CT guided liver abscess drainage    Sedation Plan: Moderate sedation    Josafat Chase MD  Radiology, PGY - II  [phone]

## 2017-10-12 NOTE — HPI
Mr. 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 He was given 2L of NS, started on broad spectrum abx (van/zosyn/flagyl) after his blood cultures came back positive for gram positive and gram negative rods, and transferred from the floor to the unit for hypotension and acute hypoxia (desaturated down into the 80's). Of note, the patient's tbilirubin had increased from 8 to 18, he is anemic at 6.8 a drop from his baseline of 9 and his lactic acid drawn is 8.9. Currently, the patient complains of mild SOB (he is on a non-rebreather) and he denies any chest pain. He reports that prior to this hospitalization, he did not have any chest pain with exertion. He denies any leg swelling or orthopnea on this admission either.     Cardiology was consulted for his troponin level of 2.9, which trended up to peak of 28.

## 2017-10-12 NOTE — PROGRESS NOTES
Patient arrives via stretcher to IR room 173 for Liver abscess accompanied by ICU, RN SHELBIE Cook.  Awaiting consent.

## 2017-10-12 NOTE — ASSESSMENT & PLAN NOTE
Patient is a 76 yo male with a past medical hx of CAD s/p CABG in 13 here with severe sepsis secondary to cholangitis and gram negative and positive bacteremia  - Troponin elevation to 2.9 in light of normal EF with LVH and presence of diastolic dysfunction likely related to increased O2 consumption in the setting of sepsis   - In addition patient is anemic with an unexplained drop in Hb from 9.6 to 6.8 (not enough of a drop to be explained by hematuria alone. Possible hepatic infarct? However blood of nearly 3 units? Regardless would transfuse to keep hb above 8.   - Unless contraindicated would continue aspirin  - Not ACS, no need for heparin and DAPT at this time.   - Treat underlying sepsis as per primary team, IVF 30 cc's/kg at least, broad spectrum abx and source control (ERCP).

## 2017-10-12 NOTE — HOSPITAL COURSE
10/12/17: Transferred to ICU overnight for worsening respiratory status, hypotension and increased lethargy.

## 2017-10-12 NOTE — PT/OT/SLP PROGRESS
Physical Therapy      Robby Soriano  MRN: [id number]    Patient not seen today secondary to  (pt on hold per RN 2nd to going to IR for proceedure. ). Will follow-up at a later date. .    Christin Meléndez, PT

## 2017-10-12 NOTE — PROGRESS NOTES
Patient tolerated abscess tube placement.  8 Fr tube placed to RUQ (anterior) , 8 Fr tube placed to RUQ (posterior).  Specimens sent to lab for testing.   Pt transported back to [facility] accompanied by SHELBIE Cook ICU RN.

## 2017-10-12 NOTE — PLAN OF CARE
Problem: Patient Care Overview  Goal: Plan of Care Review  Outcome: Ongoing (interventions implemented as appropriate)  No s/s of pain or distress today. Pt afebrile and normotensive.  IV Antibiotics administered per orders, and ID service consulted per orders. 2 units PRBCs transfused without complication.  Lab monitoring per orders, with 1,000 ml NS IV bolus administered secondary to elevated Lactic Acid level (with subsequent noted decrease after bolus administration).  Adequate amounts of clear kike urine produced today.  Plan for IR procedure per orders (with numerous inquiries performed today concerning availability for Radiology to perform procedure. Radiology states will contact once available). Plan of care reviewed with pt and pt's family, but reinforcement of education required.  Emotional support offered.

## 2017-10-13 PROBLEM — R78.81 BACTEREMIA: Status: ACTIVE | Noted: 2017-10-13

## 2017-10-13 PROBLEM — I24.9 ACS (ACUTE CORONARY SYNDROME): Status: ACTIVE | Noted: 2017-10-12

## 2017-10-13 PROBLEM — J96.02 ACUTE RESPIRATORY FAILURE WITH HYPOXIA AND HYPERCARBIA: Status: RESOLVED | Noted: 2017-10-12 | Resolved: 2017-10-13

## 2017-10-13 PROBLEM — J96.01 ACUTE RESPIRATORY FAILURE WITH HYPOXIA AND HYPERCARBIA: Status: RESOLVED | Noted: 2017-10-12 | Resolved: 2017-10-13

## 2017-10-13 PROBLEM — I95.9 HYPOTENSION: Status: RESOLVED | Noted: 2017-10-12 | Resolved: 2017-10-13

## 2017-10-13 PROBLEM — I24.89 DEMAND ISCHEMIA OF MYOCARDIUM: Status: RESOLVED | Noted: 2017-10-12 | Resolved: 2017-10-13

## 2017-10-13 LAB
ALBUMIN SERPL BCP-MCNC: 1.5 G/DL
ALBUMIN SERPL BCP-MCNC: 1.8 G/DL
ALP SERPL-CCNC: 131 U/L
ALP SERPL-CCNC: 142 U/L
ALT SERPL W/O P-5'-P-CCNC: 191 U/L
ALT SERPL W/O P-5'-P-CCNC: 228 U/L
ANION GAP SERPL CALC-SCNC: 6 MMOL/L
ANION GAP SERPL CALC-SCNC: 9 MMOL/L
AORTIC VALVE REGURGITATION: ABNORMAL
APTT BLDCRRT: 23.1 SEC
APTT BLDCRRT: 25 SEC
APTT BLDCRRT: 39.1 SEC
AST SERPL-CCNC: 153 U/L
AST SERPL-CCNC: 249 U/L
BACTERIA UR CULT: NO GROWTH
BASOPHILS # BLD AUTO: 0.01 K/UL
BASOPHILS # BLD AUTO: 0.01 K/UL
BASOPHILS NFR BLD: 0.1 %
BASOPHILS NFR BLD: 0.1 %
BILIRUB SERPL-MCNC: 6.1 MG/DL
BILIRUB SERPL-MCNC: 9.2 MG/DL
BNP SERPL-MCNC: 1906 PG/ML
BUN SERPL-MCNC: 45 MG/DL
BUN SERPL-MCNC: 47 MG/DL
CA-I BLDV-SCNC: 1.17 MMOL/L
CALCIUM SERPL-MCNC: 7.6 MG/DL
CALCIUM SERPL-MCNC: 7.9 MG/DL
CHLORIDE SERPL-SCNC: 109 MMOL/L
CHLORIDE SERPL-SCNC: 111 MMOL/L
CO2 SERPL-SCNC: 19 MMOL/L
CO2 SERPL-SCNC: 19 MMOL/L
CREAT SERPL-MCNC: 0.9 MG/DL
CREAT SERPL-MCNC: 1 MG/DL
DIASTOLIC DYSFUNCTION: YES
DIFFERENTIAL METHOD: ABNORMAL
DIFFERENTIAL METHOD: ABNORMAL
EOSINOPHIL # BLD AUTO: 0 K/UL
EOSINOPHIL # BLD AUTO: 0 K/UL
EOSINOPHIL NFR BLD: 0 %
EOSINOPHIL NFR BLD: 0 %
ERYTHROCYTE [DISTWIDTH] IN BLOOD BY AUTOMATED COUNT: 17.6 %
ERYTHROCYTE [DISTWIDTH] IN BLOOD BY AUTOMATED COUNT: 17.9 %
EST. GFR  (AFRICAN AMERICAN): >60 ML/MIN/1.73 M^2
EST. GFR  (AFRICAN AMERICAN): >60 ML/MIN/1.73 M^2
EST. GFR  (NON AFRICAN AMERICAN): >60 ML/MIN/1.73 M^2
EST. GFR  (NON AFRICAN AMERICAN): >60 ML/MIN/1.73 M^2
ESTIMATED PA SYSTOLIC PRESSURE: 27.01
GLUCOSE SERPL-MCNC: 153 MG/DL
GLUCOSE SERPL-MCNC: 216 MG/DL
HCT VFR BLD AUTO: 24.6 %
HCT VFR BLD AUTO: 25.5 %
HGB BLD-MCNC: 8.1 G/DL
HGB BLD-MCNC: 8.4 G/DL
INR PPP: 1
LACTATE SERPL-SCNC: 0.9 MMOL/L
LYMPHOCYTES # BLD AUTO: 0.9 K/UL
LYMPHOCYTES # BLD AUTO: 1 K/UL
LYMPHOCYTES NFR BLD: 4.5 %
LYMPHOCYTES NFR BLD: 5.8 %
MAGNESIUM SERPL-MCNC: 2.1 MG/DL
MCH RBC QN AUTO: 28.4 PG
MCH RBC QN AUTO: 28.7 PG
MCHC RBC AUTO-ENTMCNC: 32.9 G/DL
MCHC RBC AUTO-ENTMCNC: 32.9 G/DL
MCV RBC AUTO: 86 FL
MCV RBC AUTO: 87 FL
MITRAL VALVE REGURGITATION: ABNORMAL
MONOCYTES # BLD AUTO: 0.5 K/UL
MONOCYTES # BLD AUTO: 0.7 K/UL
MONOCYTES NFR BLD: 3.2 %
MONOCYTES NFR BLD: 3.7 %
NEUTROPHILS # BLD AUTO: 14.8 K/UL
NEUTROPHILS # BLD AUTO: 18.2 K/UL
NEUTROPHILS NFR BLD: 90.4 %
NEUTROPHILS NFR BLD: 91.2 %
PHOSPHATE SERPL-MCNC: 3.3 MG/DL
PLATELET # BLD AUTO: 55 K/UL
PLATELET # BLD AUTO: 57 K/UL
PMV BLD AUTO: 11.1 FL
PMV BLD AUTO: 11.2 FL
POTASSIUM SERPL-SCNC: 4 MMOL/L
POTASSIUM SERPL-SCNC: 4.4 MMOL/L
PROT SERPL-MCNC: 4.6 G/DL
PROT SERPL-MCNC: 5.1 G/DL
PROTHROMBIN TIME: 10.6 SEC
RBC # BLD AUTO: 2.82 M/UL
RBC # BLD AUTO: 2.96 M/UL
RETIRED EF AND QEF - SEE NOTES: 25 (ref 55–65)
SODIUM SERPL-SCNC: 136 MMOL/L
SODIUM SERPL-SCNC: 137 MMOL/L
TRICUSPID VALVE REGURGITATION: ABNORMAL
TROPONIN I SERPL DL<=0.01 NG/ML-MCNC: 21.55 NG/ML
VANCOMYCIN TROUGH SERPL-MCNC: 8.1 UG/ML
WBC # BLD AUTO: 16.33 K/UL
WBC # BLD AUTO: 19.98 K/UL

## 2017-10-13 PROCEDURE — 27000221 HC OXYGEN, UP TO 24 HOURS

## 2017-10-13 PROCEDURE — 83735 ASSAY OF MAGNESIUM: CPT

## 2017-10-13 PROCEDURE — 25000003 PHARM REV CODE 250: Performed by: STUDENT IN AN ORGANIZED HEALTH CARE EDUCATION/TRAINING PROGRAM

## 2017-10-13 PROCEDURE — 93306 TTE W/DOPPLER COMPLETE: CPT

## 2017-10-13 PROCEDURE — 93005 ELECTROCARDIOGRAM TRACING: CPT

## 2017-10-13 PROCEDURE — 63600175 PHARM REV CODE 636 W HCPCS: Performed by: SURGERY

## 2017-10-13 PROCEDURE — 20000000 HC ICU ROOM

## 2017-10-13 PROCEDURE — 83605 ASSAY OF LACTIC ACID: CPT

## 2017-10-13 PROCEDURE — 97162 PT EVAL MOD COMPLEX 30 MIN: CPT

## 2017-10-13 PROCEDURE — 85025 COMPLETE CBC W/AUTO DIFF WBC: CPT | Mod: 91

## 2017-10-13 PROCEDURE — P9047 ALBUMIN (HUMAN), 25%, 50ML: HCPCS | Performed by: SURGERY

## 2017-10-13 PROCEDURE — 25000003 PHARM REV CODE 250: Performed by: INTERNAL MEDICINE

## 2017-10-13 PROCEDURE — 80202 ASSAY OF VANCOMYCIN: CPT

## 2017-10-13 PROCEDURE — A4216 STERILE WATER/SALINE, 10 ML: HCPCS | Performed by: NURSE PRACTITIONER

## 2017-10-13 PROCEDURE — 85730 THROMBOPLASTIN TIME PARTIAL: CPT | Mod: 91

## 2017-10-13 PROCEDURE — G8979 MOBILITY GOAL STATUS: HCPCS | Mod: CK

## 2017-10-13 PROCEDURE — 85730 THROMBOPLASTIN TIME PARTIAL: CPT

## 2017-10-13 PROCEDURE — 25000242 PHARM REV CODE 250 ALT 637 W/ HCPCS: Performed by: SURGERY

## 2017-10-13 PROCEDURE — 25000003 PHARM REV CODE 250: Performed by: SURGERY

## 2017-10-13 PROCEDURE — S0077 INJECTION, CLINDAMYCIN PHOSP: HCPCS | Performed by: INTERNAL MEDICINE

## 2017-10-13 PROCEDURE — 83880 ASSAY OF NATRIURETIC PEPTIDE: CPT

## 2017-10-13 PROCEDURE — 25000003 PHARM REV CODE 250: Performed by: NURSE PRACTITIONER

## 2017-10-13 PROCEDURE — 84100 ASSAY OF PHOSPHORUS: CPT

## 2017-10-13 PROCEDURE — G8978 MOBILITY CURRENT STATUS: HCPCS | Mod: CN

## 2017-10-13 PROCEDURE — 82330 ASSAY OF CALCIUM: CPT

## 2017-10-13 PROCEDURE — 99233 SBSQ HOSP IP/OBS HIGH 50: CPT | Mod: 24,,, | Performed by: SURGERY

## 2017-10-13 PROCEDURE — 85610 PROTHROMBIN TIME: CPT

## 2017-10-13 PROCEDURE — 84484 ASSAY OF TROPONIN QUANT: CPT

## 2017-10-13 PROCEDURE — 93010 ELECTROCARDIOGRAM REPORT: CPT | Mod: ,,, | Performed by: INTERNAL MEDICINE

## 2017-10-13 PROCEDURE — 63600175 PHARM REV CODE 636 W HCPCS: Performed by: STUDENT IN AN ORGANIZED HEALTH CARE EDUCATION/TRAINING PROGRAM

## 2017-10-13 PROCEDURE — 86022 PLATELET ANTIBODIES: CPT

## 2017-10-13 PROCEDURE — 80053 COMPREHEN METABOLIC PANEL: CPT

## 2017-10-13 PROCEDURE — 94640 AIRWAY INHALATION TREATMENT: CPT

## 2017-10-13 PROCEDURE — 93306 TTE W/DOPPLER COMPLETE: CPT | Mod: 26,,, | Performed by: INTERNAL MEDICINE

## 2017-10-13 PROCEDURE — 99223 1ST HOSP IP/OBS HIGH 75: CPT | Mod: ,,, | Performed by: INTERNAL MEDICINE

## 2017-10-13 RX ORDER — FUROSEMIDE 10 MG/ML
40 INJECTION INTRAMUSCULAR; INTRAVENOUS ONCE
Status: COMPLETED | OUTPATIENT
Start: 2017-10-13 | End: 2017-10-13

## 2017-10-13 RX ORDER — CLOPIDOGREL BISULFATE 75 MG/1
75 TABLET ORAL DAILY
Status: DISCONTINUED | OUTPATIENT
Start: 2017-10-14 | End: 2017-10-18 | Stop reason: HOSPADM

## 2017-10-13 RX ORDER — ARGATROBAN 1 MG/ML
1 INJECTION INTRAVENOUS CONTINUOUS
Status: DISCONTINUED | OUTPATIENT
Start: 2017-10-13 | End: 2017-10-16

## 2017-10-13 RX ORDER — CLINDAMYCIN PHOSPHATE 900 MG/50ML
900 INJECTION, SOLUTION INTRAVENOUS
Status: DISCONTINUED | OUTPATIENT
Start: 2017-10-13 | End: 2017-10-15

## 2017-10-13 RX ORDER — HEPARIN SODIUM 5000 [USP'U]/ML
5000 INJECTION, SOLUTION INTRAVENOUS; SUBCUTANEOUS EVERY 8 HOURS
Status: DISCONTINUED | OUTPATIENT
Start: 2017-10-13 | End: 2017-10-13

## 2017-10-13 RX ORDER — ALBUMIN HUMAN 250 G/1000ML
25 SOLUTION INTRAVENOUS ONCE
Status: COMPLETED | OUTPATIENT
Start: 2017-10-13 | End: 2017-10-13

## 2017-10-13 RX ORDER — NAPROXEN SODIUM 220 MG/1
81 TABLET, FILM COATED ORAL ONCE
Status: COMPLETED | OUTPATIENT
Start: 2017-10-13 | End: 2017-10-13

## 2017-10-13 RX ORDER — CLOPIDOGREL 300 MG/1
600 TABLET, FILM COATED ORAL ONCE
Status: COMPLETED | OUTPATIENT
Start: 2017-10-13 | End: 2017-10-13

## 2017-10-13 RX ORDER — HEPARIN SODIUM,PORCINE/D5W 25000/250
17 INTRAVENOUS SOLUTION INTRAVENOUS CONTINUOUS
Status: DISCONTINUED | OUTPATIENT
Start: 2017-10-13 | End: 2017-10-13

## 2017-10-13 RX ORDER — IPRATROPIUM BROMIDE AND ALBUTEROL SULFATE 2.5; .5 MG/3ML; MG/3ML
3 SOLUTION RESPIRATORY (INHALATION) EVERY 6 HOURS PRN
Status: DISCONTINUED | OUTPATIENT
Start: 2017-10-13 | End: 2017-10-18 | Stop reason: HOSPADM

## 2017-10-13 RX ADMIN — Medication 3 ML: at 09:10

## 2017-10-13 RX ADMIN — ARGATROBAN 1 MCG/KG/MIN: 125 SOLUTION INTRAVENOUS at 07:10

## 2017-10-13 RX ADMIN — IPRATROPIUM BROMIDE AND ALBUTEROL SULFATE 3 ML: .5; 3 SOLUTION RESPIRATORY (INHALATION) at 07:10

## 2017-10-13 RX ADMIN — HYDROCORTISONE SODIUM SUCCINATE 100 MG: 100 INJECTION, POWDER, FOR SOLUTION INTRAMUSCULAR; INTRAVENOUS at 05:10

## 2017-10-13 RX ADMIN — PIPERACILLIN AND TAZOBACTAM 4.5 G: 4; .5 INJECTION, POWDER, LYOPHILIZED, FOR SOLUTION INTRAVENOUS; PARENTERAL at 05:10

## 2017-10-13 RX ADMIN — VANCOMYCIN HYDROCHLORIDE 1250 MG: 10 INJECTION, POWDER, LYOPHILIZED, FOR SOLUTION INTRAVENOUS at 05:10

## 2017-10-13 RX ADMIN — FUROSEMIDE 40 MG: 10 INJECTION, SOLUTION INTRAVENOUS at 06:10

## 2017-10-13 RX ADMIN — CLOPIDOGREL BISULFATE 600 MG: 300 TABLET, FILM COATED ORAL at 05:10

## 2017-10-13 RX ADMIN — LEVOTHYROXINE SODIUM ANHYDROUS 50 MCG: 100 INJECTION, POWDER, LYOPHILIZED, FOR SOLUTION INTRAVENOUS at 09:10

## 2017-10-13 RX ADMIN — HYDROCORTISONE SODIUM SUCCINATE 100 MG: 100 INJECTION, POWDER, FOR SOLUTION INTRAMUSCULAR; INTRAVENOUS at 09:10

## 2017-10-13 RX ADMIN — FLUCONAZOLE 400 MG: 2 INJECTION INTRAVENOUS at 03:10

## 2017-10-13 RX ADMIN — METOPROLOL TARTRATE 5 MG: 5 INJECTION INTRAVENOUS at 01:10

## 2017-10-13 RX ADMIN — Medication 3 ML: at 05:10

## 2017-10-13 RX ADMIN — ALBUMIN (HUMAN) 25 G: 12.5 SOLUTION INTRAVENOUS at 06:10

## 2017-10-13 RX ADMIN — PIPERACILLIN AND TAZOBACTAM 4.5 G: 4; .5 INJECTION, POWDER, LYOPHILIZED, FOR SOLUTION INTRAVENOUS; PARENTERAL at 09:10

## 2017-10-13 RX ADMIN — METOPROLOL TARTRATE 5 MG: 5 INJECTION INTRAVENOUS at 05:10

## 2017-10-13 RX ADMIN — HYDROCORTISONE SODIUM SUCCINATE 100 MG: 100 INJECTION, POWDER, FOR SOLUTION INTRAMUSCULAR; INTRAVENOUS at 02:10

## 2017-10-13 RX ADMIN — CLINDAMYCIN IN 5 PERCENT DEXTROSE 900 MG: 18 INJECTION, SOLUTION INTRAVENOUS at 03:10

## 2017-10-13 RX ADMIN — PIPERACILLIN AND TAZOBACTAM 4.5 G: 4; .5 INJECTION, POWDER, LYOPHILIZED, FOR SOLUTION INTRAVENOUS; PARENTERAL at 01:10

## 2017-10-13 RX ADMIN — METOPROLOL TARTRATE 5 MG: 5 INJECTION INTRAVENOUS at 06:10

## 2017-10-13 RX ADMIN — VANCOMYCIN HYDROCHLORIDE 1250 MG: 10 INJECTION, POWDER, LYOPHILIZED, FOR SOLUTION INTRAVENOUS at 06:10

## 2017-10-13 RX ADMIN — ALPRAZOLAM 0.25 MG: 0.25 TABLET ORAL at 04:10

## 2017-10-13 RX ADMIN — ASPIRIN 81 MG CHEWABLE TABLET 81 MG: 81 TABLET CHEWABLE at 04:10

## 2017-10-13 RX ADMIN — CLINDAMYCIN IN 5 PERCENT DEXTROSE 900 MG: 18 INJECTION, SOLUTION INTRAVENOUS at 10:10

## 2017-10-13 RX ADMIN — HEPARIN SODIUM 5000 UNITS: 5000 INJECTION, SOLUTION INTRAVENOUS; SUBCUTANEOUS at 02:10

## 2017-10-13 RX ADMIN — Medication 3 ML: at 02:10

## 2017-10-13 NOTE — PROGRESS NOTES
Progress Note  Surgical Intensive Care    Admit Date: 10/11/2017  Post-operative Day:    Hospital Day: 3    SUBJECTIVE:     Follow-up For:  * No surgery found *    HPI:    Patient is a 77 y.o. 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 Past medical history is significant for hypothyroidism, hypertension, hyperlipidemia, CAD s/p CABG, anemia and autoimmune cholangitis. Patient was seen in clinic on 10/11/2017 for post-op follow up. Patient stated he was fatigued, experiencing chills the night before follow up. He looked ashen in clinic and had scleral icterus. He was subsequently admitted to [facility]. On the floor, blood cultures came back positive for gram positive rods and gram negative rods. CT scan yesterday evening showed 7.2cm gas collection in the hepatic dome and an adjacent gas and fluid collection in the resection bed 2.7cm. Patient received 2L NS for fluid resuscitation. Patient became hypotensive on the floor, desated down to 80s. Lactic 8.9, Troponin elevated at 2.9. No changes on EKG. He was stepped up to the SICU. Noted to be quite jaundice with total bilirubin 18, increased from 5.4 in clinic. Started on broad spectrum antibiotics, placed on non-rebreather mask at 15L, placed right radial arterial catheter and right internal jugular central venous catheter. H/H decreased to 6.4/20.9 from 9.6/30.4, transfused 2 units pRBCs. Cardiology consulted for increased troponin, determined to be secondary to demand ischemia. IR called regarding fluid collection. Continue on broad spectrum antibiotics.    Interval history:    Went to IR yesterday for placement of two RUQ drains. Troponin continued to trend up overnight, peaked at 28. Cardiology called, attributed to demand ischemia and they will continue to follow. Recommended starting him on anticoagulation but holding off due to recent hepatic drain placement. Patient on venti mask  overnight, maintaining sats. HDS off pressors. Continues on broad spectrum antibiotics.    Continuous Infusions:   Scheduled Meds:   albuterol-ipratropium 2.5mg-0.5mg/3mL  3 mL Nebulization Q6H WAKE    fluconazole (DIFLUCAN) IVPB  400 mg Intravenous Q24H    hydrocortisone sodium succinate  100 mg Intravenous Q8H    levothyroxine  50 mcg Intravenous Daily    magnesium sulfate IVPB  3 g Intravenous Once    metoprolol  5 mg Intravenous Q6H    piperacillin-tazobactam 4.5 g in dextrose 5 % 100 mL IVPB (ready to mix system)  4.5 g Intravenous Q8H    sodium chloride 0.9%  3 mL Intravenous Q8H    vancomycin (VANCOCIN) IVPB  1,250 mg Intravenous Q24H     PRN Meds:alprazolam, calcium gluconate IVPB, calcium gluconate IVPB, calcium gluconate IVPB, ibuprofen, magnesium sulfate IVPB, magnesium sulfate IVPB, ondansetron, promethazine (PHENERGAN) IVPB    Review of patient's allergies indicates:  No Known Allergies    OBJECTIVE:     Vital Signs (Most Recent)  Temp: 97.5 °F (36.4 °C) (10/13/17 0700)  Pulse: 66 (10/13/17 0730)  Resp: 19 (10/13/17 0730)  BP: (!) 96/53 (10/12/17 0600)  SpO2: 100 % (10/13/17 0730)    Vital Signs Range (Last 24H):  Temp:  [97.4 °F (36.3 °C)-97.9 °F (36.6 °C)]   Pulse:  []   Resp:  [15-24]   SpO2:  [98 %-100 %]   Arterial Line BP: ()/(42-79)     I & O (Last 24H):  Intake/Output Summary (Last 24 hours) at 10/13/17 0746  Last data filed at 10/13/17 0600   Gross per 24 hour   Intake             4794 ml   Output             1920 ml   Net             2874 ml     Ventilator Data (Last 24H):     Oxygen Concentration (%):  [55] 55    Hemodynamic Parameters (Last 24H):  CO:  [5.4 L/min-8.1 L/min] 5.4 L/min  CI:  [2.9 L/min/m2-43 L/min/m2] 2.9 L/min/m2    Physical Exam:  Physical Exam   Constitutional: He is oriented to person, place, and time and well-developed, well-nourished, and in no distress.   HENT:   Head: Normocephalic and atraumatic.   Cardiovascular: Normal rate and regular rhythm.     No murmur heard.  Pulmonary/Chest: Effort normal. No respiratory distress. He has no wheezes. He has no rales.   Abdominal: Soft. He exhibits no distension. There is no tenderness.   Neurological: He is alert and oriented to person, place, and time.   Skin: Skin is warm and dry.   Jaundice.     Wound/Incision:  Drains placed by IR yesterday in the anterior and posterior RUQ    Laboratory (Last 24H):  CBC:    Recent Labs  Lab 10/13/17  0410   WBC 19.98*   HGB 8.1*   HCT 24.6*   PLT 57*     CMP:    Recent Labs  Lab 10/13/17  0410   CALCIUM 7.6*   ALBUMIN 1.5*   PROT 4.6*      K 4.4   CO2 19*   *   BUN 47*   CREATININE 0.9   ALKPHOS 142*   *   *   BILITOT 9.2*     Lab Results   Component Value Date    INR 1.0 10/13/2017    INR 1.2 10/12/2017    INR 1.1 10/11/2017       Chest X-Ray: X-ray Chest 1 View    Result Date: 10/13/2017  No interval detrimental change. Electronically signed by: APRIL HIDALGO MD Date:     10/13/17 Time:    06:30       Diagnostic Results:  X-Ray: unchanged from yesterday    ASSESSMENT/PLAN:   Patient is a 77 y.o. male had a partial hepatic resection in August 2017 for IgG4 sclerosing cholangitis and is on a steroid taper for this. Past medical history is significant for hypothyroidism, hypertension, hyperlipidemia, CAD s/p CABG, anemia and autoimmune cholangitis     Plan:     Neuro:   -not sedated  - ibuprofen PO for pain     Pulmonary:   - on venti mask, maintaining O2 sats>95%     Oxygen Concentration (%):  [55] 55     Recent Labs  Lab 10/12/17  0400   PH 7.360   PCO2 27.0*   PO2 72*   HCO3 15.2*   POCSATURATED 94*   BE -10         Cardiac:  -MAP goal >65  -HDS not requiring pressors  - troponin increased yesterday, cardiology attributing it to demand ischemia, recommended anticoagulation. Hold off due to hepatic drain placement. Cardiology following     Renal:   -Ibarra in place  - UOP /hr overnight  -Bun/Cr 47/0.9     Fluids/Electrolytes/Nutrition/GI:    -Nutritional status: NPO until able to wean off venti mask   -replace lytes PRN  -Bowel regimen per primary team  - GI prophylaxis per primary team  - two drains placed by IR yesterday, one in the anterior fluid collection with purulent bilious drainage and one in the posterior/dome fluid collection with what appears to be old hematoma.      Hematology/Oncology:  -H/H stable, CBC q12  - thrombocytopenial to 57, INR stable 1.0  -Anticoagulation: held due to hepatic drains     Infectious Disease:   -afebrile  - blood cultures positive for gram positive and gram negative rods, speciation pending, urine cultures pending  -WBC trending down  - lactate now at 0.9, trending down  -Abx: fluconazole, zosyn, vanc, will deesculate after speciation and sensitivities result  - IR drained hepatic abscess yesterday, two drains placed  - fluid boluses for sepsis as needed     Endocrine:  -Glucose goal of 120-150     Dispo:  - Continue broad spectrum antibiotics, monitor cardiac function in light of elevated troponins, fluid resuscitation as needed for sepsis, continue care in the ICU setting    Shayna Saeed, PGY-1  General Surgery  [phone]  10/13/2017

## 2017-10-13 NOTE — ASSESSMENT & PLAN NOTE
76 yo M with autoimmune sclerosing cholangitis who underwent left hepatic resection; resection of extrahepatic bile duct; Eron-Y right hepaticojejunostomy on 8/3/17 who presented to clinic with chills, now with sepsis and hepatic abscess    Plan:  - s/p IR drain placement x2 on 10/12/17  - Afebrile overnight, WBC decreasing, lactate normalized  - Cont broad spectrum antibiotics (vanc and zosyn for now) for bacteremia with GNR and GP bacteria  - Blood, IR drain cultures pending. Will adjust abx as cultures result  - D/C IVF  - Lasix x1 today  - Keep valenzuela in place for strict I&Os  - GI prophylaxis  - Stress dose steroids. Plan to wean  - Synthroid IV daily  - Continue ICU care

## 2017-10-13 NOTE — ASSESSMENT & PLAN NOTE
Patient is a 78 yo male with a past medical hx of CAD s/p CABG in 13 here with severe sepsis secondary to cholangitis and gram negative and positive bacteremia    - Likely ACS event with associated heart failure in sapna-operative period given EKG with new LBBB and troponin peak of 28   - If deemed appropriate by primary patient would ideally be medically managed as NSTEMI with DAPT, beta block to target HR of 50-60, and heparin gtt; however, if this is not possible, recommend ASA 81 at minimum  - BNP 1900, echo pending  - Recommend transfusing to Hg > 10  - Recommend LHC when patient is stable from infectious standpoint

## 2017-10-13 NOTE — CONSULTS
[facility]  Infectious Disease  Consult Note    Patient Name: Robby Soriano  MRN: [id number]  Admission Date: 10/11/2017  Hospital Length of Stay: 2 days  Attending Physician: No att. providers found  Primary Care Provider: Lyla Bryna MD     Isolation Status: No active isolations    Patient information was obtained from patient, spouse/SO and ER records.      Consults  Assessment/Plan:     Bacteremia    Patient with positive blood cultures for GNR both lactose fermentors and non fermentors, GPR and gram variable. Recent results show E. coli and Clostridium perfringens from blood cultures. Abscess cultures taken yesterday with similar results. Patient has known past infections (8/3/17) with E.coli, Klebsiella, Clostridium. perfringes and Enterococcus cultrured from bile sample. Abdominal CT highly suggestive of Clostridium perfringens infection with gas production seen. It is the most likely source of gram positive bettie bacteremia at this time. Will recommend to add clindamycin for toxin inhibition and continue on zosyn that will cover broadly all preliminary isolates found on blood culture. All bacteria likely GI torrey and translocated to blood. Vancomycin is alternative therapy for Clostridium perfringens therapy, at this time will continue until susceptibilities attained. At this will continue fluconazole therapy until patient condition more stable.     - Add clindamycin 900 mg IV every 8 hrs   - Continue Zosyn   - Continue Vancomycin   - continue fluconazole   - repeat blood cultures in AM  - monitor blood culture for susceptibilities               Thank you for your consult. I will follow-up with patient. Please contact us if you have any additional questions.    Kishore Lee MD  Infectious Disease  [facility]    Subjective:     Principal Problem: Obstructive jaundice    HPI: Case of 76 y/o male PMHx hypothyroidism, AHTN, HLD, CAD s/p CABG, anemia and  autoimmune cholangitis was admitted on 10/11/17. 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 Currently on steroid taper. Evaluated in clinic on 10/11/17 for follow up mentioned at that time being fatigued, experiencing chills, during that encounter was found to be jaundice and was admitted to [facility]. During admission found to have B/C positive for GNR and GPR. CT scan with evidence of 7.2 cm gas collection in the hepatic dome and adjacent gas and fluid collection in the resection bed 2.7 cm. Was resuscitated with 2 L of NS was transferred to SICU due to desaturation and hypotension. Was started on zosyn, vanc and fluconazole and NRB mask. Required 2 PRBC transfusion due to anemia. Was evaluated by cardiology due to increasing troponin's determined to by type 2 NSTEMI. IR performed placement of drain yesterday. At this time off pressors. Consulted to ID for further recommendations.        Past Medical History:   Diagnosis Date    Cancer     Prostate/s/p prostatectomy    Coronary artery disease     GERD (gastroesophageal reflux disease)     Hyperlipidemia     Hypertension     Hypothyroidism        Past Surgical History:   Procedure Laterality Date    CAROTID ENDARTERECTOMY Bilateral     CORONARY ARTERY BYPASS GRAFT      PROSTATE SURGERY         Review of patient's allergies indicates:  No Known Allergies    Medications:  Prescriptions Prior to Admission   Medication Sig    alprazolam (XANAX) 0.25 MG tablet Take 0.25 mg by mouth 3 (three) times daily.    aspirin (ECOTRIN) 81 MG EC tablet Take 81 mg by mouth once daily.    atorvastatin (LIPITOR) 80 MG tablet Take 80 mg by mouth once daily.    carvedilol (COREG) 3.125 MG tablet Take 3.125 mg by mouth 2 (two) times daily with meals.    escitalopram oxalate (LEXAPRO) 10 MG tablet Take 10 mg by mouth once daily.    indomethacin (INDOCIN) 25 MG capsule Take 25 mg by mouth 2 (two) times daily with meals.     isosorbide dinitrate (ISORDIL) 30 MG Tab Take 20 mg by mouth 3 (three) times daily.    isosorbide mononitrate (IMDUR) 30 MG 24 hr tablet     levothyroxine (SYNTHROID) 100 MCG tablet Take 100 mcg by mouth once daily.    ondansetron (ZOFRAN-ODT) 8 MG TbDL Take 1 tablet (8 mg total) by mouth every 6 (six) hours as needed.    oxycodone (ROXICODONE) 5 MG immediate release tablet Take 1 tablet (5 mg total) by mouth every 4 (four) hours as needed for Pain.    pantoprazole (PROTONIX) 40 MG tablet Take 40 mg by mouth once daily.    polyethylene glycol (GLYCOLAX) 17 gram/dose powder Take 17 g by mouth once daily.    predniSONE (DELTASONE) 5 MG tablet     promethazine (PHENERGAN) 25 MG tablet Take 1 tablet (25 mg total) by mouth every 6 (six) hours as needed for Nausea.     Antibiotics     Start     Stop Route Frequency Ordered    10/13/17 1715  vancomycin (VANCOCIN) 1,250 mg in dextrose 5 % 250 mL IVPB  (Vancomycin IVPB with levels panel)      -- IV Every 12 hours (non-standard times) 10/13/17 0802    10/12/17 0515  piperacillin-tazobactam 4.5 g in dextrose 5 % 100 mL IVPB (ready to mix system)      -- IV Every 8 hours (non-standard times) 10/12/17 0516        Antifungals     Start     Stop Route Frequency Ordered    10/11/17 1530  fluconazole (DIFLUCAN) IVPB 400 mg 200 mL      -- IV Every 24 hours (non-standard times) 10/11/17 1418        Antivirals     None             There is no immunization history on file for this patient.    Family History     None        Social History     Social History    Marital status:      Spouse name: N/A    Number of children: N/A    Years of education: N/A     Social History Main Topics    Smoking status: Former Smoker     Types: Cigarettes     Start date: 1/1/1956     Quit date: 6/18/2013    Smokeless tobacco: Never Used    Alcohol use No    Drug use: Unknown    Sexual activity: Not Asked     Other Topics Concern    None     Social History Narrative    None     Review  of Systems   Constitutional: Positive for chills and fatigue. Negative for diaphoresis and fever.   Respiratory: Negative for cough, choking, chest tightness and shortness of breath.    Gastrointestinal: Positive for abdominal pain. Negative for nausea and vomiting.   Skin: Negative for rash.     Objective:     Vital Signs (Most Recent):  Temp: 97.5 °F (36.4 °C) (10/13/17 0700)  Pulse: 64 (10/13/17 1100)  Resp: 14 (10/13/17 1100)  BP: (!) 96/53 (10/12/17 0600)  SpO2: 100 % (10/13/17 1100) Vital Signs (24h Range):  Temp:  [97.4 °F (36.3 °C)-97.9 °F (36.6 °C)] 97.5 °F (36.4 °C)  Pulse:  [] 64  Resp:  [13-24] 14  SpO2:  [98 %-100 %] 100 %  Arterial Line BP: (114-145)/(47-70) 133/53     Weight: 78.2 kg (172 lb 6.4 oz)  Body mass index is 27 kg/m².    Estimated Creatinine Clearance: 64.3 mL/min (based on SCr of 0.9 mg/dL).    Physical Exam   Constitutional: He appears well-developed and well-nourished.   HENT:   Head: Normocephalic and atraumatic.   Eyes: EOM are normal. Pupils are equal, round, and reactive to light. Scleral icterus is present.   Neck: Normal range of motion.   Cardiovascular: Normal rate, regular rhythm and normal heart sounds.    Pulmonary/Chest: Effort normal and breath sounds normal.   Abdominal: Soft. Bowel sounds are normal. He exhibits distension. There is no tenderness. There is no guarding.   Musculoskeletal: He exhibits edema. He exhibits no deformity.   Skin:   Jaundice        Significant Labs:   Blood Culture:   Recent Labs  Lab 10/11/17  1309 10/11/17  1317 10/12/17  0055 10/12/17  2208 10/12/17  2235   LABBLOO Gram stain leola bottle: Gram positive rods  Results called to and read back by:Enrique Palomo RN 10/11/2017  21:54  Gram stain aer bottle: Gram negative rods   Results called to and read back by:Enrique Palomo RN 10/12/2017  02:41 Gram stain leola bottle:  Gram variable rods  Results called to and read back by:Enrique Palomo RN 10/11/2017  23:09  Gram stain aer bottle:  Gram negative rods   Results called to and read back by:Enrique Palomo RN 10/12/2017  02:41  GRAM NEGATIVE RODIdentification pendingFor susceptibility see order # [id number] Gram stain aer bottle: Gram negative rods  Results called to and read back by: Harry Cook RN  10/12/2017  14:44  GRAM NEGATIVE RODIdentification pendingFor susceptibility see order # [id number]  Gram stain aer bottle: Gram negative rods  Results called to and read back by: Harry Cook RN  10/12/2017  14:43  Gram stain leola bottle: Gram negative rods 10/12/2017  20:11  GRAM NEGATIVE RODIdentification pendingFor susceptibility see order # [id number] No Growth to date No Growth to date     BMP:   Recent Labs  Lab 10/13/17  0410   *      K 4.4   *   CO2 19*   BUN 47*   CREATININE 0.9   CALCIUM 7.6*   MG 2.1     CBC:   Recent Labs  Lab 10/12/17  1810 10/12/17  2007 10/13/17  0410   WBC 23.78* 20.89* 19.98*   HGB 8.3* 7.8* 8.1*   HCT 25.0* 23.6* 24.6*   PLT 80* 60* 57*     CMP:   Recent Labs  Lab 10/12/17  0403 10/12/17  2228 10/13/17  0410   * 136 136   K 5.6* 4.5 4.4    109 111*   CO2 12* 19* 19*   * 158* 153*   BUN 50* 48* 47*   CREATININE 1.3 1.0 0.9   CALCIUM 8.1* 7.5* 7.6*   PROT 5.1* 4.6* 4.6*   ALBUMIN 1.8* 1.6* 1.5*   BILITOT 18.1* 12.5* 9.2*   ALKPHOS 260* 151* 142*   * 302* 249*   * 245* 228*   ANIONGAP 18* 8 6*   EGFRNONAA 52.6* >60.0 >60.0     Microbiology Results (last 7 days)     Procedure Component Value Units Date/Time    Blood culture [[id number]] Collected:  10/12/17 0055    Order Status:  Completed Specimen:  Blood Updated:  10/13/17 1033     Blood Culture, Routine Gram stain aer bottle: Gram negative rods     Blood Culture, Routine Results called to and read back by: Harry Cook RN     Blood Culture, Routine 10/12/2017  14:44     Blood Culture, Routine --     GRAM NEGATIVE ADARSH  Identification pending  For susceptibility see order # [id number]      Blood culture  [[id number]] Collected:  10/12/17 0055    Order Status:  Completed Specimen:  Blood Updated:  10/13/17 1030     Blood Culture, Routine Gram stain aer bottle: Gram negative rods     Blood Culture, Routine Results called to and read back by: Harry Cook RN     Blood Culture, Routine 10/12/2017  14:43     Blood Culture, Routine Gram stain leola bottle: Gram negative rods 10/12/2017  20:11     Blood Culture, Routine --     GRAM NEGATIVE ADARSH  Identification pending  For susceptibility see order # [id number]      Blood culture [[id number]] Collected:  10/11/17 1317    Order Status:  Completed Specimen:  Blood Updated:  10/13/17 1028     Blood Culture, Routine Gram stain leola bottle:  Gram variable rods     Blood Culture, Routine Results called to and read back by:Enrique Palomo RN 10/11/2017  23:09     Blood Culture, Routine Gram stain aer bottle: Gram negative rods      Blood Culture, Routine Results called to and read back by:Enrique Palomo RN 10/12/2017  02:41     Blood Culture, Routine --     GRAM NEGATIVE ADARSH  Identification pending  For susceptibility see order # [id number]      Blood culture [[id number]] Collected:  10/11/17 1309    Order Status:  Completed Specimen:  Blood Updated:  10/13/17 1022     Blood Culture, Routine Gram stain leola bottle: Gram positive rods     Blood Culture, Routine Results called to and read back by:Enrique Palomo RN 10/11/2017  21:54     Blood Culture, Routine Gram stain aer bottle: Gram negative rods      Blood Culture, Routine Results called to and read back by:Enrique Palomo RN 10/12/2017  02:41    Aerobic culture [[id number]] Collected:  10/12/17 1705    Order Status:  Completed Specimen:  Abscess from Abdomen Updated:  10/13/17 0930     Aerobic Bacterial Culture --     GRAM NEGATIVE ADARSH, LACTOSE   Moderate  Identification and susceptibility pending       Aerobic Bacterial Culture --     GRAM NEGATIVE ADARSH, NON-LACTOSE   Moderate  Identification and susceptibility  pending      Narrative:       Anterior lesion (liver)    Blood culture [[id number]] Collected:  10/12/17 2208    Order Status:  Completed Specimen:  Blood from Peripheral, Antecubital, Right Updated:  10/13/17 0915     Blood Culture, Routine No Growth to date    Blood culture [[id number]] Collected:  10/12/17 2235    Order Status:  Completed Specimen:  Blood from Line, Jugular, Internal Right Updated:  10/13/17 0915     Blood Culture, Routine No Growth to date    Culture, Anaerobe [[id number]] Collected:  10/12/17 1705    Order Status:  Completed Specimen:  Abscess from Abdomen Updated:  10/13/17 0741     Anaerobic Culture Culture in progress    Narrative:       Anterior lesion (liver)    Culture, Anaerobe [[id number]] Collected:  10/12/17 1714    Order Status:  Completed Specimen:  Abscess from Abdomen Updated:  10/13/17 0741     Anaerobic Culture Culture in progress    Narrative:       Posterior dome (liver)    Gram stain [[id number]] Collected:  10/12/17 1705    Order Status:  Completed Specimen:  Abscess from Abdomen Updated:  10/12/17 2033     Gram Stain Result Rare WBC's      Rare Gram positive cocci    Narrative:       Anterior lesion (liver)    Gram stain [[id number]] Collected:  10/12/17 1714    Order Status:  Completed Specimen:  Abscess from Abdomen Updated:  10/12/17 2013     Gram Stain Result Few WBC's      Few Gram positive rods      Rare Gram negative rods    Narrative:       Posterior dome (liver)    Aerobic culture [[id number]] Collected:  10/12/17 1714    Order Status:  Sent Specimen:  Abscess from Abdomen Updated:  10/12/17 1755    Urine culture [[id number]] Collected:  10/12/17 0500    Order Status:  Sent Specimen:  Urine from Urine, Catheterized Updated:  10/12/17 0640    Urine culture [[id number]] Collected:  10/11/17 1832    Order Status:  Sent Specimen:  Urine from Urine, Clean Catch Updated:  10/11/17 1832          Significant Imaging: I have reviewed all pertinent imaging results/findings within the  past 24 hours.

## 2017-10-13 NOTE — PROGRESS NOTES
Troponin elevation from 2.9 to 28.1 over the day. Patient with no chest pain or change in breathing. Cardiology fellow called and discussed plan. Given use of venturi mask and clinical status cath lab was not an option. After discussing patient case with attending cardiologist, thought is still demand ischemia. Holding off on treating ASA given recent procedure with IR on liver . Will discuss on rounds today further plan of action      Jeremy Horner PGY3

## 2017-10-13 NOTE — PLAN OF CARE
Problem: Patient Care Overview  Goal: Plan of Care Review  Outcome: Ongoing (interventions implemented as appropriate)  No s/s of pain or distress today. Pt afebrile and normotensive. ID service presents to bedside today to assess pt, and IV antibiotics administered per orders. 2D echo performed per orders. Cardiology service presents to bedside to assess pt. Physical therapy presents to bedside, and pt OOB to chair today and tolerating well.  Scant amount of drainage noted from both grenade drains today, with drainage characteristics consistent with prior assessment notations as noted in flowsheet data. Plan of care reviewed with pt and pt's family, but reinforcement of education required.  Emotional support offered.

## 2017-10-13 NOTE — PROGRESS NOTES
[facility]  Cardiology  Progress Note    Patient Name: Robby Soriano  MRN: [id number]  Admission Date: 10/11/2017  Hospital Length of Stay: 2 days  Code Status: Prior   Attending Physician: No att. providers found   Primary Care Physician: Lyla Bryan MD  Expected Discharge Date:   Principal Problem:Obstructive jaundice    Subjective:     Hospital Course:   No notes on file    Review of Systems   Constitution: Positive for weakness and malaise/fatigue. Negative for chills and fever.   Eyes: Negative for blurred vision and visual disturbance.   Cardiovascular: Positive for leg swelling. Negative for chest pain, irregular heartbeat, orthopnea and paroxysmal nocturnal dyspnea.   Respiratory: Positive for shortness of breath (improving). Negative for cough.    Musculoskeletal: Negative.    Gastrointestinal: Positive for abdominal pain. Negative for constipation, diarrhea, nausea and vomiting.   Genitourinary: Negative.    Neurological: Negative for headaches and light-headedness.   Psychiatric/Behavioral: Negative for altered mental status.     Objective:     Vital Signs (Most Recent):  Temp: 97.5 °F (36.4 °C) (10/13/17 0700)  Pulse: 66 (10/13/17 0930)  Resp: 15 (10/13/17 0930)  BP: (!) 96/53 (10/12/17 0600)  SpO2: 100 % (10/13/17 0930) Vital Signs (24h Range):  Temp:  [97.4 °F (36.3 °C)-97.9 °F (36.6 °C)] 97.5 °F (36.4 °C)  Pulse:  [] 66  Resp:  [14-24] 15  SpO2:  [98 %-100 %] 100 %  Arterial Line BP: ()/(46-79) 131/52     Weight: 78.2 kg (172 lb 6.4 oz)  Body mass index is 27 kg/m².     SpO2: 100 %  O2 Device (Oxygen Therapy): venti mask      Intake/Output Summary (Last 24 hours) at 10/13/17 1043  Last data filed at 10/13/17 0900   Gross per 24 hour   Intake             3544 ml   Output             2775 ml   Net              769 ml       Lines/Drains/Airways     Central Venous Catheter Line                 Percutaneous Central Line Insertion/Assessment - triple lumen  10/12/17 0700  right internal jugular 1 day          Drain                 Urethral Catheter 10/12/17 0451 1 day         Closed/Suction Drain 10/12/17 1711 Right;Anterior Abdomen Bulb 8 Fr. less than 1 day         Closed/Suction Drain 10/12/17 1712 Right;Posterior Abdomen Bulb 8 Fr. less than 1 day          Arterial Line                 Arterial Line 10/12/17 0700 Right Radial 1 day          Peripheral Intravenous Line                 Peripheral IV - Single Lumen 10/11/17 1400 Left Forearm 1 day         Peripheral IV - Single Lumen 10/11/17 1945 Right Upper Arm 1 day                Physical Exam   Constitutional: He is oriented to person, place, and time.   No apparent distress. Ill-appearing. Jaundiced.   HENT:   Head: Normocephalic and atraumatic.   Venturi mask in place.   Eyes: Scleral icterus is present.   Neck: JVD present.   Cardiovascular: Normal rate, regular rhythm, normal heart sounds and intact distal pulses.    Pulmonary/Chest: Breath sounds normal. He is in respiratory distress.   Abdominal: Soft. Bowel sounds are normal. He exhibits no distension.   Well-healed vertical surgical scar.   Neurological: He is alert and oriented to person, place, and time.   Skin: Skin is warm and dry.       Significant Labs:     Recent Results (from the past 12 hour(s))   Troponin I    Collection Time: 10/13/17 12:05 AM   Result Value Ref Range    Troponin I 21.555 (H) 0.000 - 0.026 ng/mL   VANCOMYCIN, TROUGH before next dose    Collection Time: 10/13/17  4:10 AM   Result Value Ref Range    Vancomycin-Trough 8.1 (L) 10.0 - 22.0 ug/mL   Comprehensive metabolic panel    Collection Time: 10/13/17  4:10 AM   Result Value Ref Range    Sodium 136 136 - 145 mmol/L    Potassium 4.4 3.5 - 5.1 mmol/L    Chloride 111 (H) 95 - 110 mmol/L    CO2 19 (L) 23 - 29 mmol/L    Glucose 153 (H) 70 - 110 mg/dL    BUN, Bld 47 (H) 8 - 23 mg/dL    Creatinine 0.9 0.5 - 1.4 mg/dL    Calcium 7.6 (L) 8.7 - 10.5 mg/dL    Total Protein 4.6 (L) 6.0 - 8.4 g/dL     Albumin 1.5 (L) 3.5 - 5.2 g/dL    Total Bilirubin 9.2 (H) 0.1 - 1.0 mg/dL    Alkaline Phosphatase 142 (H) 55 - 135 U/L     (H) 10 - 40 U/L     (H) 10 - 44 U/L    Anion Gap 6 (L) 8 - 16 mmol/L    eGFR if African American >60.0 >60 mL/min/1.73 m^2    eGFR if non African American >60.0 >60 mL/min/1.73 m^2   CBC auto differential    Collection Time: 10/13/17  4:10 AM   Result Value Ref Range    WBC 19.98 (H) 3.90 - 12.70 K/uL    RBC 2.82 (L) 4.60 - 6.20 M/uL    Hemoglobin 8.1 (L) 14.0 - 18.0 g/dL    Hematocrit 24.6 (L) 40.0 - 54.0 %    MCV 87 82 - 98 fL    MCH 28.7 27.0 - 31.0 pg    MCHC 32.9 32.0 - 36.0 g/dL    RDW 17.6 (H) 11.5 - 14.5 %    Platelets 57 (L) 150 - 350 K/uL    MPV 11.2 9.2 - 12.9 fL    Gran # 18.2 (H) 1.8 - 7.7 K/uL    Lymph # 0.9 (L) 1.0 - 4.8 K/uL    Mono # 0.7 0.3 - 1.0 K/uL    Eos # 0.0 0.0 - 0.5 K/uL    Baso # 0.01 0.00 - 0.20 K/uL    Gran% 91.2 (H) 38.0 - 73.0 %    Lymph% 4.5 (L) 18.0 - 48.0 %    Mono% 3.7 (L) 4.0 - 15.0 %    Eosinophil% 0.0 0.0 - 8.0 %    Basophil% 0.1 0.0 - 1.9 %    Differential Method Automated    Lactic acid, plasma    Collection Time: 10/13/17  4:10 AM   Result Value Ref Range    Lactate (Lactic Acid) 0.9 0.5 - 2.2 mmol/L   Magnesium    Collection Time: 10/13/17  4:10 AM   Result Value Ref Range    Magnesium 2.1 1.6 - 2.6 mg/dL   Phosphorus    Collection Time: 10/13/17  4:10 AM   Result Value Ref Range    Phosphorus 3.3 2.7 - 4.5 mg/dL   Protime-INR    Collection Time: 10/13/17  4:10 AM   Result Value Ref Range    Prothrombin Time 10.6 9.0 - 12.5 sec    INR 1.0 0.8 - 1.2   APTT    Collection Time: 10/13/17  4:10 AM   Result Value Ref Range    aPTT 25.0 21.0 - 32.0 sec   Calcium, ionized    Collection Time: 10/13/17  4:10 AM   Result Value Ref Range    Calcium, Ion 1.17 1.06 - 1.42 mmol/L   Brain natriuretic peptide    Collection Time: 10/13/17  4:10 AM   Result Value Ref Range    BNP 1,906 (H) 0 - 99 pg/mL     Results for CONCEPCION LEIGH (MRN [id number]) as of  10/13/2017 10:46   Ref. Range 10/12/2017 10:00 10/12/2017 11:30 10/12/2017 18:10 10/13/2017 00:05   Troponin I Latest Ref Range: 0.000 - 0.026 ng/mL 12.133 (H) 16.065 (H) 28.149 (H) 21.555 (H)       Significant Imaging:   EKG: NSR with new LBBB  ECHO pending    Assessment and Plan:     ACS (acute coronary syndrome)    Patient is a 76 yo male with a past medical hx of CAD s/p CABG in 13 here with severe sepsis secondary to cholangitis and gram negative and positive bacteremia    - Likely ACS event with associated heart failure in sapna-operative period given EKG with new LBBB and troponin peak of 28   - If deemed appropriate by primary patient would ideally be medically managed as NSTEMI with DAPT, beta block to target HR of 50-60, and heparin gtt; however, if this is not possible, recommend ASA 81 at minimum  - BNP 1900, echo pending  - Recommend transfusing to Hg > 10  - Recommend C when patient is stable from infectious standpoint                - Recommend Lasix 40 mg IV qD    VTE Risk Mitigation         Ordered     heparin (porcine) injection 5,000 Units  Every 8 hours     Route:  Subcutaneous        10/13/17 0802     High Risk of VTE  Once      10/11/17 1141     Place sequential compression device  Until discontinued      10/11/17 1141     Place YONY hose  Until discontinued      10/11/17 1141          Lai Mullen MD   Internal Medicine, PGY-1    Cardiology  [facility]

## 2017-10-13 NOTE — CONSULTS
Consult received. Full note to follow.    Please call for questions.    Thanks,  Kishore Varma MD  Infectious Disease Fellow, PGY-4  Pager: [phone]  [facility]

## 2017-10-13 NOTE — SUBJECTIVE & OBJECTIVE
Interval History: Much better overnight. Afebrile for 24 hours. Hepatic abscess drained by IR .     Medications:  Continuous Infusions:   Scheduled Meds:   albuterol-ipratropium 2.5mg-0.5mg/3mL  3 mL Nebulization Q6H WAKE    fluconazole (DIFLUCAN) IVPB  400 mg Intravenous Q24H    heparin (porcine)  5,000 Units Subcutaneous Q8H    hydrocortisone sodium succinate  100 mg Intravenous Q8H    levothyroxine  50 mcg Intravenous Daily    metoprolol  5 mg Intravenous Q6H    piperacillin-tazobactam 4.5 g in dextrose 5 % 100 mL IVPB (ready to mix system)  4.5 g Intravenous Q8H    sodium chloride 0.9%  3 mL Intravenous Q8H    vancomycin (VANCOCIN) IVPB  1,250 mg Intravenous Q12H     PRN Meds:alprazolam, calcium gluconate IVPB, calcium gluconate IVPB, calcium gluconate IVPB, ibuprofen, magnesium sulfate IVPB, magnesium sulfate IVPB, ondansetron, promethazine (PHENERGAN) IVPB     Review of patient's allergies indicates:  No Known Allergies  Objective:     Vital Signs (Most Recent):  Temp: 97.5 °F (36.4 °C) (10/13/17 0700)  Pulse: 66 (10/13/17 0800)  Resp: (!) 21 (10/13/17 0800)  BP: (!) 96/53 (10/12/17 0600)  SpO2: 100 % (10/13/17 0800) Vital Signs (24h Range):  Temp:  [97.4 °F (36.3 °C)-97.9 °F (36.6 °C)] 97.5 °F (36.4 °C)  Pulse:  [] 66  Resp:  [15-24] 21  SpO2:  [98 %-100 %] 100 %  Arterial Line BP: ()/(45-79) 127/52     Weight: 78.2 kg (172 lb 6.4 oz)  Body mass index is 27 kg/m².    Intake/Output - Last 3 Shifts       10/11 0700 - 10/12 0659 10/12 0700 - 10/13 0659 10/13 0700 - 10/14 0659    I.V. (mL/kg)  3544 (45.3)     Blood 250 250     IV Piggyback  1000     Total Intake(mL/kg) 250 (3.3) 4794 (61.3)     Urine (mL/kg/hr) 575 1800 (1) 450 (3.6)    Drains  170 (0.1) 15 (0.1)    Total Output 575 1970 465    Net -325 +2824 -465           Urine Occurrence 3 x            Physical Exam   Constitutional: He is oriented to person, place, and time. He appears well-developed. No distress.   Mild distress this  morning   HENT:   Head: Normocephalic and atraumatic.   Eyes: EOM are normal. Scleral icterus is present.   Neck: Neck supple.   Cardiovascular: Regular rhythm and normal heart sounds.  Tachycardia present.    Tachycardic in the 110s   Pulmonary/Chest: No respiratory distress. He has no wheezes.   Guppy breathing, noted and tachypneic   Abdominal: Soft. He exhibits no distension. There is no tenderness. There is no rebound.       Well-healed upper midline incision. 2 IR drains in place with murky output   Musculoskeletal: He exhibits no edema or deformity.   Neurological: He is alert and oriented to person, place, and time.   Skin: Skin is warm and dry.       Significant Labs:  CBC:   Recent Labs  Lab 10/13/17  0410   WBC 19.98*   RBC 2.82*   HGB 8.1*   HCT 24.6*   PLT 57*   MCV 87   MCH 28.7   MCHC 32.9     BMP:   Recent Labs  Lab 10/13/17  0410   *      K 4.4   *   CO2 19*   BUN 47*   CREATININE 0.9   CALCIUM 7.6*   MG 2.1     LFTs:   Recent Labs  Lab 10/13/17  0410   *   *   ALKPHOS 142*   BILITOT 9.2*   PROT 4.6*   ALBUMIN 1.5*     Cardiac markers:   Recent Labs  Lab 10/12/17  1810 10/13/17  0005   CPKMB 57.8*  --    TROPONINI 28.149* 21.555*     Microbiology Results (last 7 days)     Procedure Component Value Units Date/Time    Culture, Anaerobe [[id number]] Collected:  10/12/17 1705    Order Status:  Completed Specimen:  Abscess from Abdomen Updated:  10/13/17 0741     Anaerobic Culture Culture in progress    Narrative:       Anterior lesion (liver)    Culture, Anaerobe [[id number]] Collected:  10/12/17 1714    Order Status:  Completed Specimen:  Abscess from Abdomen Updated:  10/13/17 0741     Anaerobic Culture Culture in progress    Narrative:       Posterior dome (liver)    Blood culture [[id number]] Collected:  10/12/17 2208    Order Status:  Sent Specimen:  Blood from Peripheral, Antecubital, Right Updated:  10/13/17 0038    Blood culture [[id number]] Collected:  10/12/17 2235     Order Status:  Sent Specimen:  Blood from Line, Jugular, Internal Right Updated:  10/13/17 0036    Gram stain [[id number]] Collected:  10/12/17 1705    Order Status:  Completed Specimen:  Abscess from Abdomen Updated:  10/12/17 2033     Gram Stain Result Rare WBC's      Rare Gram positive cocci    Narrative:       Anterior lesion (liver)    Gram stain [[id number]] Collected:  10/12/17 1714    Order Status:  Completed Specimen:  Abscess from Abdomen Updated:  10/12/17 2013     Gram Stain Result Few WBC's      Few Gram positive rods      Rare Gram negative rods    Narrative:       Posterior dome (liver)    Blood culture [[id number]] Collected:  10/12/17 0055    Order Status:  Completed Specimen:  Blood Updated:  10/12/17 2012     Blood Culture, Routine Gram stain aer bottle: Gram negative rods     Blood Culture, Routine Results called to and read back by: Harry Cook RN     Blood Culture, Routine 10/12/2017  14:43     Blood Culture, Routine Gram stain leola bottle: Gram negative rods 10/12/2017  20:11    Aerobic culture [[id number]] Collected:  10/12/17 1705    Order Status:  Sent Specimen:  Abscess from Abdomen Updated:  10/12/17 1759    Aerobic culture [[id number]] Collected:  10/12/17 1714    Order Status:  Sent Specimen:  Abscess from Abdomen Updated:  10/12/17 1755    Blood culture [[id number]] Collected:  10/12/17 0055    Order Status:  Completed Specimen:  Blood Updated:  10/12/17 1444     Blood Culture, Routine Gram stain aer bottle: Gram negative rods     Blood Culture, Routine Results called to and read back by: Harry Cook RN     Blood Culture, Routine 10/12/2017  14:44    Blood culture [[id number]] Collected:  10/11/17 1309    Order Status:  Completed Specimen:  Blood Updated:  10/12/17 1138     Blood Culture, Routine Gram stain leola bottle: Gram positive rods     Blood Culture, Routine Results called to and read back by:Enrique Palomo RN 10/11/2017  21:54     Blood Culture, Routine Gram stain aer bottle:  Gram negative rods      Blood Culture, Routine Results called to and read back by:Enrique Palomo RN 10/12/2017  02:41    Blood culture [[id number]] Collected:  10/11/17 1317    Order Status:  Completed Specimen:  Blood Updated:  10/12/17 1138     Blood Culture, Routine Gram stain leola bottle:  Gram variable rods     Blood Culture, Routine Results called to and read back by:Enrique Palomo RN 10/11/2017  23:09     Blood Culture, Routine Gram stain aer bottle: Gram negative rods      Blood Culture, Routine Results called to and read back by:Enrique Palomo RN 10/12/2017  02:41    Urine culture [[id number]] Collected:  10/12/17 0500    Order Status:  Sent Specimen:  Urine from Urine, Catheterized Updated:  10/12/17 0640    Urine culture [[id number]] Collected:  10/11/17 1832    Order Status:  Sent Specimen:  Urine from Urine, Clean Catch Updated:  10/11/17 1832          Significant Diagnostics:  I have reviewed all pertinent imaging results/findings within the past 24 hours.

## 2017-10-13 NOTE — SUBJECTIVE & OBJECTIVE
Review of Systems   Constitution: Positive for weakness and malaise/fatigue. Negative for chills and fever.   Eyes: Negative for blurred vision and visual disturbance.   Cardiovascular: Positive for leg swelling. Negative for chest pain, irregular heartbeat, orthopnea and paroxysmal nocturnal dyspnea.   Respiratory: Positive for shortness of breath (improving). Negative for cough.    Musculoskeletal: Negative.    Gastrointestinal: Positive for abdominal pain. Negative for constipation, diarrhea, nausea and vomiting.   Genitourinary: Negative.    Neurological: Negative for headaches and light-headedness.   Psychiatric/Behavioral: Negative for altered mental status.     Objective:     Vital Signs (Most Recent):  Temp: 97.5 °F (36.4 °C) (10/13/17 0700)  Pulse: 66 (10/13/17 0930)  Resp: 15 (10/13/17 0930)  BP: (!) 96/53 (10/12/17 0600)  SpO2: 100 % (10/13/17 0930) Vital Signs (24h Range):  Temp:  [97.4 °F (36.3 °C)-97.9 °F (36.6 °C)] 97.5 °F (36.4 °C)  Pulse:  [] 66  Resp:  [14-24] 15  SpO2:  [98 %-100 %] 100 %  Arterial Line BP: ()/(46-79) 131/52     Weight: 78.2 kg (172 lb 6.4 oz)  Body mass index is 27 kg/m².     SpO2: 100 %  O2 Device (Oxygen Therapy): venti mask      Intake/Output Summary (Last 24 hours) at 10/13/17 1043  Last data filed at 10/13/17 0900   Gross per 24 hour   Intake             3544 ml   Output             2775 ml   Net              769 ml       Lines/Drains/Airways     Central Venous Catheter Line                 Percutaneous Central Line Insertion/Assessment - triple lumen  10/12/17 0700 right internal jugular 1 day          Drain                 Urethral Catheter 10/12/17 0451 1 day         Closed/Suction Drain 10/12/17 1711 Right;Anterior Abdomen Bulb 8 Fr. less than 1 day         Closed/Suction Drain 10/12/17 1712 Right;Posterior Abdomen Bulb 8 Fr. less than 1 day          Arterial Line                 Arterial Line 10/12/17 0700 Right Radial 1 day          Peripheral Intravenous  Line                 Peripheral IV - Single Lumen 10/11/17 1400 Left Forearm 1 day         Peripheral IV - Single Lumen 10/11/17 1945 Right Upper Arm 1 day                Physical Exam   Constitutional: He is oriented to person, place, and time.   No apparent distress. Ill-appearing. Jaundiced.   HENT:   Head: Normocephalic and atraumatic.   Venturi mask in place.   Eyes: Scleral icterus is present.   Neck: JVD present.   Cardiovascular: Normal rate, regular rhythm, normal heart sounds and intact distal pulses.    Pulmonary/Chest: Breath sounds normal. He is in respiratory distress.   Abdominal: Soft. Bowel sounds are normal. He exhibits no distension.   Well-healed vertical surgical scar.   Neurological: He is alert and oriented to person, place, and time.   Skin: Skin is warm and dry.       Significant Labs:     Recent Results (from the past 12 hour(s))   Troponin I    Collection Time: 10/13/17 12:05 AM   Result Value Ref Range    Troponin I 21.555 (H) 0.000 - 0.026 ng/mL   VANCOMYCIN, TROUGH before next dose    Collection Time: 10/13/17  4:10 AM   Result Value Ref Range    Vancomycin-Trough 8.1 (L) 10.0 - 22.0 ug/mL   Comprehensive metabolic panel    Collection Time: 10/13/17  4:10 AM   Result Value Ref Range    Sodium 136 136 - 145 mmol/L    Potassium 4.4 3.5 - 5.1 mmol/L    Chloride 111 (H) 95 - 110 mmol/L    CO2 19 (L) 23 - 29 mmol/L    Glucose 153 (H) 70 - 110 mg/dL    BUN, Bld 47 (H) 8 - 23 mg/dL    Creatinine 0.9 0.5 - 1.4 mg/dL    Calcium 7.6 (L) 8.7 - 10.5 mg/dL    Total Protein 4.6 (L) 6.0 - 8.4 g/dL    Albumin 1.5 (L) 3.5 - 5.2 g/dL    Total Bilirubin 9.2 (H) 0.1 - 1.0 mg/dL    Alkaline Phosphatase 142 (H) 55 - 135 U/L     (H) 10 - 40 U/L     (H) 10 - 44 U/L    Anion Gap 6 (L) 8 - 16 mmol/L    eGFR if African American >60.0 >60 mL/min/1.73 m^2    eGFR if non African American >60.0 >60 mL/min/1.73 m^2   CBC auto differential    Collection Time: 10/13/17  4:10 AM   Result Value Ref Range     WBC 19.98 (H) 3.90 - 12.70 K/uL    RBC 2.82 (L) 4.60 - 6.20 M/uL    Hemoglobin 8.1 (L) 14.0 - 18.0 g/dL    Hematocrit 24.6 (L) 40.0 - 54.0 %    MCV 87 82 - 98 fL    MCH 28.7 27.0 - 31.0 pg    MCHC 32.9 32.0 - 36.0 g/dL    RDW 17.6 (H) 11.5 - 14.5 %    Platelets 57 (L) 150 - 350 K/uL    MPV 11.2 9.2 - 12.9 fL    Gran # 18.2 (H) 1.8 - 7.7 K/uL    Lymph # 0.9 (L) 1.0 - 4.8 K/uL    Mono # 0.7 0.3 - 1.0 K/uL    Eos # 0.0 0.0 - 0.5 K/uL    Baso # 0.01 0.00 - 0.20 K/uL    Gran% 91.2 (H) 38.0 - 73.0 %    Lymph% 4.5 (L) 18.0 - 48.0 %    Mono% 3.7 (L) 4.0 - 15.0 %    Eosinophil% 0.0 0.0 - 8.0 %    Basophil% 0.1 0.0 - 1.9 %    Differential Method Automated    Lactic acid, plasma    Collection Time: 10/13/17  4:10 AM   Result Value Ref Range    Lactate (Lactic Acid) 0.9 0.5 - 2.2 mmol/L   Magnesium    Collection Time: 10/13/17  4:10 AM   Result Value Ref Range    Magnesium 2.1 1.6 - 2.6 mg/dL   Phosphorus    Collection Time: 10/13/17  4:10 AM   Result Value Ref Range    Phosphorus 3.3 2.7 - 4.5 mg/dL   Protime-INR    Collection Time: 10/13/17  4:10 AM   Result Value Ref Range    Prothrombin Time 10.6 9.0 - 12.5 sec    INR 1.0 0.8 - 1.2   APTT    Collection Time: 10/13/17  4:10 AM   Result Value Ref Range    aPTT 25.0 21.0 - 32.0 sec   Calcium, ionized    Collection Time: 10/13/17  4:10 AM   Result Value Ref Range    Calcium, Ion 1.17 1.06 - 1.42 mmol/L   Brain natriuretic peptide    Collection Time: 10/13/17  4:10 AM   Result Value Ref Range    BNP 1,906 (H) 0 - 99 pg/mL     Results for CONCEPCION LEIGH (MRN [id number]) as of 10/13/2017 10:46   Ref. Range 10/12/2017 10:00 10/12/2017 11:30 10/12/2017 18:10 10/13/2017 00:05   Troponin I Latest Ref Range: 0.000 - 0.026 ng/mL 12.133 (H) 16.065 (H) 28.149 (H) 21.555 (H)       Significant Imaging:   EKG: NSR with new LBBB  ECHO pending

## 2017-10-13 NOTE — PROGRESS NOTES
[facility]  General Surgery  Progress Note    Subjective:     History of Present Illness:  Mr. 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 He is currently on a steroid taper. He recuperated slowly but was doing well with forward progress until yesterday when he become easily fatigued and last night he had chills x 45 minutes.   He didnot feel well in clinic today. Of note, he also reports increasingly dark urine in the past week. Tbili was also noted to be 5.4 today in clinic.     Post-Op Info:  * No surgery found *         Interval History: Much better overnight. Afebrile for 24 hours. Hepatic abscess drained by IR .     Medications:  Continuous Infusions:   Scheduled Meds:   albuterol-ipratropium 2.5mg-0.5mg/3mL  3 mL Nebulization Q6H WAKE    fluconazole (DIFLUCAN) IVPB  400 mg Intravenous Q24H    heparin (porcine)  5,000 Units Subcutaneous Q8H    hydrocortisone sodium succinate  100 mg Intravenous Q8H    levothyroxine  50 mcg Intravenous Daily    metoprolol  5 mg Intravenous Q6H    piperacillin-tazobactam 4.5 g in dextrose 5 % 100 mL IVPB (ready to mix system)  4.5 g Intravenous Q8H    sodium chloride 0.9%  3 mL Intravenous Q8H    vancomycin (VANCOCIN) IVPB  1,250 mg Intravenous Q12H     PRN Meds:alprazolam, calcium gluconate IVPB, calcium gluconate IVPB, calcium gluconate IVPB, ibuprofen, magnesium sulfate IVPB, magnesium sulfate IVPB, ondansetron, promethazine (PHENERGAN) IVPB     Review of patient's allergies indicates:  No Known Allergies  Objective:     Vital Signs (Most Recent):  Temp: 97.5 °F (36.4 °C) (10/13/17 0700)  Pulse: 66 (10/13/17 0800)  Resp: (!) 21 (10/13/17 0800)  BP: (!) 96/53 (10/12/17 0600)  SpO2: 100 % (10/13/17 0800) Vital Signs (24h Range):  Temp:  [97.4 °F (36.3 °C)-97.9 °F (36.6  °C)] 97.5 °F (36.4 °C)  Pulse:  [] 66  Resp:  [15-24] 21  SpO2:  [98 %-100 %] 100 %  Arterial Line BP: ()/(45-79) 127/52     Weight: 78.2 kg (172 lb 6.4 oz)  Body mass index is 27 kg/m².    Intake/Output - Last 3 Shifts       10/11 0700 - 10/12 0659 10/12 0700 - 10/13 0659 10/13 0700 - 10/14 0659    I.V. (mL/kg)  3544 (45.3)     Blood 250 250     IV Piggyback  1000     Total Intake(mL/kg) 250 (3.3) 4794 (61.3)     Urine (mL/kg/hr) 575 1800 (1) 450 (3.6)    Drains  170 (0.1) 15 (0.1)    Total Output 575 1970 465    Net -325 +2824 -465           Urine Occurrence 3 x            Physical Exam   Constitutional: He is oriented to person, place, and time. He appears well-developed. No distress.   Mild distress this morning   HENT:   Head: Normocephalic and atraumatic.   Eyes: EOM are normal. Scleral icterus is present.   Neck: Neck supple.   Cardiovascular: Regular rhythm and normal heart sounds.  Tachycardia present.    Tachycardic in the 110s   Pulmonary/Chest: No respiratory distress. He has no wheezes.   Guppy breathing, noted and tachypneic   Abdominal: Soft. He exhibits no distension. There is no tenderness. There is no rebound.       Well-healed upper midline incision. 2 IR drains in place with murky output   Musculoskeletal: He exhibits no edema or deformity.   Neurological: He is alert and oriented to person, place, and time.   Skin: Skin is warm and dry.       Significant Labs:  CBC:   Recent Labs  Lab 10/13/17  0410   WBC 19.98*   RBC 2.82*   HGB 8.1*   HCT 24.6*   PLT 57*   MCV 87   MCH 28.7   MCHC 32.9     BMP:   Recent Labs  Lab 10/13/17  0410   *      K 4.4   *   CO2 19*   BUN 47*   CREATININE 0.9   CALCIUM 7.6*   MG 2.1     LFTs:   Recent Labs  Lab 10/13/17  0410   *   *   ALKPHOS 142*   BILITOT 9.2*   PROT 4.6*   ALBUMIN 1.5*     Cardiac markers:   Recent Labs  Lab 10/12/17  1810 10/13/17  0005   CPKMB 57.8*  --    TROPONINI 28.149* 21.555*     Microbiology  Results (last 7 days)     Procedure Component Value Units Date/Time    Culture, Anaerobe [[id number]] Collected:  10/12/17 1705    Order Status:  Completed Specimen:  Abscess from Abdomen Updated:  10/13/17 0741     Anaerobic Culture Culture in progress    Narrative:       Anterior lesion (liver)    Culture, Anaerobe [[id number]] Collected:  10/12/17 1714    Order Status:  Completed Specimen:  Abscess from Abdomen Updated:  10/13/17 0741     Anaerobic Culture Culture in progress    Narrative:       Posterior dome (liver)    Blood culture [[id number]] Collected:  10/12/17 2208    Order Status:  Sent Specimen:  Blood from Peripheral, Antecubital, Right Updated:  10/13/17 0038    Blood culture [[id number]] Collected:  10/12/17 2235    Order Status:  Sent Specimen:  Blood from Line, Jugular, Internal Right Updated:  10/13/17 0036    Gram stain [[id number]] Collected:  10/12/17 1705    Order Status:  Completed Specimen:  Abscess from Abdomen Updated:  10/12/17 2033     Gram Stain Result Rare WBC's      Rare Gram positive cocci    Narrative:       Anterior lesion (liver)    Gram stain [[id number]] Collected:  10/12/17 1714    Order Status:  Completed Specimen:  Abscess from Abdomen Updated:  10/12/17 2013     Gram Stain Result Few WBC's      Few Gram positive rods      Rare Gram negative rods    Narrative:       Posterior dome (liver)    Blood culture [[id number]] Collected:  10/12/17 0055    Order Status:  Completed Specimen:  Blood Updated:  10/12/17 2012     Blood Culture, Routine Gram stain aer bottle: Gram negative rods     Blood Culture, Routine Results called to and read back by: Harry Cook RN     Blood Culture, Routine 10/12/2017  14:43     Blood Culture, Routine Gram stain leola bottle: Gram negative rods 10/12/2017  20:11    Aerobic culture [[id number]] Collected:  10/12/17 1705    Order Status:  Sent Specimen:  Abscess from Abdomen Updated:  10/12/17 1759    Aerobic culture [[id number]] Collected:  10/12/17 1714     Order Status:  Sent Specimen:  Abscess from Abdomen Updated:  10/12/17 1755    Blood culture [[id number]] Collected:  10/12/17 0055    Order Status:  Completed Specimen:  Blood Updated:  10/12/17 1444     Blood Culture, Routine Gram stain aer bottle: Gram negative rods     Blood Culture, Routine Results called to and read back by: Harry Cook RN     Blood Culture, Routine 10/12/2017  14:44    Blood culture [[id number]] Collected:  10/11/17 1309    Order Status:  Completed Specimen:  Blood Updated:  10/12/17 1138     Blood Culture, Routine Gram stain leola bottle: Gram positive rods     Blood Culture, Routine Results called to and read back by:Enrique Palomo RN 10/11/2017  21:54     Blood Culture, Routine Gram stain aer bottle: Gram negative rods      Blood Culture, Routine Results called to and read back by:Enrique Palomo RN 10/12/2017  02:41    Blood culture [[id number]] Collected:  10/11/17 1317    Order Status:  Completed Specimen:  Blood Updated:  10/12/17 1138     Blood Culture, Routine Gram stain leola bottle:  Gram variable rods     Blood Culture, Routine Results called to and read back by:Enrique Palomo RN 10/11/2017  23:09     Blood Culture, Routine Gram stain aer bottle: Gram negative rods      Blood Culture, Routine Results called to and read back by:Enrique Palomo RN 10/12/2017  02:41    Urine culture [[id number]] Collected:  10/12/17 0500    Order Status:  Sent Specimen:  Urine from Urine, Catheterized Updated:  10/12/17 0640    Urine culture [[id number]] Collected:  10/11/17 1832    Order Status:  Sent Specimen:  Urine from Urine, Clean Catch Updated:  10/11/17 1832          Significant Diagnostics:  I have reviewed all pertinent imaging results/findings within the past 24 hours.    Assessment/Plan:     * Obstructive jaundice    76 yo M with autoimmune sclerosing cholangitis who underwent left hepatic resection; resection of extrahepatic bile duct; Eron-Y right hepaticojejunostomy on 8/3/17 who  presented to clinic with chills, now with sepsis and hepatic abscess    Plan:  - s/p IR drain placement x2 on 10/12/17  - Afebrile overnight, WBC decreasing, lactate normalized  - Cont broad spectrum antibiotics (vanc and zosyn for now) for bacteremia with GNR and GP bacteria  - Blood, IR drain cultures pending. Will adjust abx as cultures result  - D/C IVF  - Lasix x1 today  - Keep valenzuela in place for strict I&Os  - GI prophylaxis  - Stress dose steroids. Plan to wean  - Synthroid IV daily  - Continue ICU care        Shortness of breath    - Improved  - Wean O2 as tolerated        Demand ischemia of myocardium    - Trop I peaked at 28; now decreasing  - Cardiology consulted; appreciated recs  - 2D echo from 10/11/17: normal EF (60-65%) with severe LA enlargement  - Continue aspirin          IgG4-related sclerosing cholangitis    - See above            Kade Jansen MD  General Surgery  [facility]

## 2017-10-13 NOTE — PROGRESS NOTES
Dr. Horner with SICU contacted for   Results for CONCEPCION LEIGH (MRN [id number]) as of 10/13/2017 00:29   Ref. Range 10/12/2017 18:10   Troponin I Latest Ref Range: 0.000 - 0.026 ng/mL 28.149 (H)     Is cintacting cardiology and surgery, awaiting orders.  Pt denies any chest tightness, heaviness, pain or any discomfort.  Denies any shortness of breath.  Vitals signs remain stable with no increase in oxygen demand.

## 2017-10-13 NOTE — PLAN OF CARE
Problem: Physical Therapy Goal  Goal: Physical Therapy Goal  Goals to be met by: 10/27/17    Patient will increase functional independence with mobility by performin. Supine to sit with Stand-by Assistance - not met  2. Sit to stand transfer with Stand-by Assistance -not met  3. Gait  x 150 feet with Contact Guard Assistance using Rolling Walker.- not met   4. Lower extremity exercise program x15 reps per handout, with supervision -not met  eval completed and goals appropriate. Christin Meléndez, PT 10/13/2017

## 2017-10-13 NOTE — ASSESSMENT & PLAN NOTE
Patient with positive blood cultures for GNR both lactose fermentors and non fermentors, GPR and gram variable. Recent results show E. coli and Clostridium perfringens from blood cultures. Abscess cultures taken yesterday with similar results. Patient has known past infections (8/3/17) with E.coli, Klebsiella, Clostridium. perfringes and Enterococcus cultrured from bile sample. Abdominal CT highly suggestive of Clostridium perfringens infection with gas production seen. It is the most likely source of gram positive bettie bacteremia at this time. Will recommend to add clindamycin for toxin inhibition and continue on zosyn that will cover broadly all preliminary isolates found on blood culture. All bacteria likely GI torrey and translocated to blood. Vancomycin is alternative therapy for Clostridium perfringens therapy, at this time will continue until susceptibilities attained. At this will continue fluconazole therapy until patient condition more stable.     - Add clindamycin 900 mg IV every 8 hrs   - Continue Zosyn   - Continue Vancomycin   - continue fluconazole   - repeat blood cultures in AM  - monitor blood culture for susceptibilities

## 2017-10-13 NOTE — SUBJECTIVE & OBJECTIVE
Past Medical History:   Diagnosis Date    Cancer     Prostate/s/p prostatectomy    Coronary artery disease     GERD (gastroesophageal reflux disease)     Hyperlipidemia     Hypertension     Hypothyroidism        Past Surgical History:   Procedure Laterality Date    CAROTID ENDARTERECTOMY Bilateral     CORONARY ARTERY BYPASS GRAFT      PROSTATE SURGERY         Review of patient's allergies indicates:  No Known Allergies    Medications:  Prescriptions Prior to Admission   Medication Sig    alprazolam (XANAX) 0.25 MG tablet Take 0.25 mg by mouth 3 (three) times daily.    aspirin (ECOTRIN) 81 MG EC tablet Take 81 mg by mouth once daily.    atorvastatin (LIPITOR) 80 MG tablet Take 80 mg by mouth once daily.    carvedilol (COREG) 3.125 MG tablet Take 3.125 mg by mouth 2 (two) times daily with meals.    escitalopram oxalate (LEXAPRO) 10 MG tablet Take 10 mg by mouth once daily.    indomethacin (INDOCIN) 25 MG capsule Take 25 mg by mouth 2 (two) times daily with meals.    isosorbide dinitrate (ISORDIL) 30 MG Tab Take 20 mg by mouth 3 (three) times daily.    isosorbide mononitrate (IMDUR) 30 MG 24 hr tablet     levothyroxine (SYNTHROID) 100 MCG tablet Take 100 mcg by mouth once daily.    ondansetron (ZOFRAN-ODT) 8 MG TbDL Take 1 tablet (8 mg total) by mouth every 6 (six) hours as needed.    oxycodone (ROXICODONE) 5 MG immediate release tablet Take 1 tablet (5 mg total) by mouth every 4 (four) hours as needed for Pain.    pantoprazole (PROTONIX) 40 MG tablet Take 40 mg by mouth once daily.    polyethylene glycol (GLYCOLAX) 17 gram/dose powder Take 17 g by mouth once daily.    predniSONE (DELTASONE) 5 MG tablet     promethazine (PHENERGAN) 25 MG tablet Take 1 tablet (25 mg total) by mouth every 6 (six) hours as needed for Nausea.     Antibiotics     Start     Stop Route Frequency Ordered    10/13/17 7725  vancomycin (VANCOCIN) 1,250 mg in dextrose 5 % 250 mL IVPB  (Vancomycin IVPB with levels panel)       -- IV Every 12 hours (non-standard times) 10/13/17 0802    10/12/17 0515  piperacillin-tazobactam 4.5 g in dextrose 5 % 100 mL IVPB (ready to mix system)      -- IV Every 8 hours (non-standard times) 10/12/17 0516        Antifungals     Start     Stop Route Frequency Ordered    10/11/17 1530  fluconazole (DIFLUCAN) IVPB 400 mg 200 mL      -- IV Every 24 hours (non-standard times) 10/11/17 1418        Antivirals     None             There is no immunization history on file for this patient.    Family History     None        Social History     Social History    Marital status:      Spouse name: N/A    Number of children: N/A    Years of education: N/A     Social History Main Topics    Smoking status: Former Smoker     Types: Cigarettes     Start date: 1/1/1956     Quit date: 6/18/2013    Smokeless tobacco: Never Used    Alcohol use No    Drug use: Unknown    Sexual activity: Not Asked     Other Topics Concern    None     Social History Narrative    None     Review of Systems   Constitutional: Positive for chills and fatigue. Negative for diaphoresis and fever.   Respiratory: Negative for cough, choking, chest tightness and shortness of breath.    Gastrointestinal: Positive for abdominal pain. Negative for nausea and vomiting.   Skin: Negative for rash.     Objective:     Vital Signs (Most Recent):  Temp: 97.5 °F (36.4 °C) (10/13/17 0700)  Pulse: 64 (10/13/17 1100)  Resp: 14 (10/13/17 1100)  BP: (!) 96/53 (10/12/17 0600)  SpO2: 100 % (10/13/17 1100) Vital Signs (24h Range):  Temp:  [97.4 °F (36.3 °C)-97.9 °F (36.6 °C)] 97.5 °F (36.4 °C)  Pulse:  [] 64  Resp:  [13-24] 14  SpO2:  [98 %-100 %] 100 %  Arterial Line BP: (114-145)/(47-70) 133/53     Weight: 78.2 kg (172 lb 6.4 oz)  Body mass index is 27 kg/m².    Estimated Creatinine Clearance: 64.3 mL/min (based on SCr of 0.9 mg/dL).    Physical Exam   Constitutional: He appears well-developed and well-nourished.   HENT:   Head: Normocephalic and  atraumatic.   Eyes: EOM are normal. Pupils are equal, round, and reactive to light. Scleral icterus is present.   Neck: Normal range of motion.   Cardiovascular: Normal rate, regular rhythm and normal heart sounds.    Pulmonary/Chest: Effort normal and breath sounds normal.   Abdominal: Soft. Bowel sounds are normal. He exhibits distension. There is no tenderness. There is no guarding.   Musculoskeletal: He exhibits edema. He exhibits no deformity.   Skin:   Jaundice        Significant Labs:   Blood Culture:   Recent Labs  Lab 10/11/17  1309 10/11/17  1317 10/12/17  0055 10/12/17  2208 10/12/17  2235   LABBLOO Gram stain leola bottle: Gram positive rods  Results called to and read back by:Enrique Palomo RN 10/11/2017  21:54  Gram stain aer bottle: Gram negative rods   Results called to and read back by:Enrique Palomo RN 10/12/2017  02:41 Gram stain leola bottle:  Gram variable rods  Results called to and read back by:Enrique Palomo RN 10/11/2017  23:09  Gram stain aer bottle: Gram negative rods   Results called to and read back by:Enrique Palomo RN 10/12/2017  02:41  GRAM NEGATIVE RODIdentification pendingFor susceptibility see order # [id number] Gram stain aer bottle: Gram negative rods  Results called to and read back by: Harry Cook RN  10/12/2017  14:44  GRAM NEGATIVE RODIdentification pendingFor susceptibility see order # [id number]  Gram stain aer bottle: Gram negative rods  Results called to and read back by: Harry Cook RN  10/12/2017  14:43  Gram stain leola bottle: Gram negative rods 10/12/2017  20:11  GRAM NEGATIVE RODIdentification pendingFor susceptibility see order # [id number] No Growth to date No Growth to date     BMP:   Recent Labs  Lab 10/13/17  0410   *      K 4.4   *   CO2 19*   BUN 47*   CREATININE 0.9   CALCIUM 7.6*   MG 2.1     CBC:   Recent Labs  Lab 10/12/17  1810 10/12/17  2007 10/13/17  0410   WBC 23.78* 20.89* 19.98*   HGB 8.3* 7.8* 8.1*   HCT  25.0* 23.6* 24.6*   PLT 80* 60* 57*     CMP:   Recent Labs  Lab 10/12/17  0403 10/12/17  2228 10/13/17  0410   * 136 136   K 5.6* 4.5 4.4    109 111*   CO2 12* 19* 19*   * 158* 153*   BUN 50* 48* 47*   CREATININE 1.3 1.0 0.9   CALCIUM 8.1* 7.5* 7.6*   PROT 5.1* 4.6* 4.6*   ALBUMIN 1.8* 1.6* 1.5*   BILITOT 18.1* 12.5* 9.2*   ALKPHOS 260* 151* 142*   * 302* 249*   * 245* 228*   ANIONGAP 18* 8 6*   EGFRNONAA 52.6* >60.0 >60.0     Microbiology Results (last 7 days)     Procedure Component Value Units Date/Time    Blood culture [[id number]] Collected:  10/12/17 0055    Order Status:  Completed Specimen:  Blood Updated:  10/13/17 1033     Blood Culture, Routine Gram stain aer bottle: Gram negative rods     Blood Culture, Routine Results called to and read back by: Harry Cook RN     Blood Culture, Routine 10/12/2017  14:44     Blood Culture, Routine --     GRAM NEGATIVE ADARSH  Identification pending  For susceptibility see order # [id number]      Blood culture [[id number]] Collected:  10/12/17 0055    Order Status:  Completed Specimen:  Blood Updated:  10/13/17 1030     Blood Culture, Routine Gram stain aer bottle: Gram negative rods     Blood Culture, Routine Results called to and read back by: Harry Cook RN     Blood Culture, Routine 10/12/2017  14:43     Blood Culture, Routine Gram stain leola bottle: Gram negative rods 10/12/2017  20:11     Blood Culture, Routine --     GRAM NEGATIVE ADARSH  Identification pending  For susceptibility see order # [id number]      Blood culture [[id number]] Collected:  10/11/17 1317    Order Status:  Completed Specimen:  Blood Updated:  10/13/17 1028     Blood Culture, Routine Gram stain leola bottle:  Gram variable rods     Blood Culture, Routine Results called to and read back by:Enrique Palomo RN 10/11/2017  23:09     Blood Culture, Routine Gram stain aer bottle: Gram negative rods      Blood Culture, Routine Results called to and read back by:Enrique  Stacia BARRIENTOS 10/12/2017  02:41     Blood Culture, Routine --     GRAM NEGATIVE ADARSH  Identification pending  For susceptibility see order # [id number]      Blood culture [[id number]] Collected:  10/11/17 1309    Order Status:  Completed Specimen:  Blood Updated:  10/13/17 1022     Blood Culture, Routine Gram stain leola bottle: Gram positive rods     Blood Culture, Routine Results called to and read back by:Enrique Palomo RN 10/11/2017  21:54     Blood Culture, Routine Gram stain aer bottle: Gram negative rods      Blood Culture, Routine Results called to and read back by:Enrique Palomo RN 10/12/2017  02:41    Aerobic culture [[id number]] Collected:  10/12/17 1705    Order Status:  Completed Specimen:  Abscess from Abdomen Updated:  10/13/17 0930     Aerobic Bacterial Culture --     GRAM NEGATIVE ADARSH, LACTOSE   Moderate  Identification and susceptibility pending       Aerobic Bacterial Culture --     GRAM NEGATIVE ADARSH, NON-LACTOSE   Moderate  Identification and susceptibility pending      Narrative:       Anterior lesion (liver)    Blood culture [[id number]] Collected:  10/12/17 2208    Order Status:  Completed Specimen:  Blood from Peripheral, Antecubital, Right Updated:  10/13/17 0915     Blood Culture, Routine No Growth to date    Blood culture [[id number]] Collected:  10/12/17 2235    Order Status:  Completed Specimen:  Blood from Line, Jugular, Internal Right Updated:  10/13/17 0915     Blood Culture, Routine No Growth to date    Culture, Anaerobe [[id number]] Collected:  10/12/17 1705    Order Status:  Completed Specimen:  Abscess from Abdomen Updated:  10/13/17 0741     Anaerobic Culture Culture in progress    Narrative:       Anterior lesion (liver)    Culture, Anaerobe [[id number]] Collected:  10/12/17 1714    Order Status:  Completed Specimen:  Abscess from Abdomen Updated:  10/13/17 0741     Anaerobic Culture Culture in progress    Narrative:       Posterior dome (liver)    Gram stain  [[id number]] Collected:  10/12/17 1705    Order Status:  Completed Specimen:  Abscess from Abdomen Updated:  10/12/17 2033     Gram Stain Result Rare WBC's      Rare Gram positive cocci    Narrative:       Anterior lesion (liver)    Gram stain [[id number]] Collected:  10/12/17 1714    Order Status:  Completed Specimen:  Abscess from Abdomen Updated:  10/12/17 2013     Gram Stain Result Few WBC's      Few Gram positive rods      Rare Gram negative rods    Narrative:       Posterior dome (liver)    Aerobic culture [[id number]] Collected:  10/12/17 1714    Order Status:  Sent Specimen:  Abscess from Abdomen Updated:  10/12/17 1755    Urine culture [[id number]] Collected:  10/12/17 0500    Order Status:  Sent Specimen:  Urine from Urine, Catheterized Updated:  10/12/17 0640    Urine culture [[id number]] Collected:  10/11/17 1832    Order Status:  Sent Specimen:  Urine from Urine, Clean Catch Updated:  10/11/17 1832          Significant Imaging: I have reviewed all pertinent imaging results/findings within the past 24 hours.

## 2017-10-13 NOTE — PT/OT/SLP EVAL
Physical Therapy  Evaluation    Robby Soriano   MRN: [id number]   Admitting Diagnosis: Obstructive jaundice    PT Received On: 10/13/17  PT Start Time: 1344     PT Stop Time: 1401    PT Total Time (min): 17 min       Billable Minutes:  Evaluation 17 min    Diagnosis: Obstructive jaundice  S/p IR abscess drainage peritoneal 10/12. Pt had L hepatic resection etc 8/3/17 (previous admit)    Past Medical History:   Diagnosis Date    Cancer     Prostate/s/p prostatectomy    Coronary artery disease     GERD (gastroesophageal reflux disease)     Hyperlipidemia     Hypertension     Hypothyroidism       Past Surgical History:   Procedure Laterality Date    CAROTID ENDARTERECTOMY Bilateral     CORONARY ARTERY BYPASS GRAFT      PROSTATE SURGERY         Referring physician: Shayna Saeed  Date referred to PT: 10/13/17    General Precautions: Standard, fall  Orthopedic Precautions:     Braces:         Do you have any cultural, spiritual, Baptism conflicts, given your current situation?: none    Patient History:  Lives With: spouse (pt is retired and lives with her homemaker wife who will be able to assist after discharge. )  Living Arrangements: mobile home (ramp entrance)  Equipment Currently Used at Home:  (QC, rollator, walk in shower with built in seat.)  DME owned (not currently used): none    Previous Level of Function:  Ambulation Skills: independent  Transfer Skills: independent    Subjective:  Communicated with nurse prior to session.    Chief Complaint: pt had no complaints during treatment.   Patient goals:  To get better and go home.     Pain/Comfort  Pain Rating 1: 0/10  Pain Rating Post-Intervention 1: 0/10      Objective:   Patient found with: telemetry, arterial line, pulse ox (continuous), oxygen, valenzuela catheter (granade drain)     Cognitive Exam:  Oriented to: Person, Place, Time and Situation    Follows Commands/attention: Follows multistep  commands  Communication: clear/fluent  Safety  awareness/insight to disability: intact    Physical Exam:    Lower Extremity Range of Motion:  Right Lower Extremity: WFL  Left Lower Extremity: WFL    Lower Extremity Strength:  Right Lower Extremity: WFL  Left Lower Extremity: WFL     Functional Mobility:  Bed Mobility:  Rolling/Turning Right: Minimum assistance (pt needed verbal cues for hand placement and sequencing for functional mobility )    Transfers:  Sit <> Stand Assistance: Contact Guard Assistance  Sit <> Stand Assistive Device: No Assistive Device  Bed <> Chair Technique: Stand Pivot  Bed <> Chair Assistance: Contact Guard Assistance    Gait:   Gait Distance: not tested 2nd to medical lines      AM-PAC 6 CLICK MOBILITY  How much help from another person does this patient currently need?   1 = Unable, Total/Dependent Assistance  2 = A lot, Maximum/Moderate Assistance  3 = A little, Minimum/Contact Guard/Supervision  4 = None, Modified Casey/Independent    Turning over in bed (including adjusting bedclothes, sheets and blankets)?: 3  Sitting down on and standing up from a chair with arms (e.g., wheelchair, bedside commode, etc.): 3  Moving from lying on back to sitting on the side of the bed?: 3  Moving to and from a bed to a chair (including a wheelchair)?: 3  Need to walk in hospital room?: 2  Climbing 3-5 steps with a railing?: 1  Total Score: 15     AM-PAC Raw Score CMS G-Code Modifier Level of Impairment Assistance   6 % Total / Unable   7 - 9 CM 80 - 100% Maximal Assist   10 - 14 CL 60 - 80% Moderate Assist   15 - 19 CK 40 - 60% Moderate Assist   20 - 22 CJ 20 - 40% Minimal Assist   23 CI 1-20% SBA / CGA   24 CH 0% Independent/ Mod I     Patient left up in chair with all lines intact, call button in reach and wife present.    Assessment:   Robby Soriano is a 77 y.o. male with a medical diagnosis of Obstructive jaundice and presents with decreased mobility, transfers and decreased distance ambulated. Pt would benefit from cont PT to  address deficits and should be able to discharge home with HHPT and BSC. Pt will benefit from skilled PT 5x/wk to return pt to highest functional level possible. Pt is s/p IR abscess drainage peritoneal 10/12. Pt had L hepatic resection etc 8/3/17 (previous admit).    Rehab identified problem list/impairments: Rehab identified problem list/impairments: weakness, gait instability, impaired balance, impaired endurance, impaired functional mobilty    Rehab potential is good.    Activity tolerance: Good    Discharge recommendations: Discharge Facility/Level Of Care Needs: home health PT     Barriers to discharge: Barriers to Discharge: None    Equipment recommendations: Equipment Needed After Discharge:  (BSC?)     GOALS:    Physical Therapy Goals        Problem: Physical Therapy Goal    Goal Priority Disciplines Outcome Goal Variances Interventions   Physical Therapy Goal     PT/OT, PT      Description:  Goals to be met by: 10/27/17    Patient will increase functional independence with mobility by performin. Supine to sit with Stand-by Assistance - not met  2. Sit to stand transfer with Stand-by Assistance -not met  3. Gait  x 150 feet with Contact Guard Assistance using Rolling Walker.- not met   4. Lower extremity exercise program x15 reps per handout, with supervision -not met                    PLAN:    Patient to be seen 5 x/week to address the above listed problems via gait training, therapeutic activities, therapeutic exercises  Plan of Care expires: 11/10/17  Plan of Care reviewed with: patient, spouse    Functional Assessment Tool Used: FIM  Score: 1  Functional Limitation: Mobility: Walking and moving around  Mobility: Walking and Moving Around Current Status (): CN  Mobility: Walking and Moving Around Goal Status (): JENNIFER Meléndez, PT  10/13/2017

## 2017-10-14 LAB
ABO + RH BLD: NORMAL
ALBUMIN SERPL BCP-MCNC: 1.7 G/DL
ALBUMIN SERPL BCP-MCNC: 1.8 G/DL
ALP SERPL-CCNC: 113 U/L
ALP SERPL-CCNC: 117 U/L
ALT SERPL W/O P-5'-P-CCNC: 137 U/L
ALT SERPL W/O P-5'-P-CCNC: 155 U/L
ANION GAP SERPL CALC-SCNC: 10 MMOL/L
ANION GAP SERPL CALC-SCNC: 12 MMOL/L
ANISOCYTOSIS BLD QL SMEAR: SLIGHT
APTT BLDCRRT: 43.3 SEC
APTT BLDCRRT: 46.7 SEC
APTT BLDCRRT: 47 SEC
APTT BLDCRRT: 47.1 SEC
APTT BLDCRRT: 50.6 SEC
APTT BLDCRRT: 53.1 SEC
AST SERPL-CCNC: 50 U/L
AST SERPL-CCNC: 93 U/L
BACTERIA BLD CULT: NORMAL
BASOPHILS # BLD AUTO: 0 K/UL
BASOPHILS # BLD AUTO: 0 K/UL
BASOPHILS NFR BLD: 0 %
BASOPHILS NFR BLD: 0 %
BILIRUB SERPL-MCNC: 3.9 MG/DL
BILIRUB SERPL-MCNC: 4.2 MG/DL
BLD GP AB SCN CELLS X3 SERPL QL: NORMAL
BNP SERPL-MCNC: 985 PG/ML
BUN SERPL-MCNC: 48 MG/DL
BUN SERPL-MCNC: 52 MG/DL
CA-I BLDV-SCNC: 1.05 MMOL/L
CALCIUM SERPL-MCNC: 7.6 MG/DL
CALCIUM SERPL-MCNC: 7.9 MG/DL
CHLORIDE SERPL-SCNC: 103 MMOL/L
CHLORIDE SERPL-SCNC: 107 MMOL/L
CO2 SERPL-SCNC: 21 MMOL/L
CO2 SERPL-SCNC: 23 MMOL/L
CREAT SERPL-MCNC: 1 MG/DL
CREAT SERPL-MCNC: 1.2 MG/DL
DIFFERENTIAL METHOD: ABNORMAL
DIFFERENTIAL METHOD: ABNORMAL
EOSINOPHIL # BLD AUTO: 0 K/UL
EOSINOPHIL # BLD AUTO: 0 K/UL
EOSINOPHIL NFR BLD: 0 %
EOSINOPHIL NFR BLD: 0 %
ERYTHROCYTE [DISTWIDTH] IN BLOOD BY AUTOMATED COUNT: 16.9 %
ERYTHROCYTE [DISTWIDTH] IN BLOOD BY AUTOMATED COUNT: 17.8 %
EST. GFR  (AFRICAN AMERICAN): >60 ML/MIN/1.73 M^2
EST. GFR  (AFRICAN AMERICAN): >60 ML/MIN/1.73 M^2
EST. GFR  (NON AFRICAN AMERICAN): 58 ML/MIN/1.73 M^2
EST. GFR  (NON AFRICAN AMERICAN): >60 ML/MIN/1.73 M^2
GLUCOSE SERPL-MCNC: 204 MG/DL
GLUCOSE SERPL-MCNC: 228 MG/DL
HCT VFR BLD AUTO: 23.8 %
HCT VFR BLD AUTO: 30.1 %
HGB BLD-MCNC: 10.3 G/DL
HGB BLD-MCNC: 7.8 G/DL
HYPOCHROMIA BLD QL SMEAR: ABNORMAL
INR PPP: 2.6
LYMPHOCYTES # BLD AUTO: 0.8 K/UL
LYMPHOCYTES # BLD AUTO: 0.8 K/UL
LYMPHOCYTES NFR BLD: 5.3 %
LYMPHOCYTES NFR BLD: 7.2 %
MAGNESIUM SERPL-MCNC: 1.8 MG/DL
MCH RBC QN AUTO: 28.3 PG
MCH RBC QN AUTO: 28.9 PG
MCHC RBC AUTO-ENTMCNC: 32.8 G/DL
MCHC RBC AUTO-ENTMCNC: 34.2 G/DL
MCV RBC AUTO: 84 FL
MCV RBC AUTO: 86 FL
MONOCYTES # BLD AUTO: 0.4 K/UL
MONOCYTES # BLD AUTO: 0.6 K/UL
MONOCYTES NFR BLD: 3.5 %
MONOCYTES NFR BLD: 4.1 %
NEUTROPHILS # BLD AUTO: 10.1 K/UL
NEUTROPHILS # BLD AUTO: 13.3 K/UL
NEUTROPHILS NFR BLD: 89.3 %
NEUTROPHILS NFR BLD: 90.6 %
PHOSPHATE SERPL-MCNC: 4.2 MG/DL
PLATELET # BLD AUTO: 50 K/UL
PLATELET # BLD AUTO: 69 K/UL
PLATELET BLD QL SMEAR: ABNORMAL
PLATELET BLD QL SMEAR: ABNORMAL
PMV BLD AUTO: 11.4 FL
PMV BLD AUTO: ABNORMAL FL
POTASSIUM SERPL-SCNC: 2.9 MMOL/L
POTASSIUM SERPL-SCNC: 3.7 MMOL/L
PROT SERPL-MCNC: 4.8 G/DL
PROT SERPL-MCNC: 5.2 G/DL
PROTHROMBIN TIME: 26.2 SEC
RBC # BLD AUTO: 2.76 M/UL
RBC # BLD AUTO: 3.57 M/UL
SODIUM SERPL-SCNC: 138 MMOL/L
SODIUM SERPL-SCNC: 138 MMOL/L
WBC # BLD AUTO: 11.4 K/UL
WBC # BLD AUTO: 14.77 K/UL

## 2017-10-14 PROCEDURE — 63600175 PHARM REV CODE 636 W HCPCS: Performed by: STUDENT IN AN ORGANIZED HEALTH CARE EDUCATION/TRAINING PROGRAM

## 2017-10-14 PROCEDURE — 85730 THROMBOPLASTIN TIME PARTIAL: CPT

## 2017-10-14 PROCEDURE — 25000003 PHARM REV CODE 250: Performed by: INTERNAL MEDICINE

## 2017-10-14 PROCEDURE — 25000003 PHARM REV CODE 250: Performed by: STUDENT IN AN ORGANIZED HEALTH CARE EDUCATION/TRAINING PROGRAM

## 2017-10-14 PROCEDURE — 99233 SBSQ HOSP IP/OBS HIGH 50: CPT | Mod: 24,,, | Performed by: SURGERY

## 2017-10-14 PROCEDURE — 25000003 PHARM REV CODE 250: Performed by: NURSE PRACTITIONER

## 2017-10-14 PROCEDURE — P9021 RED BLOOD CELLS UNIT: HCPCS

## 2017-10-14 PROCEDURE — 63600175 PHARM REV CODE 636 W HCPCS: Performed by: SURGERY

## 2017-10-14 PROCEDURE — 99233 SBSQ HOSP IP/OBS HIGH 50: CPT | Mod: ,,, | Performed by: INTERNAL MEDICINE

## 2017-10-14 PROCEDURE — 85610 PROTHROMBIN TIME: CPT

## 2017-10-14 PROCEDURE — 80053 COMPREHEN METABOLIC PANEL: CPT | Mod: 91

## 2017-10-14 PROCEDURE — 93010 ELECTROCARDIOGRAM REPORT: CPT | Mod: ,,, | Performed by: INTERNAL MEDICINE

## 2017-10-14 PROCEDURE — 82330 ASSAY OF CALCIUM: CPT

## 2017-10-14 PROCEDURE — 94799 UNLISTED PULMONARY SVC/PX: CPT

## 2017-10-14 PROCEDURE — 87040 BLOOD CULTURE FOR BACTERIA: CPT | Mod: 59

## 2017-10-14 PROCEDURE — S0077 INJECTION, CLINDAMYCIN PHOSP: HCPCS | Performed by: INTERNAL MEDICINE

## 2017-10-14 PROCEDURE — 83735 ASSAY OF MAGNESIUM: CPT

## 2017-10-14 PROCEDURE — 86901 BLOOD TYPING SEROLOGIC RH(D): CPT

## 2017-10-14 PROCEDURE — 83880 ASSAY OF NATRIURETIC PEPTIDE: CPT

## 2017-10-14 PROCEDURE — 99233 SBSQ HOSP IP/OBS HIGH 50: CPT | Mod: GC,,, | Performed by: INTERNAL MEDICINE

## 2017-10-14 PROCEDURE — 84100 ASSAY OF PHOSPHORUS: CPT

## 2017-10-14 PROCEDURE — 93005 ELECTROCARDIOGRAM TRACING: CPT

## 2017-10-14 PROCEDURE — 85730 THROMBOPLASTIN TIME PARTIAL: CPT | Mod: 91

## 2017-10-14 PROCEDURE — 86900 BLOOD TYPING SEROLOGIC ABO: CPT

## 2017-10-14 PROCEDURE — A4216 STERILE WATER/SALINE, 10 ML: HCPCS | Performed by: NURSE PRACTITIONER

## 2017-10-14 PROCEDURE — 20000000 HC ICU ROOM

## 2017-10-14 PROCEDURE — 85025 COMPLETE CBC W/AUTO DIFF WBC: CPT | Mod: 91

## 2017-10-14 PROCEDURE — 94761 N-INVAS EAR/PLS OXIMETRY MLT: CPT

## 2017-10-14 RX ORDER — FUROSEMIDE 10 MG/ML
40 INJECTION INTRAMUSCULAR; INTRAVENOUS DAILY
Status: DISCONTINUED | OUTPATIENT
Start: 2017-10-14 | End: 2017-10-18

## 2017-10-14 RX ORDER — POTASSIUM CHLORIDE 14.9 MG/ML
40 INJECTION INTRAVENOUS ONCE
Status: COMPLETED | OUTPATIENT
Start: 2017-10-14 | End: 2017-10-14

## 2017-10-14 RX ORDER — FUROSEMIDE 10 MG/ML
40 INJECTION INTRAMUSCULAR; INTRAVENOUS ONCE
Status: COMPLETED | OUTPATIENT
Start: 2017-10-14 | End: 2017-10-14

## 2017-10-14 RX ORDER — NAPROXEN SODIUM 220 MG/1
81 TABLET, FILM COATED ORAL DAILY
Status: DISCONTINUED | OUTPATIENT
Start: 2017-10-14 | End: 2017-10-18 | Stop reason: HOSPADM

## 2017-10-14 RX ORDER — HYDROCODONE BITARTRATE AND ACETAMINOPHEN 500; 5 MG/1; MG/1
TABLET ORAL
Status: DISCONTINUED | OUTPATIENT
Start: 2017-10-14 | End: 2017-10-16

## 2017-10-14 RX ADMIN — METOPROLOL TARTRATE 5 MG: 5 INJECTION INTRAVENOUS at 05:10

## 2017-10-14 RX ADMIN — HYDROCORTISONE SODIUM SUCCINATE 100 MG: 100 INJECTION, POWDER, FOR SOLUTION INTRAMUSCULAR; INTRAVENOUS at 06:10

## 2017-10-14 RX ADMIN — FUROSEMIDE 40 MG: 10 INJECTION, SOLUTION INTRAVENOUS at 11:10

## 2017-10-14 RX ADMIN — Medication 3 ML: at 09:10

## 2017-10-14 RX ADMIN — METOPROLOL TARTRATE 5 MG: 5 INJECTION INTRAVENOUS at 11:10

## 2017-10-14 RX ADMIN — POTASSIUM CHLORIDE 40 MEQ: 200 INJECTION, SOLUTION INTRAVENOUS at 07:10

## 2017-10-14 RX ADMIN — PIPERACILLIN AND TAZOBACTAM 4.5 G: 4; .5 INJECTION, POWDER, LYOPHILIZED, FOR SOLUTION INTRAVENOUS; PARENTERAL at 08:10

## 2017-10-14 RX ADMIN — CLINDAMYCIN IN 5 PERCENT DEXTROSE 900 MG: 18 INJECTION, SOLUTION INTRAVENOUS at 08:10

## 2017-10-14 RX ADMIN — METOPROLOL TARTRATE 5 MG: 5 INJECTION INTRAVENOUS at 06:10

## 2017-10-14 RX ADMIN — PIPERACILLIN AND TAZOBACTAM 4.5 G: 4; .5 INJECTION, POWDER, LYOPHILIZED, FOR SOLUTION INTRAVENOUS; PARENTERAL at 02:10

## 2017-10-14 RX ADMIN — CLOPIDOGREL 75 MG: 75 TABLET, FILM COATED ORAL at 08:10

## 2017-10-14 RX ADMIN — VANCOMYCIN HYDROCHLORIDE 1250 MG: 10 INJECTION, POWDER, LYOPHILIZED, FOR SOLUTION INTRAVENOUS at 04:10

## 2017-10-14 RX ADMIN — CLINDAMYCIN IN 5 PERCENT DEXTROSE 900 MG: 18 INJECTION, SOLUTION INTRAVENOUS at 10:10

## 2017-10-14 RX ADMIN — CLINDAMYCIN IN 5 PERCENT DEXTROSE 900 MG: 18 INJECTION, SOLUTION INTRAVENOUS at 02:10

## 2017-10-14 RX ADMIN — MAGNESIUM SULFATE IN WATER 2 G: 40 INJECTION, SOLUTION INTRAVENOUS at 05:10

## 2017-10-14 RX ADMIN — LEVOTHYROXINE SODIUM ANHYDROUS 50 MCG: 100 INJECTION, POWDER, LYOPHILIZED, FOR SOLUTION INTRAVENOUS at 08:10

## 2017-10-14 RX ADMIN — FUROSEMIDE 40 MG: 10 INJECTION, SOLUTION INTRAVENOUS at 08:10

## 2017-10-14 RX ADMIN — HYDROCORTISONE SODIUM SUCCINATE 100 MG: 100 INJECTION, POWDER, FOR SOLUTION INTRAMUSCULAR; INTRAVENOUS at 01:10

## 2017-10-14 RX ADMIN — VANCOMYCIN HYDROCHLORIDE 1250 MG: 10 INJECTION, POWDER, LYOPHILIZED, FOR SOLUTION INTRAVENOUS at 05:10

## 2017-10-14 RX ADMIN — ARGATROBAN 1 MCG/KG/MIN: 125 SOLUTION INTRAVENOUS at 06:10

## 2017-10-14 RX ADMIN — Medication 3 ML: at 05:10

## 2017-10-14 RX ADMIN — ASPIRIN 81 MG CHEWABLE TABLET 81 MG: 81 TABLET CHEWABLE at 08:10

## 2017-10-14 RX ADMIN — HYDROCORTISONE SODIUM SUCCINATE 100 MG: 100 INJECTION, POWDER, FOR SOLUTION INTRAMUSCULAR; INTRAVENOUS at 09:10

## 2017-10-14 RX ADMIN — PIPERACILLIN AND TAZOBACTAM 4.5 G: 4; .5 INJECTION, POWDER, LYOPHILIZED, FOR SOLUTION INTRAVENOUS; PARENTERAL at 04:10

## 2017-10-14 RX ADMIN — FLUCONAZOLE 400 MG: 2 INJECTION INTRAVENOUS at 02:10

## 2017-10-14 NOTE — PROGRESS NOTES
[facility]  Cardiology  Progress Note    Patient Name: Robby Soriano  MRN: [id number]  Admission Date: 10/11/2017  Hospital Length of Stay: 3 days  Code Status: Prior   Attending Physician: No att. providers found   Primary Care Physician: Lyla Bryan MD  Expected Discharge Date:   Principal Problem:Obstructive jaundice    Subjective:     Hospital Course:   Patient was started on aspirin and plavix and then started on argatroban as per Primary team for HIT? Platelets dropped from 120 to 60's.     Interval History:   DAPT started. On argatroban gtt. Concern for HIT. Not started BB or Ace Inhibitor yet. Patient denies any SOB or CP currently.      Review of Systems   Constitution: Negative for chills and fever.   HENT: Negative for congestion.    Cardiovascular: Negative for chest pain, claudication, dyspnea on exertion and orthopnea.   Respiratory: Negative for shortness of breath.    Hematologic/Lymphatic: Negative for adenopathy and bleeding problem.   Gastrointestinal: Negative for abdominal pain, diarrhea, nausea and vomiting.   Genitourinary: Negative for dysuria and hematuria.   Neurological: Negative for headaches and numbness.     Objective:     Vital Signs (Most Recent):  Temp: 97.5 °F (36.4 °C) (10/14/17 1155)  Pulse: (!) 56 (10/14/17 1115)  Resp: 15 (10/14/17 1115)  BP: (!) 96/53 (10/12/17 0600)  SpO2: 98 % (10/14/17 1115) Vital Signs (24h Range):  Temp:  [97.4 °F (36.3 °C)-97.8 °F (36.6 °C)] 97.5 °F (36.4 °C)  Pulse:  [52-68] 56  Resp:  [13-19] 15  SpO2:  [96 %-100 %] 98 %  Arterial Line BP: (110-134)/(38-56) 121/39     Weight: 78.2 kg (172 lb 6.4 oz)  Body mass index is 27 kg/m².     SpO2: 98 %  O2 Device (Oxygen Therapy): room air      Intake/Output Summary (Last 24 hours) at 10/14/17 1436  Last data filed at 10/14/17 1200   Gross per 24 hour   Intake          1657.13 ml   Output             3475 ml   Net         -1817.87 ml       Lines/Drains/Airways     Central Venous Catheter  Line                 Percutaneous Central Line Insertion/Assessment - triple lumen  10/12/17 0700 right internal jugular 2 days          Drain                 Urethral Catheter 10/12/17 0451 2 days         Closed/Suction Drain 10/12/17 1711 Right;Anterior Abdomen Bulb 8 Fr. 1 day         Closed/Suction Drain 10/12/17 1712 Right;Posterior Abdomen Bulb 8 Fr. 1 day          Arterial Line                 Arterial Line 10/12/17 0700 Right Radial 2 days          Peripheral Intravenous Line                 Peripheral IV - Single Lumen 10/11/17 1400 Left Forearm 3 days         Peripheral IV - Single Lumen 10/11/17 1945 Right Upper Arm 2 days                Physical Exam   Constitutional: He is oriented to person, place, and time. He appears well-developed and well-nourished.   HENT:   Head: Normocephalic and atraumatic.   Eyes: EOM are normal. Pupils are equal, round, and reactive to light.   Neck: Normal range of motion. Neck supple. No JVD (CVP of 4) present.   Cardiovascular: Normal rate, regular rhythm, normal heart sounds and intact distal pulses.    Pulmonary/Chest: Effort normal. He has rales.   Abdominal: Soft. Bowel sounds are normal. There is no tenderness.   Musculoskeletal: He exhibits no edema.   Neurological: He is alert and oriented to person, place, and time. He has normal reflexes.   Vitals reviewed.      Significant Labs:   ABG: No results for input(s): PH, PCO2, HCO3, POCSATURATED, BE in the last 48 hours., Blood Culture:   Recent Labs  Lab 10/12/17  2235 10/14/17  0355 10/14/17  0410   LABBLOO No Growth to date  No Growth to date No Growth to date No Growth to date   , BMP:   Recent Labs  Lab 10/13/17  0410 10/13/17  1600 10/14/17  0300   * 216* 204*    137 138   K 4.4 4.0 3.7   * 109 107   CO2 19* 19* 21*   BUN 47* 45* 48*   CREATININE 0.9 1.0 1.0   CALCIUM 7.6* 7.9* 7.6*   MG 2.1  --  1.8   , CMP   Recent Labs  Lab 10/13/17  0410 10/13/17  1600 10/14/17  0300    137 138   K  4.4 4.0 3.7   * 109 107   CO2 19* 19* 21*   * 216* 204*   BUN 47* 45* 48*   CREATININE 0.9 1.0 1.0   CALCIUM 7.6* 7.9* 7.6*   PROT 4.6* 5.1* 4.8*   ALBUMIN 1.5* 1.8* 1.7*   BILITOT 9.2* 6.1* 3.9*   ALKPHOS 142* 131 113   * 153* 93*   * 191* 155*   ANIONGAP 6* 9 10   ESTGFRAFRICA >60.0 >60.0 >60.0   EGFRNONAA >60.0 >60.0 >60.0   , CBC   Recent Labs  Lab 10/13/17  1600 10/13/17  1800 10/14/17  0300   WBC 16.33* 16.58* 11.40   HGB 8.4* 8.3* 7.8*   HCT 25.5* 25.0* 23.8*   PLT 55* 63* 69*   , INR   Recent Labs  Lab 10/13/17  0410 10/14/17  0500   INR 1.0 2.6*   , Lipid Panel No results for input(s): CHOL, HDL, LDLCALC, TRIG, CHOLHDL in the last 48 hours.,   Pathology Results  (Last 10 years)    None      , Troponin   Recent Labs  Lab 10/12/17  1810 10/13/17  0005   TROPONINI 28.149* 21.555*    and All pertinent lab results from the last 24 hours have been reviewed.    Significant Imaging: Echocardiogram:   2D echo with color flow doppler:   Results for orders placed or performed during the hospital encounter of 10/11/17   2D echo with color flow doppler   Result Value Ref Range    EF 25 (A) 55 - 65    Mitral Valve Regurgitation MILD     Diastolic Dysfunction Yes (A)     Aortic Valve Regurgitation TRIVIAL TO MILD     Est. PA Systolic Pressure 27.01     Tricuspid Valve Regurgitation TRIVIAL TO MILD      Assessment and Plan:     Brief HPI: see above    ACS (acute coronary syndrome)    Patient is a 76 yo male with a past medical hx of CAD s/p CABG in 13 here with severe sepsis secondary to cholangitis and gram negative and positive bacteremia    - Here with ACS event with associated heart failure in sapna-operative period given EKG with new LBBB and troponin peak of 28   - Continue DAPT, aspirin 81 mg daily and plavix 75 mg daily.  - Since 2D echo shows global dysfunction would recommend started lisinopril 2.5 mg daily and toprol 25 mg daily. HR blockade goal HR of <70 and Ace inhibitor low dose as  tolerated.   - Can continue argatroban in light of possible HIT? If HIT r/o then from our perspective patients needed 48 hrs of anticoagulation therapeutically.   - Recommend transfusing to Hg > 10  - Recommend LHC when patient is stable from infectious standpoint              VTE Risk Mitigation         Ordered     argatroban 125 mg in sodium chloride 0.9% 125 mL infusion (conc: 1 mg/mL)  Continuous     Route:  Intravenous        10/13/17 1732     High Risk of VTE  Once      10/11/17 1141     Place sequential compression device  Until discontinued      10/11/17 1141     Place YONY hose  Until discontinued      10/11/17 1141          Anant Bustamante MD  Cardiology  [facility]

## 2017-10-14 NOTE — ASSESSMENT & PLAN NOTE
- Trop I peaked at 28; now decreasing  - STEMI, Cardiology following; agatroban drip, plavix load,  - 2D echo from 10/11/17: normal EF (60-65%) with severe LA enlargement, recent EF down to 25%.  - Continue aspirin  - Given 2 units PRBC's this AM w/ Lasix

## 2017-10-14 NOTE — PROGRESS NOTES
Progress Note  Surgical Intensive Care    Admit Date: 10/11/2017  Post-operative Day:    Hospital Day: 4    SUBJECTIVE:     Follow-up For:  * No surgery found *    HPI:    Patient is a 77 y.o. 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 Past medical history is significant for hypothyroidism, hypertension, hyperlipidemia, CAD s/p CABG, anemia and autoimmune cholangitis. Patient was seen in clinic on 10/11/2017 for post-op follow up. Patient stated he was fatigued, experiencing chills the night before follow up. He looked ashen in clinic and had scleral icterus. He was subsequently admitted to [facility]. On the floor, blood cultures came back positive for gram positive rods and gram negative rods. CT scan yesterday evening showed 7.2cm gas collection in the hepatic dome and an adjacent gas and fluid collection in the resection bed 2.7cm. Patient received 2L NS for fluid resuscitation. Patient became hypotensive on the floor, desated down to 80s. Lactic 8.9, Troponin elevated at 2.9. No changes on EKG. He was stepped up to the SICU. Noted to be quite jaundice with total bilirubin 18, increased from 5.4 in clinic. Started on broad spectrum antibiotics, placed on non-rebreather mask at 15L, placed right radial arterial catheter and right internal jugular central venous catheter. H/H decreased to 6.4/20.9 from 9.6/30.4, transfused 2 units pRBCs. Cardiology consulted for increased troponin, determined to be secondary to demand ischemia. IR called regarding fluid collection. Continue on broad spectrum antibiotics.    Interval history:    Started on DAPT, argatroban gtt. Diuresed with adequate UOP. Weaned to NC. Abx broadened to clinda. Patient feels improved, t bili 3.9. Drain output decreased    Continuous Infusions:   argatroban in 0.9 % sod chlor 1 mcg/kg/min (10/14/17 0600)     Scheduled Meds:   clindamycin (CLEOCIN) IVPB  900 mg Intravenous Q8H     clopidogrel  75 mg Oral Daily    fluconazole (DIFLUCAN) IVPB  400 mg Intravenous Q24H    hydrocortisone sodium succinate  100 mg Intravenous Q8H    levothyroxine  50 mcg Intravenous Daily    metoprolol  5 mg Intravenous Q6H    piperacillin-tazobactam 4.5 g in dextrose 5 % 100 mL IVPB (ready to mix system)  4.5 g Intravenous Q8H    sodium chloride 0.9%  3 mL Intravenous Q8H    vancomycin (VANCOCIN) IVPB  1,250 mg Intravenous Q12H     PRN Meds:sodium chloride, albuterol-ipratropium 2.5mg-0.5mg/3mL, alprazolam, calcium gluconate IVPB, calcium gluconate IVPB, calcium gluconate IVPB, ibuprofen, magnesium sulfate IVPB, magnesium sulfate IVPB, ondansetron, promethazine (PHENERGAN) IVPB    Review of patient's allergies indicates:  No Known Allergies    OBJECTIVE:     Vital Signs (Most Recent)  Temp: 97.4 °F (36.3 °C) (10/14/17 0300)  Pulse: (!) 56 (10/14/17 0600)  Resp: 15 (10/14/17 0600)  BP: (!) 96/53 (10/12/17 0600)  SpO2: 96 % (10/14/17 0600)    Vital Signs Range (Last 24H):  Temp:  [97.4 °F (36.3 °C)-97.6 °F (36.4 °C)]   Pulse:  [55-69]   Resp:  [13-22]   SpO2:  [96 %-100 %]   Arterial Line BP: (110-140)/(39-56)     I & O (Last 24H):    Intake/Output Summary (Last 24 hours) at 10/14/17 0630  Last data filed at 10/14/17 0600   Gross per 24 hour   Intake              970 ml   Output             4015 ml   Net            -3045 ml     Ventilator Data (Last 24H):     Oxygen Concentration (%):  [45-55] 45    Hemodynamic Parameters (Last 24H):  CO:  [5.4 L/min-5.7 L/min] 5.7 L/min  CI:  [2.9 L/min/m2-3 L/min/m2] 3 L/min/m2    Physical Exam:  Physical Exam   Constitutional: He is oriented to person, place, and time and well-developed, well-nourished, and in no distress.   HENT:   Head: Normocephalic and atraumatic.   Cardiovascular: Normal rate and regular rhythm.    No murmur heard.  Pulmonary/Chest: Effort normal. No respiratory distress. He has no wheezes. He has no rales.   Abdominal: Soft. He exhibits no  distension. There is no tenderness.   Neurological: He is alert and oriented to person, place, and time.   Skin: Skin is warm and dry.   Jaundice.     Wound/Incision:  Drains placed by IR yesterday in the anterior and posterior RUQ    Laboratory (Last 24H):  CBC:    Recent Labs  Lab 10/14/17  0300   WBC 11.40   HGB 7.8*   HCT 23.8*   PLT 69*     CMP:    Recent Labs  Lab 10/14/17  0300   CALCIUM 7.6*   ALBUMIN 1.7*   PROT 4.8*      K 3.7   CO2 21*      BUN 48*   CREATININE 1.0   ALKPHOS 113   *   AST 93*   BILITOT 3.9*     Lab Results   Component Value Date    INR 2.6 (H) 10/14/2017    INR 1.0 10/13/2017    INR 1.2 10/12/2017       Chest X-Ray: X-ray Chest 1 View    Result Date: 10/13/2017  No interval detrimental change. Electronically signed by: APRIL HIDALGO MD Date:     10/13/17 Time:    06:30       Diagnostic Results:  X-Ray: unchanged from yesterday    ASSESSMENT/PLAN:   Patient is a 77 y.o. male had a partial hepatic resection in August 2017 for IgG4 sclerosing cholangitis and is on a steroid taper for this. Past medical history is significant for hypothyroidism, hypertension, hyperlipidemia, CAD s/p CABG, anemia and autoimmune cholangitis     Plan:     Neuro:   -not sedated  - ibuprofen PO for pain     Pulmonary:   - on NC, maintaining O2 sats>95%     Oxygen Concentration (%):  [55] 55     Cardiac:  -MAP goal >65  -NSTEMI, new LBBB and drop in EF  -ASA, plavix, argatroban  -B-blocker, ace inhibitor as tolerated  -lasix 40 daily     Renal:   -Ibarra in place  - UOP3.5 L   -Bun/Cr stable     Fluids/Electrolytes/Nutrition/GI:   -Nutritional status: ADAT   -replace lytes PRN  -Bowel regimen per primary team  - GI prophylaxis per primary team  - two drains placed by IR yesterday, one in the anterior fluid collection with purulent bilious drainage and one in the posterior/dome fluid collection with what appears to be old hematoma.   - tbili improved today 3.9     Hematology/Oncology:  -H/H  stable, CBC q12  - thrombocytopenial to 69, INR stable 1.0. HIT panel pending    Infectious Disease:   -afebrile  - blood cultures positive for gram positive and gram negative rods, speciation pending, urine cultures pending  -WBC trending down  - lactate normalized  -Abx: fluconazole, zosyn, vanc, added clinda per ID   - IR drained hepatic abscess, two drains placed    Endocrine:  -Glucose goal of 120-150     Dispo:  - Continue ICU care      Cody Hutchinson. CA-1  10/14/2017

## 2017-10-14 NOTE — ASSESSMENT & PLAN NOTE
78 yo M with autoimmune sclerosing cholangitis who underwent left hepatic resection; resection of extrahepatic bile duct; Eron-Y right hepaticojejunostomy on 8/3/17 who presented to clinic with chills, now with sepsis and hepatic abscess, STEMI yesterday cardiology following    Plan:  - s/p IR drain placement x2 on 10/12/17  - Afebrile overnight, WBC decreasing, lactate normalized  - Cont broad spectrum antibiotics (vanc and zosyn, fluconazole, clindamycin for now) for bacteremia with GNR and GP bacteria  - Blood, IR drain cultures pending. Will adjust abx as cultures result  - D/C IVF  - Lasix daily  - Keep valenzuela in place for strict I&Os  - GI prophylaxis  - Stress dose steroids. Plan to wean  - Synthroid IV daily  - Continue ICU care

## 2017-10-14 NOTE — PROGRESS NOTES
[facility]  General Surgery  Progress Note    Subjective:     History of Present Illness:  Mr. 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 He is currently on a steroid taper. He recuperated slowly but was doing well with forward progress until yesterday when he become easily fatigued and last night he had chills x 45 minutes.   He didnot feel well in clinic today. Of note, he also reports increasingly dark urine in the past week. Tbili was also noted to be 5.4 today in clinic.     Post-Op Info:  * No surgery found *         Interval History: elevated trops, STEMI yesterday, cardiology following, on argatroban drip due to drop in platelets, plavix loaded, Gave blood this AM due to down trending H&H     Medications:  Continuous Infusions:   argatroban in 0.9 % sod chlor 1 mcg/kg/min (10/14/17 1100)     Scheduled Meds:   aspirin  81 mg Oral Daily    clindamycin (CLEOCIN) IVPB  900 mg Intravenous Q8H    clopidogrel  75 mg Oral Daily    fluconazole (DIFLUCAN) IVPB  400 mg Intravenous Q24H    furosemide  40 mg Intravenous Daily    furosemide  40 mg Intravenous Once    hydrocortisone sodium succinate  100 mg Intravenous Q8H    levothyroxine  50 mcg Intravenous Daily    metoprolol  5 mg Intravenous Q6H    piperacillin-tazobactam 4.5 g in dextrose 5 % 100 mL IVPB (ready to mix system)  4.5 g Intravenous Q8H    sodium chloride 0.9%  3 mL Intravenous Q8H    vancomycin (VANCOCIN) IVPB  1,250 mg Intravenous Q12H     PRN Meds:sodium chloride, albuterol-ipratropium 2.5mg-0.5mg/3mL, alprazolam, calcium gluconate IVPB, calcium gluconate IVPB, calcium gluconate IVPB, ibuprofen, magnesium sulfate IVPB, magnesium sulfate IVPB, ondansetron, promethazine (PHENERGAN) IVPB     Review of patient's allergies indicates:  No Known  Allergies  Objective:     Vital Signs (Most Recent):  Temp: 97.6 °F (36.4 °C) (10/14/17 0850)  Pulse: (!) 56 (10/14/17 1115)  Resp: 15 (10/14/17 1115)  BP: (!) 96/53 (10/12/17 0600)  SpO2: 98 % (10/14/17 1115) Vital Signs (24h Range):  Temp:  [97.4 °F (36.3 °C)-97.6 °F (36.4 °C)] 97.6 °F (36.4 °C)  Pulse:  [52-69] 56  Resp:  [13-22] 15  SpO2:  [96 %-100 %] 98 %  Arterial Line BP: (110-139)/(38-56) 121/39     Weight: 78.2 kg (172 lb 6.4 oz)  Body mass index is 27 kg/m².    Intake/Output - Last 3 Shifts       10/12 0700 - 10/13 0659 10/13 0700 - 10/14 0659 10/14 0700 - 10/15 0659    I.V. (mL/kg) 3544 (45.3) 1657.1 (21.2)     Blood 250      IV Piggyback 1000      Total Intake(mL/kg) 4794 (61.3) 1657.1 (21.2)     Urine (mL/kg/hr) 1800 (1) 4000 (2.1) 825 (2.4)    Drains 170 (0.1) 15 (0)     Total Output 1970 4015 825    Net +2824 -2357.9 -825                 Physical Exam   Constitutional: He is oriented to person, place, and time. He appears well-developed. No distress.   No acute distress this morning   HENT:   Head: Normocephalic and atraumatic.   Eyes: EOM are normal. Scleral icterus is present.   Neck: Neck supple.   Cardiovascular: Regular rhythm, normal heart sounds and intact distal pulses.  Tachycardia present.    No chest pain today, HR in 50s   Pulmonary/Chest: Effort normal. No respiratory distress. He has no wheezes.   On RA   Abdominal: Soft. He exhibits no distension. There is no tenderness. There is no rebound.       Well-healed upper midline incision. 2 IR drains in place with murky output   Musculoskeletal: He exhibits no edema or deformity.   Neurological: He is alert and oriented to person, place, and time.   Skin: Skin is warm and dry.       Significant Labs:  CBC:     Recent Labs  Lab 10/14/17  0300   WBC 11.40   RBC 2.76*   HGB 7.8*   HCT 23.8*   PLT 69*   MCV 86   MCH 28.3   MCHC 32.8     BMP:     Recent Labs  Lab 10/14/17  0300   *      K 3.7      CO2 21*   BUN 48*   CREATININE  1.0   CALCIUM 7.6*   MG 1.8     LFTs:     Recent Labs  Lab 10/14/17  0300   *   AST 93*   ALKPHOS 113   BILITOT 3.9*   PROT 4.8*   ALBUMIN 1.7*     Cardiac markers:     Recent Labs  Lab 10/12/17  1810 10/13/17  0005   CPKMB 57.8*  --    TROPONINI 28.149* 21.555*     Microbiology Results (last 7 days)     Procedure Component Value Units Date/Time    Blood culture [[id number]] Collected:  10/12/17 0055    Order Status:  Completed Specimen:  Blood Updated:  10/14/17 1043     Blood Culture, Routine Gram stain aer bottle: Gram negative rods     Blood Culture, Routine Results called to and read back by: Harry Cook RN     Blood Culture, Routine 10/12/2017  14:44     Blood Culture, Routine --     ESCHERICHIA COLI  For susceptibility see order # [id number]      Blood culture [[id number]] Collected:  10/12/17 0055    Order Status:  Completed Specimen:  Blood Updated:  10/14/17 1042     Blood Culture, Routine Gram stain aer bottle: Gram negative rods     Blood Culture, Routine Results called to and read back by: Harry Cook RN     Blood Culture, Routine 10/12/2017  14:43     Blood Culture, Routine Gram stain leola bottle: Gram negative rods 10/12/2017  20:11     Blood Culture, Routine --     ESCHERICHIA COLI  For susceptibility see order # [id number]      Blood culture [[id number]] Collected:  10/11/17 1317    Order Status:  Completed Specimen:  Blood Updated:  10/14/17 1041     Blood Culture, Routine Gram stain leola bottle:  Gram variable rods     Blood Culture, Routine Results called to and read back by:Enrique Palomo RN 10/11/2017  23:09     Blood Culture, Routine Gram stain aer bottle: Gram negative rods      Blood Culture, Routine Results called to and read back by:Enrique Palomo RN 10/12/2017  02:41     Blood Culture, Routine --     ESCHERICHIA COLI  For susceptibility see order # [id number]       Blood Culture, Routine CLOSTRIDIUM PERFRINGENS    Blood culture [[id number]]  (Susceptibility) Collected:  10/11/17  1309    Order Status:  Completed Specimen:  Blood Updated:  10/14/17 1041     Blood Culture, Routine Gram stain leola bottle: Gram positive rods     Blood Culture, Routine Results called to and read back by:Enrique Palomo RN 10/11/2017  21:54     Blood Culture, Routine Gram stain aer bottle: Gram negative rods      Blood Culture, Routine Results called to and read back by:Enrique Palomo RN 10/12/2017  02:41     Blood Culture, Routine ESCHERICHIA COLI     Blood Culture, Routine CLOSTRIDIUM PERFRINGENS    Blood culture [[id number]] Collected:  10/12/17 2208    Order Status:  Completed Specimen:  Blood from Peripheral, Antecubital, Right Updated:  10/14/17 0954     Blood Culture, Routine Gram stain aer bottle: Gram negative rods     Blood Culture, Routine Results called to and read back by:Harry Cook RN 10/13/2017  15:51     Blood Culture, Routine --     GRAM NEGATIVE ADARSH, LACTOSE   Identification and susceptibility pending      Blood culture [[id number]] Collected:  10/14/17 0410    Order Status:  Sent Specimen:  Blood from Peripheral, Wrist, Left Updated:  10/14/17 0620    Blood culture [[id number]] Collected:  10/14/17 0355    Order Status:  Sent Specimen:  Blood from Peripheral, Antecubital, Right Updated:  10/14/17 0619    Blood culture [[id number]] Collected:  10/12/17 2235    Order Status:  Completed Specimen:  Blood from Line, Jugular, Internal Right Updated:  10/14/17 0613     Blood Culture, Routine No Growth to date     Blood Culture, Routine No Growth to date    Aerobic culture [[id number]] Collected:  10/12/17 1714    Order Status:  Completed Specimen:  Abscess from Abdomen Updated:  10/13/17 1402     Aerobic Bacterial Culture --     GRAM NEGATIVE ADARSH, LACTOSE   Moderate  Identification and susceptibility pending       Aerobic Bacterial Culture --     ENTEROCOCCUS SPECIES  Many  Identification and susceptibility pending      Narrative:       Posterior dome (liver)    Urine culture  [[id number]] Collected:  10/12/17 0500    Order Status:  Completed Specimen:  Urine from Urine, Catheterized Updated:  10/13/17 1255     Urine Culture, Routine No growth    Aerobic culture [[id number]] Collected:  10/12/17 1705    Order Status:  Completed Specimen:  Abscess from Abdomen Updated:  10/13/17 0930     Aerobic Bacterial Culture --     GRAM NEGATIVE ADARSH, LACTOSE   Moderate  Identification and susceptibility pending       Aerobic Bacterial Culture --     GRAM NEGATIVE ADARSH, NON-LACTOSE   Moderate  Identification and susceptibility pending      Narrative:       Anterior lesion (liver)    Culture, Anaerobe [[id number]] Collected:  10/12/17 1705    Order Status:  Completed Specimen:  Abscess from Abdomen Updated:  10/13/17 0741     Anaerobic Culture Culture in progress    Narrative:       Anterior lesion (liver)    Culture, Anaerobe [[id number]] Collected:  10/12/17 1714    Order Status:  Completed Specimen:  Abscess from Abdomen Updated:  10/13/17 0741     Anaerobic Culture Culture in progress    Narrative:       Posterior dome (liver)    Gram stain [[id number]] Collected:  10/12/17 1705    Order Status:  Completed Specimen:  Abscess from Abdomen Updated:  10/12/17 2033     Gram Stain Result Rare WBC's      Rare Gram positive cocci    Narrative:       Anterior lesion (liver)    Gram stain [[id number]] Collected:  10/12/17 1714    Order Status:  Completed Specimen:  Abscess from Abdomen Updated:  10/12/17 2013     Gram Stain Result Few WBC's      Few Gram positive rods      Rare Gram negative rods    Narrative:       Posterior dome (liver)    Urine culture [[id number]] Collected:  10/11/17 1832    Order Status:  Sent Specimen:  Urine from Urine, Clean Catch Updated:  10/11/17 1832          Significant Diagnostics:  I have reviewed all pertinent imaging results/findings within the past 24 hours.    Assessment/Plan:     * Obstructive jaundice    78 yo M with autoimmune sclerosing cholangitis who  underwent left hepatic resection; resection of extrahepatic bile duct; Eron-Y right hepaticojejunostomy on 8/3/17 who presented to clinic with chills, now with sepsis and hepatic abscess, STEMI yesterday cardiology following    Plan:  - s/p IR drain placement x2 on 10/12/17  - Afebrile overnight, WBC decreasing, lactate normalized  - Cont broad spectrum antibiotics (vanc and zosyn, fluconazole, clindamycin for now) for bacteremia with GNR and GP bacteria  - Blood, IR drain cultures pending. Will adjust abx as cultures result  - D/C IVF  - Lasix daily  - Keep valenzuela in place for strict I&Os  - GI prophylaxis  - Stress dose steroids. Plan to wean  - Synthroid IV daily  - Continue ICU care        Shortness of breath    - Improved  - On RA        ACS (acute coronary syndrome)    - Trop I peaked at 28; now decreasing  - STEMI, Cardiology following; agatroban drip, plavix load,  - 2D echo from 10/11/17: normal EF (60-65%) with severe LA enlargement, recent EF down to 25%.  - Continue aspirin  - Given 2 units PRBC's this AM w/ Lasix          IgG4-related sclerosing cholangitis    - See above          Flush drain BID starting today.     Jorge Bowling MD  General Surgery  [facility]

## 2017-10-14 NOTE — ASSESSMENT & PLAN NOTE
Patient is a 78 yo male with a past medical hx of CAD s/p CABG in 13 here with severe sepsis secondary to cholangitis and gram negative and positive bacteremia    - Here with ACS event with associated heart failure in sapna-operative period given EKG with new LBBB and troponin peak of 28   - Continue DAPT, aspirin 81 mg daily and plavix 75 mg daily.  - Since 2D echo shows global dysfunction would recommend started lisinopril 2.5 mg daily and toprol 25 mg daily. HR blockade goal HR of <70 and Ace inhibitor low dose as tolerated.   - Can continue argatroban in light of possible HIT? If HIT r/o then from our perspective patients needed 48 hrs of anticoagulation therapeutically.   - Recommend transfusing to Hg > 10  - Recommend LHC when patient is stable from infectious standpoint

## 2017-10-14 NOTE — SUBJECTIVE & OBJECTIVE
Interval History:   DAPT started. On argatroban gtt. Concern for HIT. Not started BB or Ace Inhibitor yet. Patient denies any SOB or CP currently.      Review of Systems   Constitution: Negative for chills and fever.   HENT: Negative for congestion.    Cardiovascular: Negative for chest pain, claudication, dyspnea on exertion and orthopnea.   Respiratory: Negative for shortness of breath.    Hematologic/Lymphatic: Negative for adenopathy and bleeding problem.   Gastrointestinal: Negative for abdominal pain, diarrhea, nausea and vomiting.   Genitourinary: Negative for dysuria and hematuria.   Neurological: Negative for headaches and numbness.     Objective:     Vital Signs (Most Recent):  Temp: 97.5 °F (36.4 °C) (10/14/17 1155)  Pulse: (!) 56 (10/14/17 1115)  Resp: 15 (10/14/17 1115)  BP: (!) 96/53 (10/12/17 0600)  SpO2: 98 % (10/14/17 1115) Vital Signs (24h Range):  Temp:  [97.4 °F (36.3 °C)-97.8 °F (36.6 °C)] 97.5 °F (36.4 °C)  Pulse:  [52-68] 56  Resp:  [13-19] 15  SpO2:  [96 %-100 %] 98 %  Arterial Line BP: (110-134)/(38-56) 121/39     Weight: 78.2 kg (172 lb 6.4 oz)  Body mass index is 27 kg/m².     SpO2: 98 %  O2 Device (Oxygen Therapy): room air      Intake/Output Summary (Last 24 hours) at 10/14/17 1436  Last data filed at 10/14/17 1200   Gross per 24 hour   Intake          1657.13 ml   Output             3475 ml   Net         -1817.87 ml       Lines/Drains/Airways     Central Venous Catheter Line                 Percutaneous Central Line Insertion/Assessment - triple lumen  10/12/17 0700 right internal jugular 2 days          Drain                 Urethral Catheter 10/12/17 0451 2 days         Closed/Suction Drain 10/12/17 1711 Right;Anterior Abdomen Bulb 8 Fr. 1 day         Closed/Suction Drain 10/12/17 1712 Right;Posterior Abdomen Bulb 8 Fr. 1 day          Arterial Line                 Arterial Line 10/12/17 0700 Right Radial 2 days          Peripheral Intravenous Line                 Peripheral IV -  Single Lumen 10/11/17 1400 Left Forearm 3 days         Peripheral IV - Single Lumen 10/11/17 1945 Right Upper Arm 2 days                Physical Exam   Constitutional: He is oriented to person, place, and time. He appears well-developed and well-nourished.   HENT:   Head: Normocephalic and atraumatic.   Eyes: EOM are normal. Pupils are equal, round, and reactive to light.   Neck: Normal range of motion. Neck supple. No JVD (CVP of 4) present.   Cardiovascular: Normal rate, regular rhythm, normal heart sounds and intact distal pulses.    Pulmonary/Chest: Effort normal. He has rales.   Abdominal: Soft. Bowel sounds are normal. There is no tenderness.   Musculoskeletal: He exhibits no edema.   Neurological: He is alert and oriented to person, place, and time. He has normal reflexes.   Vitals reviewed.      Significant Labs:   ABG: No results for input(s): PH, PCO2, HCO3, POCSATURATED, BE in the last 48 hours., Blood Culture:   Recent Labs  Lab 10/12/17  2235 10/14/17  0355 10/14/17  0410   LABBLOO No Growth to date  No Growth to date No Growth to date No Growth to date   , BMP:   Recent Labs  Lab 10/13/17  0410 10/13/17  1600 10/14/17  0300   * 216* 204*    137 138   K 4.4 4.0 3.7   * 109 107   CO2 19* 19* 21*   BUN 47* 45* 48*   CREATININE 0.9 1.0 1.0   CALCIUM 7.6* 7.9* 7.6*   MG 2.1  --  1.8   , CMP   Recent Labs  Lab 10/13/17  0410 10/13/17  1600 10/14/17  0300    137 138   K 4.4 4.0 3.7   * 109 107   CO2 19* 19* 21*   * 216* 204*   BUN 47* 45* 48*   CREATININE 0.9 1.0 1.0   CALCIUM 7.6* 7.9* 7.6*   PROT 4.6* 5.1* 4.8*   ALBUMIN 1.5* 1.8* 1.7*   BILITOT 9.2* 6.1* 3.9*   ALKPHOS 142* 131 113   * 153* 93*   * 191* 155*   ANIONGAP 6* 9 10   ESTGFRAFRICA >60.0 >60.0 >60.0   EGFRNONAA >60.0 >60.0 >60.0   , CBC   Recent Labs  Lab 10/13/17  1600 10/13/17  1800 10/14/17  0300   WBC 16.33* 16.58* 11.40   HGB 8.4* 8.3* 7.8*   HCT 25.5* 25.0* 23.8*   PLT 55* 63* 69*   , INR    Recent Labs  Lab 10/13/17  0410 10/14/17  0500   INR 1.0 2.6*   , Lipid Panel No results for input(s): CHOL, HDL, LDLCALC, TRIG, CHOLHDL in the last 48 hours.,   Pathology Results  (Last 10 years)    None      , Troponin   Recent Labs  Lab 10/12/17  1810 10/13/17  0005   TROPONINI 28.149* 21.555*    and All pertinent lab results from the last 24 hours have been reviewed.    Significant Imaging: Echocardiogram:   2D echo with color flow doppler:   Results for orders placed or performed during the hospital encounter of 10/11/17   2D echo with color flow doppler   Result Value Ref Range    EF 25 (A) 55 - 65    Mitral Valve Regurgitation MILD     Diastolic Dysfunction Yes (A)     Aortic Valve Regurgitation TRIVIAL TO MILD     Est. PA Systolic Pressure 27.01     Tricuspid Valve Regurgitation TRIVIAL TO MILD

## 2017-10-14 NOTE — PLAN OF CARE
Problem: Patient Care Overview  Goal: Plan of Care Review  Outcome: Ongoing (interventions implemented as appropriate)  POC reviewed with Pt and spouse. Pt AAOx4 and following commands. Pt on room air, O2 saturation % throughout shift. Pt on argatroban drip at 1 unit/kg/hr, titrated per protocol.Pt on clear liquid diet and tolerating. Pt received 2 units PRBCs today. -350 ml/hr throughout shift. Pt up to chair for 3 hours during shift, tolerated well. VSS. Skin free from new breakdown. Family to remain at bedside. Will continue to monitor.

## 2017-10-14 NOTE — SUBJECTIVE & OBJECTIVE
Interval History: Afebrile during night, improved condition. Patient no longer on O2 supplement. Reports feeling better     Review of Systems   Constitutional: Negative for activity change, appetite change, chills, fatigue and fever.   Gastrointestinal: Negative for abdominal distention, abdominal pain, diarrhea, nausea and vomiting.   Skin: Negative for rash.     Objective:     Vital Signs (Most Recent):  Temp: 97.5 °F (36.4 °C) (10/14/17 1155)  Pulse: 62 (10/14/17 1800)  Resp: 14 (10/14/17 1800)  BP: (!) 96/53 (10/12/17 0600)  SpO2: 98 % (10/14/17 1800) Vital Signs (24h Range):  Temp:  [97.4 °F (36.3 °C)-97.8 °F (36.6 °C)] 97.5 °F (36.4 °C)  Pulse:  [52-72] 62  Resp:  [13-20] 14  SpO2:  [96 %-100 %] 98 %  Arterial Line BP: (115-150)/(38-66) 137/49     Weight: 78.2 kg (172 lb 6.4 oz)  Body mass index is 27 kg/m².    Estimated Creatinine Clearance: 48.2 mL/min (based on SCr of 1.2 mg/dL).    Physical Exam   Constitutional: He is oriented to person, place, and time. He appears well-developed and well-nourished.   HENT:   Head: Normocephalic and atraumatic.   Eyes: Conjunctivae and EOM are normal. Pupils are equal, round, and reactive to light.   Cardiovascular: Normal rate, regular rhythm and normal heart sounds.    Pulmonary/Chest: Effort normal and breath sounds normal.   Abdominal: Soft. Bowel sounds are normal. He exhibits no distension.   Drain in place, bilious content    Musculoskeletal: Normal range of motion. He exhibits no edema.   Neurological: He is alert and oriented to person, place, and time.       Significant Labs:   Blood Culture:   Recent Labs  Lab 10/12/17  0055 10/12/17  2208 10/12/17  2235 10/14/17  0355 10/14/17  0410   LABBLOO Gram stain aer bottle: Gram negative rods  Results called to and read back by: Harry Cook RN  10/12/2017  14:44  ESCHERICHIA COLIFor susceptibility see order # [id number]  Gram stain aer bottle: Gram negative rods  Results called to and read back by: Harry Cook  RN  10/12/2017  14:43  Gram stain leola bottle: Gram negative rods 10/12/2017  20:11  ESCHERICHIA COLIFor susceptibility see order # [id number] Gram stain aer bottle: Gram negative rods  Results called to and read back by:Harry Cook RN 10/13/2017  15:51  ESCHERICHIA COLISusceptibility pending No Growth to date  No Growth to date No Growth to date No Growth to date     BMP:   Recent Labs  Lab 10/14/17  0300 10/14/17  1717   * 228*    138   K 3.7 2.9*    103   CO2 21* 23   BUN 48* 52*   CREATININE 1.0 1.2   CALCIUM 7.6* 7.9*   MG 1.8  --      CBC:   Recent Labs  Lab 10/13/17  1800 10/14/17  0300 10/14/17  1717   WBC 16.58* 11.40 14.77*   HGB 8.3* 7.8* 10.3*   HCT 25.0* 23.8* 30.1*   PLT 63* 69* 50*       Significant Imaging: I have reviewed all pertinent imaging results/findings within the past 24 hours.

## 2017-10-14 NOTE — PROGRESS NOTES
[facility]  Infectious Disease  Progress Note    Patient Name: Robby Soriano  MRN: [id number]  Admission Date: 10/11/2017  Length of Stay: 3 days  Attending Physician: No att. providers found  Primary Care Provider: Lyla Bryan MD    Isolation Status: No active isolations  Assessment/Plan:      Bacteremia    Patient with positive blood cultures for GNR both lactose fermentors and non fermentors, GPR and gram variable. Recent results show E. coli and Clostridium perfringens from blood cultures. Abscess cultures taken with similar results. Patient has known past infections (8/3/17) with E.coli, Klebsiella, Clostridium. perfringes and Enterococcus cultrured from bile sample. Abdominal CT highly suggestive of Clostridium perfringens infection with gas production seen. It is the most likely source of gram positive bettie bacteremia at this time. Currently on clindamycin for toxin inhibition and on zosyn that will cover broadly all preliminary isolates found on blood culture. All bacteria likely GI torrey and translocated to blood. Vancomycin is alternative therapy for Clostridium perfringens therapy and will cover empirically enterococcus in B/C, at this time will continue until susceptibilities attained. Will continue fluconazole therapy until patient condition more stable. Repeated B/C from 10/14/17 no growth to date. Vacn trough 8.0 goal should be 10-15. Patient condition improving no longer needs V mask for O2 supplement and afebrile with decreasing trend WBC.     - Continue clindamycin 900 mg IV every 8 hrs   - Continue Zosyn   - Continue Vancomycin   - repeat trough tomorrow   - continue fluconazole   - repeat blood cultures in AM  - monitor blood culture for susceptibilities               Anticipated Disposition: continue antibiotics     Thank you for your consult. I will follow-up with patient. Please contact us if you have any additional questions.    Kishore Lee MD  Infectious  Disease  [facility]    Subjective:     Principal Problem:Obstructive jaundice    HPI: Case of 78 y/o male PMHx hypothyroidism, AHTN, HLD, CAD s/p CABG, anemia and autoimmune cholangitis was admitted on 10/11/17. 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 Currently on steroid taper. Evaluated in clinic on 10/11/17 for follow up mentioned at that time being fatigued, experiencing chills, during that encounter was found to be jaundice and was admitted to [facility]. During admission found to have B/C positive for GNR and GPR. CT scan with evidence of 7.2 cm gas collection in the hepatic dome and adjacent gas and fluid collection in the resection bed 2.7 cm. Was resuscitated with 2 L of NS was transferred to SICU due to desaturation and hypotension. Was started on zosyn, vanc and fluconazole and NRB mask. Required 2 PRBC transfusion due to anemia. Was evaluated by cardiology due to increasing troponin's determined to by type 2 NSTEMI. IR performed placement of drain yesterday. At this time off pressors. Consulted to ID for further recommendations.      Interval History: Afebrile during night, improved condition. Patient no longer on O2 supplement. Reports feeling better     Review of Systems   Constitutional: Negative for activity change, appetite change, chills, fatigue and fever.   Gastrointestinal: Negative for abdominal distention, abdominal pain, diarrhea, nausea and vomiting.   Skin: Negative for rash.     Objective:     Vital Signs (Most Recent):  Temp: 97.5 °F (36.4 °C) (10/14/17 1155)  Pulse: 62 (10/14/17 1800)  Resp: 14 (10/14/17 1800)  BP: (!) 96/53 (10/12/17 0600)  SpO2: 98 % (10/14/17 1800) Vital Signs (24h Range):  Temp:  [97.4 °F (36.3 °C)-97.8 °F (36.6 °C)] 97.5 °F (36.4 °C)  Pulse:  [52-72] 62  Resp:  [13-20] 14  SpO2:  [96 %-100 %] 98 %  Arterial Line BP: (115-150)/(38-66) 137/49     Weight: 78.2 kg (172 lb 6.4 oz)  Body mass index  is 27 kg/m².    Estimated Creatinine Clearance: 48.2 mL/min (based on SCr of 1.2 mg/dL).    Physical Exam   Constitutional: He is oriented to person, place, and time. He appears well-developed and well-nourished.   HENT:   Head: Normocephalic and atraumatic.   Eyes: Conjunctivae and EOM are normal. Pupils are equal, round, and reactive to light.   Cardiovascular: Normal rate, regular rhythm and normal heart sounds.    Pulmonary/Chest: Effort normal and breath sounds normal.   Abdominal: Soft. Bowel sounds are normal. He exhibits no distension.   Drain in place, bilious content    Musculoskeletal: Normal range of motion. He exhibits no edema.   Neurological: He is alert and oriented to person, place, and time.       Significant Labs:   Blood Culture:   Recent Labs  Lab 10/12/17  0055 10/12/17  2208 10/12/17  2235 10/14/17  0355 10/14/17  0410   LABBLOO Gram stain aer bottle: Gram negative rods  Results called to and read back by: Harry Cook RN  10/12/2017  14:44  ESCHERICHIA COLIFor susceptibility see order # [id number]  Gram stain aer bottle: Gram negative rods  Results called to and read back by: Harry Cook RN  10/12/2017  14:43  Gram stain leola bottle: Gram negative rods 10/12/2017  20:11  ESCHERICHIA COLIFor susceptibility see order # [id number] Gram stain aer bottle: Gram negative rods  Results called to and read back by:Harry Cook RN 10/13/2017  15:51  ESCHERICHIA COLISusceptibility pending No Growth to date  No Growth to date No Growth to date No Growth to date     BMP:   Recent Labs  Lab 10/14/17  0300 10/14/17  1717   * 228*    138   K 3.7 2.9*    103   CO2 21* 23   BUN 48* 52*   CREATININE 1.0 1.2   CALCIUM 7.6* 7.9*   MG 1.8  --      CBC:   Recent Labs  Lab 10/13/17  1800 10/14/17  0300 10/14/17  1717   WBC 16.58* 11.40 14.77*   HGB 8.3* 7.8* 10.3*   HCT 25.0* 23.8* 30.1*   PLT 63* 69* 50*       Significant Imaging: I have reviewed all pertinent imaging  results/findings within the past 24 hours.

## 2017-10-14 NOTE — ASSESSMENT & PLAN NOTE
Patient with positive blood cultures for GNR both lactose fermentors and non fermentors, GPR and gram variable. Recent results show E. coli and Clostridium perfringens from blood cultures. Abscess cultures taken with similar results. Patient has known past infections (8/3/17) with E.coli, Klebsiella, Clostridium. perfringes and Enterococcus cultrured from bile sample. Abdominal CT highly suggestive of Clostridium perfringens infection with gas production seen. It is the most likely source of gram positive bettie bacteremia at this time. Currently on clindamycin for toxin inhibition and on zosyn that will cover broadly all preliminary isolates found on blood culture. All bacteria likely GI torrey and translocated to blood. Vancomycin is alternative therapy for Clostridium perfringens therapy and will cover empirically enterococcus in B/C, at this time will continue until susceptibilities attained. Will continue fluconazole therapy until patient condition more stable. Repeated B/C from 10/14/17 no growth to date. Vacn trough 8.0 goal should be 10-15. Patient condition improving no longer needs V mask for O2 supplement and afebrile with decreasing trend WBC.     - Continue clindamycin 900 mg IV every 8 hrs   - Continue Zosyn   - Continue Vancomycin   - repeat trough tomorrow   - continue fluconazole   - repeat blood cultures in AM  - monitor blood culture for susceptibilities

## 2017-10-14 NOTE — SUBJECTIVE & OBJECTIVE
Interval History: elevated trops, STEMI yesterday, cardiology following, on argatroban drip due to drop in platelets, plavix loaded, Gave blood this AM due to down trending H&H     Medications:  Continuous Infusions:   argatroban in 0.9 % sod chlor 1 mcg/kg/min (10/14/17 1100)     Scheduled Meds:   aspirin  81 mg Oral Daily    clindamycin (CLEOCIN) IVPB  900 mg Intravenous Q8H    clopidogrel  75 mg Oral Daily    fluconazole (DIFLUCAN) IVPB  400 mg Intravenous Q24H    furosemide  40 mg Intravenous Daily    furosemide  40 mg Intravenous Once    hydrocortisone sodium succinate  100 mg Intravenous Q8H    levothyroxine  50 mcg Intravenous Daily    metoprolol  5 mg Intravenous Q6H    piperacillin-tazobactam 4.5 g in dextrose 5 % 100 mL IVPB (ready to mix system)  4.5 g Intravenous Q8H    sodium chloride 0.9%  3 mL Intravenous Q8H    vancomycin (VANCOCIN) IVPB  1,250 mg Intravenous Q12H     PRN Meds:sodium chloride, albuterol-ipratropium 2.5mg-0.5mg/3mL, alprazolam, calcium gluconate IVPB, calcium gluconate IVPB, calcium gluconate IVPB, ibuprofen, magnesium sulfate IVPB, magnesium sulfate IVPB, ondansetron, promethazine (PHENERGAN) IVPB     Review of patient's allergies indicates:  No Known Allergies  Objective:     Vital Signs (Most Recent):  Temp: 97.6 °F (36.4 °C) (10/14/17 0850)  Pulse: (!) 56 (10/14/17 1115)  Resp: 15 (10/14/17 1115)  BP: (!) 96/53 (10/12/17 0600)  SpO2: 98 % (10/14/17 1115) Vital Signs (24h Range):  Temp:  [97.4 °F (36.3 °C)-97.6 °F (36.4 °C)] 97.6 °F (36.4 °C)  Pulse:  [52-69] 56  Resp:  [13-22] 15  SpO2:  [96 %-100 %] 98 %  Arterial Line BP: (110-139)/(38-56) 121/39     Weight: 78.2 kg (172 lb 6.4 oz)  Body mass index is 27 kg/m².    Intake/Output - Last 3 Shifts       10/12 0700 - 10/13 0659 10/13 0700 - 10/14 0659 10/14 0700 - 10/15 0659    I.V. (mL/kg) 3544 (45.3) 1657.1 (21.2)     Blood 250      IV Piggyback 1000      Total Intake(mL/kg) 4794 (61.3) 1657.1 (21.2)     Urine  (mL/kg/hr) 1800 (1) 4000 (2.1) 825 (2.4)    Drains 170 (0.1) 15 (0)     Total Output 1970 4015 825    Net +2824 -2357.9 -825                 Physical Exam   Constitutional: He is oriented to person, place, and time. He appears well-developed. No distress.   No acute distress this morning   HENT:   Head: Normocephalic and atraumatic.   Eyes: EOM are normal. Scleral icterus is present.   Neck: Neck supple.   Cardiovascular: Regular rhythm, normal heart sounds and intact distal pulses.  Tachycardia present.    No chest pain today, HR in 50s   Pulmonary/Chest: Effort normal. No respiratory distress. He has no wheezes.   On RA   Abdominal: Soft. He exhibits no distension. There is no tenderness. There is no rebound.       Well-healed upper midline incision. 2 IR drains in place with murky output   Musculoskeletal: He exhibits no edema or deformity.   Neurological: He is alert and oriented to person, place, and time.   Skin: Skin is warm and dry.       Significant Labs:  CBC:     Recent Labs  Lab 10/14/17  0300   WBC 11.40   RBC 2.76*   HGB 7.8*   HCT 23.8*   PLT 69*   MCV 86   MCH 28.3   MCHC 32.8     BMP:     Recent Labs  Lab 10/14/17  0300   *      K 3.7      CO2 21*   BUN 48*   CREATININE 1.0   CALCIUM 7.6*   MG 1.8     LFTs:     Recent Labs  Lab 10/14/17  0300   *   AST 93*   ALKPHOS 113   BILITOT 3.9*   PROT 4.8*   ALBUMIN 1.7*     Cardiac markers:     Recent Labs  Lab 10/12/17  1810 10/13/17  0005   CPKMB 57.8*  --    TROPONINI 28.149* 21.555*     Microbiology Results (last 7 days)     Procedure Component Value Units Date/Time    Blood culture [[id number]] Collected:  10/12/17 0055    Order Status:  Completed Specimen:  Blood Updated:  10/14/17 1043     Blood Culture, Routine Gram stain aer bottle: Gram negative rods     Blood Culture, Routine Results called to and read back by: Harry Cook RN     Blood Culture, Routine 10/12/2017  14:44     Blood Culture, Routine --     ESCHERICHIA  COLI  For susceptibility see order # [id number]      Blood culture [[id number]] Collected:  10/12/17 0055    Order Status:  Completed Specimen:  Blood Updated:  10/14/17 1042     Blood Culture, Routine Gram stain aer bottle: Gram negative rods     Blood Culture, Routine Results called to and read back by: Harry Cook RN     Blood Culture, Routine 10/12/2017  14:43     Blood Culture, Routine Gram stain leola bottle: Gram negative rods 10/12/2017  20:11     Blood Culture, Routine --     ESCHERICHIA COLI  For susceptibility see order # [id number]      Blood culture [[id number]] Collected:  10/11/17 1317    Order Status:  Completed Specimen:  Blood Updated:  10/14/17 1041     Blood Culture, Routine Gram stain leola bottle:  Gram variable rods     Blood Culture, Routine Results called to and read back by:Enrique Palomo RN 10/11/2017  23:09     Blood Culture, Routine Gram stain aer bottle: Gram negative rods      Blood Culture, Routine Results called to and read back by:Enrique Palomo RN 10/12/2017  02:41     Blood Culture, Routine --     ESCHERICHIA COLI  For susceptibility see order # [id number]       Blood Culture, Routine CLOSTRIDIUM PERFRINGENS    Blood culture [[id number]]  (Susceptibility) Collected:  10/11/17 1309    Order Status:  Completed Specimen:  Blood Updated:  10/14/17 1041     Blood Culture, Routine Gram stain leola bottle: Gram positive rods     Blood Culture, Routine Results called to and read back by:Enrique Palomo RN 10/11/2017  21:54     Blood Culture, Routine Gram stain aer bottle: Gram negative rods      Blood Culture, Routine Results called to and read back by:Enrique Palomo RN 10/12/2017  02:41     Blood Culture, Routine ESCHERICHIA COLI     Blood Culture, Routine CLOSTRIDIUM PERFRINGENS    Blood culture [[id number]] Collected:  10/12/17 2208    Order Status:  Completed Specimen:  Blood from Peripheral, Antecubital, Right Updated:  10/14/17 0954     Blood Culture, Routine Gram stain aer bottle:  Gram negative rods     Blood Culture, Routine Results called to and read back by:Harry Cook RN 10/13/2017  15:51     Blood Culture, Routine --     GRAM NEGATIVE ADARSH, LACTOSE   Identification and susceptibility pending      Blood culture [[id number]] Collected:  10/14/17 0410    Order Status:  Sent Specimen:  Blood from Peripheral, Wrist, Left Updated:  10/14/17 0620    Blood culture [[id number]] Collected:  10/14/17 0355    Order Status:  Sent Specimen:  Blood from Peripheral, Antecubital, Right Updated:  10/14/17 0619    Blood culture [[id number]] Collected:  10/12/17 2235    Order Status:  Completed Specimen:  Blood from Line, Jugular, Internal Right Updated:  10/14/17 0613     Blood Culture, Routine No Growth to date     Blood Culture, Routine No Growth to date    Aerobic culture [[id number]] Collected:  10/12/17 1714    Order Status:  Completed Specimen:  Abscess from Abdomen Updated:  10/13/17 1402     Aerobic Bacterial Culture --     GRAM NEGATIVE ADARSH, LACTOSE   Moderate  Identification and susceptibility pending       Aerobic Bacterial Culture --     ENTEROCOCCUS SPECIES  Many  Identification and susceptibility pending      Narrative:       Posterior dome (liver)    Urine culture [[id number]] Collected:  10/12/17 0500    Order Status:  Completed Specimen:  Urine from Urine, Catheterized Updated:  10/13/17 1255     Urine Culture, Routine No growth    Aerobic culture [[id number]] Collected:  10/12/17 1705    Order Status:  Completed Specimen:  Abscess from Abdomen Updated:  10/13/17 0930     Aerobic Bacterial Culture --     GRAM NEGATIVE ADARSH, LACTOSE   Moderate  Identification and susceptibility pending       Aerobic Bacterial Culture --     GRAM NEGATIVE ADARSH, NON-LACTOSE   Moderate  Identification and susceptibility pending      Narrative:       Anterior lesion (liver)    Culture, Anaerobe [[id number]] Collected:  10/12/17 1705    Order Status:  Completed Specimen:  Abscess from  Abdomen Updated:  10/13/17 0741     Anaerobic Culture Culture in progress    Narrative:       Anterior lesion (liver)    Culture, Anaerobe [[id number]] Collected:  10/12/17 1714    Order Status:  Completed Specimen:  Abscess from Abdomen Updated:  10/13/17 0741     Anaerobic Culture Culture in progress    Narrative:       Posterior dome (liver)    Gram stain [[id number]] Collected:  10/12/17 1705    Order Status:  Completed Specimen:  Abscess from Abdomen Updated:  10/12/17 2033     Gram Stain Result Rare WBC's      Rare Gram positive cocci    Narrative:       Anterior lesion (liver)    Gram stain [[id number]] Collected:  10/12/17 1714    Order Status:  Completed Specimen:  Abscess from Abdomen Updated:  10/12/17 2013     Gram Stain Result Few WBC's      Few Gram positive rods      Rare Gram negative rods    Narrative:       Posterior dome (liver)    Urine culture [[id number]] Collected:  10/11/17 1832    Order Status:  Sent Specimen:  Urine from Urine, Clean Catch Updated:  10/11/17 1832          Significant Diagnostics:  I have reviewed all pertinent imaging results/findings within the past 24 hours.

## 2017-10-14 NOTE — PROGRESS NOTES
Pt having runs of tachycardia (10-15 second each) in 150-160's. NO pressure changes noted. Dr. Benjamin notified. 12 Lead EKG run. VSS. Will continue to monitor.

## 2017-10-14 NOTE — PLAN OF CARE
Problem: Patient Care Overview  Goal: Plan of Care Review  Outcome: Ongoing (interventions implemented as appropriate)    NO complaints of pain or s/s of distress overnight.  Afebrile, VSS, UOP adequate. Blood cultures x 2 drawn; Agatroban gtt at 1 mcg/kg/min (4.7ml/hr). Plan of care reviewed with pt and spouse, all questions answered.

## 2017-10-15 LAB
ALBUMIN SERPL BCP-MCNC: 1.6 G/DL
ALBUMIN SERPL BCP-MCNC: 1.7 G/DL
ALP SERPL-CCNC: 100 U/L
ALP SERPL-CCNC: 112 U/L
ALT SERPL W/O P-5'-P-CCNC: 109 U/L
ALT SERPL W/O P-5'-P-CCNC: 91 U/L
ANION GAP SERPL CALC-SCNC: 11 MMOL/L
ANION GAP SERPL CALC-SCNC: 11 MMOL/L
ANION GAP SERPL CALC-SCNC: 12 MMOL/L
ANION GAP SERPL CALC-SCNC: 13 MMOL/L
ANISOCYTOSIS BLD QL SMEAR: ABNORMAL
ANISOCYTOSIS BLD QL SMEAR: SLIGHT
APTT BLDCRRT: 40.5 SEC
APTT BLDCRRT: 43 SEC
APTT BLDCRRT: 45.4 SEC
APTT BLDCRRT: 45.5 SEC
APTT BLDCRRT: 55.8 SEC
AST SERPL-CCNC: 22 U/L
AST SERPL-CCNC: 31 U/L
BACTERIA BLD CULT: NORMAL
BASOPHILS # BLD AUTO: 0 K/UL
BASOPHILS # BLD AUTO: 0.01 K/UL
BASOPHILS NFR BLD: 0 %
BASOPHILS NFR BLD: 0.1 %
BILIRUB SERPL-MCNC: 2.5 MG/DL
BILIRUB SERPL-MCNC: 3.1 MG/DL
BLD PROD TYP BPU: NORMAL
BLOOD UNIT EXPIRATION DATE: NORMAL
BLOOD UNIT TYPE CODE: 6200
BLOOD UNIT TYPE: NORMAL
BNP SERPL-MCNC: 1681 PG/ML
BUN SERPL-MCNC: 50 MG/DL
BUN SERPL-MCNC: 51 MG/DL
BUN SERPL-MCNC: 51 MG/DL
BUN SERPL-MCNC: 55 MG/DL
CA-I BLDV-SCNC: 1.06 MMOL/L
CALCIUM SERPL-MCNC: 7.3 MG/DL
CALCIUM SERPL-MCNC: 7.3 MG/DL
CALCIUM SERPL-MCNC: 7.7 MG/DL
CALCIUM SERPL-MCNC: 8 MG/DL
CHLORIDE SERPL-SCNC: 102 MMOL/L
CHLORIDE SERPL-SCNC: 103 MMOL/L
CHLORIDE SERPL-SCNC: 105 MMOL/L
CHLORIDE SERPL-SCNC: 106 MMOL/L
CO2 SERPL-SCNC: 22 MMOL/L
CO2 SERPL-SCNC: 23 MMOL/L
CO2 SERPL-SCNC: 23 MMOL/L
CO2 SERPL-SCNC: 24 MMOL/L
CODING SYSTEM: NORMAL
CREAT SERPL-MCNC: 1.1 MG/DL
CREAT SERPL-MCNC: 1.1 MG/DL
CREAT SERPL-MCNC: 1.3 MG/DL
CREAT SERPL-MCNC: 1.4 MG/DL
DIFFERENTIAL METHOD: ABNORMAL
DIFFERENTIAL METHOD: ABNORMAL
DISPENSE STATUS: NORMAL
EOSINOPHIL # BLD AUTO: 0 K/UL
EOSINOPHIL # BLD AUTO: 0 K/UL
EOSINOPHIL NFR BLD: 0 %
EOSINOPHIL NFR BLD: 0 %
ERYTHROCYTE [DISTWIDTH] IN BLOOD BY AUTOMATED COUNT: 17.2 %
ERYTHROCYTE [DISTWIDTH] IN BLOOD BY AUTOMATED COUNT: 17.3 %
EST. GFR  (AFRICAN AMERICAN): 55.6 ML/MIN/1.73 M^2
EST. GFR  (AFRICAN AMERICAN): >60 ML/MIN/1.73 M^2
EST. GFR  (NON AFRICAN AMERICAN): 48.1 ML/MIN/1.73 M^2
EST. GFR  (NON AFRICAN AMERICAN): 52.6 ML/MIN/1.73 M^2
EST. GFR  (NON AFRICAN AMERICAN): >60 ML/MIN/1.73 M^2
EST. GFR  (NON AFRICAN AMERICAN): >60 ML/MIN/1.73 M^2
GIANT PLATELETS BLD QL SMEAR: PRESENT
GIANT PLATELETS BLD QL SMEAR: PRESENT
GLUCOSE SERPL-MCNC: 199 MG/DL
GLUCOSE SERPL-MCNC: 200 MG/DL
GLUCOSE SERPL-MCNC: 201 MG/DL
GLUCOSE SERPL-MCNC: 204 MG/DL
HCT VFR BLD AUTO: 29.9 %
HCT VFR BLD AUTO: 30.6 %
HGB BLD-MCNC: 10.1 G/DL
HGB BLD-MCNC: 10.3 G/DL
HYPOCHROMIA BLD QL SMEAR: ABNORMAL
HYPOCHROMIA BLD QL SMEAR: ABNORMAL
INR PPP: 2.4
LYMPHOCYTES # BLD AUTO: 0.8 K/UL
LYMPHOCYTES # BLD AUTO: 1 K/UL
LYMPHOCYTES NFR BLD: 7.3 %
LYMPHOCYTES NFR BLD: 7.7 %
MAGNESIUM SERPL-MCNC: 1.7 MG/DL
MAGNESIUM SERPL-MCNC: 1.7 MG/DL
MAGNESIUM SERPL-MCNC: 2.1 MG/DL
MCH RBC QN AUTO: 29.2 PG
MCH RBC QN AUTO: 29.3 PG
MCHC RBC AUTO-ENTMCNC: 33.7 G/DL
MCHC RBC AUTO-ENTMCNC: 33.8 G/DL
MCV RBC AUTO: 86 FL
MCV RBC AUTO: 87 FL
MONOCYTES # BLD AUTO: 0.6 K/UL
MONOCYTES # BLD AUTO: 0.7 K/UL
MONOCYTES NFR BLD: 5.2 %
MONOCYTES NFR BLD: 5.5 %
NEUTROPHILS # BLD AUTO: 11.4 K/UL
NEUTROPHILS # BLD AUTO: 9 K/UL
NEUTROPHILS NFR BLD: 86.8 %
NEUTROPHILS NFR BLD: 87.4 %
OVALOCYTES BLD QL SMEAR: ABNORMAL
PHOSPHATE SERPL-MCNC: 4 MG/DL
PHOSPHATE SERPL-MCNC: 4 MG/DL
PLATELET # BLD AUTO: 48 K/UL
PLATELET # BLD AUTO: 57 K/UL
PLATELET BLD QL SMEAR: ABNORMAL
PLATELET BLD QL SMEAR: ABNORMAL
PMV BLD AUTO: 10.8 FL
PMV BLD AUTO: 11.4 FL
POIKILOCYTOSIS BLD QL SMEAR: SLIGHT
POLYCHROMASIA BLD QL SMEAR: ABNORMAL
POLYCHROMASIA BLD QL SMEAR: ABNORMAL
POTASSIUM SERPL-SCNC: 3.3 MMOL/L
POTASSIUM SERPL-SCNC: 3.5 MMOL/L
POTASSIUM SERPL-SCNC: 3.5 MMOL/L
POTASSIUM SERPL-SCNC: 3.6 MMOL/L
PROT SERPL-MCNC: 4.6 G/DL
PROT SERPL-MCNC: 4.8 G/DL
PROTHROMBIN TIME: 24 SEC
RBC # BLD AUTO: 3.46 M/UL
RBC # BLD AUTO: 3.52 M/UL
SODIUM SERPL-SCNC: 138 MMOL/L
SODIUM SERPL-SCNC: 139 MMOL/L
SPHEROCYTES BLD QL SMEAR: ABNORMAL
TOXIC GRANULES BLD QL SMEAR: PRESENT
TOXIC GRANULES BLD QL SMEAR: PRESENT
TRANS ERYTHROCYTES VOL PATIENT: NORMAL ML
VANCOMYCIN TROUGH SERPL-MCNC: 26.6 UG/ML
WBC # BLD AUTO: 10.46 K/UL
WBC # BLD AUTO: 13.18 K/UL

## 2017-10-15 PROCEDURE — 83735 ASSAY OF MAGNESIUM: CPT

## 2017-10-15 PROCEDURE — 80048 BASIC METABOLIC PNL TOTAL CA: CPT

## 2017-10-15 PROCEDURE — 85730 THROMBOPLASTIN TIME PARTIAL: CPT | Mod: 91

## 2017-10-15 PROCEDURE — 63600175 PHARM REV CODE 636 W HCPCS: Performed by: ANESTHESIOLOGY

## 2017-10-15 PROCEDURE — 83735 ASSAY OF MAGNESIUM: CPT | Mod: 91

## 2017-10-15 PROCEDURE — 63600175 PHARM REV CODE 636 W HCPCS: Performed by: STUDENT IN AN ORGANIZED HEALTH CARE EDUCATION/TRAINING PROGRAM

## 2017-10-15 PROCEDURE — 99233 SBSQ HOSP IP/OBS HIGH 50: CPT | Mod: 24,,, | Performed by: SURGERY

## 2017-10-15 PROCEDURE — 25000003 PHARM REV CODE 250: Performed by: STUDENT IN AN ORGANIZED HEALTH CARE EDUCATION/TRAINING PROGRAM

## 2017-10-15 PROCEDURE — P9045 ALBUMIN (HUMAN), 5%, 250 ML: HCPCS | Performed by: SURGERY

## 2017-10-15 PROCEDURE — 85610 PROTHROMBIN TIME: CPT

## 2017-10-15 PROCEDURE — A4216 STERILE WATER/SALINE, 10 ML: HCPCS | Performed by: NURSE PRACTITIONER

## 2017-10-15 PROCEDURE — 82330 ASSAY OF CALCIUM: CPT

## 2017-10-15 PROCEDURE — S0077 INJECTION, CLINDAMYCIN PHOSP: HCPCS | Performed by: INTERNAL MEDICINE

## 2017-10-15 PROCEDURE — 99233 SBSQ HOSP IP/OBS HIGH 50: CPT | Mod: ,,, | Performed by: INTERNAL MEDICINE

## 2017-10-15 PROCEDURE — 25000003 PHARM REV CODE 250: Performed by: NURSE PRACTITIONER

## 2017-10-15 PROCEDURE — 25000003 PHARM REV CODE 250: Performed by: INTERNAL MEDICINE

## 2017-10-15 PROCEDURE — 85025 COMPLETE CBC W/AUTO DIFF WBC: CPT | Mod: 91

## 2017-10-15 PROCEDURE — 84100 ASSAY OF PHOSPHORUS: CPT

## 2017-10-15 PROCEDURE — 84100 ASSAY OF PHOSPHORUS: CPT | Mod: 91

## 2017-10-15 PROCEDURE — 97535 SELF CARE MNGMENT TRAINING: CPT

## 2017-10-15 PROCEDURE — 20600001 HC STEP DOWN PRIVATE ROOM

## 2017-10-15 PROCEDURE — 25000003 PHARM REV CODE 250: Performed by: SURGERY

## 2017-10-15 PROCEDURE — 80202 ASSAY OF VANCOMYCIN: CPT

## 2017-10-15 PROCEDURE — 63600175 PHARM REV CODE 636 W HCPCS: Performed by: SURGERY

## 2017-10-15 PROCEDURE — 83880 ASSAY OF NATRIURETIC PEPTIDE: CPT

## 2017-10-15 PROCEDURE — 80053 COMPREHEN METABOLIC PANEL: CPT | Mod: 91

## 2017-10-15 PROCEDURE — 97166 OT EVAL MOD COMPLEX 45 MIN: CPT

## 2017-10-15 PROCEDURE — S0077 INJECTION, CLINDAMYCIN PHOSP: HCPCS | Performed by: ANESTHESIOLOGY

## 2017-10-15 PROCEDURE — 25000003 PHARM REV CODE 250: Performed by: ANESTHESIOLOGY

## 2017-10-15 PROCEDURE — 80048 BASIC METABOLIC PNL TOTAL CA: CPT | Mod: 91

## 2017-10-15 RX ORDER — DOCUSATE SODIUM 100 MG/1
100 CAPSULE, LIQUID FILLED ORAL 2 TIMES DAILY
Status: DISCONTINUED | OUTPATIENT
Start: 2017-10-15 | End: 2017-10-16

## 2017-10-15 RX ORDER — POLYETHYLENE GLYCOL 3350 17 G/17G
17 POWDER, FOR SOLUTION ORAL DAILY
Status: DISCONTINUED | OUTPATIENT
Start: 2017-10-15 | End: 2017-10-16

## 2017-10-15 RX ORDER — CLINDAMYCIN PHOSPHATE 900 MG/50ML
900 INJECTION, SOLUTION INTRAVENOUS
Status: DISCONTINUED | OUTPATIENT
Start: 2017-10-15 | End: 2017-10-17

## 2017-10-15 RX ORDER — METOPROLOL SUCCINATE 25 MG/1
25 TABLET, EXTENDED RELEASE ORAL DAILY
Status: DISCONTINUED | OUTPATIENT
Start: 2017-10-15 | End: 2017-10-15

## 2017-10-15 RX ORDER — POTASSIUM CHLORIDE 20 MEQ/1
40 TABLET, EXTENDED RELEASE ORAL DAILY
Status: DISCONTINUED | OUTPATIENT
Start: 2017-10-15 | End: 2017-10-18 | Stop reason: HOSPADM

## 2017-10-15 RX ORDER — FLUCONAZOLE 2 MG/ML
400 INJECTION, SOLUTION INTRAVENOUS
Status: DISCONTINUED | OUTPATIENT
Start: 2017-10-15 | End: 2017-10-17

## 2017-10-15 RX ORDER — ALBUMIN HUMAN 50 G/1000ML
25 SOLUTION INTRAVENOUS ONCE
Status: COMPLETED | OUTPATIENT
Start: 2017-10-15 | End: 2017-10-15

## 2017-10-15 RX ORDER — METOPROLOL SUCCINATE 25 MG/1
25 TABLET, EXTENDED RELEASE ORAL DAILY
Status: DISCONTINUED | OUTPATIENT
Start: 2017-10-15 | End: 2017-10-17

## 2017-10-15 RX ORDER — POTASSIUM CHLORIDE 29.8 MG/ML
40 INJECTION INTRAVENOUS ONCE
Status: COMPLETED | OUTPATIENT
Start: 2017-10-15 | End: 2017-10-15

## 2017-10-15 RX ORDER — IBUPROFEN 400 MG/1
400 TABLET ORAL EVERY 6 HOURS PRN
Status: DISCONTINUED | OUTPATIENT
Start: 2017-10-15 | End: 2017-10-16

## 2017-10-15 RX ORDER — BISACODYL 10 MG
10 SUPPOSITORY, RECTAL RECTAL ONCE
Status: DISCONTINUED | OUTPATIENT
Start: 2017-10-15 | End: 2017-10-16

## 2017-10-15 RX ORDER — LISINOPRIL 2.5 MG/1
2.5 TABLET ORAL DAILY
Status: DISCONTINUED | OUTPATIENT
Start: 2017-10-15 | End: 2017-10-17

## 2017-10-15 RX ADMIN — POTASSIUM CHLORIDE 40 MEQ: 1500 TABLET, EXTENDED RELEASE ORAL at 12:10

## 2017-10-15 RX ADMIN — Medication 3 ML: at 09:10

## 2017-10-15 RX ADMIN — POTASSIUM CHLORIDE 40 MEQ: 400 INJECTION, SOLUTION INTRAVENOUS at 06:10

## 2017-10-15 RX ADMIN — Medication 3 ML: at 05:10

## 2017-10-15 RX ADMIN — METOPROLOL SUCCINATE 25 MG: 25 TABLET, EXTENDED RELEASE ORAL at 06:10

## 2017-10-15 RX ADMIN — METOPROLOL TARTRATE 5 MG: 5 INJECTION INTRAVENOUS at 05:10

## 2017-10-15 RX ADMIN — IBUPROFEN 400 MG: 400 TABLET, FILM COATED ORAL at 08:10

## 2017-10-15 RX ADMIN — LEVOTHYROXINE SODIUM ANHYDROUS 50 MCG: 100 INJECTION, POWDER, LYOPHILIZED, FOR SOLUTION INTRAVENOUS at 08:10

## 2017-10-15 RX ADMIN — CLOPIDOGREL 75 MG: 75 TABLET, FILM COATED ORAL at 08:10

## 2017-10-15 RX ADMIN — CLINDAMYCIN IN 5 PERCENT DEXTROSE 900 MG: 18 INJECTION, SOLUTION INTRAVENOUS at 09:10

## 2017-10-15 RX ADMIN — CLINDAMYCIN IN 5 PERCENT DEXTROSE 900 MG: 18 INJECTION, SOLUTION INTRAVENOUS at 06:10

## 2017-10-15 RX ADMIN — FLUCONAZOLE 400 MG: 2 INJECTION INTRAVENOUS at 04:10

## 2017-10-15 RX ADMIN — POLYETHYLENE GLYCOL 3350 17 G: 17 POWDER, FOR SOLUTION ORAL at 06:10

## 2017-10-15 RX ADMIN — SODIUM CHLORIDE, SODIUM LACTATE, POTASSIUM CHLORIDE, AND CALCIUM CHLORIDE 1000 ML: .6; .31; .03; .02 INJECTION, SOLUTION INTRAVENOUS at 06:10

## 2017-10-15 RX ADMIN — PIPERACILLIN AND TAZOBACTAM 4.5 G: 4; .5 INJECTION, POWDER, LYOPHILIZED, FOR SOLUTION INTRAVENOUS; PARENTERAL at 05:10

## 2017-10-15 RX ADMIN — HYDROCORTISONE SODIUM SUCCINATE 50 MG: 100 INJECTION, POWDER, FOR SOLUTION INTRAMUSCULAR; INTRAVENOUS at 03:10

## 2017-10-15 RX ADMIN — DOCUSATE SODIUM 100 MG: 100 CAPSULE, LIQUID FILLED ORAL at 09:10

## 2017-10-15 RX ADMIN — PIPERACILLIN AND TAZOBACTAM 4.5 G: 4; .5 INJECTION, POWDER, LYOPHILIZED, FOR SOLUTION INTRAVENOUS; PARENTERAL at 03:10

## 2017-10-15 RX ADMIN — FUROSEMIDE 40 MG: 10 INJECTION, SOLUTION INTRAVENOUS at 08:10

## 2017-10-15 RX ADMIN — LISINOPRIL 2.5 MG: 2.5 TABLET ORAL at 08:10

## 2017-10-15 RX ADMIN — ALBUMIN (HUMAN) 25 G: 12.5 SOLUTION INTRAVENOUS at 08:10

## 2017-10-15 RX ADMIN — MAGNESIUM SULFATE IN WATER 2 G: 40 INJECTION, SOLUTION INTRAVENOUS at 04:10

## 2017-10-15 RX ADMIN — ARGATROBAN 1 MCG/KG/MIN: 125 SOLUTION INTRAVENOUS at 09:10

## 2017-10-15 RX ADMIN — HYDROCORTISONE SODIUM SUCCINATE 50 MG: 100 INJECTION, POWDER, FOR SOLUTION INTRAMUSCULAR; INTRAVENOUS at 09:10

## 2017-10-15 RX ADMIN — PIPERACILLIN AND TAZOBACTAM 4.5 G: 4; .5 INJECTION, POWDER, LYOPHILIZED, FOR SOLUTION INTRAVENOUS; PARENTERAL at 09:10

## 2017-10-15 RX ADMIN — HYDROCORTISONE SODIUM SUCCINATE 100 MG: 100 INJECTION, POWDER, FOR SOLUTION INTRAMUSCULAR; INTRAVENOUS at 05:10

## 2017-10-15 RX ADMIN — ASPIRIN 81 MG CHEWABLE TABLET 81 MG: 81 TABLET CHEWABLE at 08:10

## 2017-10-15 RX ADMIN — DOCUSATE SODIUM 100 MG: 100 CAPSULE, LIQUID FILLED ORAL at 06:10

## 2017-10-15 RX ADMIN — CALCIUM GLUCONATE 1000 MG: 94 INJECTION, SOLUTION INTRAVENOUS at 06:10

## 2017-10-15 RX ADMIN — CLINDAMYCIN IN 5 PERCENT DEXTROSE 900 MG: 18 INJECTION, SOLUTION INTRAVENOUS at 03:10

## 2017-10-15 NOTE — SUBJECTIVE & OBJECTIVE
Interval History: afebrile, no pressors, on RA WBC downtrended    Review of Systems   Constitutional: Negative for activity change, appetite change, chills, fatigue and fever.   HENT: Negative for congestion, dental problem, mouth sores and sinus pressure.    Eyes: Negative for pain, redness and visual disturbance.   Respiratory: Negative for cough, shortness of breath and wheezing.    Cardiovascular: Negative for chest pain and leg swelling.   Gastrointestinal: Negative for abdominal distention, abdominal pain, diarrhea, nausea and vomiting.   Endocrine: Negative for polyuria.   Genitourinary: Negative for decreased urine volume, dysuria and frequency.   Musculoskeletal: Negative for joint swelling.   Skin: Negative for color change.   Allergic/Immunologic: Negative for food allergies.   Neurological: Negative for dizziness, weakness and headaches.   Hematological: Negative for adenopathy.   Psychiatric/Behavioral: Negative for agitation and confusion. The patient is not nervous/anxious.      Objective:     Vital Signs (Most Recent):  Temp: 97.4 °F (36.3 °C) (10/14/17 2300)  Pulse: (!) 54 (10/15/17 0600)  Resp: 14 (10/15/17 0600)  BP: (!) 96/53 (10/12/17 0600)  SpO2: 95 % (10/15/17 0600) Vital Signs (24h Range):  Temp:  [97.4 °F (36.3 °C)-97.8 °F (36.6 °C)] 97.4 °F (36.3 °C)  Pulse:  [52-72] 54  Resp:  [13-20] 14  SpO2:  [94 %-100 %] 95 %  Arterial Line BP: (117-150)/(38-66) 146/50     Weight: 78.2 kg (172 lb 6.4 oz)  Body mass index is 27 kg/m².    Estimated Creatinine Clearance: 52.6 mL/min (based on SCr of 1.1 mg/dL).    Physical Exam   Constitutional: He is oriented to person, place, and time. He appears well-developed and well-nourished.   HENT:   Head: Normocephalic and atraumatic.   Mouth/Throat: Oropharynx is clear and moist.   Eyes: Conjunctivae are normal.   Neck: Neck supple.   Cardiovascular: Normal rate, regular rhythm and normal heart sounds.    No murmur heard.  Pulmonary/Chest: Effort normal and  breath sounds normal. No respiratory distress. He has no wheezes.   Abdominal: Soft. Bowel sounds are normal. He exhibits no distension. There is no tenderness.   RUQ drains x 2   Musculoskeletal: Normal range of motion. He exhibits no edema or tenderness.   Lymphadenopathy:     He has no cervical adenopathy.   Neurological: He is alert and oriented to person, place, and time. Coordination normal.   Skin: Skin is warm and dry. No rash noted.   Psychiatric: He has a normal mood and affect. His behavior is normal.       Significant Labs:   CBC:   Recent Labs  Lab 10/14/17  0300 10/14/17  1717 10/15/17  0311   WBC 11.40 14.77* 10.46   HGB 7.8* 10.3* 10.1*   HCT 23.8* 30.1* 29.9*   PLT 69* 50* 48*     CMP:   Recent Labs  Lab 10/14/17  0300 10/14/17  1717 10/15/17  0036 10/15/17  0311    138 139 139   K 3.7 2.9* 3.5 3.3*    103 106 105   CO2 21* 23 22* 23   * 228* 204* 199*   BUN 48* 52* 50* 51*   CREATININE 1.0 1.2 1.1 1.1   CALCIUM 7.6* 7.9* 7.7* 7.3*   PROT 4.8* 5.2*  --  4.6*   ALBUMIN 1.7* 1.8*  --  1.7*   BILITOT 3.9* 4.2*  --  3.1*   ALKPHOS 113 117  --  100   AST 93* 50*  --  31   * 137*  --  109*   ANIONGAP 10 12 11 11   EGFRNONAA >60.0 58.0* >60.0 >60.0       Significant Imaging: I have reviewed all pertinent imaging results/findings within the past 24 hours.     10/14 bcx NGTD  10/12 bcx ecoli  10/12 abscess drainage e faecalis sensi pending, Ecoli R to quinolones/;amp, kleb pneumo S  10/11 bcx ecoli, c perfringens

## 2017-10-15 NOTE — PROGRESS NOTES
Progress Note  Surgical Intensive Care    Admit Date: 10/11/2017  Post-operative Day:    Hospital Day: 5    SUBJECTIVE:     Follow-up For:  * No surgery found *    HPI:    Patient is a 77 y.o. 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 Past medical history is significant for hypothyroidism, hypertension, hyperlipidemia, CAD s/p CABG, anemia and autoimmune cholangitis. Patient was seen in clinic on 10/11/2017 for post-op follow up. Patient stated he was fatigued, experiencing chills the night before follow up. He looked ashen in clinic and had scleral icterus. He was subsequently admitted to [facility]. On the floor, blood cultures came back positive for gram positive rods and gram negative rods. CT scan yesterday evening showed 7.2cm gas collection in the hepatic dome and an adjacent gas and fluid collection in the resection bed 2.7cm. Patient received 2L NS for fluid resuscitation. Patient became hypotensive on the floor, desated down to 80s. Lactic 8.9, Troponin elevated at 2.9. No changes on EKG. He was stepped up to the SICU. Noted to be quite jaundice with total bilirubin 18, increased from 5.4 in clinic. Started on broad spectrum antibiotics, placed on non-rebreather mask at 15L, placed right radial arterial catheter and right internal jugular central venous catheter. H/H decreased to 6.4/20.9 from 9.6/30.4, transfused 2 units pRBCs. Cardiology consulted for increased troponin, determined to be secondary to demand ischemia. IR called regarding fluid collection. Continue on broad spectrum antibiotics.    Interval history:    No acute issues overnight. HIT panel pending, continues on Argatroban gtt. Toleraing PO and passing flatus, no BM. Complains of some gastric cramping this AM.     Continuous Infusions:   argatroban in 0.9 % sod chlor 1 mcg/kg/min (10/15/17 0800)     Scheduled Meds:   aspirin  81 mg Oral Daily    bisacodyl  10 mg  Rectal Once    clindamycin (CLEOCIN) IVPB  900 mg Intravenous Q8H    clopidogrel  75 mg Oral Daily    docusate sodium  100 mg Oral BID    fluconazole (DIFLUCAN) IVPB  400 mg Intravenous Q24H    furosemide  40 mg Intravenous Daily    hydrocortisone sodium succinate  50 mg Intravenous Q8H    levothyroxine  50 mcg Intravenous Daily    lisinopril  2.5 mg Oral Daily    metoprolol succinate  25 mg Oral Daily    piperacillin-tazobactam 4.5 g in dextrose 5 % 100 mL IVPB (ready to mix system)  4.5 g Intravenous Q8H    polyethylene glycol  17 g Oral Daily    potassium chloride  40 mEq Oral Daily    sodium chloride 0.9%  3 mL Intravenous Q8H    vancomycin (VANCOCIN) IVPB  1,000 mg Intravenous Q12H     PRN Meds:sodium chloride, albuterol-ipratropium 2.5mg-0.5mg/3mL, alprazolam, calcium gluconate IVPB, ibuprofen, magnesium sulfate IVPB, magnesium sulfate IVPB, ondansetron, promethazine (PHENERGAN) IVPB    Review of patient's allergies indicates:  No Known Allergies    OBJECTIVE:     Vital Signs (Most Recent)  Temp: 97.7 °F (36.5 °C) (10/15/17 0715)  Pulse: (!) 59 (10/15/17 0830)  Resp: 14 (10/15/17 0830)  BP: (!) 96/53 (10/12/17 0600)  SpO2: 97 % (10/15/17 0830)    Vital Signs Range (Last 24H):  Temp:  [97.4 °F (36.3 °C)-97.8 °F (36.6 °C)]   Pulse:  [53-72]   Resp:  [12-20]   SpO2:  [94 %-99 %]   Arterial Line BP: (117-150)/(38-66)     I & O (Last 24H):    Intake/Output Summary (Last 24 hours) at 10/15/17 1015  Last data filed at 10/15/17 0800   Gross per 24 hour   Intake             2940 ml   Output             3835 ml   Net             -895 ml       Hemodynamic Parameters (Last 24H):       Physical Exam  Physical Exam:  General: NAD. Appears stated age. Well appearing.  Neuro: Alert and Oriented x4. CN II-XII Grossly intact  HEENT: NCAT. EOMI. Ears not examined. Nares patent without drainage. OP clear and moist without exudate.  Chest/Pulmonary: Symmetric. Normal effort. CTAB   Cardiac: RRR, Clear S1/S2, no m/r/g  noted.   Abdomen: soft, Mild periumbilical TTP, nd, +bs. Drains with minimal serobilious output.   : Valenzuela in place draining clear urine  Extremities: warm, symmetric      Laboratory (Last 24H):  CBC:    Recent Labs  Lab 10/15/17  0311   WBC 10.46   HGB 10.1*   HCT 29.9*   PLT 48*     CMP:    Recent Labs  Lab 10/15/17  0311   CALCIUM 7.3*   ALBUMIN 1.7*   PROT 4.6*      K 3.3*   CO2 23      BUN 51*   CREATININE 1.1   ALKPHOS 100   *   AST 31   BILITOT 3.1*     Lab Results   Component Value Date    INR 2.4 (H) 10/15/2017    INR 2.6 (H) 10/14/2017    INR 1.0 10/13/2017       Chest X-Ray: X-ray Chest 1 View    Result Date: 10/13/2017  No interval detrimental change. Electronically signed by: APRIL HIDALGO MD Date:     10/13/17 Time:    06:30     Diagnostic Results:  X-Ray: unchanged from yesterday    ASSESSMENT/PLAN:   Patient is a 77 y.o. male had a partial hepatic resection in August 2017 for IgG4 sclerosing cholangitis and is on a steroid taper for this. Past medical history is significant for hypothyroidism, hypertension, hyperlipidemia, CAD s/p CABG, anemia and autoimmune cholangitis.     Plan:     Neuro:   - Pain: Minimal. Controlled on PRNs.      Pulmonary:   - No acute issues  - Supplemental O2 to main Spo2@> 92%     Cardiac:  - ACS, NSTEMI: Peak troponin of 28 with new LBBB and decreased EF. Cardiology following. Continue Anticoagulation Gtt for 48hrs, DAPT, BB, ACE-I.  LHC when completed sepsis treatment course. Lasiix  - HTN, CAD: Contine home medications      Renal:   - No acute issues  - Consider d/c valenzuela     Fluids/Electrolytes/Nutrition/GI:   -Nutritional status: ADAT   -replace lytes PRN  -Bowel regimen per primary team  - GI prophylaxis per primary team  - two drains placed by IR yesterday, one in the anterior fluid collection with purulent bilious drainage and one in the posterior/dome fluid collection with what appears to be old hematoma.   - tbili improved today  3.9     Hematology/Oncology:  -H/H stable, CBC q12  - thrombocytopenial to 69, INR stable 1.0. HIT panel pending    Infectious Disease:   - Severe Sepsis 2/2 E. Coli Bacteremia from Biliary Source: Pan sensitive. IA abscess with Klebsiella as well. Repeat Cx NGTD. Clinda day 3, Zosyn Day 4. Fluconazole day 5. Continue per ID.   - Hepatic Abscess: Pan sensitive E. Coli and Klebsiella. Drains with minimal output. Clinda day 3, Zosyn Day 4. Fluconazole day 5. Continue per ID.       Endocrine:  -Glucose goal of 120-150     Dispo:  - Stepdown today    Hubert Toribio MD   Anesthesiology CA-2

## 2017-10-15 NOTE — SUBJECTIVE & OBJECTIVE
Interval History: No acute events overnight, cardiology following, on argatroban drip due to drop in platelets Transfused yesterday due to down trending H&H, appropriate response. Some cramping in belly this AM. Started flushing drains BID.     Medications:  Continuous Infusions:   argatroban in 0.9 % sod chlor 1 mcg/kg/min (10/15/17 0600)     Scheduled Meds:   aspirin  81 mg Oral Daily    calcium gluconate IVPB  1,000 mg Intravenous Once    clindamycin (CLEOCIN) IVPB  900 mg Intravenous Q8H    clopidogrel  75 mg Oral Daily    fluconazole (DIFLUCAN) IVPB  400 mg Intravenous Q24H    furosemide  40 mg Intravenous Daily    hydrocortisone sodium succinate  100 mg Intravenous Q8H    levothyroxine  50 mcg Intravenous Daily    lisinopril  2.5 mg Oral Daily    metoprolol succinate  25 mg Oral Daily    piperacillin-tazobactam 4.5 g in dextrose 5 % 100 mL IVPB (ready to mix system)  4.5 g Intravenous Q8H    potassium chloride  40 mEq Intravenous Once    sodium chloride 0.9%  3 mL Intravenous Q8H    vancomycin (VANCOCIN) IVPB  1,250 mg Intravenous Q12H     PRN Meds:sodium chloride, albuterol-ipratropium 2.5mg-0.5mg/3mL, alprazolam, calcium gluconate IVPB, calcium gluconate IVPB, calcium gluconate IVPB, ibuprofen, magnesium sulfate IVPB, magnesium sulfate IVPB, ondansetron, promethazine (PHENERGAN) IVPB     Review of patient's allergies indicates:  No Known Allergies  Objective:     Vital Signs (Most Recent):  Temp: 97.4 °F (36.3 °C) (10/14/17 2300)  Pulse: (!) 54 (10/15/17 0600)  Resp: 14 (10/15/17 0600)  BP: (!) 96/53 (10/12/17 0600)  SpO2: 95 % (10/15/17 0600) Vital Signs (24h Range):  Temp:  [97.4 °F (36.3 °C)-97.8 °F (36.6 °C)] 97.4 °F (36.3 °C)  Pulse:  [52-72] 54  Resp:  [13-20] 14  SpO2:  [94 %-100 %] 95 %  Arterial Line BP: (115-150)/(38-66) 146/50     Weight: 78.2 kg (172 lb 6.4 oz)  Body mass index is 27 kg/m².    Intake/Output - Last 3 Shifts       10/13 0700 - 10/14 0659 10/14 0700 - 10/15 0659     P.O.  954    I.V. (mL/kg) 1657.1 (21.2) 1986 (25.4)    Total Intake(mL/kg) 1657.1 (21.2) 2940 (37.6)    Urine (mL/kg/hr) 4000 (2.1) 4300 (2.3)    Drains 15 (0) 175 (0.1)    Total Output 4015 4475    Net -2357.9 -1535                Physical Exam   Constitutional: He is oriented to person, place, and time. He appears well-developed. No distress.   No acute distress this morning   HENT:   Head: Normocephalic and atraumatic.   Eyes: EOM are normal. Scleral icterus is present.   Neck: Neck supple.   Cardiovascular: Regular rhythm, normal heart sounds and intact distal pulses.  Tachycardia present.    No chest pain today, HR in 50s   Pulmonary/Chest: Effort normal. No respiratory distress. He has no wheezes.   On RA   Abdominal: Soft. He exhibits no distension. There is no tenderness. There is no rebound.       Well-healed upper midline incision. 2 IR drains in place with murky output   Musculoskeletal: He exhibits no edema or deformity.   Neurological: He is alert and oriented to person, place, and time.   Skin: Skin is warm and dry.       Significant Labs:  CBC:     Recent Labs  Lab 10/15/17  0311   WBC 10.46   RBC 3.46*   HGB 10.1*   HCT 29.9*   PLT 48*   MCV 86   MCH 29.2   MCHC 33.8     BMP:     Recent Labs  Lab 10/15/17  0311   *      K 3.3*      CO2 23   BUN 51*   CREATININE 1.1   CALCIUM 7.3*   MG 1.7     LFTs:     Recent Labs  Lab 10/15/17  0311   *   AST 31   ALKPHOS 100   BILITOT 3.1*   PROT 4.6*   ALBUMIN 1.7*     Cardiac markers:     Recent Labs  Lab 10/12/17  1810 10/13/17  0005   CPKMB 57.8*  --    TROPONINI 28.149* 21.555*     Microbiology Results (last 7 days)     Procedure Component Value Units Date/Time    Blood culture [[id number]] Collected:  10/12/17 2235    Order Status:  Completed Specimen:  Blood from Line, Jugular, Internal Right Updated:  10/15/17 0613     Blood Culture, Routine No Growth to date     Blood Culture, Routine No Growth to date     Blood Culture, Routine No  Growth to date    Blood culture [[id number]] Collected:  10/12/17 2208    Order Status:  Completed Specimen:  Blood from Peripheral, Antecubital, Right Updated:  10/14/17 1416     Blood Culture, Routine Gram stain aer bottle: Gram negative rods     Blood Culture, Routine Results called to and read back by:Harry Cook RN 10/13/2017  15:51     Blood Culture, Routine --     ESCHERICHIA COLI  Susceptibility pending      Blood culture [[id number]] Collected:  10/14/17 0410    Order Status:  Completed Specimen:  Blood from Peripheral, Wrist, Left Updated:  10/14/17 1345     Blood Culture, Routine No Growth to date    Blood culture [[id number]] Collected:  10/14/17 0355    Order Status:  Completed Specimen:  Blood from Peripheral, Antecubital, Right Updated:  10/14/17 1345     Blood Culture, Routine No Growth to date    Aerobic culture [[id number]]  (Susceptibility) Collected:  10/12/17 1714    Order Status:  Completed Specimen:  Abscess from Abdomen Updated:  10/14/17 1201     Aerobic Bacterial Culture --     ESCHERICHIA COLI  Moderate       Aerobic Bacterial Culture --     ENTEROCOCCUS SPECIES  Many  Identification and susceptibility pending      Narrative:       Posterior dome (liver)    Aerobic culture [[id number]]  (Susceptibility) Collected:  10/12/17 1705    Order Status:  Completed Specimen:  Abscess from Abdomen Updated:  10/14/17 1151     Aerobic Bacterial Culture --     ESCHERICHIA COLI  Moderate       Aerobic Bacterial Culture --     KLEBSIELLA PNEUMONIAE  Moderate       Aerobic Bacterial Culture --     ENTEROCOCCUS SPECIES  Moderate  Identification and susceptibility pending      Narrative:       Anterior lesion (liver)    Blood culture [[id number]] Collected:  10/12/17 0055    Order Status:  Completed Specimen:  Blood Updated:  10/14/17 1043     Blood Culture, Routine Gram stain aer bottle: Gram negative rods     Blood Culture, Routine Results called to and read back by: Harry Cook RN     Blood Culture,  Routine 10/12/2017  14:44     Blood Culture, Routine --     ESCHERICHIA COLI  For susceptibility see order # [id number]      Blood culture [[id number]] Collected:  10/12/17 0055    Order Status:  Completed Specimen:  Blood Updated:  10/14/17 1042     Blood Culture, Routine Gram stain aer bottle: Gram negative rods     Blood Culture, Routine Results called to and read back by: Harry Cook RN     Blood Culture, Routine 10/12/2017  14:43     Blood Culture, Routine Gram stain leola bottle: Gram negative rods 10/12/2017  20:11     Blood Culture, Routine --     ESCHERICHIA COLI  For susceptibility see order # [id number]      Blood culture [[id number]] Collected:  10/11/17 1317    Order Status:  Completed Specimen:  Blood Updated:  10/14/17 1041     Blood Culture, Routine Gram stain leola bottle:  Gram variable rods     Blood Culture, Routine Results called to and read back by:Enrique Palomo RN 10/11/2017  23:09     Blood Culture, Routine Gram stain aer bottle: Gram negative rods      Blood Culture, Routine Results called to and read back by:Enrique Palomo RN 10/12/2017  02:41     Blood Culture, Routine --     ESCHERICHIA COLI  For susceptibility see order # [id number]       Blood Culture, Routine CLOSTRIDIUM PERFRINGENS    Blood culture [[id number]]  (Susceptibility) Collected:  10/11/17 1309    Order Status:  Completed Specimen:  Blood Updated:  10/14/17 1041     Blood Culture, Routine Gram stain leola bottle: Gram positive rods     Blood Culture, Routine Results called to and read back by:Enrique Palomo RN 10/11/2017  21:54     Blood Culture, Routine Gram stain aer bottle: Gram negative rods      Blood Culture, Routine Results called to and read back by:Enrique Palomo RN 10/12/2017  02:41     Blood Culture, Routine ESCHERICHIA COLI     Blood Culture, Routine CLOSTRIDIUM PERFRINGENS    Urine culture [[id number]] Collected:  10/12/17 0500    Order Status:  Completed Specimen:  Urine from Urine, Catheterized Updated:   10/13/17 1255     Urine Culture, Routine No growth    Culture, Anaerobe [[id number]] Collected:  10/12/17 1705    Order Status:  Completed Specimen:  Abscess from Abdomen Updated:  10/13/17 0741     Anaerobic Culture Culture in progress    Narrative:       Anterior lesion (liver)    Culture, Anaerobe [[id number]] Collected:  10/12/17 1714    Order Status:  Completed Specimen:  Abscess from Abdomen Updated:  10/13/17 0741     Anaerobic Culture Culture in progress    Narrative:       Posterior dome (liver)    Gram stain [[id number]] Collected:  10/12/17 1705    Order Status:  Completed Specimen:  Abscess from Abdomen Updated:  10/12/17 2033     Gram Stain Result Rare WBC's      Rare Gram positive cocci    Narrative:       Anterior lesion (liver)    Gram stain [[id number]] Collected:  10/12/17 1714    Order Status:  Completed Specimen:  Abscess from Abdomen Updated:  10/12/17 2013     Gram Stain Result Few WBC's      Few Gram positive rods      Rare Gram negative rods    Narrative:       Posterior dome (liver)    Urine culture [[id number]] Collected:  10/11/17 1832    Order Status:  Sent Specimen:  Urine from Urine, Clean Catch Updated:  10/11/17 1832          Significant Diagnostics:  I have reviewed all pertinent imaging results/findings within the past 24 hours.

## 2017-10-15 NOTE — ASSESSMENT & PLAN NOTE
76 y/o M h/o CAD s/p CABG, HTN, HLD, prostate CA s/p prostatectomy 2005, found to have hepatic mass/abscess (segment IV) near hilum and stricture of upper bile duct on ERCP s/p L hepatectomy and resection of extrahepatic biliary tree, Eron-en-Y, 8/3/17 with final path demonstrating IGG-4 associated sclerosing cholangitis started on prednisone 40 mg daily and OR cultures of hepatic abscess growing clostridium perfringens, Ecoli, kleb pneumo, E faecalis given 5 days of IV abx post operatively, seen in clinic for f/u 10/11 c/o jaundice and fatigue with subjective fevers and was admitted.  Here he was febrile to 102 with hypoxia requirine venti mask, with leukocytosis of 33K and blood cultures with GPR, GVR and GNR with CT a/p with 7.2 cm gas collection in hepatic dome containing scattered debris with adjacent gas and fluid collection along the resection bed measuring 2.7 cm s/p IR drainage of 2 collections 10/12  - C perfringens, ecoli, kleb, efaecalis bacteremia all from hepatic abscess source  - continue clindamycin  pip/tazo and fluc, stop vanco for now with supratherapeutic level, suspect e faecalis S to amp which covered by pip/tazo

## 2017-10-15 NOTE — PT/OT/SLP EVAL
"Occupational Therapy  Evaluation    Robby Soriano   MRN: [id number]   Admitting Diagnosis: Obstructive jaundice    OT Date of Treatment: 10/15/17   OT Start Time: 1100  OT Stop Time: 1123  OT Total Time (min): 23 min    Billable Minutes:  Evaluation 10  Self Care/Home Management 13    Diagnosis: Obstructive jaundice   S/p IR abscess drainage 10/12    Past Medical History:   Diagnosis Date    Cancer     Prostate/s/p prostatectomy    Coronary artery disease     GERD (gastroesophageal reflux disease)     Hyperlipidemia     Hypertension     Hypothyroidism       Past Surgical History:   Procedure Laterality Date    CAROTID ENDARTERECTOMY Bilateral     CORONARY ARTERY BYPASS GRAFT      PROSTATE SURGERY         Referring physician: Darlin  Date referred to OT: 10/12/17    General Precautions: Standard, fall  Orthopedic Precautions:    Braces:      Do you have any cultural, spiritual, Yazidi conflicts, given your current situation?: none reported     Patient History:  Living Environment  Lives With: spouse  Living Arrangements: mobile home (ramp access)  Stair Railings at Home: outside, present at both sides  Transportation Available: family or friend will provide  Living Environment Comment: Pt's wife will be available to assist at d/c  Equipment Currently Used at Home:  (walk-in shower with built-in seat, QC, rollator)    Prior level of function:   Bed Mobility/Transfers: independent  Grooming: independent  Bathing: independent  Upper Body Dressing: independent  Lower Body Dressing: independent  Toileting: independent  Home Management Skills: independent  Driving License: Yes  Occupation: Retired     Dominant hand: right    Subjective:  Communicated with RN prior to session.    Chief Complaint: "I think I need to be cleaned up."  Patient/Family stated goals: to go home    Pain/Comfort  Pain Rating 1: 0/10  Pain Rating Post-Intervention 1: 0/10    Objective:  Patient found with: blood pressure cuff, telemetry, " pulse ox (continuous), KAIDEN drain, arterial line, valenzuela catheter, oxygen    Cognitive Exam:  WFL    Physical Exam:  Postural examination/scapula alignment: No postural abnormalities identified  Skin integrity: Visible skin intact  Edema: None noted     Sensation:   Intact    Upper Extremity Range of Motion:  Right Upper Extremity: WFL  Left Upper Extremity: WFL    Upper Extremity Strength:  Right Upper Extremity: WFL  Left Upper Extremity: WFL   Strength: Good  Functional Mobility:  Bed Mobility:  Rolling/Turning Right: Minimum assistance  Scooting/Bridging: Contact Guard Assistance (to EOB)  Supine to Sit: Minimum Assistance    Transfers:  Sit <> Stand Assistance: Contact Guard Assistance  Sit <> Stand Assistive Device: No Assistive Device  Bed <> Chair Technique: Stand Pivot  Bed <> Chair Transfer Assistance: Contact Guard Assistance  Bed <> Chair Assistive Device: No Assistive Device    Functional Ambulation: Minimal A x 5 feet; limited d/t bowel incontinence during mobility    Activities of Daily Living:  Feeding Level of Assistance: Set-up Assistance  Feeding adaptive equipment:   UE Dressing Level of Assistance: Minimum assistance  UE adaptive equipment:   LE Dressing Level of Assistance: Maximum assistance  LE adaptive equipment:   Grooming Position: Standing  Grooming Level of Assistance: Contact guard assistance  Toileting Where Assessed: Other (Comment)  Toileting Level of Assistance: Maximum assistance        Therapeutic Activities and Exercises:  Pt ed on OT POC and ROM ex's to be performed for increased aerobic activity  Pt stood x 3 minutes with CGA during pericare    AM-PAC 6 CLICK ADL  How much help from another person does this patient currently need?  1 = Unable, Total/Dependent Assistance  2 = A lot, Maximum/Moderate Assistance  3 = A little, Minimum/Contact Guard/Supervision  4 = None, Modified [place]/Independent    Putting on and taking off regular lower body clothing? : 2  Bathing  "(including washing, rinsing, drying)?: 2  Toileting, which includes using toilet, bedpan, or urinal? : 2  Putting on and taking off regular upper body clothing?: 3  Taking care of personal grooming such as brushing teeth?: 3  Eating meals?: 3  Total Score: 15    AM-PAC Raw Score CMS "G-Code Modifier Level of Impairment Assistance   6 % Total / Unable   7 - 9 CM 80 - 100% Maximal Assist   10-14 CL 60 - 80% Moderate Assist   15 - 19 CK 40 - 60% Moderate Assist   20 - 22 CJ 20 - 40% Minimal Assist   23 CI 1-20% SBA / CGA   24 CH 0% Independent/ Mod I       Patient left up in chair with all lines intact, call button in reach, rn notified and spouse present    Assessment:  Robby Soriano is a 77 y.o. male with a medical diagnosis of Obstructive jaundice and presents with generalizes weakness and decreased endurance affecting ADL (I).    Rehab identified problem list/impairments: Rehab identified problem list/impairments: impaired endurance, impaired self care skills, impaired balance, gait instability, weakness, impaired functional mobilty, impaired cardiopulmonary response to activity    Rehab potential is good.    Activity tolerance: Good    Discharge recommendations: Discharge Facility/Level Of Care Needs: home with home health     Barriers to discharge: Barriers to Discharge: None    Equipment recommendations:  (TBD closer to d/c)     GOALS:    Occupational Therapy Goals        Problem: Occupational Therapy Goal    Goal Priority Disciplines Outcome Interventions   Occupational Therapy Goal     OT, PT/OT Ongoing (interventions implemented as appropriate)    Description:  Goals to be met by: 10/27/17     Patient will increase functional independence with ADLs by performing:    Feeding with [place].  UE Dressing with Supervision.  LE Dressing with Supervision.  Grooming while standing at sink with Supervision.  Toileting from toilet with Supervision for hygiene and clothing management.   Toilet transfer to " toilet with Supervision.                      PLAN:  Patient to be seen 4 x/week to address the above listed problems via self-care/home management, therapeutic activities, therapeutic exercises  Plan of Care expires: 11/14/17  Plan of Care reviewed with: patient, spouse         Pauline ELLEN Gross  10/15/2017

## 2017-10-15 NOTE — PLAN OF CARE
Problem: Occupational Therapy Goal  Goal: Occupational Therapy Goal  Goals to be met by: 10/27/17     Patient will increase functional independence with ADLs by performing:    Feeding with [place].  UE Dressing with Supervision.  LE Dressing with Supervision.  Grooming while standing at sink with Supervision.  Toileting from toilet with Supervision for hygiene and clothing management.   Toilet transfer to toilet with Supervision.    Outcome: Ongoing (interventions implemented as appropriate)  OT eval completed, and above goals established. ELLEN Acevedo  10/15/2017

## 2017-10-15 NOTE — ASSESSMENT & PLAN NOTE
- Trop I peaked at 28; now decreasing  - STEMI, Cardiology following; agatroban drip, plavix loaded,  - Started on lininopril 2.5mg daily, switched to 25mg oral metop appreciate recs  - 2D echo from 10/11/17: normal EF (60-65%) with severe LA enlargement, recent EF down to 25%.  - Continue aspirin  - Given 2 units PRBC's yesterday w/ Lasix, appropriate response

## 2017-10-15 NOTE — PROGRESS NOTES
[facility]  General Surgery  Progress Note    Subjective:     History of Present Illness:  Mr. 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 He is currently on a steroid taper. He recuperated slowly but was doing well with forward progress until yesterday when he become easily fatigued and last night he had chills x 45 minutes.   He didnot feel well in clinic today. Of note, he also reports increasingly dark urine in the past week. Tbili was also noted to be 5.4 today in clinic.     Post-Op Info:  * No surgery found *         Interval History: No acute events overnight, cardiology following, on argatroban drip due to drop in platelets Transfused yesterday due to down trending H&H, appropriate response. Some cramping in belly this AM. Started flushing drains BID.     Medications:  Continuous Infusions:   argatroban in 0.9 % sod chlor 1 mcg/kg/min (10/15/17 0600)     Scheduled Meds:   aspirin  81 mg Oral Daily    calcium gluconate IVPB  1,000 mg Intravenous Once    clindamycin (CLEOCIN) IVPB  900 mg Intravenous Q8H    clopidogrel  75 mg Oral Daily    fluconazole (DIFLUCAN) IVPB  400 mg Intravenous Q24H    furosemide  40 mg Intravenous Daily    hydrocortisone sodium succinate  100 mg Intravenous Q8H    levothyroxine  50 mcg Intravenous Daily    lisinopril  2.5 mg Oral Daily    metoprolol succinate  25 mg Oral Daily    piperacillin-tazobactam 4.5 g in dextrose 5 % 100 mL IVPB (ready to mix system)  4.5 g Intravenous Q8H    potassium chloride  40 mEq Intravenous Once    sodium chloride 0.9%  3 mL Intravenous Q8H    vancomycin (VANCOCIN) IVPB  1,250 mg Intravenous Q12H     PRN Meds:sodium chloride, albuterol-ipratropium 2.5mg-0.5mg/3mL, alprazolam, calcium gluconate IVPB, calcium gluconate IVPB, calcium  gluconate IVPB, ibuprofen, magnesium sulfate IVPB, magnesium sulfate IVPB, ondansetron, promethazine (PHENERGAN) IVPB     Review of patient's allergies indicates:  No Known Allergies  Objective:     Vital Signs (Most Recent):  Temp: 97.4 °F (36.3 °C) (10/14/17 2300)  Pulse: (!) 54 (10/15/17 0600)  Resp: 14 (10/15/17 0600)  BP: (!) 96/53 (10/12/17 0600)  SpO2: 95 % (10/15/17 0600) Vital Signs (24h Range):  Temp:  [97.4 °F (36.3 °C)-97.8 °F (36.6 °C)] 97.4 °F (36.3 °C)  Pulse:  [52-72] 54  Resp:  [13-20] 14  SpO2:  [94 %-100 %] 95 %  Arterial Line BP: (115-150)/(38-66) 146/50     Weight: 78.2 kg (172 lb 6.4 oz)  Body mass index is 27 kg/m².    Intake/Output - Last 3 Shifts       10/13 0700 - 10/14 0659 10/14 0700 - 10/15 0659    P.O.  954    I.V. (mL/kg) 1657.1 (21.2) 1986 (25.4)    Total Intake(mL/kg) 1657.1 (21.2) 2940 (37.6)    Urine (mL/kg/hr) 4000 (2.1) 4300 (2.3)    Drains 15 (0) 175 (0.1)    Total Output 4015 4475    Net -2357.9 -1535                Physical Exam   Constitutional: He is oriented to person, place, and time. He appears well-developed. No distress.   No acute distress this morning   HENT:   Head: Normocephalic and atraumatic.   Eyes: EOM are normal. Scleral icterus is present.   Neck: Neck supple.   Cardiovascular: Regular rhythm, normal heart sounds and intact distal pulses.  Tachycardia present.    No chest pain today, HR in 50s   Pulmonary/Chest: Effort normal. No respiratory distress. He has no wheezes.   On RA   Abdominal: Soft. He exhibits no distension. There is no tenderness. There is no rebound.       Well-healed upper midline incision. 2 IR drains in place with murky output   Musculoskeletal: He exhibits no edema or deformity.   Neurological: He is alert and oriented to person, place, and time.   Skin: Skin is warm and dry.       Significant Labs:  CBC:     Recent Labs  Lab 10/15/17  0311   WBC 10.46   RBC 3.46*   HGB 10.1*   HCT 29.9*   PLT 48*   MCV 86   MCH 29.2   MCHC 33.8     BMP:      Recent Labs  Lab 10/15/17  0311   *      K 3.3*      CO2 23   BUN 51*   CREATININE 1.1   CALCIUM 7.3*   MG 1.7     LFTs:     Recent Labs  Lab 10/15/17  0311   *   AST 31   ALKPHOS 100   BILITOT 3.1*   PROT 4.6*   ALBUMIN 1.7*     Cardiac markers:     Recent Labs  Lab 10/12/17  1810 10/13/17  0005   CPKMB 57.8*  --    TROPONINI 28.149* 21.555*     Microbiology Results (last 7 days)     Procedure Component Value Units Date/Time    Blood culture [[id number]] Collected:  10/12/17 2235    Order Status:  Completed Specimen:  Blood from Line, Jugular, Internal Right Updated:  10/15/17 0613     Blood Culture, Routine No Growth to date     Blood Culture, Routine No Growth to date     Blood Culture, Routine No Growth to date    Blood culture [[id number]] Collected:  10/12/17 2208    Order Status:  Completed Specimen:  Blood from Peripheral, Antecubital, Right Updated:  10/14/17 1416     Blood Culture, Routine Gram stain aer bottle: Gram negative rods     Blood Culture, Routine Results called to and read back by:Harry Cook RN 10/13/2017  15:51     Blood Culture, Routine --     ESCHERICHIA COLI  Susceptibility pending      Blood culture [[id number]] Collected:  10/14/17 0410    Order Status:  Completed Specimen:  Blood from Peripheral, Wrist, Left Updated:  10/14/17 1345     Blood Culture, Routine No Growth to date    Blood culture [[id number]] Collected:  10/14/17 0355    Order Status:  Completed Specimen:  Blood from Peripheral, Antecubital, Right Updated:  10/14/17 1345     Blood Culture, Routine No Growth to date    Aerobic culture [[id number]]  (Susceptibility) Collected:  10/12/17 1714    Order Status:  Completed Specimen:  Abscess from Abdomen Updated:  10/14/17 1201     Aerobic Bacterial Culture --     ESCHERICHIA COLI  Moderate       Aerobic Bacterial Culture --     ENTEROCOCCUS SPECIES  Many  Identification and susceptibility pending      Narrative:       Posterior dome (liver)    Aerobic  culture [[id number]]  (Susceptibility) Collected:  10/12/17 1705    Order Status:  Completed Specimen:  Abscess from Abdomen Updated:  10/14/17 1151     Aerobic Bacterial Culture --     ESCHERICHIA COLI  Moderate       Aerobic Bacterial Culture --     KLEBSIELLA PNEUMONIAE  Moderate       Aerobic Bacterial Culture --     ENTEROCOCCUS SPECIES  Moderate  Identification and susceptibility pending      Narrative:       Anterior lesion (liver)    Blood culture [[id number]] Collected:  10/12/17 0055    Order Status:  Completed Specimen:  Blood Updated:  10/14/17 1043     Blood Culture, Routine Gram stain aer bottle: Gram negative rods     Blood Culture, Routine Results called to and read back by: Harry Cook RN     Blood Culture, Routine 10/12/2017  14:44     Blood Culture, Routine --     ESCHERICHIA COLI  For susceptibility see order # [id number]      Blood culture [[id number]] Collected:  10/12/17 0055    Order Status:  Completed Specimen:  Blood Updated:  10/14/17 1042     Blood Culture, Routine Gram stain aer bottle: Gram negative rods     Blood Culture, Routine Results called to and read back by: Harry Cook RN     Blood Culture, Routine 10/12/2017  14:43     Blood Culture, Routine Gram stain leola bottle: Gram negative rods 10/12/2017  20:11     Blood Culture, Routine --     ESCHERICHIA COLI  For susceptibility see order # [id number]      Blood culture [[id number]] Collected:  10/11/17 1317    Order Status:  Completed Specimen:  Blood Updated:  10/14/17 1041     Blood Culture, Routine Gram stain leola bottle:  Gram variable rods     Blood Culture, Routine Results called to and read back by:Enrique Palomo RN 10/11/2017  23:09     Blood Culture, Routine Gram stain aer bottle: Gram negative rods      Blood Culture, Routine Results called to and read back by:Enrique Palomo RN 10/12/2017  02:41     Blood Culture, Routine --     ESCHERICHIA COLI  For susceptibility see order # [id number]       Blood Culture, Routine  CLOSTRIDIUM PERFRINGENS    Blood culture [[id number]]  (Susceptibility) Collected:  10/11/17 1309    Order Status:  Completed Specimen:  Blood Updated:  10/14/17 1041     Blood Culture, Routine Gram stain leola bottle: Gram positive rods     Blood Culture, Routine Results called to and read back by:Enrique Palomo RN 10/11/2017  21:54     Blood Culture, Routine Gram stain aer bottle: Gram negative rods      Blood Culture, Routine Results called to and read back by:Enrique Palomo RN 10/12/2017  02:41     Blood Culture, Routine ESCHERICHIA COLI     Blood Culture, Routine CLOSTRIDIUM PERFRINGENS    Urine culture [[id number]] Collected:  10/12/17 0500    Order Status:  Completed Specimen:  Urine from Urine, Catheterized Updated:  10/13/17 1255     Urine Culture, Routine No growth    Culture, Anaerobe [[id number]] Collected:  10/12/17 1705    Order Status:  Completed Specimen:  Abscess from Abdomen Updated:  10/13/17 0741     Anaerobic Culture Culture in progress    Narrative:       Anterior lesion (liver)    Culture, Anaerobe [[id number]] Collected:  10/12/17 1714    Order Status:  Completed Specimen:  Abscess from Abdomen Updated:  10/13/17 0741     Anaerobic Culture Culture in progress    Narrative:       Posterior dome (liver)    Gram stain [[id number]] Collected:  10/12/17 1705    Order Status:  Completed Specimen:  Abscess from Abdomen Updated:  10/12/17 2033     Gram Stain Result Rare WBC's      Rare Gram positive cocci    Narrative:       Anterior lesion (liver)    Gram stain [[id number]] Collected:  10/12/17 1714    Order Status:  Completed Specimen:  Abscess from Abdomen Updated:  10/12/17 2013     Gram Stain Result Few WBC's      Few Gram positive rods      Rare Gram negative rods    Narrative:       Posterior dome (liver)    Urine culture [[id number]] Collected:  10/11/17 1832    Order Status:  Sent Specimen:  Urine from Urine, Clean Catch Updated:  10/11/17 1832          Significant Diagnostics:  I have reviewed  all pertinent imaging results/findings within the past 24 hours.    Assessment/Plan:     * Obstructive jaundice    78 yo M with autoimmune sclerosing cholangitis who underwent left hepatic resection; resection of extrahepatic bile duct; Eron-Y right hepaticojejunostomy on 8/3/17 who presented to clinic with chills, now with sepsis and hepatic abscess, STEMI, cardiology following    Plan:  - s/p IR drain placement x2 on 10/12/17  - Afebrile overnight, WBC decreasing down to 10.5 today, lactate normalized  - Cont broad spectrum antibiotics (vanc and zosyn, fluconazole, clindamycin for now) for bacteremia with GNR and GP bacteria  - Blood, IR drain cultures pending. Will adjust abx as cultures result, ID following, repeat BC this AM Abdo cultures from 10/12 + ecoli and enterococcus, BC from 10/12+ for ecoli, susceptibilities pending.  - D/C IVF  - Lasix daily  - Keep valenzuela in place for strict I&Os  - GI prophylaxis  - Stress dose steroids. Wean to 50mg Q8 today.   - Synthroid IV daily  - Continue ICU care        Shortness of breath    - Improved  - On RA        ACS (acute coronary syndrome)    - Trop I peaked at 28; now decreasing  - STEMI, Cardiology following; agatroban drip, plavix loaded,  - Started on lininopril 2.5mg daily, switched to 25mg oral metop appreciate recs  - 2D echo from 10/11/17: normal EF (60-65%) with severe LA enlargement, recent EF down to 25%.  - Continue aspirin  - Given 2 units PRBC's yesterday w/ Lasix, appropriate response          IgG4-related sclerosing cholangitis    - See above            Jorge Bowling MD  General Surgery  [facility]

## 2017-10-15 NOTE — ASSESSMENT & PLAN NOTE
78 yo M with autoimmune sclerosing cholangitis who underwent left hepatic resection; resection of extrahepatic bile duct; Eron-Y right hepaticojejunostomy on 8/3/17 who presented to clinic with chills, now with sepsis and hepatic abscess, STEMI, cardiology following    Plan:  - s/p IR drain placement x2 on 10/12/17  - Afebrile overnight, WBC decreasing down to 10.5 today, lactate normalized  - Cont broad spectrum antibiotics (vanc and zosyn, fluconazole, clindamycin for now) for bacteremia with GNR and GP bacteria  - Blood, IR drain cultures pending. Will adjust abx as cultures result, ID following, repeat BC this AM Abdo cultures from 10/12 + ecoli and enterococcus, BC from 10/12+ for ecoli, susceptibilities pending.  - D/C IVF  - Lasix daily  - Keep vlaenzuela in place for strict I&Os  - GI prophylaxis  - Stress dose steroids. Wean to 50mg Q8 today.   - Synthroid IV daily  - Continue ICU care

## 2017-10-15 NOTE — PROGRESS NOTES
Pt AAOx4. Pt opens eyes spontaneously. Following commands. Denies pain. Moving all extremities freely/equally. Plan of care reviewed with pt and wife. Questions answered per ICU RN. See flowsheet for details.

## 2017-10-15 NOTE — PROGRESS NOTES
[facility]  Infectious Disease  Progress Note    Patient Name: Robby Soriano  MRN: [id number]  Admission Date: 10/11/2017  Length of Stay: 4 days  Attending Physician: No att. providers found  Primary Care Provider: Lyla Bryan MD    Isolation Status: No active isolations  Assessment/Plan:      Bacteremia    76 y/o M h/o CAD s/p CABG, HTN, HLD, prostate CA s/p prostatectomy 2005, found to have hepatic mass/abscess (segment IV) near hilum and stricture of upper bile duct on ERCP s/p L hepatectomy and resection of extrahepatic biliary tree, Eron-en-Y, 8/3/17 with final path demonstrating IGG-4 associated sclerosing cholangitis started on prednisone 40 mg daily and OR cultures of hepatic abscess growing clostridium perfringens, Ecoli, kleb pneumo, E faecalis given 5 days of IV abx post operatively, seen in clinic for f/u 10/11 c/o jaundice and fatigue with subjective fevers and was admitted.  Here he was febrile to 102 with hypoxia requirine venti mask, with leukocytosis of 33K and blood cultures with GPR, GVR and GNR with CT a/p with 7.2 cm gas collection in hepatic dome containing scattered debris with adjacent gas and fluid collection along the resection bed measuring 2.7 cm s/p IR drainage of 2 collections 10/12  - C perfringens, ecoli, kleb, efaecalis bacteremia all from hepatic abscess source  - continue clindamycin  pip/tazo and fluc, stop vanco for now with supratherapeutic level, suspect e faecalis S to amp which covered by pip/tazo            Anticipated Disposition:pending    Thank you for your consult. I will follow-up with patient. Please contact us if you have any additional questions.    David Méndez MD  Infectious Disease  [facility]    Subjective:     Principal Problem:Obstructive jaundice    HPI: Case of 76 y/o male PMHx hypothyroidism, AHTN, HLD, CAD s/p CABG, anemia and autoimmune cholangitis was admitted on 10/11/17. Patient with history of partial  hepatic resection in August 2017 for IgG 4 sclerosing cholangitis. Currently on steroid taper. Evaluated in clinic on 10/11/17 for follow up mentioned at that time being fatigued, experiencing chills, during that encounter was found to be jaundice and was admitted to [facility]. During admission found to have B/C positive for GNR and GPR. CT scan with evidence of 7.2 cm gas collection in the hepatic dome and adjacent gas and fluid collection in the resection bed 2.7 cm. Was resuscitated with 2 L of NS was transferred to SICU due to desaturation and hypotension. Was started on zosyn, vanc and fluconazole and NRB mask. Required 2 PRBC transfusion due to anemia. Was evaluated by cardiology due to increasing troponin's determined to by type 2 NSTEMI. IR performed placement of drain yesterday. At this time off pressors. Consulted to ID for further recommendations.      Interval History: afebrile, no pressors, on RA WBC downtrended    Review of Systems   Constitutional: Negative for activity change, appetite change, chills, fatigue and fever.   HENT: Negative for congestion, dental problem, mouth sores and sinus pressure.    Eyes: Negative for pain, redness and visual disturbance.   Respiratory: Negative for cough, shortness of breath and wheezing.    Cardiovascular: Negative for chest pain and leg swelling.   Gastrointestinal: Negative for abdominal distention, abdominal pain, diarrhea, nausea and vomiting.   Endocrine: Negative for polyuria.   Genitourinary: Negative for decreased urine volume, dysuria and frequency.   Musculoskeletal: Negative for joint swelling.   Skin: Negative for color change.   Allergic/Immunologic: Negative for food allergies.   Neurological: Negative for dizziness, weakness and headaches.   Hematological: Negative for adenopathy.   Psychiatric/Behavioral: Negative for agitation and confusion. The patient is not nervous/anxious.      Objective:     Vital Signs (Most Recent):  Temp: 97.4 °F (36.3 °C)  (10/14/17 2300)  Pulse: (!) 54 (10/15/17 0600)  Resp: 14 (10/15/17 0600)  BP: (!) 96/53 (10/12/17 0600)  SpO2: 95 % (10/15/17 0600) Vital Signs (24h Range):  Temp:  [97.4 °F (36.3 °C)-97.8 °F (36.6 °C)] 97.4 °F (36.3 °C)  Pulse:  [52-72] 54  Resp:  [13-20] 14  SpO2:  [94 %-100 %] 95 %  Arterial Line BP: (117-150)/(38-66) 146/50     Weight: 78.2 kg (172 lb 6.4 oz)  Body mass index is 27 kg/m².    Estimated Creatinine Clearance: 52.6 mL/min (based on SCr of 1.1 mg/dL).    Physical Exam   Constitutional: He is oriented to person, place, and time. He appears well-developed and well-nourished.   HENT:   Head: Normocephalic and atraumatic.   Mouth/Throat: Oropharynx is clear and moist.   Eyes: Conjunctivae are normal.   Neck: Neck supple.   Cardiovascular: Normal rate, regular rhythm and normal heart sounds.    No murmur heard.  Pulmonary/Chest: Effort normal and breath sounds normal. No respiratory distress. He has no wheezes.   Abdominal: Soft. Bowel sounds are normal. He exhibits no distension. There is no tenderness.   RUQ drains x 2   Musculoskeletal: Normal range of motion. He exhibits no edema or tenderness.   Lymphadenopathy:     He has no cervical adenopathy.   Neurological: He is alert and oriented to person, place, and time. Coordination normal.   Skin: Skin is warm and dry. No rash noted.   Psychiatric: He has a normal mood and affect. His behavior is normal.       Significant Labs:   CBC:   Recent Labs  Lab 10/14/17  0300 10/14/17  1717 10/15/17  0311   WBC 11.40 14.77* 10.46   HGB 7.8* 10.3* 10.1*   HCT 23.8* 30.1* 29.9*   PLT 69* 50* 48*     CMP:   Recent Labs  Lab 10/14/17  0300 10/14/17  1717 10/15/17  0036 10/15/17  0311    138 139 139   K 3.7 2.9* 3.5 3.3*    103 106 105   CO2 21* 23 22* 23   * 228* 204* 199*   BUN 48* 52* 50* 51*   CREATININE 1.0 1.2 1.1 1.1   CALCIUM 7.6* 7.9* 7.7* 7.3*   PROT 4.8* 5.2*  --  4.6*   ALBUMIN 1.7* 1.8*  --  1.7*   BILITOT 3.9* 4.2*  --  3.1*   ALKPHOS  113 117  --  100   AST 93* 50*  --  31   * 137*  --  109*   ANIONGAP 10 12 11 11   EGFRNONAA >60.0 58.0* >60.0 >60.0       Significant Imaging: I have reviewed all pertinent imaging results/findings within the past 24 hours.     10/14 bcx NGTD  10/12 bcx ecoli  10/12 abscess drainage e faecalis sensi pending, Ecoli R to quinolones/;amp, kleb pneumo S  10/11 bcx ecoli, c perfringens

## 2017-10-16 LAB
ALBUMIN SERPL BCP-MCNC: 2.1 G/DL
ALBUMIN SERPL BCP-MCNC: 2.2 G/DL
ALP SERPL-CCNC: 119 U/L
ALP SERPL-CCNC: 89 U/L
ALT SERPL W/O P-5'-P-CCNC: 64 U/L
ALT SERPL W/O P-5'-P-CCNC: 72 U/L
ANION GAP SERPL CALC-SCNC: 10 MMOL/L
ANION GAP SERPL CALC-SCNC: 11 MMOL/L
ANISOCYTOSIS BLD QL SMEAR: ABNORMAL
ANISOCYTOSIS BLD QL SMEAR: SLIGHT
APTT BLDCRRT: 42.1 SEC
APTT BLDCRRT: 42.8 SEC
AST SERPL-CCNC: 14 U/L
AST SERPL-CCNC: 22 U/L
BACTERIA SPEC AEROBE CULT: NORMAL
BACTERIA SPEC ANAEROBE CULT: NORMAL
BACTERIA SPEC ANAEROBE CULT: NORMAL
BASOPHILS # BLD AUTO: 0.01 K/UL
BASOPHILS NFR BLD: 0.1 %
BILIRUB SERPL-MCNC: 2.4 MG/DL
BILIRUB SERPL-MCNC: 2.6 MG/DL
BNP SERPL-MCNC: 2037 PG/ML
BUN SERPL-MCNC: 45 MG/DL
BUN SERPL-MCNC: 50 MG/DL
CA-I BLDV-SCNC: 1.12 MMOL/L
CALCIUM SERPL-MCNC: 7.8 MG/DL
CALCIUM SERPL-MCNC: 8.1 MG/DL
CHLORIDE SERPL-SCNC: 102 MMOL/L
CHLORIDE SERPL-SCNC: 105 MMOL/L
CO2 SERPL-SCNC: 23 MMOL/L
CO2 SERPL-SCNC: 27 MMOL/L
CREAT SERPL-MCNC: 1.1 MG/DL
CREAT SERPL-MCNC: 1.1 MG/DL
DIFFERENTIAL METHOD: ABNORMAL
DOHLE BOD BLD QL SMEAR: PRESENT
EOSINOPHIL # BLD AUTO: 0 K/UL
EOSINOPHIL NFR BLD: 0 %
ERYTHROCYTE [DISTWIDTH] IN BLOOD BY AUTOMATED COUNT: 17.5 %
ERYTHROCYTE [DISTWIDTH] IN BLOOD BY AUTOMATED COUNT: 17.7 %
ERYTHROCYTE [DISTWIDTH] IN BLOOD BY AUTOMATED COUNT: 18 %
EST. GFR  (AFRICAN AMERICAN): >60 ML/MIN/1.73 M^2
EST. GFR  (AFRICAN AMERICAN): >60 ML/MIN/1.73 M^2
EST. GFR  (NON AFRICAN AMERICAN): >60 ML/MIN/1.73 M^2
EST. GFR  (NON AFRICAN AMERICAN): >60 ML/MIN/1.73 M^2
GLUCOSE SERPL-MCNC: 159 MG/DL
GLUCOSE SERPL-MCNC: 203 MG/DL
HCT VFR BLD AUTO: 25 %
HCT VFR BLD AUTO: 27.4 %
HCT VFR BLD AUTO: 31.8 %
HEPARIN INDUCED THROMBOCYTOPENIA: NORMAL
HGB BLD-MCNC: 10.5 G/DL
HGB BLD-MCNC: 8.3 G/DL
HGB BLD-MCNC: 9 G/DL
HYPOCHROMIA BLD QL SMEAR: ABNORMAL
IMM GRANULOCYTES # BLD AUTO: 0.16 K/UL
IMM GRANULOCYTES # BLD AUTO: 0.24 K/UL
IMM GRANULOCYTES NFR BLD AUTO: 1.5 %
IMM GRANULOCYTES NFR BLD AUTO: 2.1 %
INR PPP: 2.2
LYMPHOCYTES # BLD AUTO: 0.8 K/UL
LYMPHOCYTES # BLD AUTO: 0.9 K/UL
LYMPHOCYTES # BLD AUTO: 1 K/UL
LYMPHOCYTES NFR BLD: 5.2 %
LYMPHOCYTES NFR BLD: 7 %
LYMPHOCYTES NFR BLD: 8.7 %
MAGNESIUM SERPL-MCNC: 1.7 MG/DL
MCH RBC QN AUTO: 28.1 PG
MCH RBC QN AUTO: 28.6 PG
MCH RBC QN AUTO: 28.8 PG
MCHC RBC AUTO-ENTMCNC: 32.8 G/DL
MCHC RBC AUTO-ENTMCNC: 33 G/DL
MCHC RBC AUTO-ENTMCNC: 33.2 G/DL
MCV RBC AUTO: 86 FL
MCV RBC AUTO: 86 FL
MCV RBC AUTO: 87 FL
MONOCYTES # BLD AUTO: 0.4 K/UL
MONOCYTES # BLD AUTO: 0.6 K/UL
MONOCYTES # BLD AUTO: 0.6 K/UL
MONOCYTES NFR BLD: 2.7 %
MONOCYTES NFR BLD: 5.1 %
MONOCYTES NFR BLD: 5.3 %
NEUTROPHILS # BLD AUTO: 10 K/UL
NEUTROPHILS # BLD AUTO: 15.2 K/UL
NEUTROPHILS # BLD AUTO: 9.2 K/UL
NEUTROPHILS NFR BLD: 84.4 %
NEUTROPHILS NFR BLD: 85.7 %
NEUTROPHILS NFR BLD: 91.5 %
NRBC BLD-RTO: 0 /100 WBC
NRBC BLD-RTO: 0 /100 WBC
PHOSPHATE SERPL-MCNC: 3.1 MG/DL
PLATELET # BLD AUTO: 62 K/UL
PLATELET # BLD AUTO: 63 K/UL
PLATELET # BLD AUTO: 70 K/UL
PLATELET BLD QL SMEAR: ABNORMAL
PLATELET BLD QL SMEAR: ABNORMAL
PMV BLD AUTO: 11.1 FL
PMV BLD AUTO: 11.2 FL
PMV BLD AUTO: 11.8 FL
POIKILOCYTOSIS BLD QL SMEAR: SLIGHT
POIKILOCYTOSIS BLD QL SMEAR: SLIGHT
POLYCHROMASIA BLD QL SMEAR: ABNORMAL
POLYCHROMASIA BLD QL SMEAR: ABNORMAL
POTASSIUM SERPL-SCNC: 3.2 MMOL/L
POTASSIUM SERPL-SCNC: 3.5 MMOL/L
PROT SERPL-MCNC: 5 G/DL
PROT SERPL-MCNC: 5.4 G/DL
PROTHROMBIN TIME: 22.5 SEC
RBC # BLD AUTO: 2.9 M/UL
RBC # BLD AUTO: 3.2 M/UL
RBC # BLD AUTO: 3.65 M/UL
SODIUM SERPL-SCNC: 138 MMOL/L
SODIUM SERPL-SCNC: 140 MMOL/L
SPHEROCYTES BLD QL SMEAR: ABNORMAL
SPHEROCYTES BLD QL SMEAR: ABNORMAL
TARGETS BLD QL SMEAR: ABNORMAL
TOXIC GRANULES BLD QL SMEAR: PRESENT
WBC # BLD AUTO: 10.92 K/UL
WBC # BLD AUTO: 11.68 K/UL
WBC # BLD AUTO: 16.58 K/UL

## 2017-10-16 PROCEDURE — 85610 PROTHROMBIN TIME: CPT

## 2017-10-16 PROCEDURE — 87449 NOS EACH ORGANISM AG IA: CPT

## 2017-10-16 PROCEDURE — 83735 ASSAY OF MAGNESIUM: CPT

## 2017-10-16 PROCEDURE — 25000003 PHARM REV CODE 250: Performed by: ANESTHESIOLOGY

## 2017-10-16 PROCEDURE — 85025 COMPLETE CBC W/AUTO DIFF WBC: CPT

## 2017-10-16 PROCEDURE — 25000003 PHARM REV CODE 250: Performed by: STUDENT IN AN ORGANIZED HEALTH CARE EDUCATION/TRAINING PROGRAM

## 2017-10-16 PROCEDURE — 63600175 PHARM REV CODE 636 W HCPCS: Performed by: STUDENT IN AN ORGANIZED HEALTH CARE EDUCATION/TRAINING PROGRAM

## 2017-10-16 PROCEDURE — 99222 1ST HOSP IP/OBS MODERATE 55: CPT | Mod: ,,, | Performed by: INTERNAL MEDICINE

## 2017-10-16 PROCEDURE — 25000003 PHARM REV CODE 250: Performed by: NURSE PRACTITIONER

## 2017-10-16 PROCEDURE — S0077 INJECTION, CLINDAMYCIN PHOSP: HCPCS | Performed by: ANESTHESIOLOGY

## 2017-10-16 PROCEDURE — 20600001 HC STEP DOWN PRIVATE ROOM

## 2017-10-16 PROCEDURE — 99233 SBSQ HOSP IP/OBS HIGH 50: CPT | Mod: ,,, | Performed by: INTERNAL MEDICINE

## 2017-10-16 PROCEDURE — 63600175 PHARM REV CODE 636 W HCPCS: Performed by: SURGERY

## 2017-10-16 PROCEDURE — 85730 THROMBOPLASTIN TIME PARTIAL: CPT

## 2017-10-16 PROCEDURE — 87493 C DIFF AMPLIFIED PROBE: CPT

## 2017-10-16 PROCEDURE — 63600175 PHARM REV CODE 636 W HCPCS: Performed by: NURSE PRACTITIONER

## 2017-10-16 PROCEDURE — 84100 ASSAY OF PHOSPHORUS: CPT

## 2017-10-16 PROCEDURE — 97535 SELF CARE MNGMENT TRAINING: CPT

## 2017-10-16 PROCEDURE — 82330 ASSAY OF CALCIUM: CPT

## 2017-10-16 PROCEDURE — 85730 THROMBOPLASTIN TIME PARTIAL: CPT | Mod: 91

## 2017-10-16 PROCEDURE — 25000003 PHARM REV CODE 250: Performed by: SURGERY

## 2017-10-16 PROCEDURE — 63600175 PHARM REV CODE 636 W HCPCS: Performed by: ANESTHESIOLOGY

## 2017-10-16 PROCEDURE — A4216 STERILE WATER/SALINE, 10 ML: HCPCS | Performed by: NURSE PRACTITIONER

## 2017-10-16 PROCEDURE — 83880 ASSAY OF NATRIURETIC PEPTIDE: CPT

## 2017-10-16 PROCEDURE — 80053 COMPREHEN METABOLIC PANEL: CPT | Mod: 91

## 2017-10-16 RX ORDER — POTASSIUM CHLORIDE 20 MEQ/1
40 TABLET, EXTENDED RELEASE ORAL ONCE
Status: COMPLETED | OUTPATIENT
Start: 2017-10-16 | End: 2017-10-16

## 2017-10-16 RX ORDER — FONDAPARINUX SODIUM 2.5 MG/.5ML
2.5 INJECTION SUBCUTANEOUS DAILY
Status: DISCONTINUED | OUTPATIENT
Start: 2017-10-16 | End: 2017-10-17

## 2017-10-16 RX ORDER — HYDROCORTISONE 1 %
CREAM (GRAM) TOPICAL 2 TIMES DAILY
Status: DISCONTINUED | OUTPATIENT
Start: 2017-10-16 | End: 2017-10-18 | Stop reason: HOSPADM

## 2017-10-16 RX ORDER — HYDRALAZINE HYDROCHLORIDE 20 MG/ML
20 INJECTION INTRAMUSCULAR; INTRAVENOUS ONCE
Status: COMPLETED | OUTPATIENT
Start: 2017-10-16 | End: 2017-10-16

## 2017-10-16 RX ORDER — MAGNESIUM SULFATE HEPTAHYDRATE 40 MG/ML
2 INJECTION, SOLUTION INTRAVENOUS ONCE
Status: COMPLETED | OUTPATIENT
Start: 2017-10-16 | End: 2017-10-16

## 2017-10-16 RX ORDER — HYDRALAZINE HYDROCHLORIDE 20 MG/ML
10 INJECTION INTRAMUSCULAR; INTRAVENOUS ONCE
Status: COMPLETED | OUTPATIENT
Start: 2017-10-16 | End: 2017-10-16

## 2017-10-16 RX ADMIN — HYDROCORTISONE SODIUM SUCCINATE 50 MG: 100 INJECTION, POWDER, FOR SOLUTION INTRAMUSCULAR; INTRAVENOUS at 05:10

## 2017-10-16 RX ADMIN — POTASSIUM CHLORIDE 40 MEQ: 1500 TABLET, EXTENDED RELEASE ORAL at 10:10

## 2017-10-16 RX ADMIN — MAGNESIUM SULFATE IN WATER 2 G: 40 INJECTION, SOLUTION INTRAVENOUS at 03:10

## 2017-10-16 RX ADMIN — HYDRALAZINE HYDROCHLORIDE 10 MG: 20 INJECTION INTRAMUSCULAR; INTRAVENOUS at 01:10

## 2017-10-16 RX ADMIN — PIPERACILLIN AND TAZOBACTAM 4.5 G: 4; .5 INJECTION, POWDER, LYOPHILIZED, FOR SOLUTION INTRAVENOUS; PARENTERAL at 01:10

## 2017-10-16 RX ADMIN — POTASSIUM CHLORIDE 40 MEQ: 1500 TABLET, EXTENDED RELEASE ORAL at 08:10

## 2017-10-16 RX ADMIN — METOPROLOL SUCCINATE 25 MG: 25 TABLET, EXTENDED RELEASE ORAL at 08:10

## 2017-10-16 RX ADMIN — FUROSEMIDE 40 MG: 10 INJECTION, SOLUTION INTRAVENOUS at 08:10

## 2017-10-16 RX ADMIN — PIPERACILLIN AND TAZOBACTAM 4.5 G: 4; .5 INJECTION, POWDER, LYOPHILIZED, FOR SOLUTION INTRAVENOUS; PARENTERAL at 06:10

## 2017-10-16 RX ADMIN — HYDRALAZINE HYDROCHLORIDE 20 MG: 20 INJECTION INTRAMUSCULAR; INTRAVENOUS at 04:10

## 2017-10-16 RX ADMIN — ASPIRIN 81 MG CHEWABLE TABLET 81 MG: 81 TABLET CHEWABLE at 08:10

## 2017-10-16 RX ADMIN — CLOPIDOGREL 75 MG: 75 TABLET, FILM COATED ORAL at 08:10

## 2017-10-16 RX ADMIN — HYDROCORTISONE SODIUM SUCCINATE 50 MG: 100 INJECTION, POWDER, FOR SOLUTION INTRAMUSCULAR; INTRAVENOUS at 01:10

## 2017-10-16 RX ADMIN — Medication 3 ML: at 09:10

## 2017-10-16 RX ADMIN — Medication 3 ML: at 06:10

## 2017-10-16 RX ADMIN — Medication 3 ML: at 01:10

## 2017-10-16 RX ADMIN — FONDAPARINUX SODIUM 2.5 MG: 2.5 INJECTION SUBCUTANEOUS at 10:10

## 2017-10-16 RX ADMIN — CLINDAMYCIN IN 5 PERCENT DEXTROSE 900 MG: 18 INJECTION, SOLUTION INTRAVENOUS at 01:10

## 2017-10-16 RX ADMIN — PIPERACILLIN AND TAZOBACTAM 4.5 G: 4; .5 INJECTION, POWDER, LYOPHILIZED, FOR SOLUTION INTRAVENOUS; PARENTERAL at 09:10

## 2017-10-16 RX ADMIN — CLINDAMYCIN IN 5 PERCENT DEXTROSE 900 MG: 18 INJECTION, SOLUTION INTRAVENOUS at 05:10

## 2017-10-16 RX ADMIN — HYDROCORTISONE SODIUM SUCCINATE 50 MG: 100 INJECTION, POWDER, FOR SOLUTION INTRAMUSCULAR; INTRAVENOUS at 09:10

## 2017-10-16 RX ADMIN — HYDROCORTISONE: 10 CREAM TOPICAL at 09:10

## 2017-10-16 RX ADMIN — HYDROCORTISONE: 10 CREAM TOPICAL at 12:10

## 2017-10-16 RX ADMIN — FLUCONAZOLE 400 MG: 2 INJECTION INTRAVENOUS at 03:10

## 2017-10-16 RX ADMIN — LISINOPRIL 2.5 MG: 2.5 TABLET ORAL at 08:10

## 2017-10-16 RX ADMIN — LEVOTHYROXINE SODIUM ANHYDROUS 50 MCG: 100 INJECTION, POWDER, LYOPHILIZED, FOR SOLUTION INTRAVENOUS at 08:10

## 2017-10-16 RX ADMIN — CLINDAMYCIN IN 5 PERCENT DEXTROSE 900 MG: 18 INJECTION, SOLUTION INTRAVENOUS at 10:10

## 2017-10-16 NOTE — ASSESSMENT & PLAN NOTE
- Trop I peaked at 28; now decreasing  - STEMI, Cardiology following; agatroban drip, plavix loaded  - D/C agatroban gtt today  - Lopressor 25mg daily  - 2D echo from 10/11/17: normal EF (60-65%) with severe LA enlargement, recent EF down to 25%.  - Continue aspirin

## 2017-10-16 NOTE — PLAN OF CARE
Problem: Patient Care Overview  Goal: Individualization & Mutuality  PMH: Partial hepatic resection, hypothyroidism, HTN, HLD, CAD s/p CABG, anemia and autoimmune cholangitis.  10/11- Admitted to [facility] R/T fatigue  10/12- Cultures, CT. Elevated troponins (NSTEMI)  10/13- Admitted to SICU On the floor  10/14: 2 Units PRBCs  10/15- 1L LR bolus, 500cc Albumin            Outcome: Ongoing (interventions implemented as appropriate)  Pt arrived on floor at midnight and was able to ambulate to bed from wheel chair with minimal assistance. Pt is AOx4 and had no injuries during night shift. Breathing is regular equal and unlabored bilaterally on room air.   Pt denies pain at this time. Wife in room . [place] to gravity.  At this time the aPTT is with in normal range and no adjustment to the infusion rate has been needed.

## 2017-10-16 NOTE — PT/OT/SLP PROGRESS
"Occupational Therapy  Treatment    Robby Soriano   MRN: [id number]   Admitting Diagnosis: Obstructive jaundice    OT Date of Treatment: 10/16/17       Billable Minutes:  Self Care/Home Management 23    General Precautions: Standard, fall  Orthopedic Precautions: N/A  Braces: N/A    Do you have any cultural, spiritual, Sabianism conflicts, given your current situation?: none reported    Subjective:  Communicated with NSG prior to session.  "It feels good to stand up."  Pain/Comfort  Pain Rating 1: 0/10  Pain Rating Post-Intervention 1: 0/10    Objective:  Patient found with: KAIDEN drain, peripheral IV     Functional Mobility:  Bed Mobility:  Supine to Sit: Minimum Assistance    Transfers:   Sit <> Stand Assistance: Contact Guard Assistance  Sit <> Stand Assistive Device: Rolling Walker  Bed <> Chair Technique: Stand Pivot  Bed <> Chair Transfer Assistance: Contact Guard Assistance  Bed <> Chair Assistive Device: Rolling Walker    Activities of Daily Living:  UE Dressing Level of Assistance: Minimum assistance  LE Dressing Level of Assistance: Moderate assistance  Grooming Position: Standing  Grooming Level of Assistance: Contact guard assistance      AM-PAC 6 CLICK ADL   How much help from another person does this patient currently need?   1 = Unable, Total/Dependent Assistance  2 = A lot, Maximum/Moderate Assistance  3 = A little, Minimum/Contact Guard/Supervision  4 = None, Modified [place]/Independent    Putting on and taking off regular lower body clothing? : 2  Bathing (including washing, rinsing, drying)?: 2  Toileting, which includes using toilet, bedpan, or urinal? : 2  Putting on and taking off regular upper body clothing?: 3  Taking care of personal grooming such as brushing teeth?: 3  Eating meals?: 4  Total Score: 16     AM-PAC Raw Score CMS "G-Code Modifier Level of Impairment Assistance   6 % Total / Unable   7 - 8 CM 80 - 100% Maximal Assist   9-13 CL 60 - 80% Moderate Assist   14 - 19 CK 40 - " 60% Moderate Assist   20 - 22 CJ 20 - 40% Minimal Assist   23 CI 1-20% SBA / CGA   24 CH 0% Independent/ Mod I     Patient left up in chair with all lines intact and wife and NSG present    ASSESSMENT:  Robby Soriano is a 77 y.o. male with a medical diagnosis of Obstructive jaundice. Pt improving w/ standing balance but req'd intermittent (A) for safety.    Rehab identified problem list/impairments: Rehab identified problem list/impairments: impaired endurance, impaired self care skills, impaired balance, gait instability, weakness, impaired functional mobilty, impaired cardiopulmonary response to activity    Rehab potential is good.    Activity tolerance: Good    Discharge recommendations: Discharge Facility/Level Of Care Needs: home with home health     Barriers to discharge: Barriers to Discharge: None    Equipment recommendations:  (TBD)     GOALS:    Occupational Therapy Goals        Problem: Occupational Therapy Goal    Goal Priority Disciplines Outcome Interventions   Occupational Therapy Goal     OT, PT/OT Ongoing (interventions implemented as appropriate)    Description:  Goals to be met by: 10/27/17     Patient will increase functional independence with ADLs by performing:    Feeding with [place].  UE Dressing with Supervision.  LE Dressing with Supervision.  Grooming while standing at sink with Supervision.  Toileting from toilet with Supervision for hygiene and clothing management.   Toilet transfer to toilet with Supervision.                  Plan:  Patient to be seen 4 x/week to address the above listed problems via self-care/home management, therapeutic activities, therapeutic exercises  Plan of Care expires: 11/14/17  Plan of Care reviewed with: patient, spouse  ELLEN Harmon  10/16/2017

## 2017-10-16 NOTE — PROGRESS NOTES
[facility]  General Surgery  Progress Note    Subjective:     History of Present Illness:  Mr. 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 He is currently on a steroid taper. He recuperated slowly but was doing well with forward progress until yesterday when he become easily fatigued and last night he had chills x 45 minutes.   He didnot feel well in clinic today. Of note, he also reports increasingly dark urine in the past week. Tbili was also noted to be 5.4 today in clinic.     Post-Op Info:  * No surgery found *         Interval History: No issues overnight. Transferred out of SICU. Pain controlled. Tolerating diet    Medications:  Continuous Infusions:   Scheduled Meds:   aspirin  81 mg Oral Daily    clindamycin (CLEOCIN) IVPB  900 mg Intravenous Q8H    clopidogrel  75 mg Oral Daily    fluconazole (DIFLUCAN) IVPB  400 mg Intravenous Q24H    furosemide  40 mg Intravenous Daily    hydrocortisone sodium succinate  50 mg Intravenous Q8H    levothyroxine  50 mcg Intravenous Daily    lisinopril  2.5 mg Oral Daily    metoprolol succinate  25 mg Oral Daily    piperacillin-tazobactam (ZOSYN) IVPB  4.5 g Intravenous Q8H    potassium chloride  40 mEq Oral Daily    sodium chloride 0.9%  3 mL Intravenous Q8H     PRN Meds:albuterol-ipratropium 2.5mg-0.5mg/3mL, alprazolam, ondansetron, promethazine (PHENERGAN) IVPB     Review of patient's allergies indicates:  No Known Allergies  Objective:     Vital Signs (Most Recent):  Temp: 96.6 °F (35.9 °C) (10/16/17 0700)  Pulse: 69 (10/16/17 0700)  Resp: 15 (10/16/17 0700)  BP: (!) 158/90 (10/16/17 0700)  SpO2: 97 % (10/16/17 0700) Vital Signs (24h Range):  Temp:  [96.6 °F (35.9 °C)-97.7 °F (36.5 °C)] 96.6 °F (35.9 °C)  Pulse:  [54-76] 69  Resp:  [12-20] 15  SpO2:  [91 %-99  %] 97 %  BP: (129-180)/(60-90) 158/90  Arterial Line BP: (151)/(52) 151/52     Weight: 78.2 kg (172 lb 6.4 oz)  Body mass index is 27 kg/m².    Intake/Output - Last 3 Shifts       10/14 0700 - 10/15 0659 10/15 0700 - 10/16 0659 10/16 0700 - 10/17 0659    P.O. 954 760     I.V. (mL/kg) 1986 (25.4) 110.9 (1.4)     IV Piggyback  150     Total Intake(mL/kg) 2940 (37.6) 1020.9 (13.1)     Urine (mL/kg/hr) 4300 (2.3) 1510 (0.8) 200 (1.1)    Drains 175 (0.1) 135 (0.1)     Stool  4 (0)     Total Output 4475 1649 200    Net -1535 -628.1 -200                 Physical Exam    Significant Labs:  CBC:   Recent Labs  Lab 10/16/17  0420   WBC 10.92   RBC 3.20*   HGB 9.0*   HCT 27.4*   PLT 62*   MCV 86   MCH 28.1   MCHC 32.8     BMP:   Recent Labs  Lab 10/16/17  0420   *      K 3.2*      CO2 23   BUN 50*   CREATININE 1.1   CALCIUM 7.8*   MG 1.7     LFTs:   Recent Labs  Lab 10/16/17  0420   ALT 64*   AST 14   ALKPHOS 89   BILITOT 2.4*   PROT 5.0*   ALBUMIN 2.1*       Significant Diagnostics:  I have reviewed all pertinent imaging results/findings within the past 24 hours.    Assessment/Plan:     * Obstructive jaundice    76 yo M with autoimmune sclerosing cholangitis who underwent left hepatic resection; resection of extrahepatic bile duct; Eron-Y right hepaticojejunostomy on 8/3/17 who presented to clinic with chills, now with sepsis and hepatic abscess, STEMI, cardiology following    Plan:  - s/p IR drain placement x2 on 10/12/17  - Cont broad spectrum antibiotics (zosyn, fluconazole, clindamycin)   - ID follow. Appreciate recs  - Lasix daily  - D/C valenzuela  - Fondaparinux for DVT prophylaxis. HIT panel pending  - GI prophylaxis  - Stress dose steroids. Wean to 50mg Q8 today.  - PT/OT        Shortness of breath    - Improved  - On RA        ACS (acute coronary syndrome)    - Trop I peaked at 28; now decreasing  - STEMI, Cardiology following; agatroban drip, plavix loaded  - D/C agatroban gtt today  - Lopressor 25mg  daily  - 2D echo from 10/11/17: normal EF (60-65%) with severe LA enlargement, recent EF down to 25%.  - Continue aspirin          IgG4-related sclerosing cholangitis    - See above            Kade Jansen MD  General Surgery  [facility]

## 2017-10-16 NOTE — SUBJECTIVE & OBJECTIVE
Interval History: Patient afebrile, no events during night. Stepped down to medical floor.     Review of Systems   Constitutional: Negative for activity change, appetite change, chills, diaphoresis, fatigue and fever.   Respiratory: Negative for cough, choking, chest tightness and shortness of breath.    Gastrointestinal: Negative for abdominal distention, abdominal pain, diarrhea, nausea and vomiting.   Skin: Negative for rash.     Objective:     Vital Signs (Most Recent):  Temp: 97.2 °F (36.2 °C) (10/16/17 1236)  Pulse: 68 (10/16/17 1236)  Resp: 18 (10/16/17 1236)  BP: (!) 156/70 (10/16/17 1236)  SpO2: 98 % (10/16/17 1236) Vital Signs (24h Range):  Temp:  [96.6 °F (35.9 °C)-97.8 °F (36.6 °C)] 97.2 °F (36.2 °C)  Pulse:  [54-69] 68  Resp:  [12-20] 18  SpO2:  [94 %-99 %] 98 %  BP: (135-180)/(60-90) 156/70     Weight: 78.2 kg (172 lb 6.4 oz)  Body mass index is 27 kg/m².    Estimated Creatinine Clearance: 52.6 mL/min (based on SCr of 1.1 mg/dL).    Physical Exam   Constitutional: He is oriented to person, place, and time. He appears well-developed and well-nourished.   HENT:   Head: Normocephalic and atraumatic.   Eyes: EOM are normal. Pupils are equal, round, and reactive to light.   Neck: Normal range of motion.   Cardiovascular: Normal rate, regular rhythm and normal heart sounds.    Pulmonary/Chest: Effort normal and breath sounds normal.   Abdominal: Soft. Bowel sounds are normal. He exhibits no distension.   Drains in place    Musculoskeletal: Normal range of motion. He exhibits no edema.   Neurological: He is alert and oriented to person, place, and time.   Skin: No rash noted.       Significant Labs:   Blood Culture:   Recent Labs  Lab 10/12/17  0055 10/12/17  2208 10/12/17  2235 10/14/17  0355 10/14/17  0410   LABBLOO Gram stain aer bottle: Gram negative rods  Results called to and read back by: Harry Cook RN  10/12/2017  14:44  ESCHERICHIA COLIFor susceptibility see order # [id number]  Gram stain aer  bottle: Gram negative rods  Results called to and read back by: Harry Cook RN  10/12/2017  14:43  Gram stain leola bottle: Gram negative rods 10/12/2017  20:11  ESCHERICHIA COLIFor susceptibility see order # [id number] Gram stain aer bottle: Gram negative rods  Results called to and read back by:Harry Cook RN 10/13/2017  15:51  ESCHERICHIA COLI No Growth to date  No Growth to date  No Growth to date  No Growth to date No Growth to date  No Growth to date  No Growth to date No Growth to date  No Growth to date  No Growth to date     BMP:   Recent Labs  Lab 10/16/17  0420   *      K 3.2*      CO2 23   BUN 50*   CREATININE 1.1   CALCIUM 7.8*   MG 1.7     CBC:   Recent Labs  Lab 10/15/17  1630 10/16/17  0420 10/16/17  1611   WBC 13.18* 10.92 11.68   HGB 10.3* 9.0* 10.5*   HCT 30.6* 27.4* 31.8*   PLT 57* 62* 70*       Significant Imaging: I have reviewed all pertinent imaging results/findings within the past 24 hours.

## 2017-10-16 NOTE — ASSESSMENT & PLAN NOTE
Patient is a 76 yo male with a past medical hx of CAD s/p CABG in 13 here with severe sepsis secondary to cholangitis and gram negative and positive bacteremia.  Here with ACS event with associated heart failure in sapna-operative period given EKG with new LBBB and troponin peak of 28.       -  Change Toprol to carvedilol 6.25mg po bid  - Increase lisinopril to 5mg daily  - Goal would be HR 60's and SBP <120 for now from a cardiac perspective.   - Completed 48 Hrs of AC completed for ACS protocol; anticoagulation can be discountinued   -  Continue  DAPT aspirin/plavix  - Diurese to euvolemia; lasix 40 daily   -  Interventional cardiology consult when patient is stable, plan for likely LHC prior to d/c

## 2017-10-16 NOTE — PROGRESS NOTES
[facility]  Infectious Disease  Progress Note    Patient Name: Robby Soriano  MRN: [id number]  Admission Date: 10/11/2017  Length of Stay: 5 days  Attending Physician: Dallas Quach MD  Primary Care Provider: Lyla Bryan MD    Isolation Status: Contact  Assessment/Plan:      Bacteremia    Case of 78 y/o male h/o CAD s/p CABG, HTN, HLD, prostate CA s/p prostatectomy 2005, found to have hepatic mass/abscess (segment IV) near hilum and stricture of upper bile duct on ERCP s/p L hepatectomy and resection of extrahepatic biliary tree, Eron-en-Y, 8/3/17 with final path demonstrating IGG-4 associated sclerosing cholangitis started on prednisone 40 mg daily and OR cultures of hepatic abscess growing clostridium perfringens, Ecoli, kleb pneumo, E faecalis given 5 days of IV abx post operatively, seen in clinic for f/u 10/11 c/o jaundice and fatigue with subjective fevers and was admitted.  Here he was febrile to 102 with hypoxia requirine venti mask, with leukocytosis of 33K and blood cultures with GPR, GVR and GNR with CT a/p with 7.2 cm gas collection in hepatic dome containing scattered debris with adjacent gas and fluid collection along the resection bed measuring 2.7 cm s/p IR drainage of 2 collections 10/12    - C perfringens, ecoli, kleb, efaecalis bacteremia all from hepatic abscess source  - Discontinue pip/tazo  - discontinue fluconazole  - start meropenem IV 1 g every 8hrs   - Course for 14 days starting from negative blood cultures. End date : 10/27/17  - repeat Abdominal CT in 2 weeks   - Will need follow up in ID clinic in 2 weeks to evaluate if need additional days of antibiotic therapy  - Coordinate PICC placement  - Will need weekly CBC, CMP, B/C faxed to ID clinic once discharged home fax # [phone]            Anticipated Disposition: antibiotic course for 14 days     Thank you for your consult. I will sign off. Please contact us if you have any additional questions.    Kishore  Domenic Lee MD  Infectious Disease  [facility]    Subjective:     Principal Problem:Obstructive jaundice    HPI: Case of 78 y/o male PMHx hypothyroidism, AHTN, HLD, CAD s/p CABG, anemia and autoimmune cholangitis was admitted on 10/11/17. 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 Currently on steroid taper. Evaluated in clinic on 10/11/17 for follow up mentioned at that time being fatigued, experiencing chills, during that encounter was found to be jaundice and was admitted to [facility]. During admission found to have B/C positive for GNR and GPR. CT scan with evidence of 7.2 cm gas collection in the hepatic dome and adjacent gas and fluid collection in the resection bed 2.7 cm. Was resuscitated with 2 L of NS was transferred to SICU due to desaturation and hypotension. Was started on zosyn, vanc and fluconazole and NRB mask. Required 2 PRBC transfusion due to anemia. Was evaluated by cardiology due to increasing troponin's determined to by type 2 NSTEMI. IR performed placement of drain yesterday. At this time off pressors. Consulted to ID for further recommendations.      Interval History: Patient afebrile, no events during night. Stepped down to medical floor.     Review of Systems   Constitutional: Negative for activity change, appetite change, chills, diaphoresis, fatigue and fever.   Respiratory: Negative for cough, choking, chest tightness and shortness of breath.    Gastrointestinal: Negative for abdominal distention, abdominal pain, diarrhea, nausea and vomiting.   Skin: Negative for rash.     Objective:     Vital Signs (Most Recent):  Temp: 97.2 °F (36.2 °C) (10/16/17 1236)  Pulse: 68 (10/16/17 1236)  Resp: 18 (10/16/17 1236)  BP: (!) 156/70 (10/16/17 1236)  SpO2: 98 % (10/16/17 1236) Vital Signs (24h Range):  Temp:  [96.6 °F (35.9 °C)-97.8 °F (36.6 °C)] 97.2 °F (36.2 °C)  Pulse:  [54-69] 68  Resp:  [12-20] 18  SpO2:  [94 %-99 %] 98  %  BP: (135-180)/(60-90) 156/70     Weight: 78.2 kg (172 lb 6.4 oz)  Body mass index is 27 kg/m².    Estimated Creatinine Clearance: 52.6 mL/min (based on SCr of 1.1 mg/dL).    Physical Exam   Constitutional: He is oriented to person, place, and time. He appears well-developed and well-nourished.   HENT:   Head: Normocephalic and atraumatic.   Eyes: EOM are normal. Pupils are equal, round, and reactive to light.   Neck: Normal range of motion.   Cardiovascular: Normal rate, regular rhythm and normal heart sounds.    Pulmonary/Chest: Effort normal and breath sounds normal.   Abdominal: Soft. Bowel sounds are normal. He exhibits no distension.   Drains in place    Musculoskeletal: Normal range of motion. He exhibits no edema.   Neurological: He is alert and oriented to person, place, and time.   Skin: No rash noted.       Significant Labs:   Blood Culture:   Recent Labs  Lab 10/12/17  0055 10/12/17  2208 10/12/17  2235 10/14/17  0355 10/14/17  0410   LABBLOO Gram stain aer bottle: Gram negative rods  Results called to and read back by: Harry Cook RN  10/12/2017  14:44  ESCHERICHIA COLIFor susceptibility see order # [id number]  Gram stain aer bottle: Gram negative rods  Results called to and read back by: Harry Cook RN  10/12/2017  14:43  Gram stain leola bottle: Gram negative rods 10/12/2017  20:11  ESCHERICHIA COLIFor susceptibility see order # [id number] Gram stain aer bottle: Gram negative rods  Results called to and read back by:Harry Cook RN 10/13/2017  15:51  ESCHERICHIA COLI No Growth to date  No Growth to date  No Growth to date  No Growth to date No Growth to date  No Growth to date  No Growth to date No Growth to date  No Growth to date  No Growth to date     BMP:   Recent Labs  Lab 10/16/17  0420   *      K 3.2*      CO2 23   BUN 50*   CREATININE 1.1   CALCIUM 7.8*   MG 1.7     CBC:   Recent Labs  Lab 10/15/17  1630 10/16/17  0420 10/16/17  1611   WBC 13.18* 10.92  11.68   HGB 10.3* 9.0* 10.5*   HCT 30.6* 27.4* 31.8*   PLT 57* 62* 70*       Significant Imaging: I have reviewed all pertinent imaging results/findings within the past 24 hours.

## 2017-10-16 NOTE — SUBJECTIVE & OBJECTIVE
Interval History:  NAEON. Patient on DAPT. Argatroban d/c. Denies any CP, SOB. Feels comfortable.       Review of Systems   Constitution: Negative for chills and fever.   HENT: Negative for congestion.    Cardiovascular: Negative for chest pain, claudication, dyspnea on exertion and orthopnea.   Respiratory: Negative for shortness of breath.    Hematologic/Lymphatic: Negative for adenopathy and bleeding problem.   Gastrointestinal: Negative for abdominal pain, diarrhea, nausea and vomiting.   Genitourinary: Negative for dysuria and hematuria.   Neurological: Negative for headaches and numbness.     Objective:     Vital Signs (Most Recent):  Temp: 97.2 °F (36.2 °C) (10/16/17 1236)  Pulse: 68 (10/16/17 1236)  Resp: 18 (10/16/17 1236)  BP: (!) 156/70 (10/16/17 1236)  SpO2: 98 % (10/16/17 1236) Vital Signs (24h Range):  Temp:  [96.6 °F (35.9 °C)-97.8 °F (36.6 °C)] 97.2 °F (36.2 °C)  Pulse:  [54-72] 68  Resp:  [12-20] 18  SpO2:  [91 %-99 %] 98 %  BP: (129-180)/(60-90) 156/70     Weight: 78.2 kg (172 lb 6.4 oz)  Body mass index is 27 kg/m².     SpO2: 98 %  O2 Device (Oxygen Therapy): room air      Intake/Output Summary (Last 24 hours) at 10/16/17 1352  Last data filed at 10/16/17 1342   Gross per 24 hour   Intake          1020.92 ml   Output             3457 ml   Net         -2436.08 ml       Lines/Drains/Airways     Central Venous Catheter Line                 Percutaneous Central Line Insertion/Assessment - triple lumen  10/12/17 0700 right internal jugular 4 days          Drain                 Closed/Suction Drain 10/12/17 1711 Right;Anterior Abdomen Bulb 8 Fr. 3 days         Closed/Suction Drain 10/12/17 1712 Right;Posterior Abdomen Bulb 8 Fr. 3 days          Peripheral Intravenous Line                 Peripheral IV - Single Lumen Left Wrist -- days                Physical Exam   Constitutional: He is oriented to person, place, and time. He appears well-developed and well-nourished.   HENT:   Head: Normocephalic and  atraumatic.   Eyes: EOM are normal. Pupils are equal, round, and reactive to light.   Neck: Normal range of motion. Neck supple. No JVD present.   Cardiovascular: Normal rate, regular rhythm, normal heart sounds and intact distal pulses.    Pulmonary/Chest: Effort normal. He has rales.   Abdominal: Soft. Bowel sounds are normal. There is no tenderness.   Musculoskeletal: He exhibits no edema.   Neurological: He is alert and oriented to person, place, and time. He has normal reflexes.   Vitals reviewed.      Significant Labs:   CMP   Recent Labs  Lab 10/15/17  0311 10/15/17  1141 10/15/17  1630 10/16/17  0420    139 138 138   K 3.3* 3.5 3.6 3.2*    102 103 105   CO2 23 24 23 23   * 201* 200* 159*   BUN 51* 51* 55* 50*   CREATININE 1.1 1.3 1.4 1.1   CALCIUM 7.3* 8.0* 7.3* 7.8*   PROT 4.6*  --  4.8* 5.0*   ALBUMIN 1.7*  --  1.6* 2.1*   BILITOT 3.1*  --  2.5* 2.4*   ALKPHOS 100  --  112 89   AST 31  --  22 14   *  --  91* 64*   ANIONGAP 11 13 12 10   ESTGFRAFRICA >60.0 >60.0 55.6* >60.0   EGFRNONAA >60.0 52.6* 48.1* >60.0   , CBC   Recent Labs  Lab 10/15/17  0311 10/15/17  1630 10/16/17  0420   WBC 10.46 13.18* 10.92   HGB 10.1* 10.3* 9.0*   HCT 29.9* 30.6* 27.4*   PLT 48* 57* 62*    and Troponin No results for input(s): TROPONINI in the last 48 hours.

## 2017-10-16 NOTE — PLAN OF CARE
[facility]       HOME  HEALTH ORDERS                                    FACE TO FACE ENCOUNTER      Patient Name: Robby Soriano  YOB: 1940    PCP: Lyla Bryan MD   PCP Address: [address] [place] [address] [address] [facility] [address]  PCP Phone Number: [phone]  PCP Fax: [phone]    Encounter Date: 10/16/2017    Admit to Home Health    Diagnoses:  Active Hospital Problems    Diagnosis  POA    *Obstructive jaundice [K83.8]  Yes    Bacteremia [R78.81]  Yes    ACS (acute coronary syndrome) [I24.9]  Yes    Shortness of breath [R06.02]  Yes    IgG4-related sclerosing cholangitis [K83.0]  Yes      Resolved Hospital Problems    Diagnosis Date Resolved POA    Hypotension [I95.9] 10/13/2017 No    Acute respiratory failure with hypoxia and hypercarbia [J96.01, J96.02] 10/13/2017 No       No future appointments.        I have seen and examined this patient face to face today. My clinical findings that support the need for the home health skilled services and home bound status are the following:  Weakness/numbness causing balance and gait disturbance due to Surgery making it taxing to leave home.    Allergies:Review of patient's allergies indicates:  No Known Allergies    Diet: regular diet    Activities: activity as tolerated    Nursing:   SN to complete comprehensive assessment including routine vital signs. Instruct on disease process and s/s of complications to report to MD. Review/verify medication list sent home with the patient at time of discharge  and instruct patient/caregiver as needed. Frequency may be adjusted depending on start of care date.    Notify MD if SBP > 160 or < 90; DBP > 90 or < 50; HR > 120 or < 50; Temp > 101       CONSULTS:    Physical Therapy to evaluate and treat. Evaluate for home safety and equipment needs; Establish/upgrade home exercise program. Perform / instruct on therapeutic exercises, gait training, transfer  training, and Range of Motion.  Occupational Therapy to evaluate and treat. Evaluate home environment for safety and equipment needs. Perform/Instruct on transfers, ADL training, ROM, and therapeutic exercises.   to evaluate for community resources/long-range planning.  Aide to provide assistance with personal care, ADLs, and vital signs.    Medications: Review discharge medications with patient and family and provide education.      Current Discharge Medication List      CONTINUE these medications which have NOT CHANGED    Details   alprazolam (XANAX) 0.25 MG tablet Take 0.25 mg by mouth 3 (three) times daily.      aspirin (ECOTRIN) 81 MG EC tablet Take 81 mg by mouth once daily.      atorvastatin (LIPITOR) 80 MG tablet Take 80 mg by mouth once daily.      carvedilol (COREG) 3.125 MG tablet Take 3.125 mg by mouth 2 (two) times daily with meals.      escitalopram oxalate (LEXAPRO) 10 MG tablet Take 10 mg by mouth once daily.      indomethacin (INDOCIN) 25 MG capsule Take 25 mg by mouth 2 (two) times daily with meals.      isosorbide dinitrate (ISORDIL) 30 MG Tab Take 20 mg by mouth 3 (three) times daily.      isosorbide mononitrate (IMDUR) 30 MG 24 hr tablet       levothyroxine (SYNTHROID) 100 MCG tablet Take 100 mcg by mouth once daily.      ondansetron (ZOFRAN-ODT) 8 MG TbDL Take 1 tablet (8 mg total) by mouth every 6 (six) hours as needed.  Qty: 30 tablet, Refills: 3    Associated Diagnoses: Sclerosing cholangitis; Autoimmune cholangitis      oxycodone (ROXICODONE) 5 MG immediate release tablet Take 1 tablet (5 mg total) by mouth every 4 (four) hours as needed for Pain.  Qty: 31 tablet, Refills: 0      pantoprazole (PROTONIX) 40 MG tablet Take 40 mg by mouth once daily.      polyethylene glycol (GLYCOLAX) 17 gram/dose powder Take 17 g by mouth once daily.  Qty: 119 g, Refills: 0      predniSONE (DELTASONE) 5 MG tablet       promethazine (PHENERGAN) 25 MG tablet Take 1 tablet (25 mg total) by mouth  every 6 (six) hours as needed for Nausea.  Qty: 40 tablet, Refills: 1    Associated Diagnoses: Sclerosing cholangitis; Autoimmune cholangitis             I certify that this patient is confined to his home and needs intermittent skilled nursing care, physical therapy and occupational therapy.      Electronically Signed  _________________________________  Miriam Valderrama NP  10/16/2017

## 2017-10-16 NOTE — SUBJECTIVE & OBJECTIVE
Interval History: No issues overnight. Transferred out of SICU. Pain controlled. Tolerating diet    Medications:  Continuous Infusions:   Scheduled Meds:   aspirin  81 mg Oral Daily    clindamycin (CLEOCIN) IVPB  900 mg Intravenous Q8H    clopidogrel  75 mg Oral Daily    fluconazole (DIFLUCAN) IVPB  400 mg Intravenous Q24H    furosemide  40 mg Intravenous Daily    hydrocortisone sodium succinate  50 mg Intravenous Q8H    levothyroxine  50 mcg Intravenous Daily    lisinopril  2.5 mg Oral Daily    metoprolol succinate  25 mg Oral Daily    piperacillin-tazobactam (ZOSYN) IVPB  4.5 g Intravenous Q8H    potassium chloride  40 mEq Oral Daily    sodium chloride 0.9%  3 mL Intravenous Q8H     PRN Meds:albuterol-ipratropium 2.5mg-0.5mg/3mL, alprazolam, ondansetron, promethazine (PHENERGAN) IVPB     Review of patient's allergies indicates:  No Known Allergies  Objective:     Vital Signs (Most Recent):  Temp: 96.6 °F (35.9 °C) (10/16/17 0700)  Pulse: 69 (10/16/17 0700)  Resp: 15 (10/16/17 0700)  BP: (!) 158/90 (10/16/17 0700)  SpO2: 97 % (10/16/17 0700) Vital Signs (24h Range):  Temp:  [96.6 °F (35.9 °C)-97.7 °F (36.5 °C)] 96.6 °F (35.9 °C)  Pulse:  [54-76] 69  Resp:  [12-20] 15  SpO2:  [91 %-99 %] 97 %  BP: (129-180)/(60-90) 158/90  Arterial Line BP: (151)/(52) 151/52     Weight: 78.2 kg (172 lb 6.4 oz)  Body mass index is 27 kg/m².    Intake/Output - Last 3 Shifts       10/14 0700 - 10/15 0659 10/15 0700 - 10/16 0659 10/16 0700 - 10/17 0659    P.O. 954 760     I.V. (mL/kg) 1986 (25.4) 110.9 (1.4)     IV Piggyback  150     Total Intake(mL/kg) 2940 (37.6) 1020.9 (13.1)     Urine (mL/kg/hr) 4300 (2.3) 1510 (0.8) 200 (1.1)    Drains 175 (0.1) 135 (0.1)     Stool  4 (0)     Total Output 4475 1649 200    Net -1535 -628.1 -200                 Physical Exam    Significant Labs:  CBC:   Recent Labs  Lab 10/16/17  0420   WBC 10.92   RBC 3.20*   HGB 9.0*   HCT 27.4*   PLT 62*   MCV 86   MCH 28.1   MCHC 32.8     BMP:    Recent Labs  Lab 10/16/17  0420   *      K 3.2*      CO2 23   BUN 50*   CREATININE 1.1   CALCIUM 7.8*   MG 1.7     LFTs:   Recent Labs  Lab 10/16/17  0420   ALT 64*   AST 14   ALKPHOS 89   BILITOT 2.4*   PROT 5.0*   ALBUMIN 2.1*       Significant Diagnostics:  I have reviewed all pertinent imaging results/findings within the past 24 hours.

## 2017-10-16 NOTE — PLAN OF CARE
Problem: Occupational Therapy Goal  Goal: Occupational Therapy Goal  Goals to be met by: 10/27/17     Patient will increase functional independence with ADLs by performing:    Feeding with Wol[place].  UE Dressing with Supervision.  LE Dressing with Supervision.  Grooming while standing at sink with Supervision.  Toileting from toilet with Supervision for hygiene and clothing management.   Toilet transfer to toilet with Supervision.     Outcome: Ongoing (interventions implemented as appropriate)  Goals remain appropriate.

## 2017-10-16 NOTE — ASSESSMENT & PLAN NOTE
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 Appreciate recs  - Lasix daily  - D/C valenzuela  - Fondaparinux for DVT prophylaxis. HIT panel pending  - GI prophylaxis  - Stress dose steroids. Wean to 50mg Q8 today.  - PT/OT

## 2017-10-16 NOTE — PLAN OF CARE
JUSTIN learned that Pt is anticipated to be discharge ready by Wednesday of this week. Pt is expected to need home health. SW met with Pt and his wife to discuss discharge recommendation. Pt and his wife stated he had home health in the past, but could not remember the name of the agency. Pt stated he was ok with being referred to any agency that was in network with his insurance, [employer]. SW sent all necessary referral information to [facility] via [facility] to initiate referral.     Celi Todd LCSW

## 2017-10-16 NOTE — ASSESSMENT & PLAN NOTE
Case of 78 y/o male h/o CAD s/p CABG, HTN, HLD, prostate CA s/p prostatectomy 2005, found to have hepatic mass/abscess (segment IV) near hilum and stricture of upper bile duct on ERCP s/p L hepatectomy and resection of extrahepatic biliary tree, Eron-en-Y, 8/3/17 with final path demonstrating IGG-4 associated sclerosing cholangitis started on prednisone 40 mg daily and OR cultures of hepatic abscess growing clostridium perfringens, Ecoli, kleb pneumo, E faecalis given 5 days of IV abx post operatively, seen in clinic for f/u 10/11 c/o jaundice and fatigue with subjective fevers and was admitted.  Here he was febrile to 102 with hypoxia requirine venti mask, with leukocytosis of 33K and blood cultures with GPR, GVR and GNR with CT a/p with 7.2 cm gas collection in hepatic dome containing scattered debris with adjacent gas and fluid collection along the resection bed measuring 2.7 cm s/p IR drainage of 2 collections 10/12    - C perfringens, ecoli, kleb, efaecalis bacteremia all from hepatic abscess source  - Discontinue pip/tazo  - discontinue fluconazole  - start meropenem IV 1 g every 8hrs   - Course for 14 days starting from negative blood cultures. End date : 10/27/17  - repeat Abdominal CT in 2 weeks   - Will need follow up in ID clinic in 2 weeks to evaluate if need additional days of antibiotic therapy  - Coordinate PICC placement  - Will need weekly CBC, CMP, B/C faxed to ID clinic once discharged home fax # [phone]

## 2017-10-16 NOTE — PLAN OF CARE
Problem: Patient Care Overview  Goal: Individualization & Mutuality  PMH: Partial hepatic resection, hypothyroidism, HTN, HLD, CAD s/p CABG, anemia and autoimmune cholangitis.  10/11- Admitted to [facility] R/T fatigue  10/12- Cultures, CT. Elevated troponins (NSTEMI)  10/13- Admitted to SICU On the floor  10/14: 2 Units PRBCs  10/15- 1L LR bolus, 500cc Albumin            Outcome: Ongoing (interventions implemented as appropriate)  POC reviewed with pt and spouce. Pt AAOx4 and following commands. Pt on regular diet, tolerating well. Pt valenzuela discontinued voiding without difficulty. VSS. Skin free from new breakdown. Family to remain at bedside. Will continue to monitor.

## 2017-10-16 NOTE — PROGRESS NOTES
Cardiology follow up:     Patient with depressed LVEF after MI with recent cholangitis. He is on medical therapy with dual antiplatelets and started on Toprol and lisinopril for depressed LVEF, but suboptimal dosing at this time. Goal would be HR 60's and SBP <120 for now from a cardiac perspective.     - Change Toprol to carvedilol 6.25mg po bid  - Increase lisinopril to 5mg qdaily  - Continue aspirin/plavix  - Diurese to euvolemia  - Interventional cardiology consult when patient is stable, plan for likely LHC prior to d/c    Full note to follow.     Mele Hodges MD  Cardiology Fellow

## 2017-10-16 NOTE — PT/OT/SLP PROGRESS
Physical Therapy      Robby Soriano  MRN: [id number]    Patient not seen today secondary to pt moving rooms upon first attempt and then with nsg care upon second attempt. Will follow-up .    Dallas Garcia, PTA   10/16/2017

## 2017-10-16 NOTE — NURSING TRANSFER
Nursing Transfer Note      10/15/2017     Transfer TRANSFER TO: PCU  FROM: SICU RM [id number]    Transfer via WHEELCHAIR    Transfer with PERSONAL BELONGINGS    Transported by ICU RNS    Medicines sent: YES    Chart send with patient: YES     Notified: PT/FAMILY, ICU RN, CHARGE, MD     Patient reassessed at: 1100 10/15/17

## 2017-10-16 NOTE — PROGRESS NOTES
[facility]  Cardiology  Progress Note    Patient Name: Robby Soriano  MRN: [id number]  Admission Date: 10/11/2017  Hospital Length of Stay: 5 days  Code Status: Prior   Attending Physician: Dallas Quach MD   Primary Care Physician: Lyla Bryan MD  Expected Discharge Date: 10/18/2017  Principal Problem:Obstructive jaundice    Subjective:   Interval History:  NAEON. Patient on DAPT. Argatroban d/c. Denies any CP, SOB. Feels comfortable.       Review of Systems   Constitution: Negative for chills and fever.   HENT: Negative for congestion.    Cardiovascular: Negative for chest pain, claudication, dyspnea on exertion and orthopnea.   Respiratory: Negative for shortness of breath.    Hematologic/Lymphatic: Negative for adenopathy and bleeding problem.   Gastrointestinal: Negative for abdominal pain, diarrhea, nausea and vomiting.   Genitourinary: Negative for dysuria and hematuria.   Neurological: Negative for headaches and numbness.     Objective:     Vital Signs (Most Recent):  Temp: 97.2 °F (36.2 °C) (10/16/17 1236)  Pulse: 68 (10/16/17 1236)  Resp: 18 (10/16/17 1236)  BP: (!) 156/70 (10/16/17 1236)  SpO2: 98 % (10/16/17 1236) Vital Signs (24h Range):  Temp:  [96.6 °F (35.9 °C)-97.8 °F (36.6 °C)] 97.2 °F (36.2 °C)  Pulse:  [54-72] 68  Resp:  [12-20] 18  SpO2:  [91 %-99 %] 98 %  BP: (129-180)/(60-90) 156/70     Weight: 78.2 kg (172 lb 6.4 oz)  Body mass index is 27 kg/m².     SpO2: 98 %  O2 Device (Oxygen Therapy): room air      Intake/Output Summary (Last 24 hours) at 10/16/17 1352  Last data filed at 10/16/17 1342   Gross per 24 hour   Intake          1020.92 ml   Output             3457 ml   Net         -2436.08 ml       Lines/Drains/Airways     Central Venous Catheter Line                 Percutaneous Central Line Insertion/Assessment - triple lumen  10/12/17 0700 right internal jugular 4 days          Drain                 Closed/Suction Drain 10/12/17 1711 Right;Anterior Abdomen Bulb 8  Fr. 3 days         Closed/Suction Drain 10/12/17 1712 Right;Posterior Abdomen Bulb 8 Fr. 3 days          Peripheral Intravenous Line                 Peripheral IV - Single Lumen Left Wrist -- days                Physical Exam   Constitutional: He is oriented to person, place, and time. He appears well-developed and well-nourished.   HENT:   Head: Normocephalic and atraumatic.   Eyes: EOM are normal. Pupils are equal, round, and reactive to light.   Neck: Normal range of motion. Neck supple. No JVD present.   Cardiovascular: Normal rate, regular rhythm, normal heart sounds and intact distal pulses.    Pulmonary/Chest: Effort normal. He has rales.   Abdominal: Soft. Bowel sounds are normal. There is no tenderness.   Musculoskeletal: He exhibits no edema.   Neurological: He is alert and oriented to person, place, and time. He has normal reflexes.   Vitals reviewed.      Significant Labs:   CMP   Recent Labs  Lab 10/15/17  0311 10/15/17  1141 10/15/17  1630 10/16/17  0420    139 138 138   K 3.3* 3.5 3.6 3.2*    102 103 105   CO2 23 24 23 23   * 201* 200* 159*   BUN 51* 51* 55* 50*   CREATININE 1.1 1.3 1.4 1.1   CALCIUM 7.3* 8.0* 7.3* 7.8*   PROT 4.6*  --  4.8* 5.0*   ALBUMIN 1.7*  --  1.6* 2.1*   BILITOT 3.1*  --  2.5* 2.4*   ALKPHOS 100  --  112 89   AST 31  --  22 14   *  --  91* 64*   ANIONGAP 11 13 12 10   ESTGFRAFRICA >60.0 >60.0 55.6* >60.0   EGFRNONAA >60.0 52.6* 48.1* >60.0   , CBC   Recent Labs  Lab 10/15/17  0311 10/15/17  1630 10/16/17  0420   WBC 10.46 13.18* 10.92   HGB 10.1* 10.3* 9.0*   HCT 29.9* 30.6* 27.4*   PLT 48* 57* 62*    and Troponin No results for input(s): TROPONINI in the last 48 hours.        Assessment and Plan:       ACS (acute coronary syndrome)    Patient is a 78 yo male with a past medical hx of CAD s/p CABG in 13 here with severe sepsis secondary to cholangitis and gram negative and positive bacteremia.  Here with ACS event with associated heart failure in  sapna-operative period given EKG with new LBBB and troponin peak of 28.       -  Change Toprol to carvedilol 6.25mg po bid  - Increase lisinopril to 5mg daily  - Goal would be HR 60's and SBP <120 for now from a cardiac perspective.   - Completed 48 Hrs of AC completed for ACS protocol; anticoagulation can be discountinued   -  Continue  DAPT aspirin/plavix  - Diurese to euvolemia; lasix 40 daily   -  Interventional cardiology consult when patient is stable, plan for likely LHC prior to d/c            VTE Risk Mitigation         Ordered     fondaparinux injection 2.5 mg  Daily     Route:  Subcutaneous        10/16/17 0920     High Risk of VTE  Once      10/11/17 1141     Place sequential compression device  Until discontinued      10/11/17 1141     Place YONY hose  Until discontinued      10/11/17 1141          Jesus Brooks MD  Cardiology  [facility]

## 2017-10-17 LAB
ALBUMIN SERPL BCP-MCNC: 2 G/DL
ALP SERPL-CCNC: 112 U/L
ALT SERPL W/O P-5'-P-CCNC: 57 U/L
ANION GAP SERPL CALC-SCNC: 11 MMOL/L
ANISOCYTOSIS BLD QL SMEAR: SLIGHT
APTT BLDCRRT: 24.7 SEC
AST SERPL-CCNC: 20 U/L
BASOPHILS # BLD AUTO: 0.03 K/UL
BASOPHILS NFR BLD: 0.2 %
BILIRUB SERPL-MCNC: 2 MG/DL
BUN SERPL-MCNC: 41 MG/DL
C DIFF GDH STL QL: POSITIVE
C DIFF TOX A+B STL QL IA: NEGATIVE
C DIFF TOX GENS STL QL NAA+PROBE: NEGATIVE
CA-I BLDV-SCNC: 1.08 MMOL/L
CALCIUM SERPL-MCNC: 7.7 MG/DL
CHLORIDE SERPL-SCNC: 107 MMOL/L
CO2 SERPL-SCNC: 23 MMOL/L
CREAT SERPL-MCNC: 1 MG/DL
DIFFERENTIAL METHOD: ABNORMAL
EOSINOPHIL # BLD AUTO: 0.2 K/UL
EOSINOPHIL NFR BLD: 1.7 %
ERYTHROCYTE [DISTWIDTH] IN BLOOD BY AUTOMATED COUNT: 17.9 %
EST. GFR  (AFRICAN AMERICAN): >60 ML/MIN/1.73 M^2
EST. GFR  (NON AFRICAN AMERICAN): >60 ML/MIN/1.73 M^2
GLUCOSE SERPL-MCNC: 156 MG/DL
HCT VFR BLD AUTO: 31.3 %
HGB BLD-MCNC: 10 G/DL
HYPOCHROMIA BLD QL SMEAR: ABNORMAL
IMM GRANULOCYTES # BLD AUTO: 0.3 K/UL
IMM GRANULOCYTES NFR BLD AUTO: 2.3 %
INR PPP: 1
LYMPHOCYTES # BLD AUTO: 1.2 K/UL
LYMPHOCYTES NFR BLD: 9.6 %
MAGNESIUM SERPL-MCNC: 2 MG/DL
MCH RBC QN AUTO: 28.2 PG
MCHC RBC AUTO-ENTMCNC: 31.9 G/DL
MCV RBC AUTO: 88 FL
MONOCYTES # BLD AUTO: 0.6 K/UL
MONOCYTES NFR BLD: 4.6 %
NEUTROPHILS # BLD AUTO: 10.6 K/UL
NEUTROPHILS NFR BLD: 81.6 %
NRBC BLD-RTO: 0 /100 WBC
PHOSPHATE SERPL-MCNC: 2.9 MG/DL
PLATELET # BLD AUTO: ABNORMAL K/UL
PLATELET BLD QL SMEAR: ABNORMAL
PMV BLD AUTO: 13.1 FL
POTASSIUM SERPL-SCNC: 3.7 MMOL/L
PROT SERPL-MCNC: 5.1 G/DL
PROTHROMBIN TIME: 10.7 SEC
RBC # BLD AUTO: 3.54 M/UL
SODIUM SERPL-SCNC: 141 MMOL/L
WBC # BLD AUTO: 12.95 K/UL

## 2017-10-17 PROCEDURE — A4216 STERILE WATER/SALINE, 10 ML: HCPCS | Performed by: NURSE PRACTITIONER

## 2017-10-17 PROCEDURE — 25000003 PHARM REV CODE 250: Performed by: STUDENT IN AN ORGANIZED HEALTH CARE EDUCATION/TRAINING PROGRAM

## 2017-10-17 PROCEDURE — 20600001 HC STEP DOWN PRIVATE ROOM

## 2017-10-17 PROCEDURE — 84100 ASSAY OF PHOSPHORUS: CPT

## 2017-10-17 PROCEDURE — C1751 CATH, INF, PER/CENT/MIDLINE: HCPCS

## 2017-10-17 PROCEDURE — 99223 1ST HOSP IP/OBS HIGH 75: CPT | Mod: GC,,, | Performed by: INTERNAL MEDICINE

## 2017-10-17 PROCEDURE — 02HV33Z INSERTION OF INFUSION DEVICE INTO SUPERIOR VENA CAVA, PERCUTANEOUS APPROACH: ICD-10-PCS | Performed by: RADIOLOGY

## 2017-10-17 PROCEDURE — 85730 THROMBOPLASTIN TIME PARTIAL: CPT

## 2017-10-17 PROCEDURE — 85025 COMPLETE CBC W/AUTO DIFF WBC: CPT

## 2017-10-17 PROCEDURE — 82330 ASSAY OF CALCIUM: CPT

## 2017-10-17 PROCEDURE — 25000003 PHARM REV CODE 250: Performed by: ANESTHESIOLOGY

## 2017-10-17 PROCEDURE — A4216 STERILE WATER/SALINE, 10 ML: HCPCS | Performed by: SURGERY

## 2017-10-17 PROCEDURE — 83735 ASSAY OF MAGNESIUM: CPT

## 2017-10-17 PROCEDURE — 63600175 PHARM REV CODE 636 W HCPCS: Performed by: ANESTHESIOLOGY

## 2017-10-17 PROCEDURE — 25000003 PHARM REV CODE 250: Performed by: SURGERY

## 2017-10-17 PROCEDURE — 80053 COMPREHEN METABOLIC PANEL: CPT

## 2017-10-17 PROCEDURE — 36569 INSJ PICC 5 YR+ W/O IMAGING: CPT

## 2017-10-17 PROCEDURE — 25000003 PHARM REV CODE 250: Performed by: NURSE PRACTITIONER

## 2017-10-17 PROCEDURE — 63600175 PHARM REV CODE 636 W HCPCS: Performed by: STUDENT IN AN ORGANIZED HEALTH CARE EDUCATION/TRAINING PROGRAM

## 2017-10-17 PROCEDURE — 76937 US GUIDE VASCULAR ACCESS: CPT

## 2017-10-17 PROCEDURE — 63600175 PHARM REV CODE 636 W HCPCS: Performed by: SURGERY

## 2017-10-17 PROCEDURE — S0077 INJECTION, CLINDAMYCIN PHOSP: HCPCS | Performed by: ANESTHESIOLOGY

## 2017-10-17 PROCEDURE — 85610 PROTHROMBIN TIME: CPT

## 2017-10-17 PROCEDURE — 36415 COLL VENOUS BLD VENIPUNCTURE: CPT

## 2017-10-17 RX ORDER — PANTOPRAZOLE SODIUM 40 MG/1
40 TABLET, DELAYED RELEASE ORAL ONCE
Status: COMPLETED | OUTPATIENT
Start: 2017-10-17 | End: 2017-10-17

## 2017-10-17 RX ORDER — HEPARIN SODIUM 5000 [USP'U]/ML
5000 INJECTION, SOLUTION INTRAVENOUS; SUBCUTANEOUS EVERY 8 HOURS
Status: DISCONTINUED | OUTPATIENT
Start: 2017-10-17 | End: 2017-10-18 | Stop reason: HOSPADM

## 2017-10-17 RX ORDER — SODIUM,POTASSIUM PHOSPHATES 280-250MG
2 POWDER IN PACKET (EA) ORAL
Status: DISPENSED | OUTPATIENT
Start: 2017-10-17 | End: 2017-10-18

## 2017-10-17 RX ORDER — MEROPENEM AND SODIUM CHLORIDE 1 G/50ML
1 INJECTION, SOLUTION INTRAVENOUS
Status: DISCONTINUED | OUTPATIENT
Start: 2017-10-17 | End: 2017-10-18 | Stop reason: HOSPADM

## 2017-10-17 RX ORDER — DEXTROSE MONOHYDRATE, SODIUM CHLORIDE, AND POTASSIUM CHLORIDE 50; 1.49; 4.5 G/1000ML; G/1000ML; G/1000ML
INJECTION, SOLUTION INTRAVENOUS CONTINUOUS
Status: DISCONTINUED | OUTPATIENT
Start: 2017-10-18 | End: 2017-10-18

## 2017-10-17 RX ORDER — CARVEDILOL 6.25 MG/1
6.25 TABLET ORAL 2 TIMES DAILY
Status: DISCONTINUED | OUTPATIENT
Start: 2017-10-17 | End: 2017-10-18 | Stop reason: HOSPADM

## 2017-10-17 RX ORDER — SODIUM CHLORIDE 0.9 % (FLUSH) 0.9 %
10 SYRINGE (ML) INJECTION EVERY 6 HOURS
Status: DISCONTINUED | OUTPATIENT
Start: 2017-10-17 | End: 2017-10-18 | Stop reason: HOSPADM

## 2017-10-17 RX ORDER — LISINOPRIL 5 MG/1
5 TABLET ORAL DAILY
Status: DISCONTINUED | OUTPATIENT
Start: 2017-10-18 | End: 2017-10-18 | Stop reason: HOSPADM

## 2017-10-17 RX ORDER — SODIUM CHLORIDE 0.9 % (FLUSH) 0.9 %
10 SYRINGE (ML) INJECTION
Status: DISCONTINUED | OUTPATIENT
Start: 2017-10-17 | End: 2017-10-18 | Stop reason: HOSPADM

## 2017-10-17 RX ADMIN — Medication 3 ML: at 02:10

## 2017-10-17 RX ADMIN — PREDNISONE 35 MG: 5 TABLET ORAL at 08:10

## 2017-10-17 RX ADMIN — LEVOTHYROXINE SODIUM ANHYDROUS 50 MCG: 100 INJECTION, POWDER, LYOPHILIZED, FOR SOLUTION INTRAVENOUS at 08:10

## 2017-10-17 RX ADMIN — HEPARIN SODIUM 5000 UNITS: 5000 INJECTION, SOLUTION INTRAVENOUS; SUBCUTANEOUS at 01:10

## 2017-10-17 RX ADMIN — Medication 10 ML: at 12:10

## 2017-10-17 RX ADMIN — HYDROCORTISONE SODIUM SUCCINATE 50 MG: 100 INJECTION, POWDER, FOR SOLUTION INTRAMUSCULAR; INTRAVENOUS at 06:10

## 2017-10-17 RX ADMIN — CLOPIDOGREL 75 MG: 75 TABLET, FILM COATED ORAL at 08:10

## 2017-10-17 RX ADMIN — HEPARIN SODIUM 5000 UNITS: 5000 INJECTION, SOLUTION INTRAVENOUS; SUBCUTANEOUS at 09:10

## 2017-10-17 RX ADMIN — PIPERACILLIN AND TAZOBACTAM 4.5 G: 4; .5 INJECTION, POWDER, LYOPHILIZED, FOR SOLUTION INTRAVENOUS; PARENTERAL at 05:10

## 2017-10-17 RX ADMIN — MEROPENEM AND SODIUM CHLORIDE 1 G: 1 INJECTION, SOLUTION INTRAVENOUS at 08:10

## 2017-10-17 RX ADMIN — Medication 10 ML: at 05:10

## 2017-10-17 RX ADMIN — POTASSIUM & SODIUM PHOSPHATES POWDER PACK 280-160-250 MG 2 PACKET: 280-160-250 PACK at 09:10

## 2017-10-17 RX ADMIN — PANTOPRAZOLE SODIUM 40 MG: 40 TABLET, DELAYED RELEASE ORAL at 10:10

## 2017-10-17 RX ADMIN — HYDROCORTISONE: 10 CREAM TOPICAL at 09:10

## 2017-10-17 RX ADMIN — CARVEDILOL 6.25 MG: 6.25 TABLET, FILM COATED ORAL at 09:10

## 2017-10-17 RX ADMIN — METOPROLOL SUCCINATE 25 MG: 25 TABLET, EXTENDED RELEASE ORAL at 08:10

## 2017-10-17 RX ADMIN — Medication 3 ML: at 06:10

## 2017-10-17 RX ADMIN — Medication 3 ML: at 10:10

## 2017-10-17 RX ADMIN — LISINOPRIL 2.5 MG: 2.5 TABLET ORAL at 08:10

## 2017-10-17 RX ADMIN — MEROPENEM AND SODIUM CHLORIDE 1 G: 1 INJECTION, SOLUTION INTRAVENOUS at 03:10

## 2017-10-17 RX ADMIN — POTASSIUM & SODIUM PHOSPHATES POWDER PACK 280-160-250 MG 2 PACKET: 280-160-250 PACK at 05:10

## 2017-10-17 RX ADMIN — HYDROCORTISONE: 10 CREAM TOPICAL at 08:10

## 2017-10-17 RX ADMIN — POTASSIUM CHLORIDE 40 MEQ: 1500 TABLET, EXTENDED RELEASE ORAL at 08:10

## 2017-10-17 RX ADMIN — FUROSEMIDE 40 MG: 10 INJECTION, SOLUTION INTRAVENOUS at 08:10

## 2017-10-17 RX ADMIN — CLINDAMYCIN IN 5 PERCENT DEXTROSE 900 MG: 18 INJECTION, SOLUTION INTRAVENOUS at 06:10

## 2017-10-17 RX ADMIN — ASPIRIN 81 MG CHEWABLE TABLET 81 MG: 81 TABLET CHEWABLE at 08:10

## 2017-10-17 NOTE — PROGRESS NOTES
General cardiology to sign off.  Final recs as per note yesterday.  Please re-call us if needed.  Thank you for allowing us to partake in Mr. Soriano's care.

## 2017-10-17 NOTE — PT/OT/SLP PROGRESS
Occupational Therapy      Robby Soriano  MRN: [id number]    Patient not seen today secondary to pt having bedside procedure.    ELLEN Harmon  10/17/2017

## 2017-10-17 NOTE — PT/OT/SLP PROGRESS
Physical Therapy      Robby Soriano  MRN: [id number]    Patient not seen today secondary to  (pt in bathroom upon first attempt and on 2nd  Attempt pt having bedside  procedure. Will follow-up next scheduled treatment per PT POC.     Dallas Garcia, PTA   10/17/2017

## 2017-10-17 NOTE — ASSESSMENT & PLAN NOTE
- Trop I peaked at 28; now decreasing  - STEMI, Cardiology following; agatroban drip, plavix loaded  - 2D echo from 10/11/17: normal EF (60-65%) with severe LA enlargement, recent EF down to 25%.  - Continue aspirin  - Interventional Cardiology consult

## 2017-10-17 NOTE — PLAN OF CARE
Problem: Patient Care Overview  Goal: Plan of Care Review  Outcome: Ongoing (interventions implemented as appropriate)  Plan of Care reviewed w/ pt and spouse at bedside. AVSS on RA. Old midline scar JIN. x2 abdominal KAIDEN drains CDI. Ambulating to bathroom w/ x1 assist and RW. Continues w/ loose BM. R PICC placed this shift. NPO at MN for procedure tomorrow.

## 2017-10-17 NOTE — SUBJECTIVE & OBJECTIVE
Interval History: No issues overnight. No chest pain or SOB. Pain controlled. Tolerating regular diet    Medications:  Continuous Infusions:   Scheduled Meds:   aspirin  81 mg Oral Daily    clopidogrel  75 mg Oral Daily    furosemide  40 mg Intravenous Daily    heparin (porcine)  5,000 Units Subcutaneous Q8H    hydrocortisone   Topical BID    levothyroxine  50 mcg Intravenous Daily    lisinopril  2.5 mg Oral Daily    meropenem (MERREM) IVPB  1 g Intravenous Q8H    metoprolol succinate  25 mg Oral Daily    potassium chloride  40 mEq Oral Daily    predniSONE  35 mg Oral Daily    sodium chloride 0.9%  3 mL Intravenous Q8H     PRN Meds:albuterol-ipratropium 2.5mg-0.5mg/3mL, alprazolam, ondansetron, promethazine (PHENERGAN) IVPB     Review of patient's allergies indicates:  No Known Allergies  Objective:     Vital Signs (Most Recent):  Temp: 97.4 °F (36.3 °C) (10/17/17 0348)  Pulse: (!) 57 (10/17/17 0700)  Resp: 18 (10/17/17 0348)  BP: (!) 154/74 (10/17/17 0348)  SpO2: 98 % (10/17/17 0348) Vital Signs (24h Range):  Temp:  [97.2 °F (36.2 °C)-99.1 °F (37.3 °C)] 97.4 °F (36.3 °C)  Pulse:  [57-68] 57  Resp:  [14-19] 18  SpO2:  [96 %-99 %] 98 %  BP: (141-160)/(60-74) 154/74     Weight: 78.2 kg (172 lb 6.4 oz)  Body mass index is 27 kg/m².    Intake/Output - Last 3 Shifts       10/15 0700 - 10/16 0659 10/16 0700 - 10/17 0659 10/17 0700 - 10/18 0659    P.O. 760 1080     I.V. (mL/kg) 110.9 (1.4) 1330 (17)     IV Piggyback 150 400     Total Intake(mL/kg) 1020.9 (13.1) 2810 (35.9)     Urine (mL/kg/hr) 1510 (0.8) 3050 (1.6)     Drains 135 (0.1) 82.5 (0)     Stool 4 (0) 0 (0)     Total Output 1649 3132.5      Net -628.1 -322.5             Stool Occurrence  3 x           Physical Exam   Constitutional: He is oriented to person, place, and time. He appears well-developed and well-nourished.   HENT:   Head: Normocephalic and atraumatic.   Eyes: EOM are normal. Pupils are equal, round, and reactive to light.   Neck: Normal  range of motion.   Cardiovascular: Normal rate, regular rhythm and normal heart sounds.    Pulmonary/Chest: Effort normal and breath sounds normal.   Abdominal: Soft. Bowel sounds are normal. He exhibits no distension.   Drains in place    Musculoskeletal: Normal range of motion. He exhibits no edema.   Neurological: He is alert and oriented to person, place, and time.   Skin: No rash noted.       Significant Labs:  CBC:   Recent Labs  Lab 10/16/17  1611 10/17/17  0451   WBC 11.68 12.95*   RBC 3.65* 3.54*   HGB 10.5* 10.0*   HCT 31.8* 31.3*   PLT 70*  --    MCV 87 88   MCH 28.8 28.2   MCHC 33.0 31.9*     BMP:   Recent Labs  Lab 10/17/17  0451   *      K 3.7      CO2 23   BUN 41*   CREATININE 1.0   CALCIUM 7.7*   MG 2.0     LFTs:   Recent Labs  Lab 10/17/17  0451   ALT 57*   AST 20   ALKPHOS 112   BILITOT 2.0*   PROT 5.1*   ALBUMIN 2.0*       Significant Diagnostics:  I have reviewed all pertinent imaging results/findings within the past 24 hours.

## 2017-10-17 NOTE — PLAN OF CARE
Maria Guadalupe assigned to cover SurgOnc today 10/17/2017. Maria Guadalupe informed that Pt is to d/c mid week with HH and family support. Maria Guadalupe Alatorre sent referral and orders to [facility] via [facility] yesterday. Pt accepted by [facility]. Maria Guadalupe informed [facility] of MARCELO. Maria Guadalupe will continue to follow.      Liza Barker LMSW

## 2017-10-17 NOTE — PLAN OF CARE
Problem: Patient Care Overview  Goal: Plan of Care Review  Outcome: Ongoing (interventions implemented as appropriate)  POC reviewed with patient, who verbalized understanding. AAOx4. Remains free of falls and injury. VSS, BP <160. X2 rightside IR drains with small output.  Tolerating regular diet. Denies nausea and pain. ABX infusing, voiding per urinal with urgency. Up with assist. Telemetry monitor NSR. SCDS part of the night. No acute events. No distress noted. Contact C. Diff precautions maintained, positive lab result. Spouse at bedside. Call bell in reach. Will continue to monitor.

## 2017-10-17 NOTE — ASSESSMENT & PLAN NOTE
76 yo M with autoimmune sclerosing cholangitis who underwent left hepatic resection; resection of extrahepatic bile duct; Eron-Y right hepaticojejunostomy on 8/3/17 who presented to clinic with chills, now with sepsis and hepatic abscess, STEMI, cardiology following    Plan:  - s/p IR drain placement x2 on 10/12/17  - Change to Meropenem. 2 weeks total per ID  - PICC team consulted  - Consult Interventional Cardiology for possible cath  - Lasix daily  - HIT negative. Heparin for DVT prophylaxis  - GI prophylaxis  - Cont steroid taper  - PT/OT

## 2017-10-17 NOTE — CONSULTS
Double lumen PICC placed to right basilic vein.  36cm in length, 32cm arm circumference, 0cm exposed.  Lot# [id number].

## 2017-10-17 NOTE — PROCEDURES
"Robby Soriano is a 77 y.o. male patient.    Temp: 96.2 °F (35.7 °C) (10/17/17 0821)  Pulse: 62 (10/17/17 0821)  Resp: 18 (10/17/17 0821)  BP: (!) 168/65 (10/17/17 0821)  SpO2: 98 % (10/17/17 0821)  Weight: 78.2 kg (172 lb 6.4 oz) (10/13/17 0600)  Height: 5' 7" (170.2 cm) (10/11/17 1932)    PICC  Date/Time: 10/17/2017 10:30 AM  Performed by: COLBY IGLESIAS  Consent Done: Yes  Time out: Immediately prior to procedure a time out was called to verify the correct patient, procedure, equipment, support staff and site/side marked as required  Indications: med administration, vascular access and hemodynamic monitoring  Anesthesia: local infiltration  Local anesthetic: lidocaine 1% without epinephrine  Anesthetic Total (mL): 2  Preparation: skin prepped with ChloraPrep  Skin prep agent dried: skin prep agent completely dried prior to procedure  Sterile barriers: all five maximum sterile barriers used - cap, mask, sterile gown, sterile gloves, and large sterile sheet  Hand hygiene: hand hygiene performed prior to central venous catheter insertion  Location details: right basilic  Catheter type: double lumen  Catheter size: 5 Fr  Catheter Length: 36cm    Ultrasound guidance: yes  Vessel Caliber: medium and patent, compressibility normal  Vascular Doppler: not done  Needle advanced into vessel with real time Ultrasound guidance.  Guidewire confirmed in vessel.  Image recorded and saved.  Sterile sheath used.  no esophageal manometryNumber of attempts: 1  Post-procedure: blood return through all ports, chlorhexidine patch and sterile dressing applied  Technical procedures used: 3CG  Specimens: No  Implants: No  Assessment: placement verified by x-ray        Nadria Motley  10/17/2017  "

## 2017-10-17 NOTE — PROGRESS NOTES
[facility]  General Surgery  Progress Note    Subjective:     History of Present Illness:  Mr. 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 He is currently on a steroid taper. He recuperated slowly but was doing well with forward progress until yesterday when he become easily fatigued and last night he had chills x 45 minutes.   He didnot feel well in clinic today. Of note, he also reports increasingly dark urine in the past week. Tbili was also noted to be 5.4 today in clinic.     Post-Op Info:  * No surgery found *         Interval History: No issues overnight. No chest pain or SOB. Pain controlled. Tolerating regular diet    Medications:  Continuous Infusions:   Scheduled Meds:   aspirin  81 mg Oral Daily    clopidogrel  75 mg Oral Daily    furosemide  40 mg Intravenous Daily    heparin (porcine)  5,000 Units Subcutaneous Q8H    hydrocortisone   Topical BID    levothyroxine  50 mcg Intravenous Daily    lisinopril  2.5 mg Oral Daily    meropenem (MERREM) IVPB  1 g Intravenous Q8H    metoprolol succinate  25 mg Oral Daily    potassium chloride  40 mEq Oral Daily    predniSONE  35 mg Oral Daily    sodium chloride 0.9%  3 mL Intravenous Q8H     PRN Meds:albuterol-ipratropium 2.5mg-0.5mg/3mL, alprazolam, ondansetron, promethazine (PHENERGAN) IVPB     Review of patient's allergies indicates:  No Known Allergies  Objective:     Vital Signs (Most Recent):  Temp: 97.4 °F (36.3 °C) (10/17/17 0348)  Pulse: (!) 57 (10/17/17 0700)  Resp: 18 (10/17/17 0348)  BP: (!) 154/74 (10/17/17 0348)  SpO2: 98 % (10/17/17 0348) Vital Signs (24h Range):  Temp:  [97.2 °F (36.2 °C)-99.1 °F (37.3 °C)] 97.4 °F (36.3 °C)  Pulse:  [57-68] 57  Resp:  [14-19] 18  SpO2:  [96 %-99 %] 98 %  BP: (141-160)/(60-74) 154/74     Weight: 78.2 kg  (172 lb 6.4 oz)  Body mass index is 27 kg/m².    Intake/Output - Last 3 Shifts       10/15 0700 - 10/16 0659 10/16 0700 - 10/17 0659 10/17 0700 - 10/18 0659    P.O. 760 1080     I.V. (mL/kg) 110.9 (1.4) 1330 (17)     IV Piggyback 150 400     Total Intake(mL/kg) 1020.9 (13.1) 2810 (35.9)     Urine (mL/kg/hr) 1510 (0.8) 3050 (1.6)     Drains 135 (0.1) 82.5 (0)     Stool 4 (0) 0 (0)     Total Output 1649 3132.5      Net -628.1 -322.5             Stool Occurrence  3 x           Physical Exam   Constitutional: He is oriented to person, place, and time. He appears well-developed and well-nourished.   HENT:   Head: Normocephalic and atraumatic.   Eyes: EOM are normal. Pupils are equal, round, and reactive to light.   Neck: Normal range of motion.   Cardiovascular: Normal rate, regular rhythm and normal heart sounds.    Pulmonary/Chest: Effort normal and breath sounds normal.   Abdominal: Soft. Bowel sounds are normal. He exhibits no distension.   Drains in place    Musculoskeletal: Normal range of motion. He exhibits no edema.   Neurological: He is alert and oriented to person, place, and time.   Skin: No rash noted.       Significant Labs:  CBC:   Recent Labs  Lab 10/16/17  1611 10/17/17  0451   WBC 11.68 12.95*   RBC 3.65* 3.54*   HGB 10.5* 10.0*   HCT 31.8* 31.3*   PLT 70*  --    MCV 87 88   MCH 28.8 28.2   MCHC 33.0 31.9*     BMP:   Recent Labs  Lab 10/17/17  0451   *      K 3.7      CO2 23   BUN 41*   CREATININE 1.0   CALCIUM 7.7*   MG 2.0     LFTs:   Recent Labs  Lab 10/17/17  0451   ALT 57*   AST 20   ALKPHOS 112   BILITOT 2.0*   PROT 5.1*   ALBUMIN 2.0*       Significant Diagnostics:  I have reviewed all pertinent imaging results/findings within the past 24 hours.    Assessment/Plan:     * Obstructive jaundice    76 yo M with autoimmune sclerosing cholangitis who underwent left hepatic resection; resection of extrahepatic bile duct; Eron-Y right hepaticojejunostomy on 8/3/17 who presented to  clinic with chills, now with sepsis and hepatic abscess, STEMI, cardiology following    Plan:  - s/p IR drain placement x2 on 10/12/17  - Change to Meropenem. 2 weeks total per ID  - PICC team consulted  - Consult Interventional Cardiology for possible cath  - Lasix daily  - HIT negative. Heparin for DVT prophylaxis  - GI prophylaxis  - Cont steroid taper  - PT/OT        Shortness of breath    - Improved  - On RA        ACS (acute coronary syndrome)    - Trop I peaked at 28; now decreasing  - STEMI, Cardiology following; agatroban drip, plavix loaded  - 2D echo from 10/11/17: normal EF (60-65%) with severe LA enlargement, recent EF down to 25%.  - Continue aspirin  - Interventional Cardiology consult          IgG4-related sclerosing cholangitis    - Cont steroid taper            Kade Jansen MD  General Surgery  [facility]

## 2017-10-18 VITALS
HEART RATE: 87 BPM | TEMPERATURE: 98 F | WEIGHT: 172.38 LBS | OXYGEN SATURATION: 96 % | HEIGHT: 67 IN | DIASTOLIC BLOOD PRESSURE: 52 MMHG | SYSTOLIC BLOOD PRESSURE: 106 MMHG | BODY MASS INDEX: 27.06 KG/M2 | RESPIRATION RATE: 16 BRPM

## 2017-10-18 PROBLEM — R06.02 SHORTNESS OF BREATH: Status: RESOLVED | Noted: 2017-10-12 | Resolved: 2017-10-18

## 2017-10-18 LAB
ALBUMIN SERPL BCP-MCNC: 1.9 G/DL
ALP SERPL-CCNC: 102 U/L
ALT SERPL W/O P-5'-P-CCNC: 45 U/L
ANION GAP SERPL CALC-SCNC: 9 MMOL/L
APTT BLDCRRT: 24.2 SEC
AST SERPL-CCNC: 14 U/L
BACTERIA BLD CULT: NORMAL
BASOPHILS # BLD AUTO: 0.02 K/UL
BASOPHILS NFR BLD: 0.2 %
BILIRUB SERPL-MCNC: 1.8 MG/DL
BNP SERPL-MCNC: 1087 PG/ML
BUN SERPL-MCNC: 29 MG/DL
CA-I BLDV-SCNC: 1.1 MMOL/L
CALCIUM SERPL-MCNC: 7.8 MG/DL
CHLORIDE SERPL-SCNC: 103 MMOL/L
CO2 SERPL-SCNC: 27 MMOL/L
CREAT SERPL-MCNC: 0.8 MG/DL
DIFFERENTIAL METHOD: ABNORMAL
EOSINOPHIL # BLD AUTO: 0.1 K/UL
EOSINOPHIL NFR BLD: 1.3 %
ERYTHROCYTE [DISTWIDTH] IN BLOOD BY AUTOMATED COUNT: 18.3 %
EST. GFR  (AFRICAN AMERICAN): >60 ML/MIN/1.73 M^2
EST. GFR  (NON AFRICAN AMERICAN): >60 ML/MIN/1.73 M^2
GLUCOSE SERPL-MCNC: 166 MG/DL
HCT VFR BLD AUTO: 29.8 %
HGB BLD-MCNC: 9.4 G/DL
IMM GRANULOCYTES # BLD AUTO: 0.28 K/UL
IMM GRANULOCYTES NFR BLD AUTO: 2.6 %
INR PPP: 1
LYMPHOCYTES # BLD AUTO: 1.1 K/UL
LYMPHOCYTES NFR BLD: 10.6 %
MAGNESIUM SERPL-MCNC: 1.6 MG/DL
MCH RBC QN AUTO: 28.4 PG
MCHC RBC AUTO-ENTMCNC: 31.5 G/DL
MCV RBC AUTO: 90 FL
MONOCYTES # BLD AUTO: 0.5 K/UL
MONOCYTES NFR BLD: 4.2 %
NEUTROPHILS # BLD AUTO: 8.7 K/UL
NEUTROPHILS NFR BLD: 81.1 %
NRBC BLD-RTO: 0 /100 WBC
PHOSPHATE SERPL-MCNC: 2.5 MG/DL
PLATELET # BLD AUTO: 70 K/UL
PMV BLD AUTO: 13 FL
POTASSIUM SERPL-SCNC: 3.8 MMOL/L
PROT SERPL-MCNC: 4.8 G/DL
PROTHROMBIN TIME: 10.7 SEC
RBC # BLD AUTO: 3.31 M/UL
SODIUM SERPL-SCNC: 139 MMOL/L
WBC # BLD AUTO: 10.67 K/UL

## 2017-10-18 PROCEDURE — A4216 STERILE WATER/SALINE, 10 ML: HCPCS | Performed by: NURSE PRACTITIONER

## 2017-10-18 PROCEDURE — 83880 ASSAY OF NATRIURETIC PEPTIDE: CPT

## 2017-10-18 PROCEDURE — 25000003 PHARM REV CODE 250: Performed by: SURGERY

## 2017-10-18 PROCEDURE — 25000003 PHARM REV CODE 250: Performed by: STUDENT IN AN ORGANIZED HEALTH CARE EDUCATION/TRAINING PROGRAM

## 2017-10-18 PROCEDURE — 63600175 PHARM REV CODE 636 W HCPCS: Performed by: SURGERY

## 2017-10-18 PROCEDURE — 85025 COMPLETE CBC W/AUTO DIFF WBC: CPT

## 2017-10-18 PROCEDURE — 97116 GAIT TRAINING THERAPY: CPT

## 2017-10-18 PROCEDURE — 97110 THERAPEUTIC EXERCISES: CPT

## 2017-10-18 PROCEDURE — 82330 ASSAY OF CALCIUM: CPT

## 2017-10-18 PROCEDURE — S0028 INJECTION, FAMOTIDINE, 20 MG: HCPCS | Performed by: STUDENT IN AN ORGANIZED HEALTH CARE EDUCATION/TRAINING PROGRAM

## 2017-10-18 PROCEDURE — 63600175 PHARM REV CODE 636 W HCPCS: Performed by: STUDENT IN AN ORGANIZED HEALTH CARE EDUCATION/TRAINING PROGRAM

## 2017-10-18 PROCEDURE — 25000003 PHARM REV CODE 250: Performed by: NURSE PRACTITIONER

## 2017-10-18 PROCEDURE — 85610 PROTHROMBIN TIME: CPT

## 2017-10-18 PROCEDURE — A4216 STERILE WATER/SALINE, 10 ML: HCPCS | Performed by: SURGERY

## 2017-10-18 PROCEDURE — 85730 THROMBOPLASTIN TIME PARTIAL: CPT

## 2017-10-18 PROCEDURE — 83735 ASSAY OF MAGNESIUM: CPT

## 2017-10-18 PROCEDURE — 80053 COMPREHEN METABOLIC PANEL: CPT

## 2017-10-18 PROCEDURE — 84100 ASSAY OF PHOSPHORUS: CPT

## 2017-10-18 PROCEDURE — 97535 SELF CARE MNGMENT TRAINING: CPT

## 2017-10-18 RX ORDER — PREDNISONE 5 MG/1
35 TABLET ORAL DAILY
Qty: 35 TABLET | Refills: 0 | Status: SHIPPED | OUTPATIENT
Start: 2017-10-19 | End: 2017-10-24

## 2017-10-18 RX ORDER — CLOPIDOGREL BISULFATE 75 MG/1
75 TABLET ORAL DAILY
Qty: 30 TABLET | Refills: 1 | Status: SHIPPED | OUTPATIENT
Start: 2017-10-19 | End: 2018-10-19

## 2017-10-18 RX ORDER — FUROSEMIDE 40 MG/1
40 TABLET ORAL DAILY
Status: DISCONTINUED | OUTPATIENT
Start: 2017-10-19 | End: 2017-10-18 | Stop reason: HOSPADM

## 2017-10-18 RX ORDER — DIPHENHYDRAMINE HCL 25 MG
50 CAPSULE ORAL ONCE
Status: CANCELLED | OUTPATIENT
Start: 2017-10-18

## 2017-10-18 RX ORDER — LISINOPRIL 5 MG/1
5 TABLET ORAL DAILY
Qty: 30 TABLET | Refills: 0 | Status: SHIPPED | OUTPATIENT
Start: 2017-10-19 | End: 2017-11-17

## 2017-10-18 RX ORDER — SODIUM CHLORIDE 9 MG/ML
3 INJECTION, SOLUTION INTRAVENOUS CONTINUOUS
Status: CANCELLED | OUTPATIENT
Start: 2017-10-18 | End: 2017-10-18

## 2017-10-18 RX ORDER — MAGNESIUM SULFATE HEPTAHYDRATE 40 MG/ML
2 INJECTION, SOLUTION INTRAVENOUS ONCE
Status: COMPLETED | OUTPATIENT
Start: 2017-10-18 | End: 2017-10-18

## 2017-10-18 RX ORDER — HYDRALAZINE HYDROCHLORIDE 20 MG/ML
10 INJECTION INTRAMUSCULAR; INTRAVENOUS ONCE
Status: COMPLETED | OUTPATIENT
Start: 2017-10-18 | End: 2017-10-18

## 2017-10-18 RX ORDER — LEVOTHYROXINE SODIUM 100 UG/1
100 TABLET ORAL DAILY
Status: DISCONTINUED | OUTPATIENT
Start: 2017-10-19 | End: 2017-10-18 | Stop reason: HOSPADM

## 2017-10-18 RX ORDER — FAMOTIDINE 10 MG/ML
20 INJECTION INTRAVENOUS 2 TIMES DAILY
Status: DISCONTINUED | OUTPATIENT
Start: 2017-10-18 | End: 2017-10-18

## 2017-10-18 RX ORDER — FAMOTIDINE 20 MG/1
20 TABLET, FILM COATED ORAL 2 TIMES DAILY
Status: DISCONTINUED | OUTPATIENT
Start: 2017-10-18 | End: 2017-10-18 | Stop reason: HOSPADM

## 2017-10-18 RX ORDER — MEROPENEM AND SODIUM CHLORIDE 1 G/50ML
1 INJECTION, SOLUTION INTRAVENOUS EVERY 8 HOURS
Qty: 2100 ML | Refills: 0 | Status: ON HOLD | OUTPATIENT
Start: 2017-10-18 | End: 2017-11-04

## 2017-10-18 RX ORDER — LANOLIN ALCOHOL/MO/W.PET/CERES
800 CREAM (GRAM) TOPICAL ONCE
Status: COMPLETED | OUTPATIENT
Start: 2017-10-18 | End: 2017-10-18

## 2017-10-18 RX ORDER — ATORVASTATIN CALCIUM 20 MG/1
80 TABLET, FILM COATED ORAL DAILY
Status: DISCONTINUED | OUTPATIENT
Start: 2017-10-18 | End: 2017-10-18 | Stop reason: HOSPADM

## 2017-10-18 RX ORDER — FAMOTIDINE 10 MG/ML
20 INJECTION INTRAVENOUS ONCE
Status: COMPLETED | OUTPATIENT
Start: 2017-10-18 | End: 2017-10-18

## 2017-10-18 RX ORDER — POTASSIUM CHLORIDE 20 MEQ/1
40 TABLET, EXTENDED RELEASE ORAL DAILY
Qty: 14 TABLET | Refills: 0 | Status: SHIPPED | OUTPATIENT
Start: 2017-10-19 | End: 2017-10-26

## 2017-10-18 RX ORDER — FUROSEMIDE 40 MG/1
20 TABLET ORAL DAILY
Qty: 7 TABLET | Refills: 0 | Status: ON HOLD | OUTPATIENT
Start: 2017-10-19 | End: 2017-11-04 | Stop reason: HOSPADM

## 2017-10-18 RX ORDER — HYDROCORTISONE 1 %
CREAM (GRAM) TOPICAL 2 TIMES DAILY
Qty: 1 G | Refills: 0 | Status: ON HOLD | OUTPATIENT
Start: 2017-10-18 | End: 2017-11-04 | Stop reason: HOSPADM

## 2017-10-18 RX ORDER — PREDNISONE 5 MG/1
35 TABLET ORAL DAILY
Qty: 90 TABLET | Refills: 0 | Status: SHIPPED | OUTPATIENT
Start: 2017-10-19 | End: 2017-10-18

## 2017-10-18 RX ORDER — CARVEDILOL 6.25 MG/1
6.25 TABLET ORAL 2 TIMES DAILY
Qty: 60 TABLET | Refills: 1 | Status: SHIPPED | OUTPATIENT
Start: 2017-10-18 | End: 2017-11-17

## 2017-10-18 RX ORDER — FUROSEMIDE 40 MG/1
40 TABLET ORAL DAILY
Qty: 14 TABLET | Refills: 0 | Status: SHIPPED | OUTPATIENT
Start: 2017-10-19 | End: 2017-10-18

## 2017-10-18 RX ORDER — PREDNISONE 5 MG/1
35 TABLET ORAL DAILY
Qty: 70 TABLET | Refills: 0 | Status: SHIPPED | OUTPATIENT
Start: 2017-10-19 | End: 2017-10-18

## 2017-10-18 RX ADMIN — FUROSEMIDE 40 MG: 10 INJECTION, SOLUTION INTRAVENOUS at 09:10

## 2017-10-18 RX ADMIN — FAMOTIDINE 20 MG: 10 INJECTION, SOLUTION INTRAVENOUS at 05:10

## 2017-10-18 RX ADMIN — CARVEDILOL 6.25 MG: 6.25 TABLET, FILM COATED ORAL at 09:10

## 2017-10-18 RX ADMIN — HEPARIN SODIUM 5000 UNITS: 5000 INJECTION, SOLUTION INTRAVENOUS; SUBCUTANEOUS at 05:10

## 2017-10-18 RX ADMIN — CLOPIDOGREL 75 MG: 75 TABLET, FILM COATED ORAL at 09:10

## 2017-10-18 RX ADMIN — ASPIRIN 81 MG CHEWABLE TABLET 81 MG: 81 TABLET CHEWABLE at 09:10

## 2017-10-18 RX ADMIN — FAMOTIDINE 20 MG: 20 TABLET, FILM COATED ORAL at 11:10

## 2017-10-18 RX ADMIN — Medication 10 ML: at 12:10

## 2017-10-18 RX ADMIN — LEVOTHYROXINE SODIUM ANHYDROUS 50 MCG: 100 INJECTION, POWDER, LYOPHILIZED, FOR SOLUTION INTRAVENOUS at 09:10

## 2017-10-18 RX ADMIN — Medication 3 ML: at 06:10

## 2017-10-18 RX ADMIN — HEPARIN SODIUM 5000 UNITS: 5000 INJECTION, SOLUTION INTRAVENOUS; SUBCUTANEOUS at 03:10

## 2017-10-18 RX ADMIN — MEROPENEM AND SODIUM CHLORIDE 1 G: 1 INJECTION, SOLUTION INTRAVENOUS at 06:10

## 2017-10-18 RX ADMIN — MEROPENEM AND SODIUM CHLORIDE 1 G: 1 INJECTION, SOLUTION INTRAVENOUS at 03:10

## 2017-10-18 RX ADMIN — MEROPENEM AND SODIUM CHLORIDE 1 G: 1 INJECTION, SOLUTION INTRAVENOUS at 12:10

## 2017-10-18 RX ADMIN — DEXTROSE MONOHYDRATE, SODIUM CHLORIDE, AND POTASSIUM CHLORIDE: 50; 4.5; 1.49 INJECTION, SOLUTION INTRAVENOUS at 12:10

## 2017-10-18 RX ADMIN — MAGNESIUM OXIDE TAB 400 MG (241.3 MG ELEMENTAL MG) 800 MG: 400 (241.3 MG) TAB at 11:10

## 2017-10-18 RX ADMIN — HYDRALAZINE HYDROCHLORIDE 10 MG: 20 INJECTION INTRAMUSCULAR; INTRAVENOUS at 05:10

## 2017-10-18 RX ADMIN — ALPRAZOLAM 0.25 MG: 0.25 TABLET ORAL at 12:10

## 2017-10-18 RX ADMIN — MAGNESIUM SULFATE IN WATER 2 G: 40 INJECTION, SOLUTION INTRAVENOUS at 03:10

## 2017-10-18 RX ADMIN — LISINOPRIL 5 MG: 5 TABLET ORAL at 09:10

## 2017-10-18 RX ADMIN — PREDNISONE 35 MG: 5 TABLET ORAL at 09:10

## 2017-10-18 RX ADMIN — ATORVASTATIN CALCIUM 80 MG: 20 TABLET, FILM COATED ORAL at 09:10

## 2017-10-18 RX ADMIN — HYDROCORTISONE: 10 CREAM TOPICAL at 09:10

## 2017-10-18 RX ADMIN — Medication 10 ML: at 06:10

## 2017-10-18 RX ADMIN — Medication 3 ML: at 02:10

## 2017-10-18 RX ADMIN — POTASSIUM CHLORIDE 40 MEQ: 1500 TABLET, EXTENDED RELEASE ORAL at 09:10

## 2017-10-18 NOTE — PHYSICIAN QUERY
PT Name: Robby Soriano  MR #: [id number]    Physician Query Form - Heart  Condition Clarification     CDS/: Martha Guadalupe RN          Contact information:[email or web address]  This form is a permanent document in the medical record.     Query Date: October 18, 2017    By submitting this query, we are merely seeking further clarification of documentation. Please utilize your independent clinical judgment when addressing the question(s) below.    The medical record contains the following   Indicators     Supporting Clinical Findings Location in Medical Record   X BNP    BNP 1906     BNP 1681  BNP 2037 Labs 10/11  Labs 10/13  Labs 10/14  Labs 10/15  Labs 10/16   X EF Eccentric LVH with normal left ventricular systolic function (EF 60-65%).     Severely depressed left ventricular systolic function (EF 25-30%). LV function is significantly decreased compared to the prior study 2D Echo 10/11        2D Echo 10/13   X Radiology findings No large pleural effusion or pneumothorax    Mile Right pleural effusion is unchanged CXR 10/11      CXR 10/14   X Echo Results 1 - Eccentric LVH with normal left ventricular systolic function (EF 60-65%).   2 - Severe left atrial enlargement.   3 - Impaired LV relaxation, normal LAP (grade 1 diastolic dysfunction).   4 - Normal right ventricular systolic function .   5 - Trivial tricuspid regurgitation.   6 - The estimated PA systolic pressure is 32 mmHg.    1 - Severely depressed left ventricular systolic function (EF 25-30%). LV function is significantly decreased compared to the prior study.   2 - Impaired LV relaxation, normal LAP (grade 1 diastolic dysfunction).   3 - Normal right ventricular systolic function .   4 - The estimated PA systolic pressure is 27 mmHg.   5 - Trivial to mild aortic regurgitation.   6 - Mild mitral regurgitation.   7 - Trivial to mild tricuspid regurgitation.   8 - Mild left atrial enlargement.   9 - No wall motion abnormalities. 2D  "Echo 10/11                    2D Echo 10/13    "Ascites" documented     X "SOB" or "CHOWDHURY" documented Required transfer to ICU early this am for hypotension and SOB  Currently, the patient complains of mild SOB (he is on a non-rebreather) and he denies any chest pain Progress Note 10/12    Consult Note 10/12     X "Hypoxia" documented Here he was febrile to 102 wit hypoxia requiring venti mask Consult Note 10/13   X Heart Failure documented Likely ACS event with associated heart failure in sapna-operative period given EKG with new LBBB and troponin peak of 28     Progress Note 10/13    "Edema" documented     X Diuretics/Meds Lasix 40mg IV daily  Toprol XL 25mg Po Daily  Coreg 6.25mg PO two times daily MAR current  MAR 10/15-10/17  MAR current   X Treatment: Diurese to euvolemia; Lasix 40 daily     Progress Note 10/16      Other:          Provider, please specify diagnosis or diagnoses associated with above clinical findings.                               [xxx  ] Acute Systolic Heart Failure ( EF < 40)*  [  ] Acute on Chronic Systolic Heart Failure ( EF < 40)*  [  ] Acute Diastolic Heart Failure ( EF > 40)*  [  ] Acute on Chronic Diastolic Heart Failure( EF > 40)*  [  ] Acute Combined Systolic and Diastolic Heart Failure  [  ] Acute on Chronic Combined Systolic and Diastolic Heart Failure  [  ] Other Type of Heart Failure (please specify type): _________________________  [  ] Other (please specify): ___________________________________  [  ] Clinically Undetermined            *American [employer] Association                                                                                                          Please document in your progress notes daily for the duration of treatment until resolved and include in your discharge summary.    "

## 2017-10-18 NOTE — ASSESSMENT & PLAN NOTE
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 2 weeks total per ID  - Lasix daily  - HIT negative. Heparin for DVT prophylaxis  - GI prophylaxis  - Cont steroid taper  - PT/OT

## 2017-10-18 NOTE — DISCHARGE SUMMARY
[facility]  General Surgery  Discharge Summary      Patient Name: Robby Soriano  MRN: [id number]  Admission Date: 10/11/2017  Hospital Length of Stay: 7 days  Discharge Date and Time:  10/18/2017 4:37 PM  Attending Physician: Dallas Quach MD   Discharging Provider: Miriam Valderrama NP  Primary Care Provider: Lyla Bryan MD     HPI: Mr. 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 He is currently on a steroid taper. He recuperated slowly but was doing well with forward progress until yesterday when he become easily fatigued and last night he had chills x 45 minutes.   He did not feel well in clinic today. Of note, he also reports increasingly dark urine in the past week. Total bilirubin today in clinic = 5.4.      Hospital Course: Mr. Soriano was admitted to the hospital 5th floor and that night the patient had hypotension on the floor, desated down to 80's and was transferred to the SICU.  Patient with sepsis, STEMI with cardiology consulted.  The patient was also started on broad spectrum antibiotics.  Blood cultures drawn and were positive  positive for gram positive rods and gram negative rods.  CT scan on 10/11/2017 showed 7.2cm gas collection in the hepatic dome and an adjacent gas and fluid collection in the resection bed 2.7cm.  Please see epic for complete CT report.  Mr. Soriano with an elevated troponin 2.9 also.  There were no EKG changes.  He is s/p IR drain placement x2 on 10/12/17.  Once stabilized in the SICU the patient was stepped down to the PCU then the [facility].  ID consulted and patient discharged home on Meropenem x 2 weeks.    The patient is tolerating a regular diet.  He is voiding and ambulating without difficulty.  Patient with no complaints of nausea, vomiting, chest pain or shortness  of breath.  His vital signs stable. He is afebrile. He is positive for flatus and positive for bowel sounds.  CV: RRR  Lungs: CTA bilaterally  Abdomen:  Soft, non-tender, non-distended, positive for bowel sounds     Consults:   Consults         Status Ordering Provider     Inpatient consult to Cardiology  Once     Provider:  (Not yet assigned)    Completed ZAY MCDOWELL     Inpatient consult to Infectious Diseases  Once     Provider:  (Not yet assigned)    Completed JEM GARDNER     Inpatient consult to Interventional Cardiology  Once     Provider:  (Not yet assigned)    Completed JORGE L LIN     Inpatient consult to Midline team  Once     Provider:  (Not yet assigned)    Completed DIMPLE MAURER     Inpatient consult to PICC team ([facility])  Once     Provider:  (Not yet assigned)    Completed JORGE L LIN          Significant Diagnostic Studies: Labs:   CMP   Recent Labs  Lab 10/17/17  0451 10/18/17  0430    139   K 3.7 3.8    103   CO2 23 27   * 166*   BUN 41* 29*   CREATININE 1.0 0.8   CALCIUM 7.7* 7.8*   PROT 5.1* 4.8*   ALBUMIN 2.0* 1.9*   BILITOT 2.0* 1.8*   ALKPHOS 112 102   AST 20 14   ALT 57* 45*   ANIONGAP 11 9   ESTGFRAFRICA >60.0 >60.0   EGFRNONAA >60.0 >60.0    and CBC   Recent Labs  Lab 10/17/17  0451 10/18/17  0430   WBC 12.95* 10.67   HGB 10.0* 9.4*   HCT 31.3* 29.8*   PLT SEE COMMENT 70*     Radiology: CT scan: CT ABDOMEN PELVIS WITH CONTRAST:   Results for orders placed or performed during the hospital encounter of 10/11/17   CT Abdomen Pelvis With Contrast    Narrative    TECHNIQUE:  Axial images of the abdomen were acquired  after the use of 100 cc [id number] IV contrast during the arterial phase. Axial images of the abdomen and pelvis were then obtained in the portal venous phase and delayed phase.  Coronal and sagittal reconstructions were also obtained    HISTORY:  77 year old M with s/p liver resection with extrahepatic bile duct resection and marci-en-y hepaticojejunostomy;  re-admit for elevated Tbilirubin concern for cholangitis vs stricture vs biloma    COMPARISON: MRI abdomen without contrast 7/14/2017     FINDINGS:    Heart: Visualized aspects of heart demonstrate no cardiomegaly or pericardial effusion.There is abundant coronary atherosclerosis in a three-vessel distribution.  There is calcification of the aortic valve.    Lung Bases: The lungs are symmetrically expanded with scattered subsegmental bandlike atelectasis.  There is no consolidation, effusion, or pneumothorax.  There is no significant pleural thickening or pleural fluid.    Liver: Appropriate in size noting postsurgical change of left hepatic resection with scattered surgical clips along the medial margin of the liver.  There is an ill-defined collection of gas with scattered internal debris in the hepatic dome measuring approximate 7.2 x 3.7 x 2.5 cm which may represent hepatic abscess versus infarction with abscess.  Additionally there is a gas and fluid containing collection along the hepatic resection margin measuring approximately 2.7 cm in greatest thickness and extending along the resection margin.  This collection appears to communicate with the collection in the hepatic dome on axial series 2, image 11.  There is some periportal edema.    Gallbladder: Surgically absent    Bile Ducts: No intra-or extrahepatic biliary ductal dilatation.    Pancreas: No pancreatic mass lesion or peripancreatic inflammatory change.     Spleen: Normal.    Adrenals: Normal.    GI Tract/Mesentery: The stomach is normal.  There is postoperative change of hepaticojejunostomy.  The visualized loops of small and large bowel are normal in caliber without evidence of obstruction or inflammation.    Kidneys/ Ureters: Normal in size and location demonstrating appropriate concentration and excretion of contrast on delayed imaging.There is a small low attenuation lesion involving the lower pole of the right kidney previously characterized as  cyst on recent MRI.No hydoroureteronephrosis.    Bladder: Smooth contours without bladder wall thickening.    Reproductive organs: Normal.    Lymph nodes:  No significant lymphadenopathy.     Peritoneal Space: No abdominopelvic ascites or intraperitoneal fee air.     Abdominal wall:  There is soft tissue stranding along a midline abdominal incision.  Many soft tissues are unremarkable.     Vasculature: There is moderate calcific atherosclerosis of the abdominal aorta and its branch vessels No aneurysm.     Bones: Normal in appearance without acute fracture or bony destructive process.    Impression         1.  Postsurgical change of left hepatic lobe resection and hepaticojejunostomy.  7.2 cm gas collection in the hepatic dome containing scattered debris with an adjacent gas and fluid collection along the resection bed measuring 2.7 cm in greatest diameter.  These collections appear to communicate on axial series 2, image 11 and likely represent hepatic infarct with abscess versus hepatic abscess.    2.  Dense coronary atherosclerosis.    3.  Right renal cyst.    This report has been flagged in [facility].    Findings first visualized at 19:40.  Impression discussed with Hunter FUENTES at 19:45 by Dr. Kolb via telephone for staff radiologist Jared Sepulveda M.D..            ______________________________________     Electronically signed by resident: KURT KOLB MD  Date:     10/11/17  Time:    19:51            As the supervising and teaching physician, I personally reviewed the images and resident's interpretation and I agree with the findings.          Electronically signed by: JARED SEPULVEDA MD  Date:     10/11/17  Time:    20:56        Pending Diagnostic Studies:     None        Final Active Diagnoses:    Diagnosis Date Noted POA    PRINCIPAL PROBLEM:  Obstructive jaundice [K83.8] 10/11/2017 Yes    Bacteremia [R78.81] 10/13/2017 Yes    ACS (acute coronary syndrome) [I24.9] 10/12/2017 Yes     IgG4-related sclerosing cholangitis [K83.0] 10/11/2017 Yes      Problems Resolved During this Admission:    Diagnosis Date Noted Date Resolved POA    Shortness of breath [R06.02] 10/12/2017 10/18/2017 Yes    Hypotension [I95.9] 10/12/2017 10/13/2017 No    Acute respiratory failure with hypoxia and hypercarbia [J96.01, J96.02] 10/12/2017 10/13/2017 No      Discharged Condition: good    Disposition: Home or Self Care    Follow Up:  Follow-up Information     Sanju Torres MD On 10/19/2017.    Specialty:  Cardiology  Why:  Cardiology Follow up 10/19/2017 @ 12:10am  Contact information:  [address] [facility] [place] [address]  [phone]             Lyla Bryan MD On 10/24/2017.    Specialty:  Internal Medicine  Why:  HOSPITAL FOLLOW UP with Dr Bryan 10/24/2017 @ 4:00pm  Contact information:  [address]  [facility] [address]  [phone]             Melissa Gallego NP On 11/2/2017.    Specialty:  Family Medicine  Why:  FOLLOW UP with Ida Gallego NP in Dr Quach's office 11/2/2017 @ 11:00am in the [facility]  Contact information:  [address] [address]  [phone]             Song Otero MD On 11/2/2017.    Specialty:  Infectious Diseases  Why:  INFECTIOUS DISEASE FOLLOW UP with Dr Otero 11/2/2017 @ 11:30am in the ID Clinic (1st floor hallway between the [facility] and [place] Entrance.  Contact information:  [address] [address]  [phone]                 Patient Instructions:     Diet general     Activity as tolerated     Call MD for:  temperature >100.4     Call MD for:  persistent nausea and vomiting or diarrhea     Call MD for:  severe uncontrolled pain     Call MD for:  redness, tenderness, or signs of infection (pain, swelling, redness, odor or green/yellow discharge around incision site)     Call MD for:  difficulty breathing or increased cough     Call MD for:  severe persistent headache     Call MD for:  worsening rash     Call MD  for:  persistent dizziness, light-headedness, or visual disturbances     Call MD for:  increased confusion or weakness       Medications:  Reconciled Home Medications:   Current Discharge Medication List      START taking these medications    Details   clopidogrel (PLAVIX) 75 mg tablet Take 1 tablet (75 mg total) by mouth once daily.  Qty: 30 tablet, Refills: 1    Comments: Refills per cardiology      furosemide (LASIX) 40 MG tablet Take 1 tablet (40 mg total) by mouth once daily. Refills per cardiology  Qty: 14 tablet, Refills: 0      hydrocortisone 1 % cream Apply topically 2 (two) times daily. Apply to anal area prn.  May use over the counter.  Qty: 1 g, Refills: 0      lisinopril (PRINIVIL,ZESTRIL) 5 MG tablet Take 1 tablet (5 mg total) by mouth once daily.  Qty: 30 tablet, Refills: 0    Comments: Refills per cardiology      meropenem-0.9% sodium chloride 1 gram/50 mL PgBk Inject 50 mLs (1 g total) into the vein every 8 (eight) hours.  Qty: 2100 mL, Refills: 0      potassium chloride SA (K-DUR,KLOR-CON) 20 MEQ tablet Take 2 tablets (40 mEq total) by mouth once daily. Refills per cardiology  Qty: 14 tablet, Refills: 0         CONTINUE these medications which have CHANGED    Details   carvedilol (COREG) 6.25 MG tablet Take 1 tablet (6.25 mg total) by mouth 2 (two) times daily.  Qty: 60 tablet, Refills: 1    Comments: Refills per cardiology      predniSONE (DELTASONE) 5 MG tablet Take 7 tablets (35 mg total) by mouth once daily. Take 35mg by mouth daily for 5 days.   Then, take 30mg by mouth daily for 7 days.  Then, take 25mg by mouth daily for 7 days. Continue decreasing dose by 5mg each week.  Qty: 90 tablet, Refills: 0    Comments: Refills per PCP         CONTINUE these medications which have NOT CHANGED    Details   alprazolam (XANAX) 0.25 MG tablet Take 0.25 mg by mouth 3 (three) times daily.      aspirin (ECOTRIN) 81 MG EC tablet Take 81 mg by mouth once daily.      atorvastatin (LIPITOR) 80 MG tablet Take 80  mg by mouth once daily.      escitalopram oxalate (LEXAPRO) 10 MG tablet Take 10 mg by mouth once daily.      indomethacin (INDOCIN) 25 MG capsule Take 25 mg by mouth 2 (two) times daily with meals.      levothyroxine (SYNTHROID) 100 MCG tablet Take 100 mcg by mouth once daily.      ondansetron (ZOFRAN-ODT) 8 MG TbDL Take 1 tablet (8 mg total) by mouth every 6 (six) hours as needed.  Qty: 30 tablet, Refills: 3    Associated Diagnoses: Sclerosing cholangitis; Autoimmune cholangitis      oxycodone (ROXICODONE) 5 MG immediate release tablet Take 1 tablet (5 mg total) by mouth every 4 (four) hours as needed for Pain.  Qty: 31 tablet, Refills: 0      pantoprazole (PROTONIX) 40 MG tablet Take 40 mg by mouth once daily.      promethazine (PHENERGAN) 25 MG tablet Take 1 tablet (25 mg total) by mouth every 6 (six) hours as needed for Nausea.  Qty: 40 tablet, Refills: 1    Associated Diagnoses: Sclerosing cholangitis; Autoimmune cholangitis         STOP taking these medications       isosorbide dinitrate (ISORDIL) 30 MG Tab Comments:   Reason for Stopping:         isosorbide mononitrate (IMDUR) 30 MG 24 hr tablet Comments:   Reason for Stopping:         polyethylene glycol (GLYCOLAX) 17 gram/dose powder Comments:   Reason for Stopping:               Miriam Valderrama NP  General Surgery  [facility]

## 2017-10-18 NOTE — SUBJECTIVE & OBJECTIVE
Interval History: No issues overnight. Pain well controlled. NPO overnight for planned angiogram this morning which has been cancelled     Medications:  Continuous Infusions:   Scheduled Meds:   aspirin  81 mg Oral Daily    atorvastatin  80 mg Oral Daily    carvedilol  6.25 mg Oral BID    clopidogrel  75 mg Oral Daily    famotidine  20 mg Oral BID    [START ON 10/19/2017] furosemide  40 mg Oral Daily    heparin (porcine)  5,000 Units Subcutaneous Q8H    hydrocortisone   Topical BID    [START ON 10/19/2017] levothyroxine  100 mcg Oral Daily    lisinopril  5 mg Oral Daily    meropenem (MERREM) IVPB  1 g Intravenous Q8H    potassium chloride  40 mEq Oral Daily    predniSONE  35 mg Oral Daily    sodium chloride 0.9%  10 mL Intravenous Q6H    sodium chloride 0.9%  3 mL Intravenous Q8H     PRN Meds:albuterol-ipratropium 2.5mg-0.5mg/3mL, alprazolam, ondansetron, promethazine (PHENERGAN) IVPB, Flushing PICC Protocol **AND** sodium chloride 0.9% **AND** sodium chloride 0.9%     Review of patient's allergies indicates:  No Known Allergies  Objective:     Vital Signs (Most Recent):  Temp: 98.3 °F (36.8 °C) (10/18/17 0922)  Pulse: 76 (10/18/17 1100)  Resp: 18 (10/18/17 0922)  BP: (!) 140/65 (10/18/17 0922)  SpO2: 98 % (10/18/17 0922) Vital Signs (24h Range):  Temp:  [96.3 °F (35.7 °C)-98.3 °F (36.8 °C)] 98.3 °F (36.8 °C)  Pulse:  [60-79] 76  Resp:  [16-18] 18  SpO2:  [93 %-98 %] 98 %  BP: (139-181)/(65-82) 140/65     Weight: 78.2 kg (172 lb 6.4 oz)  Body mass index is 27 kg/m².    Intake/Output - Last 3 Shifts       10/16 0700 - 10/17 0659 10/17 0700 - 10/18 0659 10/18 0700 - 10/19 0659    P.O. 1080 1960 600    I.V. (mL/kg) 1330 (17) 335 (4.3) 323.8 (4.1)    IV Piggyback 400 200     Total Intake(mL/kg) 2810 (35.9) 2495 (31.9) 923.8 (11.8)    Urine (mL/kg/hr) 3050 (1.6) 2175 (1.2) 650 (1.8)    Drains 82.5 (0) 175 (0.1)     Stool 0 (0) 0 (0) 0 (0)    Total Output 3132.5 2350 650    Net -322.5 +145 +273.8            Stool Occurrence 3 x 5 x 1 x          Physical Exam   Constitutional: He is oriented to person, place, and time. He appears well-developed and well-nourished.   HENT:   Head: Normocephalic and atraumatic.   Eyes: EOM are normal. Pupils are equal, round, and reactive to light.   Neck: Normal range of motion.   Cardiovascular: Normal rate, regular rhythm and normal heart sounds.    Pulmonary/Chest: Effort normal and breath sounds normal.   Abdominal: Soft. Bowel sounds are normal. He exhibits no distension.   Drains in place    Musculoskeletal: Normal range of motion. He exhibits no edema.   Neurological: He is alert and oriented to person, place, and time.   Skin: No rash noted.       Significant Labs:  CBC:   Recent Labs  Lab 10/18/17  0430   WBC 10.67   RBC 3.31*   HGB 9.4*   HCT 29.8*   PLT 70*   MCV 90   MCH 28.4   MCHC 31.5*     BMP:   Recent Labs  Lab 10/18/17  0430   *      K 3.8      CO2 27   BUN 29*   CREATININE 0.8   CALCIUM 7.8*   MG 1.6     LFTs:   Recent Labs  Lab 10/18/17  0430   ALT 45*   AST 14   ALKPHOS 102   BILITOT 1.8*   PROT 4.8*   ALBUMIN 1.9*       Significant Diagnostics:  I have reviewed all pertinent imaging results/findings within the past 24 hours.

## 2017-10-18 NOTE — CONSULTS
INTERVENTIONAL CARDIOLOGY CONSULT      Referring Physician:  Dallas Quach MD  Indication: NSTEMI     HPI:  Mr. 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 He was given 2L of NS, started on broad spectrum abx (van/zosyn/flagyl) after his blood cultures came back positive for gram positive and gram negative rods, and transferred from the floor to the unit for hypotension and acute hypoxia (desaturated down into the 80's).   Work up for hypoxia included EKG and Troponins that was elevated with peak of 28 and EKG showed new LBBB. Echo showed newly depressed EF 25% with  Global hypokinesis. Cardiology was consulted and recommended to start DAPT LHC once patient become clinically stable.     Past Medical History:   Diagnosis Date    Cancer     Prostate/s/p prostatectomy    Coronary artery disease     GERD (gastroesophageal reflux disease)     Hyperlipidemia     Hypertension     Hypothyroidism      Past Surgical History:   Procedure Laterality Date    CAROTID ENDARTERECTOMY Bilateral     CORONARY ARTERY BYPASS GRAFT      PROSTATE SURGERY       History reviewed. No pertinent family history.  Social History   Substance Use Topics    Smoking status: Former Smoker     Types: Cigarettes     Start date: 1/1/1956     Quit date: 6/18/2013    Smokeless tobacco: Never Used    Alcohol use No     Review of patient's allergies indicates:  No Known Allergies   aspirin  81 mg Oral Daily    carvedilol  6.25 mg Oral BID    clopidogrel  75 mg Oral Daily    furosemide  40 mg Intravenous Daily    heparin (porcine)   5,000 Units Subcutaneous Q8H    hydrocortisone   Topical BID    levothyroxine  50 mcg Intravenous Daily    [START ON 10/18/2017] lisinopril  5 mg Oral Daily    meropenem (MERREM) IVPB  1 g Intravenous Q8H    potassium chloride  40 mEq Oral Daily    potassium, sodium phosphates  2 packet Oral QID (AC & HS)    predniSONE  35 mg Oral Daily    sodium chloride 0.9%  10 mL Intravenous Q6H    sodium chloride 0.9%  3 mL Intravenous Q8H     OBJECTIVE:   Physical Exam  Gen: NAD  Head/Eyes/Ears/Nose: NCAT, MMM   Neck: Soft, supple, no JVD   Lung: decreased breath sounds bilaterally, no wheezes  Heart: Normal S1/S2, regular rate and rhythm, no gallops, normal PMI  Abdomen: Soft, NT/ND, NABS, no masses, no guarding/rebounding, no HSM, no ascitis  Extremities: No LE edema bilaterally, 2+ pulses bilaterally in upper/lower extremities   Skin: Normal color and turgor. No rashes, no petechia, no ecchymoses.   Neuro: AAOx3     LEFT RIGHT   RADIAL 2+ 2+   ULNAR 2+ 2+   BRACHIAL     FEMORAL 2+ 2+   DP 2+ 2+   TP 2+ 2+   NASRA'S TEST     BARBEAU TEST       Labs:       Recent Labs  Lab 10/16/17  0420 10/16/17  1611 10/17/17  0451   WBC 10.92 11.68 12.95*   HGB 9.0* 10.5* 10.0*   HCT 27.4* 31.8* 31.3*   PLT 62* 70* SEE COMMENT   LYMPH 8.7*  1.0 7.0*  0.8* 9.6*  1.2   MONO 5.3  0.6 5.1  0.6 4.6  0.6   EOSINOPHIL 0.0 0.0 1.7         Recent Labs  Lab 10/15/17  0311  10/16/17  0031 10/16/17  0420 10/17/17  0451   APTT 45.4*  < > 42.8* 42.1* 24.7   INR 2.4*  --   --  2.2* 1.0   < > = values in this interval not displayed.     Recent Labs  Lab 10/15/17  0311 10/15/17  1141  10/16/17  0420 10/16/17  1611 10/17/17  0451   * 201*  < > 159* 203* 156*   CALCIUM 7.3* 8.0*  < > 7.8* 8.1* 7.7*   ALBUMIN 1.7*  --   < > 2.1* 2.2* 2.0*   PROT 4.6*  --   < > 5.0* 5.4* 5.1*    139  < > 138 140 141   K 3.3* 3.5  < > 3.2* 3.5 3.7   CO2 23 24  < > 23 27 23    102  < > 105 102 107   BUN 51* 51*  < > 50* 45* 41*   CREATININE 1.1  1.3  < > 1.1 1.1 1.0   ALKPHOS 100  --   < > 89 119 112   *  --   < > 64* 72* 57*   AST 31  --   < > 14 22 20   BILITOT 3.1*  --   < > 2.4* 2.6* 2.0*   MG 1.7 2.1  --  1.7  --  2.0   PHOS 4.0  --   --  3.1  --  2.9   < > = values in this interval not displayed.  Estimated Creatinine Clearance: 57.8 mL/min (based on SCr of 1 mg/dL).      Recent Labs  Lab 10/16/17  0420   BNP 2,037*         IMAGING:   EKGs:NSR with T wave inversions of the thea-lateral leads, LBBB    TTE: 10/13/217  CONCLUSIONS     1 - Severely depressed left ventricular systolic function (EF 25-30%). LV function is significantly decreased compared to the prior study.     2 - Impaired LV relaxation, normal LAP (grade 1 diastolic dysfunction).     3 - Normal right ventricular systolic function .     4 - The estimated PA systolic pressure is 27 mmHg.     5 - Trivial to mild aortic regurgitation.     6 - Mild mitral regurgitation.     7 - Trivial to mild tricuspid regurgitation.     8 - Mild left atrial enlargement.     9 - No wall motion abnormalities.     Prior Ischemic Evaluation:  Not available     IMPRESSION:      76 yo man with HTN, DLPD, sclerosing colangitis and CAD s/p 3V CABG in 2013 at [place], [place] admitted with hypotension found to have NSTEMI with peak troponin of 28 and T wave inversions+ new LBBB on EKG who is referred for [facility].       PLAN:     - We will not perform angiogram this time since patient is clinically stable and asymptomatic  -Would see him in the clinic for outpatient follow up and if symptomatic would plan for outpatient angiogram  -should continue DAPT (ASA and plavix) and high potency statin (atorvastatin 80 mg po daily)  -This patient was discussed with the attending interventional cardiologist who agrees with the above assessment & plan.      Nicolas Gallagher MD  Cardiology Fellow, PGY VI  #[phone]

## 2017-10-18 NOTE — PLAN OF CARE
[facility]       HOME  HEALTH ORDERS                                    FACE TO FACE ENCOUNTER      Patient Name: Robby Soriano  YOB: 1940    PCP: Lyla Bryan MD   PCP Address: [address] [place] [address] [address] [facility] [address]  PCP Phone Number: [phone]  PCP Fax: [phone]    Encounter Date: 10/18/2017    Admit to Home Health    Diagnoses:  Active Hospital Problems    Diagnosis  POA    *Obstructive jaundice [K83.8]  Yes    Bacteremia [R78.81]  Yes    ACS (acute coronary syndrome) [I24.9]  Yes    Shortness of breath [R06.02]  Yes    IgG4-related sclerosing cholangitis [K83.0]  Yes      Resolved Hospital Problems    Diagnosis Date Resolved POA    Hypotension [I95.9] 10/13/2017 No    Acute respiratory failure with hypoxia and hypercarbia [J96.01, J96.02] 10/13/2017 No       Future Appointments  Date Time Provider Department Center   11/2/2017 11:00 AM Melissa Gallego NP [facility] Ronnie Villeda   11/2/2017 11:30 AM Song Otero MD [facility] ID [facility]u Franco     Follow-up Information     Mj Velasquez MD In 1 week.    Specialties:  INTERVENTIONAL CARDIOLOGY, Cardiology  Contact information:  [address]  [facility] [id number] [phone]                   I have seen and examined this patient face to face today. My clinical findings that support the need for the home health skilled services and home bound status are the following:  Weakness/numbness causing balance and gait disturbance due to Weakness/Debility and Dehydration making it taxing to leave home.    Allergies:Review of patient's allergies indicates:  No Known Allergies    Diet: regular diet    Activities: activity as tolerated    Nursing:   SN to complete comprehensive assessment including routine vital signs. Instruct on disease process and s/s of complications to report to MD. Review/verify medication list sent home with the patient at time of discharge  and  instruct patient/caregiver as needed. Frequency may be adjusted depending on start of care date.    PICC line to right basilic. Picc line care per nursing protocol.    Notify MD if SBP > 160 or < 90; DBP > 90 or < 50; HR > 120 or < 50; Temp > 101    Labs:  CBC, CMP on 10/19/2017 and CBC, CMP Q Monday. Fax results to ID Dr. Méndez at [phone] and fax results to Dr. Sanju Torres (cardiology) at [phone]. Dr. Torres phone [phone].  CONSULTS:    Physical Therapy to evaluate and treat. Evaluate for home safety and equipment needs; Establish/upgrade home exercise program. Perform / instruct on therapeutic exercises, gait training, transfer training, and Range of Motion.  Occupational Therapy to evaluate and treat. Evaluate home environment for safety and equipment needs. Perform/Instruct on transfers, ADL training, ROM, and therapeutic exercises.   to evaluate for community resources/long-range planning.  Aide to provide assistance with personal care, ADLs, and vital signs.    Medications: Review discharge medications with patient and family and provide education.      IVAB via PICC line:   Meropenem-0.9% sodium chloride 1 gram/50 mL PgBk.  Inject 50 mLs (1 g total) into the vein every 8 (eight) hours.  (Last dose on 11/1/2017)    Current Discharge Medication List      START taking these medications    Details   clopidogrel (PLAVIX) 75 mg tablet Take 1 tablet (75 mg total) by mouth once daily.  Qty: 30 tablet, Refills: 1    Comments: Refills per cardiology      furosemide (LASIX) 40 MG tablet Take 1 tablet (40 mg total) by mouth once daily. Refills per cardiology  Qty: 14 tablet, Refills: 0      hydrocortisone 1 % cream Apply topically 2 (two) times daily. Apply to anal area prn.  May use over the counter.  Qty: 1 g, Refills: 0      lisinopril (PRINIVIL,ZESTRIL) 5 MG tablet Take 1 tablet (5 mg total) by mouth once daily.  Qty: 30 tablet, Refills: 0    Comments: Refills per cardiology       meropenem-0.9% sodium chloride 1 gram/50 mL PgBk Inject 50 mLs (1 g total) into the vein every 8 (eight) hours.  Qty: 2100 mL, Refills: 0   (Last dose on 11/1/2017)     potassium chloride SA (K-DUR,KLOR-CON) 20 MEQ tablet Take 2 tablets (40 mEq total) by mouth once daily. Refills per cardiology  Qty: 14 tablet, Refills: 0         CONTINUE these medications which have CHANGED    Details   carvedilol (COREG) 6.25 MG tablet Take 1 tablet (6.25 mg total) by mouth 2 (two) times daily.  Qty: 60 tablet, Refills: 1    Comments: Refills per cardiology      predniSONE (DELTASONE) 5 MG tablet Take 7 tablets (35 mg total) by mouth once daily.  Qty: 70 tablet, Refills: 0    Comments: Refills per PCP         CONTINUE these medications which have NOT CHANGED    Details   alprazolam (XANAX) 0.25 MG tablet Take 0.25 mg by mouth 3 (three) times daily.      aspirin (ECOTRIN) 81 MG EC tablet Take 81 mg by mouth once daily.      atorvastatin (LIPITOR) 80 MG tablet Take 80 mg by mouth once daily.      escitalopram oxalate (LEXAPRO) 10 MG tablet Take 10 mg by mouth once daily.      indomethacin (INDOCIN) 25 MG capsule Take 25 mg by mouth 2 (two) times daily with meals.      levothyroxine (SYNTHROID) 100 MCG tablet Take 100 mcg by mouth once daily.      ondansetron (ZOFRAN-ODT) 8 MG TbDL Take 1 tablet (8 mg total) by mouth every 6 (six) hours as needed.  Qty: 30 tablet, Refills: 3    Associated Diagnoses: Sclerosing cholangitis; Autoimmune cholangitis      oxycodone (ROXICODONE) 5 MG immediate release tablet Take 1 tablet (5 mg total) by mouth every 4 (four) hours as needed for Pain.  Qty: 31 tablet, Refills: 0      pantoprazole (PROTONIX) 40 MG tablet Take 40 mg by mouth once daily.      promethazine (PHENERGAN) 25 MG tablet Take 1 tablet (25 mg total) by mouth every 6 (six) hours as needed for Nausea.  Qty: 40 tablet, Refills: 1    Associated Diagnoses: Sclerosing cholangitis; Autoimmune cholangitis         STOP taking these  medications       isosorbide dinitrate (ISORDIL) 30 MG Tab Comments:   Reason for Stopping:         isosorbide mononitrate (IMDUR) 30 MG 24 hr tablet Comments:   Reason for Stopping:         polyethylene glycol (GLYCOLAX) 17 gram/dose powder Comments:   Reason for Stopping:               I certify that this patient is confined to his home and needs intermittent skilled nursing care, physical therapy and occupational therapy.      Electronically Signed  _________________________________  Miriam Valderrama NP  10/18/2017

## 2017-10-18 NOTE — PT/OT/SLP PROGRESS
Physical Therapy  Treatment    Robby Soriano   MRN: [id number]   Admitting Diagnosis: Obstructive jaundice    PT Received On: 10/18/17  PT Start Time: 1025     PT Stop Time: 1050    PT Total Time (min): 25 min       Billable Minutes:  Gait [id number] and Therapeutic Exercise 10    Treatment Type: Treatment  PT/PTA: PTA     PTA Visit Number: 1       General Precautions: Standard, fall  Orthopedic Precautions: N/A   Braces:      Do you have any cultural, spiritual, Zoroastrianism conflicts, given your current situation?: none    Subjective:  Communicated with RN prior to session.  I want to walk    Pain/Comfort  Pain Rating 1: 0/10  Pain Rating Post-Intervention 1: 0/10    Objective:   Patient found with: KAIDEN drain, peripheral IV    Functional Mobility:  Bed Mobility:        Transfers:  Sit <> Stand Assistance: Contact Guard Assistance, Stand By Assistance  Sit <> Stand Assistive Device: Rolling Walker    Gait:   Gait Distance: 110 ft with vcs for upright posture  Assistance 1: Contact Guard Assistance  Gait Assistive Device: Rolling walker  Gait Pattern: swing-through gait  Gait Deviation(s): decreased katherine, decreased step length, decreased stride length, forward lean      Therapeutic Activities and Exercises:   Discussed/educated patient on progress, safety, d/c, PT POC   Patient performed therex X 15 reps seated in bedside chair B LE AROM AP, LAQ, Hip Flexion, Hip Abd/Add   White board updated   Pt encouraged to ambulate in hallways 3x/day with nursing or family assistance to improve endurance.   Pt safe to ambulate in hallway with RN or PCT assistance       AM-PAC 6 CLICK MOBILITY  How much help from another person does this patient currently need?   1 = Unable, Total/Dependent Assistance  2 = A lot, Maximum/Moderate Assistance  3 = A little, Minimum/Contact Guard/Supervision  4 = None, Modified [place]/Independent    Turning over in bed (including adjusting bedclothes, sheets and blankets)?: 3  Sitting down on  and standing up from a chair with arms (e.g., wheelchair, bedside commode, etc.): 3  Moving from lying on back to sitting on the side of the bed?: 3  Moving to and from a bed to a chair (including a wheelchair)?: 3  Need to walk in hospital room?: 3  Climbing 3-5 steps with a railing?: 2  Total Score: 17    AM-PAC Raw Score CMS G-Code Modifier Level of Impairment Assistance   6 % Total / Unable   7 - 9 CM 80 - 100% Maximal Assist   10 - 14 CL 60 - 80% Moderate Assist   15 - 19 CK 40 - 60% Moderate Assist   20 - 22 CJ 20 - 40% Minimal Assist   23 CI 1-20% SBA / CGA   24 CH 0% Independent/ Mod I     Patient left up in chair with all lines intact, call button in reach, nsg notified and spouse present.    Assessment:  Robby Soriano is a 77 y.o. male with a medical diagnosis of Obstructive jaundice and presents with continued deficits as listed below. Pt tolerated treatment  well and is progressing with mobility. Pt would continue to benefit from skilled PT to address overall functional mobility and goals. Goals remain appropriate     Rehab identified problem list/impairments: Rehab identified problem list/impairments: weakness, impaired endurance, impaired functional mobilty, gait instability, impaired self care skills, impaired cardiopulmonary response to activity    Rehab potential is good.    Activity tolerance: Good    Discharge recommendations: Discharge Facility/Level Of Care Needs: home with home health     Barriers to discharge: Barriers to Discharge: None    Equipment recommendations: Equipment Needed After Discharge:  (TBD)     GOALS:    Physical Therapy Goals        Problem: Physical Therapy Goal    Goal Priority Disciplines Outcome Goal Variances Interventions   Physical Therapy Goal     PT/OT, PT      Description:  Goals to be met by: 10/27/17    Patient will increase functional independence with mobility by performin. Supine to sit with Stand-by Assistance - not met  2. Sit to stand  transfer with Stand-by Assistance -not met  3. Gait  x 150 feet with Contact Guard Assistance using Rolling Walker.- not met   4. Lower extremity exercise program x15 reps per handout, with supervision -not met                    PLAN:    Patient to be seen 5 x/week  to address the above listed problems via gait training, therapeutic activities, therapeutic exercises  Plan of Care expires: 11/10/17  Plan of Care reviewed with: patient, spouse         Dallas Garcia, PTA  10/18/2017

## 2017-10-18 NOTE — PHYSICIAN QUERY
PT Name: Robby Soriano  MR #: [id number]    Physician Query Form - Respiratory Condition Clarification      CDS/: Martha Guadalupe RN              Contact information:[email or web address]     This form is a permanent document in the medical record.    Query Date: October 18, 2017    By submitting this query, we are merely seeking further clarification of documentation. Please utilize your independent clinical judgment when addressing the question(s) below.    The Medical record contains the following   Indicators   Supporting Clinical Findings Location in Medical Record   X   SOB, CHOWDHURY, Wheezing, Productive Cough, Use of Accessory Muscles, etc. 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    H & P 10/12        H & P 10/12      Hypoxia or Hypercapnia     X   RR         ABGs         O2 sat RR 22-50     PH 7.36  PCO2 27  PO2 72  POC BE -10  HCO3 15.2  Sat O2 94  TCO2 16 Nursing Flow Sheets   10/12  04:33am-6:45am    Labs/ABG 10/12 @ 4am      BiPAP/Intubation     X   Supplemental O2 Non re-breather mask Vital Signs 10/12 4:51am      Home O2, Oxygen Dependence     X   Treatment Continue ICU care; monitor respiratory status; may need to be intubated    Placed on Non-Rebreather mask Progress Note Gen Surg 10/12        10/12  451am      Other       Provider, please specify diagnosis or diagnoses associated with above clinical findings.    [x ] Acute Respiratory Failure with Hypoxia  [  ] Acute Respiratory Failure with  Hypercapnia  [  ] Acute Respiratory Failure with Hypoxia and Hypercapnia  [  ] Other Acute Respiratory Failure  [  ] Other Respiratory Diagnosis (please specify): _______________________________  [  ] Clinically Undetermined    Please document in your progress notes daily for the duration of treatment until resolved and include in your discharge summary.

## 2017-10-18 NOTE — PHYSICIAN QUERY
PT Name: Robby Soriano  MR #: [id number]     Physician Query Form - Documentation Clarification      CDS/: Martha Guadalupe RN        Contact information:[email or web address]    This form is a permanent document in the medical record.     Query Date: October 18, 2017    By submitting this query, we are merely seeking further clarification of documentation. Please utilize your independent clinical judgment when addressing the question(s) below.    The Medical record reflects the following:    Supporting Clinical Findings Location in Medical Record   Likely ACS event with associated heart failure in sapna-operative period given EKG with new LBBB and troponin peak of 28   - If deemed appropriate by primary patient would ideally be medically managed as NSTEMI with DAPT, beta block to target HR of 50-60, and heparin gtt; however, if this is not possible, recommend ASA 81 at minimum    Cardiac:  -MAP goal >65  -NSTEMI, new LBBB and drop in EF    Was evaluated by cardiology due to increasing troponin's determined to by type 2 NSTEMI    ACS (acute coronary syndrome)    - Trop I peaked at 28; now decreasing  - STEMI, Cardiology following; agatroban drip, plavix loaded    STEMI, Cardiology following; agatroban drip, plavix loaded    Here with ACS event with associated heart failure in sapna-operative period given EKG with new LBBB and troponin peak of 28.     Was evaluated by cardiology due to increasing troponin's determined to by type 2 NSTEMI.      Cardiology Progress Note 10/13                      SICU [place] Note 10/14        ID Progress Note 10/14        General Surgery Progress Note 10/15            General Surgery Progress Note 10/16      Cardiology Progress Note 10/16          ID Progress Note 10/16                                                                                Doctor, Please specify diagnosis or diagnoses associated with above clinical findings.    Provider Use Only    [   ] ACS  [   ]  ACS with STEMI  [xxx   ] ACS with NSTEMI  [   ] Other (please specify)___________________________________________                                                                                                                 [  ] Clinically undetermined

## 2017-10-18 NOTE — PLAN OF CARE
Problem: Occupational Therapy Goal  Goal: Occupational Therapy Goal  Goals to be met by: 10/27/17     Patient will increase functional independence with ADLs by performing:    Feeding with [place].  UE Dressing with Supervision.  LE Dressing with Supervision.  Grooming while standing at sink with Supervision.  Toileting from toilet with Supervision for hygiene and clothing management.   Toilet transfer to toilet with Supervision.     Outcome: Ongoing (interventions implemented as appropriate)    Pt was agreeable to OT and was noted to make progress towards his goals in therapy.  Pt's goals remain appropriate at this time.  He will continue to benefit from skilled OT services in order to assist him with increasing his safety and level of independence with self care and mobility tasks.     Nallely Villatoro, OT  10/18/2017

## 2017-10-18 NOTE — PLAN OF CARE
Problem: Patient Care Overview  Goal: Plan of Care Review  Outcome: Ongoing (interventions implemented as appropriate)  Pt AAOx4. Wife at bedside participating in care. Pt tolerating regular diet with no complaints of discomfort or nausea, NPO @ midnight except sips with meds. Pt c/o heartburn during night, one time dose of protonix ordered. C Diff precaution maintained.R abd IR drain x2 intact and patent. Pt ambulates to bathroom with assist x1. VSS on RA. Call light in reach and bed in lowest position. Pt remains free of falls and injury. No acute events this shift. Will continue to monitor.

## 2017-10-18 NOTE — PT/OT/SLP PROGRESS
"Occupational Therapy  Treatment    Robby Soriano   MRN: [id number]   Admitting Diagnosis: Obstructive jaundice    OT Date of Treatment: 10/18/17   OT Start Time: 0938  OT Stop Time: 1012  OT Total Time (min): 34 min    Billable Minutes:  Self Care/Home Management 20 and Therapeutic Exercise 14    General Precautions: Standard, fall  Orthopedic Precautions: N/A  Braces: N/A    Do you have any cultural, spiritual, Rastafarian conflicts, given your current situation?: none reported    Subjective:  Communicated with nurse prior to session.  "I'm ready to go walking."    Pain/Comfort  Pain Rating 1: 0/10  Pain Rating Post-Intervention 1: 0/10    Objective:  Patient found with: KAIDEN drain, peripheral IV     Functional Mobility:  Bed Mobility:  Supine to Sit: Stand by Assistance, WIth side rail    Transfers:   Sit <> Stand Assistance: Contact Guard Assistance  Sit <> Stand Assistive Device: Rolling Walker  Bed <> Chair Transfer Assistance: Contact Guard Assistance  Bed <> Chair Assistive Device: Rolling Walker  Toilet Transfer Technique: Stand Pivot  Toilet Transfer Assistance: Contact Guard Assistance  Toilet Transfer Assistive Device: Rolling Walker, grab bar    Functional Ambulation: Pt able to walk from bedside <-> bathroom using RW with CGA    Activities of Daily Living:  Feeding Level of Assistance: Independent    UE Dressing Level of Assistance: Set-up Assistance (donned hospital gown as cristina)    LE Dressing Level of Assistance: Moderate assistance (able to doff socks but needed assist to grady due to abdominal pressure and pressence of drains - pt able to side sit to doff R sock and cross leg to doff L sock)    Grooming Position: Standing at sink  Grooming Level of Assistance: Stand by assistance (washed hands at sink)     Toileting Where Assessed: Toilet  Toileting Level of Assistance: Contact guard (pt completed all components of toileting with CGA to stand for hygiene)       Balance:   Static Sit: GOOD-: Takes " "MODERATE challenges from all directions but inconsistently  Dynamic Sit: GOOD-: Maintains balance through MODERATE excursions of active trunk movement,     Static Stand: FAIR+: Takes MINIMAL challenges from all directions  Dynamic stand: FAIR: Needs CONTACT GUARD during gait    Therapeutic Activities and Exercises:  · Pt completed ADLs and func mobility activities for tx session as noted above  Pt completed 3 sets of 10 reps of B UE AROM exercises in order to work towards increasing his UB strength/endurance to assist with mobility and self care skills.  Pt completed the following exercises: shoulder flex/ext, elbow flex/ext, forearm sup/pro, and hand pumps.   · Whiteboard updated  · Pt educated on role of OT and POC      AM-PAC 6 CLICK ADL   How much help from another person does this patient currently need?   1 = Unable, Total/Dependent Assistance  2 = A lot, Maximum/Moderate Assistance  3 = A little, Minimum/Contact Guard/Supervision  4 = None, Modified [place]/Independent    Putting on and taking off regular lower body clothing? : 2  Bathing (including washing, rinsing, drying)?: 2  Toileting, which includes using toilet, bedpan, or urinal? : 3  Putting on and taking off regular upper body clothing?: 3  Taking care of personal grooming such as brushing teeth?: 3  Eating meals?: 4  Total Score: 17     AM-PAC Raw Score CMS "G-Code Modifier Level of Impairment Assistance   6 % Total / Unable   7 - 8 CM 80 - 100% Maximal Assist   9-13 CL 60 - 80% Moderate Assist   14 - 19 CK 40 - 60% Moderate Assist   20 - 22 CJ 20 - 40% Minimal Assist   23 CI 1-20% SBA / CGA   24 CH 0% Independent/ Mod I       Patient left up in chair with all lines intact, call button in reach and wife present    ASSESSMENT:  Robby Soriano is a 77 y.o. male with a medical diagnosis of Obstructive jaundice and presents with the deficits noted below.  Pt was agreeable to OT and was noted to make progress towards his goals in therapy.  " Pt's goals remain appropriate at this time.  He will continue to benefit from skilled OT services in order to assist him with increasing his safety and level of independence with self care and mobility tasks.       Rehab identified problem list/impairments: Rehab identified problem list/impairments: weakness, impaired endurance, impaired self care skills, impaired functional mobilty, gait instability, impaired balance, decreased lower extremity function, decreased safety awareness, edema, impaired skin, impaired cardiopulmonary response to activity    Rehab potential is good.    Activity tolerance: Good    Discharge recommendations: Discharge Facility/Level Of Care Needs: home health OT     Barriers to discharge: Barriers to Discharge: None    Equipment recommendations: bedside commode (TBD at next level of care)     GOALS:    Occupational Therapy Goals        Problem: Occupational Therapy Goal    Goal Priority Disciplines Outcome Interventions   Occupational Therapy Goal     OT, PT/OT Ongoing (interventions implemented as appropriate)    Description:  Goals to be met by: 10/27/17     Patient will increase functional independence with ADLs by performing:    Feeding with [place].  UE Dressing with Supervision.  LE Dressing with Supervision.  Grooming while standing at sink with Supervision.  Toileting from toilet with Supervision for hygiene and clothing management.   Toilet transfer to toilet with Supervision.                      Plan:  Patient to be seen 4 x/week to address the above listed problems via self-care/home management, therapeutic activities, therapeutic exercises  Plan of Care expires: 11/14/17  Plan of Care reviewed with: patient, spouse         Nallely WALKER Irving, OT  10/18/2017

## 2017-10-18 NOTE — PROGRESS NOTES
[facility]  General Surgery  Progress Note    Subjective:     History of Present Illness:  Mr. 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 He is currently on a steroid taper. He recuperated slowly but was doing well with forward progress until yesterday when he become easily fatigued and last night he had chills x 45 minutes.   He didnot feel well in clinic today. Of note, he also reports increasingly dark urine in the past week. Tbili was also noted to be 5.4 today in clinic.     Post-Op Info:  Procedure(s) (LRB):  HEART CATH-LEFT (Left)         Interval History: No issues overnight. Pain well controlled. NPO overnight for planned angiogram this morning which has been cancelled     Medications:  Continuous Infusions:   Scheduled Meds:   aspirin  81 mg Oral Daily    atorvastatin  80 mg Oral Daily    carvedilol  6.25 mg Oral BID    clopidogrel  75 mg Oral Daily    famotidine  20 mg Oral BID    [START ON 10/19/2017] furosemide  40 mg Oral Daily    heparin (porcine)  5,000 Units Subcutaneous Q8H    hydrocortisone   Topical BID    [START ON 10/19/2017] levothyroxine  100 mcg Oral Daily    lisinopril  5 mg Oral Daily    meropenem (MERREM) IVPB  1 g Intravenous Q8H    potassium chloride  40 mEq Oral Daily    predniSONE  35 mg Oral Daily    sodium chloride 0.9%  10 mL Intravenous Q6H    sodium chloride 0.9%  3 mL Intravenous Q8H     PRN Meds:albuterol-ipratropium 2.5mg-0.5mg/3mL, alprazolam, ondansetron, promethazine (PHENERGAN) IVPB, Flushing PICC Protocol **AND** sodium chloride 0.9% **AND** sodium chloride 0.9%     Review of patient's allergies indicates:  No Known Allergies  Objective:     Vital Signs (Most Recent):  Temp: 98.3 °F (36.8 °C) (10/18/17 0922)  Pulse: 76 (10/18/17 1100)  Resp: 18 (10/18/17  0922)  BP: (!) 140/65 (10/18/17 0922)  SpO2: 98 % (10/18/17 0922) Vital Signs (24h Range):  Temp:  [96.3 °F (35.7 °C)-98.3 °F (36.8 °C)] 98.3 °F (36.8 °C)  Pulse:  [60-79] 76  Resp:  [16-18] 18  SpO2:  [93 %-98 %] 98 %  BP: (139-181)/(65-82) 140/65     Weight: 78.2 kg (172 lb 6.4 oz)  Body mass index is 27 kg/m².    Intake/Output - Last 3 Shifts       10/16 0700 - 10/17 0659 10/17 0700 - 10/18 0659 10/18 0700 - 10/19 0659    P.O. 1080 1960 600    I.V. (mL/kg) 1330 (17) 335 (4.3) 323.8 (4.1)    IV Piggyback 400 200     Total Intake(mL/kg) 2810 (35.9) 2495 (31.9) 923.8 (11.8)    Urine (mL/kg/hr) 3050 (1.6) 2175 (1.2) 650 (1.8)    Drains 82.5 (0) 175 (0.1)     Stool 0 (0) 0 (0) 0 (0)    Total Output 3132.5 2350 650    Net -322.5 +145 +273.8           Stool Occurrence 3 x 5 x 1 x          Physical Exam   Constitutional: He is oriented to person, place, and time. He appears well-developed and well-nourished.   HENT:   Head: Normocephalic and atraumatic.   Eyes: EOM are normal. Pupils are equal, round, and reactive to light.   Neck: Normal range of motion.   Cardiovascular: Normal rate, regular rhythm and normal heart sounds.    Pulmonary/Chest: Effort normal and breath sounds normal.   Abdominal: Soft. Bowel sounds are normal. He exhibits no distension.   Drains in place    Musculoskeletal: Normal range of motion. He exhibits no edema.   Neurological: He is alert and oriented to person, place, and time.   Skin: No rash noted.       Significant Labs:  CBC:   Recent Labs  Lab 10/18/17  0430   WBC 10.67   RBC 3.31*   HGB 9.4*   HCT 29.8*   PLT 70*   MCV 90   MCH 28.4   MCHC 31.5*     BMP:   Recent Labs  Lab 10/18/17  0430   *      K 3.8      CO2 27   BUN 29*   CREATININE 0.8   CALCIUM 7.8*   MG 1.6     LFTs:   Recent Labs  Lab 10/18/17  0430   ALT 45*   AST 14   ALKPHOS 102   BILITOT 1.8*   PROT 4.8*   ALBUMIN 1.9*       Significant Diagnostics:  I have reviewed all pertinent imaging results/findings  within the past 24 hours.    Assessment/Plan:     * Obstructive jaundice    76 yo M with autoimmune sclerosing cholangitis who underwent left hepatic resection; resection of extrahepatic bile duct; Eron-Y right hepaticojejunostomy on 8/3/17 who presented to clinic with chills, now with sepsis and hepatic abscess, STEMI, cardiology following    Plan:  - s/p IR drain placement x2 on 10/12/17  - Change to Meropenem. 2 weeks total per ID  - Lasix daily  - HIT negative. Heparin for DVT prophylaxis  - GI prophylaxis  - Cont steroid taper  - PT/OT        ACS (acute coronary syndrome)    - Trop I peaked at 28; now decreasing  - STEMI, Cardiology following; agatroban drip, plavix loaded  - 2D echo from 10/11/17: normal EF (60-65%) with severe LA enlargement, recent EF down to 25%.  - Continue aspirin, Plavix  - Add Atorvastatin 80mg per Cards.   - Patient to f/u with Interventional Cards as outpatient            IgG4-related sclerosing cholangitis    - Cont steroid taper at home            Kade Jansen MD  General Surgery  [facility]

## 2017-10-18 NOTE — PLAN OF CARE
JUSTIN sent all necessary referral information to [facility] in [facility] and called to inform Mili ([phone]) of referral and that Pt was discharging home today.     ALPHONSO CliffordW

## 2017-10-18 NOTE — PLAN OF CARE
Problem: Physical Therapy Goal  Goal: Physical Therapy Goal  Goals to be met by: 10/27/17    Patient will increase functional independence with mobility by performin. Supine to sit with Stand-by Assistance - not met  2. Sit to stand transfer with Stand-by Assistance -not met  3. Gait  x 150 feet with Contact Guard Assistance using Rolling Walker.- not met   4. Lower extremity exercise program x15 reps per handout, with supervision -not met   Pt progressing towards goals. continue with PT POC.Goals remain appropriate.    Dallas Garcia PTA  10/18/2017

## 2017-10-18 NOTE — NURSING
Discharge plans reviewed w/ pt and spouse at bedside. Home health set up per CM. R PICC to remain in place for home abx. Medications delivered to pt room. Awaiting transport.

## 2017-10-18 NOTE — PLAN OF CARE
SW was informed in morning huddle that Pt was discharge ready for today. However, Pt will now need IV antibiotics for two weeks. NP, Miriam Villanueva, to update home health orders to include IV antibiotics. SW to send all referral information to [facility] once orders are updated.     Celi Todd, MEG

## 2017-10-19 ENCOUNTER — TELEPHONE (OUTPATIENT)
Dept: SURGERY | Facility: CLINIC | Age: 77
End: 2017-10-19

## 2017-10-19 LAB
BACTERIA BLD CULT: NORMAL
BACTERIA BLD CULT: NORMAL

## 2017-10-19 NOTE — PLAN OF CARE
Maribell phoned Dr. Torres office left a message again  Ssc phoned Dr. Bryan office spoke to Ciera they did rec the fax      Karon/maribell

## 2017-10-19 NOTE — PLAN OF CARE
Ssc faxed discharge summary, 1st cardiology notes, and interventional cardiology notes    Phoned drs office @ [phone] left a message for a call back    Ssc faxed discharge summary to dr Bryan office  Ssc phoned [phone] talk to nurse did not receive I refaxed        Karon/shabnam

## 2017-10-19 NOTE — PLAN OF CARE
Patient discharged home 10/18/2017 with [facility] and CPP/[facility] for IV antibiotics.  Scheduled follow up appts with Cardiologist, PCP, Surgical and ID scheduled and on AVS.       10/19/17 0622   Final Note   Assessment Type Final Discharge Note   Discharge Disposition [facility]   Hospital Follow Up  Appt(s) scheduled? Yes   [facility] Referral Info   Post Acute Recommendation Home-care

## 2017-10-19 NOTE — TELEPHONE ENCOUNTER
----- Message from Lyudmila Leslie sent at 10/19/2017  8:32 AM CDT -----  Contact: jo-ann/wife  [phone]//caller states that she need to speak with nurse in ref to some testing the pt needs//please call//thank you

## 2017-10-20 ENCOUNTER — TELEPHONE (OUTPATIENT)
Dept: SURGERY | Facility: CLINIC | Age: 77
End: 2017-10-20

## 2017-10-20 NOTE — TELEPHONE ENCOUNTER
----- Message from Lyudmila Leslie sent at 10/20/2017  3:41 PM CDT -----  Contact: carmencita/joseline  [phone]//caller states that they are having problems with pts drains//please call//thank you

## 2017-10-20 NOTE — TELEPHONE ENCOUNTER
----- Message from Lyudmila Leslie sent at 10/20/2017  3:41 PM CDT -----  Contact: carmencita/joseline  [phone]//caller states that they are having problems with pts drains//please call//thank you

## 2017-10-20 NOTE — PT/OT/SLP DISCHARGE
Physical Therapy Discharge Summary    Robby Soriano  MRN: [id number]   Obstructive jaundice   Patient Discharged from acute Physical Therapy on 10/18/17.  Please refer to prior PT noted date on 10/18/17 for functional status.     Assessment:   Patient has not met goals.  GOALS:    Physical Therapy Goals     Not on file          Multidisciplinary Problems (Resolved)        Problem: Physical Therapy Goal    Goal Priority Disciplines Outcome Goal Variances Interventions   Physical Therapy Goal   (Resolved)     PT/OT, PT Outcome(s) achieved     Description:  Goals to be met by: 10/27/17    Patient will increase functional independence with mobility by performin. Supine to sit with Stand-by Assistance - not met  2. Sit to stand transfer with Stand-by Assistance -not met  3. Gait  x 150 feet with Contact Guard Assistance using Rolling Walker.- not met   4. Lower extremity exercise program x15 reps per handout, with supervision -not met                  Reasons for Discontinuation of Therapy Services  Transfer to alternate level of care.      Plan:  Patient Discharged to: home.

## 2017-10-24 NOTE — PT/OT/SLP DISCHARGE
Occupational Therapy Discharge Summary    Robby Soriano  MRN: [id number]   Obstructive jaundice   Patient Discharged from acute Occupational Therapy on 10/18/17.  Please refer to prior OT note dated on 10/18/17 for functional status.     Assessment:   Patient was discharge unexpectedly.  Information required to complete and accurate discharge summary is unknown.  Refer to therapy initial evaluation and last progress note for initial and most recent functional status and goal achievement.  Recommendations made may be found in medical record.  GOALS:    Occupational Therapy Goals     Not on file          Multidisciplinary Problems (Resolved)        Problem: Occupational Therapy Goal    Goal Priority Disciplines Outcome Interventions   Occupational Therapy Goal   (Resolved)     OT, PT/OT Outcome(s) achieved    Description:  Goals to be met by: 10/27/17     Patient will increase functional independence with ADLs by performing:    Feeding with [place].  UE Dressing with Supervision.  LE Dressing with Supervision.  Grooming while standing at sink with Supervision.  Toileting from toilet with Supervision for hygiene and clothing management.   Toilet transfer to toilet with Supervision.                    Reasons for Discontinuation of Therapy Services  Transfer to alternate level of care.      Plan:  Patient Discharged to: Home with Home Health Service.

## 2017-11-02 ENCOUNTER — TELEPHONE (OUTPATIENT)
Dept: INFECTIOUS DISEASES | Facility: CLINIC | Age: 77
End: 2017-11-02

## 2017-11-02 ENCOUNTER — OFFICE VISIT (OUTPATIENT)
Dept: SURGERY | Facility: CLINIC | Age: 77
End: 2017-11-02
Payer: MEDICARE

## 2017-11-02 ENCOUNTER — OFFICE VISIT (OUTPATIENT)
Dept: INFECTIOUS DISEASES | Facility: CLINIC | Age: 77
End: 2017-11-02
Payer: MEDICARE

## 2017-11-02 ENCOUNTER — HOSPITAL ENCOUNTER (OUTPATIENT)
Facility: HOSPITAL | Age: 77
Discharge: HOME | End: 2017-11-04
Attending: SURGERY | Admitting: SURGERY
Payer: MEDICARE

## 2017-11-02 VITALS
HEIGHT: 65 IN | BODY MASS INDEX: 26.96 KG/M2 | HEIGHT: 65 IN | DIASTOLIC BLOOD PRESSURE: 63 MMHG | BODY MASS INDEX: 26.93 KG/M2 | SYSTOLIC BLOOD PRESSURE: 115 MMHG | SYSTOLIC BLOOD PRESSURE: 116 MMHG | TEMPERATURE: 98 F | HEART RATE: 92 BPM | HEART RATE: 94 BPM | TEMPERATURE: 97 F | WEIGHT: 161.81 LBS | DIASTOLIC BLOOD PRESSURE: 66 MMHG

## 2017-11-02 DIAGNOSIS — K75.0 LIVER ABSCESS: ICD-10-CM

## 2017-11-02 DIAGNOSIS — K75.0 LIVER ABSCESS: Primary | ICD-10-CM

## 2017-11-02 DIAGNOSIS — K75.0 PYOGENIC HEPATIC ABSCESS: ICD-10-CM

## 2017-11-02 DIAGNOSIS — R78.81 BACTEREMIA: Primary | ICD-10-CM

## 2017-11-02 PROCEDURE — 63600175 PHARM REV CODE 636 W HCPCS: Performed by: STUDENT IN AN ORGANIZED HEALTH CARE EDUCATION/TRAINING PROGRAM

## 2017-11-02 PROCEDURE — 99213 OFFICE O/P EST LOW 20 MIN: CPT | Mod: GC,S$GLB,, | Performed by: INTERNAL MEDICINE

## 2017-11-02 PROCEDURE — 99024 POSTOP FOLLOW-UP VISIT: CPT | Mod: S$GLB,,, | Performed by: NURSE PRACTITIONER

## 2017-11-02 PROCEDURE — 25000003 PHARM REV CODE 250: Performed by: STUDENT IN AN ORGANIZED HEALTH CARE EDUCATION/TRAINING PROGRAM

## 2017-11-02 PROCEDURE — 85025 COMPLETE CBC W/AUTO DIFF WBC: CPT

## 2017-11-02 PROCEDURE — G0379 DIRECT REFER HOSPITAL OBSERV: HCPCS

## 2017-11-02 PROCEDURE — 99999 PR PBB SHADOW E&M-EST. PATIENT-LVL IV: CPT | Mod: PBBFAC,GC,, | Performed by: INTERNAL MEDICINE

## 2017-11-02 PROCEDURE — G0378 HOSPITAL OBSERVATION PER HR: HCPCS

## 2017-11-02 PROCEDURE — 80053 COMPREHEN METABOLIC PANEL: CPT

## 2017-11-02 PROCEDURE — 36415 COLL VENOUS BLD VENIPUNCTURE: CPT

## 2017-11-02 PROCEDURE — 99999 PR PBB SHADOW E&M-EST. PATIENT-LVL IV: CPT | Mod: PBBFAC,,, | Performed by: NURSE PRACTITIONER

## 2017-11-02 RX ORDER — MEROPENEM 1 G/1
INJECTION, POWDER, FOR SOLUTION INTRAVENOUS
Status: ON HOLD | COMMUNITY
Start: 2017-10-30 | End: 2017-12-18 | Stop reason: HOSPADM

## 2017-11-02 RX ORDER — DEXTROSE MONOHYDRATE, SODIUM CHLORIDE, AND POTASSIUM CHLORIDE 50; 1.49; 4.5 G/1000ML; G/1000ML; G/1000ML
INJECTION, SOLUTION INTRAVENOUS CONTINUOUS
Status: DISCONTINUED | OUTPATIENT
Start: 2017-11-02 | End: 2017-11-03

## 2017-11-02 RX ORDER — HYDROMORPHONE HYDROCHLORIDE 1 MG/ML
0.5 INJECTION, SOLUTION INTRAMUSCULAR; INTRAVENOUS; SUBCUTANEOUS EVERY 4 HOURS PRN
Status: DISCONTINUED | OUTPATIENT
Start: 2017-11-02 | End: 2017-11-03

## 2017-11-02 RX ORDER — PREDNISONE 5 MG/1
TABLET ORAL
COMMUNITY
Start: 2017-10-23 | End: 2017-12-13

## 2017-11-02 RX ORDER — DIPHENHYDRAMINE HYDROCHLORIDE 50 MG/ML
25 INJECTION INTRAMUSCULAR; INTRAVENOUS EVERY 4 HOURS PRN
Status: DISCONTINUED | OUTPATIENT
Start: 2017-11-02 | End: 2017-11-04 | Stop reason: HOSPADM

## 2017-11-02 RX ORDER — ONDANSETRON 2 MG/ML
4 INJECTION INTRAMUSCULAR; INTRAVENOUS EVERY 12 HOURS PRN
Status: DISCONTINUED | OUTPATIENT
Start: 2017-11-02 | End: 2017-11-04 | Stop reason: HOSPADM

## 2017-11-02 RX ORDER — HYDROMORPHONE HYDROCHLORIDE 1 MG/ML
1 INJECTION, SOLUTION INTRAMUSCULAR; INTRAVENOUS; SUBCUTANEOUS EVERY 4 HOURS PRN
Status: DISCONTINUED | OUTPATIENT
Start: 2017-11-02 | End: 2017-11-03

## 2017-11-02 RX ORDER — SODIUM CHLORIDE 0.9 % (FLUSH) 0.9 %
3 SYRINGE (ML) INJECTION
Status: DISCONTINUED | OUTPATIENT
Start: 2017-11-02 | End: 2017-11-04 | Stop reason: HOSPADM

## 2017-11-02 RX ORDER — ENOXAPARIN SODIUM 100 MG/ML
40 INJECTION SUBCUTANEOUS EVERY 24 HOURS
Status: DISCONTINUED | OUTPATIENT
Start: 2017-11-02 | End: 2017-11-03

## 2017-11-02 RX ORDER — MEROPENEM AND SODIUM CHLORIDE 1 G/50ML
1 INJECTION, SOLUTION INTRAVENOUS
Status: DISCONTINUED | OUTPATIENT
Start: 2017-11-02 | End: 2017-11-04 | Stop reason: HOSPADM

## 2017-11-02 RX ADMIN — MEROPENEM AND SODIUM CHLORIDE 1 G: 1 INJECTION, SOLUTION INTRAVENOUS at 05:11

## 2017-11-02 RX ADMIN — ENOXAPARIN SODIUM 40 MG: 100 INJECTION SUBCUTANEOUS at 05:11

## 2017-11-02 RX ADMIN — DEXTROSE MONOHYDRATE, SODIUM CHLORIDE, AND POTASSIUM CHLORIDE: 50; 4.5; 1.49 INJECTION, SOLUTION INTRAVENOUS at 05:11

## 2017-11-02 NOTE — PROGRESS NOTES
Subjective:      Patient ID: Robby Soriano is a 77 y.o. male.    Chief Complaint:Follow-up      History of Present Illness    76 y/o male with a history of CAD s/p CABG, HTN, HLD, prostate CA s/p prostatectomy 2005, found to have hepatic mass/abscess (segment IV) near hilum and stricture of upper bile duct on ERCP s/p L hepatectomy and resection of extrahepatic biliary tree , Eron-en-Y on 8/3/17 with final pathology demonstrating IGG-4 associated sclerosing cholangitis (on prednisone 40 mg daily) and OR cultures (8/3/17) of hepatic abscess showed Clostridium perfringens, E coli, K pneumoniae, E faecalis given 5 days of IV antibiotics post operatively, seen in clinic for follow 10/11/17 due to jaundice and fatigue with subjective fevers and was admitted that day.  A/P CT scan 10/11/17 showed: 7.2 cm gas collection in the hepatic dome containing scattered debris with an adjacent gas and fluid collection along the resection bed measuring 2.7 cm in greatest diameter. S/P IR drains x2 10/12/17, cultures positive for E coli, K pneumoniae ESBL, E faecalis and C perfringens; complicated with bacteremia 10/11/17 with E coli and C perfringens on B/C. Eventually patient was discharge to complete a 2 weeks course of Meropenem.    Patient today comes for a follow up. Now he has been a little more than 2 weeks of Meropenem. Denies any side effects or any problems with the PICC line. Reports that since about a week ago, he has pus drainage from one of his drains, also has noted clear fluid coming out from around that same drain (the most anterior).     Review of Systems   Constitution: Positive for weakness, malaise/fatigue and weight loss. Negative for chills, decreased appetite, fever, night sweats and weight gain.   HENT: Positive for hearing loss. Negative for congestion, ear pain, hoarse voice, sore throat and tinnitus.    Eyes: Negative for blurred vision and redness.   Cardiovascular: Negative for chest pain, leg swelling  and palpitations.   Respiratory: Negative for cough, hemoptysis, shortness of breath, sputum production and wheezing.    Hematologic/Lymphatic: Negative for adenopathy. Does not bruise/bleed easily.   Skin: Negative for dry skin, itching, rash and suspicious lesions.   Musculoskeletal: Positive for back pain. Negative for joint pain, myalgias and neck pain.   Gastrointestinal: Negative for abdominal pain, constipation, diarrhea, heartburn, nausea and vomiting.   Genitourinary: Negative for dysuria, flank pain, frequency, hematuria, hesitancy and urgency.   Neurological: Negative for dizziness, headaches, numbness and paresthesias.   Psychiatric/Behavioral: Positive for depression. Negative for memory loss. The patient is nervous/anxious. The patient does not have insomnia.      Objective:   Physical Exam   Constitutional: He is oriented to person, place, and time. No distress.   HENT:   Head: Normocephalic and atraumatic.   Mouth/Throat: No oropharyngeal exudate.   Eyes: No scleral icterus.   Cardiovascular: Normal rate, regular rhythm and normal heart sounds.  Exam reveals no gallop and no friction rub.    No murmur heard.  Pulmonary/Chest: Effort normal and breath sounds normal. No respiratory distress. He has no wheezes. He has no rales.   Abdominal: Soft. Bowel sounds are normal. He exhibits no distension. There is tenderness (over most anterior drain). There is no rebound and no guarding.   Pus coming out of most anterior drain   Musculoskeletal: He exhibits no edema.   Right upper extremity PICC line in place   Neurological: He is alert and oriented to person, place, and time.   Vitals reviewed.    Assessment:       1. Bacteremia    2. Pyogenic hepatic abscess          - due to pus coming out of drain and tenderness around that area, we have concern of drain malfunction    Plan:       1) Patient will be admitted today  2) Repeat A/P CT scan to assess drains position and previous abscesses  3) Continue  Meropenem while in hospital for now  4) ID will reassess patient once in the hospital and more information is available    Song Velazco MD  Infectious Disease Fellow, PGY-5  Pager: [phone]  [facility]

## 2017-11-02 NOTE — H&P
SURGERY  History & Physical       SUBJECTIVE:     HISTORY OF PRESENT ILLNESS:  Robby Soriano is a 77 y.o. male well known to our service who presented today for follow up to both our clinic and ID clinic. Was seen by Ida Gallego NP this morning with some concern about the amount and character of persistent RUQ drainage. CT Abdomen/Pelvis ordered for this afternoon in [place] but given multiple appointments patient was unable to make it. ID also saw patient this afternoon to follow up meropenem course and they too were concerned about the drain output. Patient denies any other symptoms. No fevers, chills, nausea, vomiting, changes in bowel habits, abdominal tenderness. Reports he has had concern about the anterior drain for about a week.     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 OR cultures (8/3/17) of hepatic abscess showed Clostridium perfringens, E coli, K pneumoniae, E faecalis given 5 days of IV antibiotics post operatively.   He was then seen in our clinic for follow up 10/11/17 due to jaundice and fatigue with subjective fevers and was admitted that day with A/P CT scan 10/11/17 showed: 7.2 cm gas collection in the hepatic dome containing scattered debris with an adjacent gas and fluid collection along the resection bed measuring 2.7 cm in greatest diameter. Now s/p IR drains x2 10/12/17 with cultures positive for E coli, K pneumoniae ESBL, E faecalis and C perfringens; complicated with bacteremia 10/11/17 with E coli and C perfringens on B/C. Patient was then discharged with HH on 10/18 and told to complete a 2 weeks course of Meropenem.      MEDICATIONS:  Home  Medications:  No current facility-administered medications on file prior to encounter.      Current Outpatient Prescriptions on File Prior to Encounter   Medication Sig Dispense Refill    alprazolam (XANAX) 0.25 MG tablet Take 0.25 mg by mouth 3 (three) times daily.      aspirin (ECOTRIN) 81 MG EC tablet Take 81 mg by mouth once daily.      atorvastatin (LIPITOR) 80 MG tablet Take 80 mg by mouth once daily.      carvedilol (COREG) 6.25 MG tablet Take 1 tablet (6.25 mg total) by mouth 2 (two) times daily. 60 tablet 1    clopidogrel (PLAVIX) 75 mg tablet Take 1 tablet (75 mg total) by mouth once daily. 30 tablet 1    escitalopram oxalate (LEXAPRO) 10 MG tablet Take 10 mg by mouth once daily.      furosemide (LASIX) 40 MG tablet Take 0.5 tablets (20 mg total) by mouth once daily. Refills per cardiology 7 tablet 0    hydrocortisone 1 % cream Apply topically 2 (two) times daily. Apply to anal area prn.  May use over the counter. 1 g 0    indomethacin (INDOCIN) 25 MG capsule Take 25 mg by mouth 2 (two) times daily with meals.      levothyroxine (SYNTHROID) 100 MCG tablet Take 100 mcg by mouth once daily.      lisinopril (PRINIVIL,ZESTRIL) 5 MG tablet Take 1 tablet (5 mg total) by mouth once daily. 30 tablet 0    meropenem (MERREM) 1 gram injection       meropenem-0.9% sodium chloride 1 gram/50 mL PgBk Inject 50 mLs (1 g total) into the vein every 8 (eight) hours. 2100 mL 0    ondansetron (ZOFRAN-ODT) 8 MG TbDL Take 1 tablet (8 mg total) by mouth every 6 (six) hours as needed. 30 tablet 3    oxycodone (ROXICODONE) 5 MG immediate release tablet Take 1 tablet (5 mg total) by mouth every 4 (four) hours as needed for Pain. 31 tablet 0    pantoprazole (PROTONIX) 40 MG tablet Take 40 mg by mouth once daily.      predniSONE (DELTASONE) 5 MG tablet       promethazine (PHENERGAN) 25 MG tablet Take 1 tablet (25 mg total) by mouth every 6 (six) hours as needed for Nausea. 40 tablet 1     Inpatient Medications:    enoxaparin  40 mg Subcutaneous Daily    meropenem (MERREM) IVPB  1 g Intravenous Q8H     Infusions:   dextrose 5 % and 0.45 % NaCl with KCl 20 mEq       PRN Medications:diphenhydrAMINE, HYDROmorphone, HYDROmorphone, ondansetron, promethazine (PHENERGAN) IVPB, sodium chloride 0.9%    ALLERGIES:    Review of patient's allergies indicates:  No Known Allergies    PAST MEDICAL HISTORY:    Past Medical History:   Diagnosis Date    Cancer     Prostate/s/p prostatectomy    Coronary artery disease     GERD (gastroesophageal reflux disease)     Hyperlipidemia     Hypertension     Hypothyroidism        SURGICAL HISTORY:  Past Surgical History:   Procedure Laterality Date    CAROTID ENDARTERECTOMY Bilateral     CORONARY ARTERY BYPASS GRAFT      PROSTATE SURGERY         FAMILY HISTORY:  No family history on file.    SOCIAL HISTORY:  Social History   Substance Use Topics    Smoking status: Former Smoker     Types: Cigarettes     Start date: 1/1/1956     Quit date: 6/18/2013    Smokeless tobacco: Never Used    Alcohol use No        REVIEW OF SYSTEMS:  Review of Systems   Constitutional: Positive for malaise/fatigue and weight loss. Negative for chills and fever.   HENT: Positive for hearing loss. Negative for congestion and sore throat.    Eyes: Negative for blurred vision and photophobia.   Respiratory: Negative for cough, shortness of breath and wheezing.    Cardiovascular: Negative for chest pain, palpitations and leg swelling.   Gastrointestinal: Negative for abdominal pain, constipation, diarrhea, nausea and vomiting.   Genitourinary: Negative for dysuria and urgency.   Musculoskeletal: Negative for neck pain.   Skin: Negative for rash.   Neurological: Positive for weakness. Negative for dizziness.   Psychiatric/Behavioral: Positive for depression. The patient is nervous/anxious.        OBJECTIVE:     Most Recent Vitals:       24-Hour Vitals:  Temp:  [97 °F (36.1 °C)-98.3 °F (36.8 °C)]   Pulse:  [92-94]   BP:  (115-116)/(63-66)     24-Hour I&O:No intake or output data in the 24 hours ending 11/02/17 4884    PHYSICAL EXAM:  Physical Exam   Constitutional: He is oriented to person, place, and time. He appears well-developed and well-nourished. No distress.   HENT:   Head: Normocephalic and atraumatic.   Right Ear: External ear normal.   Left Ear: External ear normal.   Nose: Nose normal.   Eyes: Conjunctivae and EOM are normal. Pupils are equal, round, and reactive to light.   Neck: Normal range of motion. Neck supple. No tracheal deviation present.   Cardiovascular: Normal rate, regular rhythm and normal heart sounds.    Pulmonary/Chest: Effort normal and breath sounds normal. No respiratory distress.   Abdominal: Soft. He exhibits no distension. There is no tenderness.   2 right sided drains in place to suction with cloudy drainage. Anterior drain with some leakage around the site.   Midline abdominal incision well healed   Musculoskeletal: Normal range of motion. He exhibits no edema.   Neurological: He is alert and oriented to person, place, and time. No cranial nerve deficit.   Skin: Skin is warm and dry. No erythema.     DIAGNOSTIC STUDIES:  CT Scan pending    ASSESSMENT:     Robby Soriano is a 77 y.o. male who was admitted on 11/2/2017 following clinic visit with concern for malposition of hepatic abscess drains.    PLAN:  · Admitted for observation with surgical oncology  · NPO  · mIVFs  · Continue home meropenem, ID following  · Further plan pending results of CT Abdomen/Pelvis   · AM labs  · Ok to access RUE PICC  · DVT ppx    Temi Harrison MD  PGY-1 General Surgery   [phone]

## 2017-11-02 NOTE — PROGRESS NOTES
Mr. Soriano is feeling much better!!  His energy and appetite are good!  No f/c/n/v.  He had two IR abscess drains placed and both are draining fluctuating amounts.  The anterior one is oozing around the site some as well.    He is running late for his appt with ID and I don't want him to miss it so I am going to have him return after lunch.    I am also going to order a CT of the abdomen to assess the liver abscesses and drain placement.  I am not sure I will be able to get that today.    Additional recommendations to follow...

## 2017-11-02 NOTE — TELEPHONE ENCOUNTER
Patient was seen at ID clinic today. He has 2 hepatic drains in place, we have concern that one of the (the most anterior one) is out of place as patient and his wife reported that has been draining cloudy fluid from around the exit site, also has pain over the area of the drain and pus coming out. We would like to perform A/P CT scan to assess drains position and size of abscesses; if out of position will need IR evaluation. Patient will be send to admission office. Vital signs at clinic are stable:  BP: 116/66 Pulse: 92 T: 98.3F R: 16    Song Velazco MD  Infectious Disease Fellow, PGY-5  Pager: [phone]  [facility]

## 2017-11-03 LAB
ALBUMIN SERPL BCP-MCNC: 1.9 G/DL
ALBUMIN SERPL BCP-MCNC: 1.9 G/DL
ALP SERPL-CCNC: 112 U/L
ALP SERPL-CCNC: 117 U/L
ALT SERPL W/O P-5'-P-CCNC: 26 U/L
ALT SERPL W/O P-5'-P-CCNC: 29 U/L
ANION GAP SERPL CALC-SCNC: 6 MMOL/L
ANION GAP SERPL CALC-SCNC: 8 MMOL/L
ANISOCYTOSIS BLD QL SMEAR: SLIGHT
ANISOCYTOSIS BLD QL SMEAR: SLIGHT
AST SERPL-CCNC: 20 U/L
AST SERPL-CCNC: 20 U/L
BASOPHILS # BLD AUTO: 0.02 K/UL
BASOPHILS # BLD AUTO: ABNORMAL K/UL
BASOPHILS NFR BLD: 0 %
BASOPHILS NFR BLD: 0.2 %
BILIRUB SERPL-MCNC: 0.8 MG/DL
BILIRUB SERPL-MCNC: 0.8 MG/DL
BUN SERPL-MCNC: 20 MG/DL
BUN SERPL-MCNC: 24 MG/DL
CALCIUM SERPL-MCNC: 8.3 MG/DL
CALCIUM SERPL-MCNC: 8.7 MG/DL
CHLORIDE SERPL-SCNC: 96 MMOL/L
CHLORIDE SERPL-SCNC: 97 MMOL/L
CO2 SERPL-SCNC: 28 MMOL/L
CO2 SERPL-SCNC: 29 MMOL/L
CREAT SERPL-MCNC: 0.7 MG/DL
CREAT SERPL-MCNC: 0.8 MG/DL
DIFFERENTIAL METHOD: ABNORMAL
DIFFERENTIAL METHOD: ABNORMAL
EOSINOPHIL # BLD AUTO: 0.1 K/UL
EOSINOPHIL # BLD AUTO: ABNORMAL K/UL
EOSINOPHIL NFR BLD: 0 %
EOSINOPHIL NFR BLD: 1 %
ERYTHROCYTE [DISTWIDTH] IN BLOOD BY AUTOMATED COUNT: 17.2 %
ERYTHROCYTE [DISTWIDTH] IN BLOOD BY AUTOMATED COUNT: 17.4 %
EST. GFR  (AFRICAN AMERICAN): >60 ML/MIN/1.73 M^2
EST. GFR  (AFRICAN AMERICAN): >60 ML/MIN/1.73 M^2
EST. GFR  (NON AFRICAN AMERICAN): >60 ML/MIN/1.73 M^2
EST. GFR  (NON AFRICAN AMERICAN): >60 ML/MIN/1.73 M^2
GLUCOSE SERPL-MCNC: 125 MG/DL
GLUCOSE SERPL-MCNC: 59 MG/DL
HCT VFR BLD AUTO: 29.1 %
HCT VFR BLD AUTO: 29.3 %
HGB BLD-MCNC: 9.6 G/DL
HGB BLD-MCNC: 9.6 G/DL
HYPOCHROMIA BLD QL SMEAR: ABNORMAL
IMM GRANULOCYTES # BLD AUTO: 0.48 K/UL
IMM GRANULOCYTES # BLD AUTO: ABNORMAL K/UL
IMM GRANULOCYTES NFR BLD AUTO: 4.6 %
IMM GRANULOCYTES NFR BLD AUTO: ABNORMAL %
INR PPP: 0.9
LYMPHOCYTES # BLD AUTO: 2.1 K/UL
LYMPHOCYTES # BLD AUTO: ABNORMAL K/UL
LYMPHOCYTES NFR BLD: 19.9 %
LYMPHOCYTES NFR BLD: 7 %
MAGNESIUM SERPL-MCNC: 1.6 MG/DL
MCH RBC QN AUTO: 29.5 PG
MCH RBC QN AUTO: 29.9 PG
MCHC RBC AUTO-ENTMCNC: 32.8 G/DL
MCHC RBC AUTO-ENTMCNC: 33 G/DL
MCV RBC AUTO: 90 FL
MCV RBC AUTO: 91 FL
METAMYELOCYTES NFR BLD MANUAL: 1 %
MONOCYTES # BLD AUTO: 0.9 K/UL
MONOCYTES # BLD AUTO: ABNORMAL K/UL
MONOCYTES NFR BLD: 4 %
MONOCYTES NFR BLD: 8.8 %
MPV, BLUE TOP: 9.8 FL
MYELOCYTES NFR BLD MANUAL: 2 %
NEUTROPHILS # BLD AUTO: 6.8 K/UL
NEUTROPHILS NFR BLD: 65.5 %
NEUTROPHILS NFR BLD: 86 %
NRBC BLD-RTO: 0 /100 WBC
NRBC BLD-RTO: 0 /100 WBC
OVALOCYTES BLD QL SMEAR: ABNORMAL
PHOSPHATE SERPL-MCNC: 2 MG/DL
PLATELET # BLD AUTO: ABNORMAL K/UL
PLATELET # BLD AUTO: ABNORMAL K/UL
PLATELET BLD QL SMEAR: ABNORMAL
PLATELET, BLUE TOP: 133 K/UL
PMV BLD AUTO: 11.1 FL
PMV BLD AUTO: ABNORMAL FL
POCT GLUCOSE: 97 MG/DL (ref 70–110)
POIKILOCYTOSIS BLD QL SMEAR: SLIGHT
POLYCHROMASIA BLD QL SMEAR: ABNORMAL
POTASSIUM SERPL-SCNC: 4.1 MMOL/L
POTASSIUM SERPL-SCNC: 5.5 MMOL/L
PROT SERPL-MCNC: 5.3 G/DL
PROT SERPL-MCNC: 5.4 G/DL
PROTHROMBIN TIME: 10.2 SEC
RBC # BLD AUTO: 3.21 M/UL
RBC # BLD AUTO: 3.25 M/UL
SODIUM SERPL-SCNC: 132 MMOL/L
SODIUM SERPL-SCNC: 132 MMOL/L
WBC # BLD AUTO: 10.37 K/UL
WBC # BLD AUTO: 11.71 K/UL

## 2017-11-03 PROCEDURE — 80053 COMPREHEN METABOLIC PANEL: CPT

## 2017-11-03 PROCEDURE — G0378 HOSPITAL OBSERVATION PER HR: HCPCS

## 2017-11-03 PROCEDURE — 63600175 PHARM REV CODE 636 W HCPCS: Performed by: STUDENT IN AN ORGANIZED HEALTH CARE EDUCATION/TRAINING PROGRAM

## 2017-11-03 PROCEDURE — 36415 COLL VENOUS BLD VENIPUNCTURE: CPT

## 2017-11-03 PROCEDURE — 85027 COMPLETE CBC AUTOMATED: CPT

## 2017-11-03 PROCEDURE — 25000003 PHARM REV CODE 250: Performed by: STUDENT IN AN ORGANIZED HEALTH CARE EDUCATION/TRAINING PROGRAM

## 2017-11-03 PROCEDURE — 25500020 PHARM REV CODE 255: Performed by: SURGERY

## 2017-11-03 PROCEDURE — 25000003 PHARM REV CODE 250: Performed by: SURGERY

## 2017-11-03 PROCEDURE — 63600175 PHARM REV CODE 636 W HCPCS: Performed by: RADIOLOGY

## 2017-11-03 PROCEDURE — 25500020 PHARM REV CODE 255: Performed by: STUDENT IN AN ORGANIZED HEALTH CARE EDUCATION/TRAINING PROGRAM

## 2017-11-03 PROCEDURE — 63600175 PHARM REV CODE 636 W HCPCS: Performed by: SURGERY

## 2017-11-03 PROCEDURE — 99215 OFFICE O/P EST HI 40 MIN: CPT | Mod: ,,, | Performed by: INTERNAL MEDICINE

## 2017-11-03 PROCEDURE — 85049 AUTOMATED PLATELET COUNT: CPT

## 2017-11-03 PROCEDURE — 83735 ASSAY OF MAGNESIUM: CPT

## 2017-11-03 PROCEDURE — 85610 PROTHROMBIN TIME: CPT

## 2017-11-03 PROCEDURE — 84100 ASSAY OF PHOSPHORUS: CPT

## 2017-11-03 PROCEDURE — 85007 BL SMEAR W/DIFF WBC COUNT: CPT

## 2017-11-03 RX ORDER — FENTANYL CITRATE 50 UG/ML
INJECTION, SOLUTION INTRAMUSCULAR; INTRAVENOUS CODE/TRAUMA/SEDATION MEDICATION
Status: COMPLETED | OUTPATIENT
Start: 2017-11-03 | End: 2017-11-03

## 2017-11-03 RX ORDER — CLOPIDOGREL BISULFATE 75 MG/1
75 TABLET ORAL DAILY
Status: DISCONTINUED | OUTPATIENT
Start: 2017-11-03 | End: 2017-11-04 | Stop reason: HOSPADM

## 2017-11-03 RX ORDER — PANTOPRAZOLE SODIUM 40 MG/1
40 TABLET, DELAYED RELEASE ORAL DAILY
Status: DISCONTINUED | OUTPATIENT
Start: 2017-11-03 | End: 2017-11-04 | Stop reason: HOSPADM

## 2017-11-03 RX ORDER — OXYCODONE HYDROCHLORIDE 5 MG/1
5 TABLET ORAL EVERY 4 HOURS PRN
Status: DISCONTINUED | OUTPATIENT
Start: 2017-11-03 | End: 2017-11-04 | Stop reason: HOSPADM

## 2017-11-03 RX ORDER — LEVOTHYROXINE SODIUM 100 UG/1
100 TABLET ORAL DAILY
Status: DISCONTINUED | OUTPATIENT
Start: 2017-11-03 | End: 2017-11-04 | Stop reason: HOSPADM

## 2017-11-03 RX ORDER — INDOMETHACIN 25 MG/1
25 CAPSULE ORAL 2 TIMES DAILY WITH MEALS
Status: DISCONTINUED | OUTPATIENT
Start: 2017-11-03 | End: 2017-11-04 | Stop reason: HOSPADM

## 2017-11-03 RX ORDER — ATORVASTATIN CALCIUM 20 MG/1
80 TABLET, FILM COATED ORAL DAILY
Status: DISCONTINUED | OUTPATIENT
Start: 2017-11-03 | End: 2017-11-04 | Stop reason: HOSPADM

## 2017-11-03 RX ORDER — ASPIRIN 81 MG/1
81 TABLET ORAL DAILY
Status: DISCONTINUED | OUTPATIENT
Start: 2017-11-03 | End: 2017-11-04 | Stop reason: HOSPADM

## 2017-11-03 RX ORDER — SODIUM CHLORIDE 9 MG/ML
INJECTION, SOLUTION INTRAVENOUS CONTINUOUS
Status: DISCONTINUED | OUTPATIENT
Start: 2017-11-03 | End: 2017-11-04 | Stop reason: HOSPADM

## 2017-11-03 RX ORDER — ALPRAZOLAM 0.25 MG/1
0.25 TABLET ORAL 3 TIMES DAILY
Status: DISCONTINUED | OUTPATIENT
Start: 2017-11-03 | End: 2017-11-04 | Stop reason: HOSPADM

## 2017-11-03 RX ORDER — ESCITALOPRAM OXALATE 10 MG/1
10 TABLET ORAL DAILY
Status: DISCONTINUED | OUTPATIENT
Start: 2017-11-03 | End: 2017-11-04 | Stop reason: HOSPADM

## 2017-11-03 RX ORDER — MAGNESIUM SULFATE HEPTAHYDRATE 40 MG/ML
2 INJECTION, SOLUTION INTRAVENOUS ONCE
Status: COMPLETED | OUTPATIENT
Start: 2017-11-03 | End: 2017-11-03

## 2017-11-03 RX ORDER — DEXTROSE MONOHYDRATE AND SODIUM CHLORIDE 5; .45 G/100ML; G/100ML
INJECTION, SOLUTION INTRAVENOUS CONTINUOUS
Status: DISCONTINUED | OUTPATIENT
Start: 2017-11-03 | End: 2017-11-03

## 2017-11-03 RX ORDER — MIDAZOLAM HYDROCHLORIDE 1 MG/ML
INJECTION INTRAMUSCULAR; INTRAVENOUS CODE/TRAUMA/SEDATION MEDICATION
Status: COMPLETED | OUTPATIENT
Start: 2017-11-03 | End: 2017-11-03

## 2017-11-03 RX ORDER — DEXTROSE 50 % IN WATER (D50W) INTRAVENOUS SYRINGE
Status: DISPENSED
Start: 2017-11-03 | End: 2017-11-03

## 2017-11-03 RX ADMIN — IOHEXOL 15 ML: 350 INJECTION, SOLUTION INTRAVENOUS at 12:11

## 2017-11-03 RX ADMIN — MEROPENEM AND SODIUM CHLORIDE 1 G: 1 INJECTION, SOLUTION INTRAVENOUS at 12:11

## 2017-11-03 RX ADMIN — INDOMETHACIN 25 MG: 25 CAPSULE ORAL at 06:11

## 2017-11-03 RX ADMIN — PANTOPRAZOLE SODIUM 40 MG: 40 TABLET, DELAYED RELEASE ORAL at 05:11

## 2017-11-03 RX ADMIN — POTASSIUM PHOSPHATE, MONOBASIC AND POTASSIUM PHOSPHATE, DIBASIC 20 MMOL: 224; 236 INJECTION, SOLUTION, CONCENTRATE INTRAVENOUS at 10:11

## 2017-11-03 RX ADMIN — ALPRAZOLAM 0.25 MG: 0.25 TABLET ORAL at 10:11

## 2017-11-03 RX ADMIN — LEVOTHYROXINE SODIUM 100 MCG: 100 TABLET ORAL at 05:11

## 2017-11-03 RX ADMIN — CALCIUM GLUCONATE 2 G: 94 INJECTION, SOLUTION INTRAVENOUS at 01:11

## 2017-11-03 RX ADMIN — MIDAZOLAM HYDROCHLORIDE 1 MG: 1 INJECTION, SOLUTION INTRAMUSCULAR; INTRAVENOUS at 01:11

## 2017-11-03 RX ADMIN — MAGNESIUM SULFATE IN WATER 2 G: 40 INJECTION, SOLUTION INTRAVENOUS at 09:11

## 2017-11-03 RX ADMIN — MEROPENEM AND SODIUM CHLORIDE 1 G: 1 INJECTION, SOLUTION INTRAVENOUS at 08:11

## 2017-11-03 RX ADMIN — FENTANYL CITRATE 25 MCG: 50 INJECTION, SOLUTION INTRAMUSCULAR; INTRAVENOUS at 01:11

## 2017-11-03 RX ADMIN — IOHEXOL 75 ML: 350 INJECTION, SOLUTION INTRAVENOUS at 04:11

## 2017-11-03 RX ADMIN — DEXTROSE MONOHYDRATE 25 G: 25 INJECTION, SOLUTION INTRAVENOUS at 01:11

## 2017-11-03 RX ADMIN — IOHEXOL 15 ML: 350 INJECTION, SOLUTION INTRAVENOUS at 01:11

## 2017-11-03 RX ADMIN — SODIUM CHLORIDE: 0.9 INJECTION, SOLUTION INTRAVENOUS at 03:11

## 2017-11-03 RX ADMIN — MIDAZOLAM HYDROCHLORIDE 0.5 MG: 1 INJECTION, SOLUTION INTRAMUSCULAR; INTRAVENOUS at 01:11

## 2017-11-03 RX ADMIN — ESCITALOPRAM OXALATE 10 MG: 10 TABLET ORAL at 05:11

## 2017-11-03 RX ADMIN — INSULIN HUMAN 10 UNITS: 100 INJECTION, SOLUTION PARENTERAL at 01:11

## 2017-11-03 RX ADMIN — CLOPIDOGREL 75 MG: 75 TABLET, FILM COATED ORAL at 05:11

## 2017-11-03 RX ADMIN — PREDNISONE 25 MG: 5 TABLET ORAL at 05:11

## 2017-11-03 RX ADMIN — MEROPENEM AND SODIUM CHLORIDE 1 G: 1 INJECTION, SOLUTION INTRAVENOUS at 04:11

## 2017-11-03 RX ADMIN — ASPIRIN 81 MG: 81 TABLET, COATED ORAL at 05:11

## 2017-11-03 RX ADMIN — FENTANYL CITRATE 50 MCG: 50 INJECTION, SOLUTION INTRAMUSCULAR; INTRAVENOUS at 01:11

## 2017-11-03 RX ADMIN — SODIUM CHLORIDE: 0.9 INJECTION, SOLUTION INTRAVENOUS at 12:11

## 2017-11-03 RX ADMIN — ATORVASTATIN CALCIUM 80 MG: 20 TABLET, FILM COATED ORAL at 05:11

## 2017-11-03 NOTE — HPI
Robby Soriano is a 77 y.o. male well known to our service who presented today for follow up to both our clinic and ID clinic. Was seen by Ida Gallego NP this morning with some concern about the amount and character of persistent RUQ drainage. CT Abdomen/Pelvis ordered for this afternoon in [place] but given multiple appointments patient was unable to make it. ID also saw patient this afternoon to follow up meropenem course and they too were concerned about the drain output. Patient denies any other symptoms. No fevers, chills, nausea, vomiting, changes in bowel habits, abdominal tenderness. Reports he has had concern about the anterior drain for about a week.      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 OR cultures (8/3/17) of hepatic abscess showed Clostridium perfringens, E coli, K pneumoniae, E faecalis given 5 days of IV antibiotics post operatively.   He was then seen in our clinic for follow up 10/11/17 due to jaundice and fatigue with subjective fevers and was admitted that day with A/P CT scan 10/11/17 showed: 7.2 cm gas collection in the hepatic dome containing scattered debris with an adjacent gas and fluid collection along the resection bed measuring 2.7 cm in greatest diameter. Now s/p IR drains x2 10/12/17 with cultures positive for E coli, K pneumoniae ESBL, E faecalis and C perfringens; complicated with bacteremia 10/11/17 with E coli and C perfringens on B/C. Patient was then discharged with  on 10/18 and told to complete a 2 weeks course of Meropenem.

## 2017-11-03 NOTE — PROGRESS NOTES
I have reviewed the notes, assessments, and/or procedures performed by Dr. Velazco, I concur with her/his documentation of Robby Soriano.     77-year-old male   - Hepatic dome abscess s/p 2 drains 10/2017 - E. Coli, ESBL K. Pneumoniae, E. Faecalis, C. Perfringens c/b  - E. Coli and C. Perfringens bacteremia  - Hepatic abscess in segment IV 8/2017 s/p hepatectomy - C. Perfringens, E. Coli, K. Pneumoniae, E. Faecalis  - IgG4 sclerosing cholangitis   - Immunosuppression - on prednisone 40 mg    Patient was discharged on meropenem. Patient had dressing changes performed on one of his drains by home health - afterwards, noted purulent drainage coming from around the drain as well as increased drainage. Patient also with persistent abdominal pain, RUQ guarding on exam. Concern for possible drain malposition given drainage surrounding drain.    - Admit for further evaluation of hepatic drains - recommend CT abd / pelvis  - Continue meropenem given known ESBL infection

## 2017-11-03 NOTE — PLAN OF CARE
SW could not find [facility], which Pt's wife was requesting. SW spoke to Pt's wife and she agreed to let SW send referral to [facility] [place] [facility]. SW sent Pt's face sheet and H&P. SW still awaiting orders. The service is waiting for ID to determine whether Pt will continue to require IV antibiotics for home. [facility] had been providing the IV antibiotics prior to this admit. SW to send orders once received. SW also provided [facility] access to Pt's chart in EP[facility].     ALPHONSO CliffordW

## 2017-11-03 NOTE — SUBJECTIVE & OBJECTIVE
Past Medical History:   Diagnosis Date    Cancer     Prostate/s/p prostatectomy    Coronary artery disease     GERD (gastroesophageal reflux disease)     Hyperlipidemia     Hypertension     Hypothyroidism        Past Surgical History:   Procedure Laterality Date    CAROTID ENDARTERECTOMY Bilateral     CORONARY ARTERY BYPASS GRAFT      PROSTATE SURGERY         Review of patient's allergies indicates:  No Known Allergies    Medications:  Prescriptions Prior to Admission   Medication Sig    alprazolam (XANAX) 0.25 MG tablet Take 0.25 mg by mouth 3 (three) times daily.    aspirin (ECOTRIN) 81 MG EC tablet Take 81 mg by mouth once daily.    atorvastatin (LIPITOR) 80 MG tablet Take 80 mg by mouth once daily.    carvedilol (COREG) 6.25 MG tablet Take 1 tablet (6.25 mg total) by mouth 2 (two) times daily.    clopidogrel (PLAVIX) 75 mg tablet Take 1 tablet (75 mg total) by mouth once daily.    escitalopram oxalate (LEXAPRO) 10 MG tablet Take 10 mg by mouth once daily.    furosemide (LASIX) 40 MG tablet Take 0.5 tablets (20 mg total) by mouth once daily. Refills per cardiology    hydrocortisone 1 % cream Apply topically 2 (two) times daily. Apply to anal area prn.  May use over the counter.    indomethacin (INDOCIN) 25 MG capsule Take 25 mg by mouth 2 (two) times daily with meals.    levothyroxine (SYNTHROID) 100 MCG tablet Take 100 mcg by mouth once daily.    lisinopril (PRINIVIL,ZESTRIL) 5 MG tablet Take 1 tablet (5 mg total) by mouth once daily.    meropenem (MERREM) 1 gram injection     [] meropenem-0.9% sodium chloride 1 gram/50 mL PgBk Inject 50 mLs (1 g total) into the vein every 8 (eight) hours.    ondansetron (ZOFRAN-ODT) 8 MG TbDL Take 1 tablet (8 mg total) by mouth every 6 (six) hours as needed.    oxycodone (ROXICODONE) 5 MG immediate release tablet Take 1 tablet (5 mg total) by mouth every 4 (four) hours as needed for Pain.    pantoprazole (PROTONIX) 40 MG tablet Take 40 mg by  mouth once daily.    predniSONE (DELTASONE) 5 MG tablet     promethazine (PHENERGAN) 25 MG tablet Take 1 tablet (25 mg total) by mouth every 6 (six) hours as needed for Nausea.     Antibiotics     Start     Stop Route Frequency Ordered    11/02/17 1715  meropenem-0.9% sodium chloride 1 g/50 mL IVPB      -- IV Every 8 hours (non-standard times) 11/02/17 1603        Antifungals     None        Antivirals     None             There is no immunization history on file for this patient.    Family History     None        Social History     Social History    Marital status:      Spouse name: N/A    Number of children: N/A    Years of education: N/A     Social History Main Topics    Smoking status: Former Smoker     Types: Cigarettes     Start date: 1/1/1956     Quit date: 6/18/2013    Smokeless tobacco: Never Used    Alcohol use No    Drug use: Unknown    Sexual activity: Not on file     Other Topics Concern    Not on file     Social History Narrative    No narrative on file     Review of Systems   Constitutional: Negative for activity change, appetite change, chills, diaphoresis, fatigue and fever.   Respiratory: Negative for cough, choking, chest tightness and shortness of breath.    Gastrointestinal: Negative for abdominal distention, abdominal pain, diarrhea, nausea and vomiting.   Skin: Negative for rash.     Objective:     Vital Signs (Most Recent):  Temp: 98.2 °F (36.8 °C) (11/03/17 1119)  Pulse: 81 (11/03/17 1119)  Resp: 18 (11/03/17 1119)  BP: (!) 142/64 (11/03/17 1119)  SpO2: 97 % (11/03/17 1119) Vital Signs (24h Range):  Temp:  [97 °F (36.1 °C)-98.7 °F (37.1 °C)] 98.2 °F (36.8 °C)  Pulse:  [76-92] 81  Resp:  [15-20] 18  SpO2:  [96 %-98 %] 97 %  BP: (113-152)/(59-70) 142/64     Weight: 73.4 kg (161 lb 13.1 oz)  Body mass index is 26.93 kg/m².    Estimated Creatinine Clearance: 76.9 mL/min (based on SCr of 0.7 mg/dL).    Physical Exam   Constitutional: He is oriented to person, place, and time.  No distress.   HENT:   Head: Normocephalic and atraumatic.   Cardiovascular: Normal rate and regular rhythm.    Pulmonary/Chest: Effort normal. He has no wheezes.   Abdominal: Soft. He exhibits no distension.   Two IR bulb drains in place with purulent drainage, some drainage around anterior drain   Neurological: He is alert and oriented to person, place, and time.   Skin: Skin is warm and dry. He is not diaphoretic.   Psychiatric: He has a normal mood and affect. His behavior is normal.       Significant Labs: All pertinent labs within the past 24 hours have been reviewed.    Significant Imaging: I have reviewed all pertinent imaging results/findings within the past 24 hours.

## 2017-11-03 NOTE — PROGRESS NOTES
[facility]  General Surgery  Progress Note    Subjective:     History of Present Illness:  Robby Soriano is a 77 y.o. male well known to our service who presented today for follow up to both our clinic and ID clinic. Was seen by Ida Gallego NP this morning with some concern about the amount and character of persistent RUQ drainage. CT Abdomen/Pelvis ordered for this afternoon in [place] but given multiple appointments patient was unable to make it. ID also saw patient this afternoon to follow up meropenem course and they too were concerned about the drain output. Patient denies any other symptoms. No fevers, chills, nausea, vomiting, changes in bowel habits, abdominal tenderness. Reports he has had concern about the anterior drain for about a week.      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 OR cultures (8/3/17) of hepatic abscess showed Clostridium perfringens, E coli, K pneumoniae, E faecalis given 5 days of IV antibiotics post operatively.   He was then seen in our clinic for follow up 10/11/17 due to jaundice and fatigue with subjective fevers and was admitted that day with A/P CT scan 10/11/17 showed: 7.2 cm gas collection in the hepatic dome containing scattered debris with an adjacent gas and fluid collection along the resection bed measuring 2.7 cm in greatest diameter. Now s/p IR drains x2 10/12/17 with cultures positive for E coli, K pneumoniae ESBL, E faecalis and C perfringens; complicated with bacteremia 10/11/17 with E coli and C perfringens on B/C. Patient was then discharged with  on 10/18 and told to complete a 2 weeks course of  Meropenem.    Post-Op Info:  * No surgery found *         Interval History: NAEON. Afebrile. No c/o abdominal pain.     Medications:  Continuous Infusions:   sodium chloride 0.9% 75 mL/hr at 11/03/17 0050     Scheduled Meds:   dextrose 50 % in water (D50W)        enoxaparin  40 mg Subcutaneous Daily    meropenem (MERREM) IVPB  1 g Intravenous Q8H     PRN Meds:diphenhydrAMINE, HYDROmorphone, HYDROmorphone, ondansetron, promethazine (PHENERGAN) IVPB, sodium chloride 0.9%     Review of patient's allergies indicates:  No Known Allergies  Objective:     Vital Signs (Most Recent):  Temp: 97.6 °F (36.4 °C) (11/02/17 2320)  Pulse: 78 (11/02/17 2320)  Resp: 15 (11/02/17 2320)  BP: 126/61 (11/02/17 2320)  SpO2: 98 % (11/02/17 2320) Vital Signs (24h Range):  Temp:  [97 °F (36.1 °C)-98.7 °F (37.1 °C)] 97.6 °F (36.4 °C)  Pulse:  [76-94] 78  Resp:  [15-20] 15  SpO2:  [96 %-98 %] 98 %  BP: (113-126)/(59-66) 126/61     Weight: 73.4 kg (161 lb 13.1 oz)  Body mass index is 26.93 kg/m².    Intake/Output - Last 3 Shifts       11/01 0700 - 11/02 0659 11/02 0700 - 11/03 0659 11/03 0700 - 11/04 0659    I.V. (mL/kg)  850 (11.6)     IV Piggyback  150     Total Intake(mL/kg)  1000 (13.6)     Urine (mL/kg/hr)  0     Drains  90     Total Output   90      Net   +910             Urine Occurrence  600 x           Physical Exam   Constitutional: He is oriented to person, place, and time. No distress.   HENT:   Head: Normocephalic and atraumatic.   Cardiovascular: Normal rate and regular rhythm.    Pulmonary/Chest: Effort normal. He has no wheezes.   Abdominal: Soft. He exhibits no distension.   Two IR bulb drains in place with purulent drainage, some drainage around anterior drain   Neurological: He is alert and oriented to person, place, and time.   Skin: Skin is warm and dry. He is not diaphoretic.   Psychiatric: He has a normal mood and affect. His behavior is normal.       Significant Labs:  CBC:   Recent Labs  Lab 11/03/17  0500   WBC  11.71   RBC 3.21*   HGB 9.6*   HCT 29.1*   PLT SEE COMMENT   MCV 91   MCH 29.9   MCHC 33.0     CMP:   Recent Labs  Lab 11/03/17  0500   GLU 59*   CALCIUM 8.7   ALBUMIN 1.9*   PROT 5.3*   *   K 4.1   CO2 28   CL 96   BUN 20   CREATININE 0.7   ALKPHOS 112   ALT 26   AST 20   BILITOT 0.8       Significant Diagnostics:  CT: Interval retraction of the percutaneous drain previously within the hepatic dome collection of fluid and air.  The sideholes of this drain are now mostly out of the collection, and adjustment should be considered.  The size of this collection appears relatively unchanged compared with 10/11/2017.    Good positioning of the more anterior percutaneous drain with decreased fluid in the air and fluid collection.    New, complex fluid and air collection at the right lateral chest wall inferior to the anterior percutaneous drain entry point, likely a subcutaneous abscess. Significant subcutaneous emphysema in the right lateral chest wall.    No evidence of anastomotic leak at the hepaticojejunostomy.     Assessment/Plan:     Liver abscess    78 yo M with malpositioning of hepatic abscess drains    -NPO for now  -IVF  -drain care  -IR consult for drain placement- plan for upsize to both drains  -repeat CBC- platelets clumped  -ID consult, cont meropenem per recs  -possible d/c after drain placement today            Laurie Lebron MD  General Surgery  [facility]

## 2017-11-03 NOTE — PLAN OF CARE
Patient is a 77 year old male well known to Dr Quach's service that discharged home 10/18/2017 (with [employer] and IV antibiotics with CPP/[employer]) after undergoing IR drain placement for Liver Abscess.  Patient re-admitted from home through clinic 11/2/2017 with pus drainage from around IR drain.  Patient had abdominal CT scan and is expected to go to IR for drain exchange X2 today, 11/3/2017.  Called IR to check on time of procedure, stated they are waiting for platelet count to result.  Patient is expected to discharge home today (if IR procedure done early enough) with [employer] (requested [employer]) and may or may not need continued IV antibiotics.    Patient lives in a mobile home with his wife, Allyn.  Patient's son will drive them home.  (Will set up [employer] and wait for IR recs for +/- IV antibiotics).  Patient given [facility] with understanding verbalized.  Will continue to follow for needs.    PCP  Lyla Bryan MD  [address] [facility] / [facility] [place] [id number] [phone]      [id number] - [place], [place] - [address]  [address]  [place] [address]  Phone: [phone] Fax: [phone]      Extended Emergency Contact Information  Primary Emergency Contact: BoAllyn  Address: [address] [place] [address] [facility]  Home Phone: [phone]  Relation: Spouse  Secondary Emergency Contact: Robby Canas Jr  Address: [address] [place] [address] [place]  Mobile Phone: [phone]  Relation: Son       11/03/17 1018   Discharge Assessment   Assessment Type Discharge Planning Assessment   Confirmed/corrected address and phone number on facesheet? Yes   Assessment information obtained from? Medical Record   Expected Length of Stay (days) 2   Communicated expected length of stay with patient/caregiver yes    Prior to hospitilization cognitive status: Alert/Oriented   Prior to hospitalization functional status: Independent   Current cognitive status: Alert/Oriented   Current Functional Status: Independent   Lives With spouse   Able to Return to Prior Arrangements yes   Is patient able to care for self after discharge? Yes   Who are your caregiver(s) and their phone number(s)? wife   Patient's perception of discharge disposition home or selfcare;home health   Readmission Within The Last 30 Days other (see comments)  (re-admit from clinic with pus around IR drain)   Equipment Currently Used at Home cane, quad;rollator   Do you have any problems affording any of your prescribed medications? No   Is the patient taking medications as prescribed? yes   Does the patient have transportation home? Yes   Transportation Available family or friend will provide   Discharge Plan A Home with family;Home Health   Discharge Plan B Home with family;Home Health   Patient/Family In Agreement With Plan yes   Does the patient have transportation to healthcare appointments? Yes

## 2017-11-03 NOTE — HPI
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  A/P CT scan 10/11/17 showed: 7.2 cm gas collection in the hepatic dome containing scattered debris with an adjacent gas and fluid collection along the resection bed measuring 2.7 cm in greatest diameter. S/P IR drains x2 10/12/17, cultures positive for E coli, K pneumoniae ESBL, E faecalis and C perfringens; complicated with bacteremia 10/11/17 with E coli and C perfringens on B/C. Eventually patient was discharge to complete a 2 weeks course of Meropenem.     Patient was seen in ID clinic for follow up. Had received a little more than 2 weeks of Meropenem. Denies any side effects or any problems with the PICC line. Reports that since about a week ago, he has pus drainage from one of his drains. CT was repeated and showed no decrease in size of the abscess

## 2017-11-03 NOTE — PROCEDURES
Radiology Post-Procedure Note    Pre Op Diagnosis: liver and abdominal abscesses   Post Op Diagnosis: Same    Procedure: Drain change x2 with upsize    Procedure performed by: Carroll Victor MD    Written Informed Consent Obtained: Yes  Specimen Removed: YES indwelling drains removed under CT  Estimated Blood Loss: Minimal    Findings:   New 10F drains placed in good position using CT.  Good position confirmed for each tube.   Patient tolerated procedure well.    Carroll Victor MD  Attending Radiologist  Interventional Neuroradiology  Pager: [phone]

## 2017-11-03 NOTE — PLAN OF CARE
Problem: Patient Care Overview  Goal: Plan of Care Review  Outcome: Ongoing (interventions implemented as appropriate)  Plan of care explained to patient: x-ray and CT testing, IV therapy, IV antibotics, frequent checks, I&0, pain relief, q 4 vs. Will continue to monitor.    0600 - Pt finally got his CT done at around 0400. NO c/o pain or discomfort. #1 and #2 KAIDEN drain draining clear green fluid in small amounts. Total output for both drains 90cc's. One KAIDEN drain works better than the other one. Will continue to monitor.

## 2017-11-03 NOTE — SUBJECTIVE & OBJECTIVE
Interval History: NAEON. Afebrile. No c/o abdominal pain.     Medications:  Continuous Infusions:   sodium chloride 0.9% 75 mL/hr at 11/03/17 0050     Scheduled Meds:   dextrose 50 % in water (D50W)        enoxaparin  40 mg Subcutaneous Daily    meropenem (MERREM) IVPB  1 g Intravenous Q8H     PRN Meds:diphenhydrAMINE, HYDROmorphone, HYDROmorphone, ondansetron, promethazine (PHENERGAN) IVPB, sodium chloride 0.9%     Review of patient's allergies indicates:  No Known Allergies  Objective:     Vital Signs (Most Recent):  Temp: 97.6 °F (36.4 °C) (11/02/17 2320)  Pulse: 78 (11/02/17 2320)  Resp: 15 (11/02/17 2320)  BP: 126/61 (11/02/17 2320)  SpO2: 98 % (11/02/17 2320) Vital Signs (24h Range):  Temp:  [97 °F (36.1 °C)-98.7 °F (37.1 °C)] 97.6 °F (36.4 °C)  Pulse:  [76-94] 78  Resp:  [15-20] 15  SpO2:  [96 %-98 %] 98 %  BP: (113-126)/(59-66) 126/61     Weight: 73.4 kg (161 lb 13.1 oz)  Body mass index is 26.93 kg/m².    Intake/Output - Last 3 Shifts       11/01 0700 - 11/02 0659 11/02 0700 - 11/03 0659 11/03 0700 - 11/04 0659    I.V. (mL/kg)  850 (11.6)     IV Piggyback  150     Total Intake(mL/kg)  1000 (13.6)     Urine (mL/kg/hr)  0     Drains  90     Total Output   90      Net   +910             Urine Occurrence  600 x           Physical Exam   Constitutional: He is oriented to person, place, and time. No distress.   HENT:   Head: Normocephalic and atraumatic.   Cardiovascular: Normal rate and regular rhythm.    Pulmonary/Chest: Effort normal. He has no wheezes.   Abdominal: Soft. He exhibits no distension.   Two IR bulb drains in place with purulent drainage, some drainage around anterior drain   Neurological: He is alert and oriented to person, place, and time.   Skin: Skin is warm and dry. He is not diaphoretic.   Psychiatric: He has a normal mood and affect. His behavior is normal.       Significant Labs:  CBC:   Recent Labs  Lab 11/03/17  0500   WBC 11.71   RBC 3.21*   HGB 9.6*   HCT 29.1*   PLT SEE COMMENT    MCV 91   MCH 29.9   MCHC 33.0     CMP:   Recent Labs  Lab 11/03/17  0500   GLU 59*   CALCIUM 8.7   ALBUMIN 1.9*   PROT 5.3*   *   K 4.1   CO2 28   CL 96   BUN 20   CREATININE 0.7   ALKPHOS 112   ALT 26   AST 20   BILITOT 0.8       Significant Diagnostics:  CT: Interval retraction of the percutaneous drain previously within the hepatic dome collection of fluid and air.  The sideholes of this drain are now mostly out of the collection, and adjustment should be considered.  The size of this collection appears relatively unchanged compared with 10/11/2017.    Good positioning of the more anterior percutaneous drain with decreased fluid in the air and fluid collection.    New, complex fluid and air collection at the right lateral chest wall inferior to the anterior percutaneous drain entry point, likely a subcutaneous abscess. Significant subcutaneous emphysema in the right lateral chest wall.    No evidence of anastomotic leak at the hepaticojejunostomy.

## 2017-11-03 NOTE — ASSESSMENT & PLAN NOTE
77-year-old male with Hepatic dome abscess s/p 2 drains 10/2017. Cxs grew E. Coli, ESBL K. Pneumoniae, E. Faecalis, and C. Perfringens. Also with E. Coli and C. Perfringens bacteremia. Receiving meropenem but now the drain is out of position    -continue meropenem  -await repositioning of drain and repeat cxs  -will need another 2 weeks of meropenem from IR procedure

## 2017-11-03 NOTE — ASSESSMENT & PLAN NOTE
76 yo M with malpositioning of hepatic abscess drains    -NPO for now  -IVF  -drain care  -IR consult for drain placement- plan for upsize to both drains  -repeat CBC- platelets clumped  -ID consult, cont meropenem per recs  -possible d/c after drain placement today

## 2017-11-03 NOTE — CONSULTS
[facility]  Infectious Disease  Consult Note    Patient Name: Robby Soriano  MRN: [id number]  Admission Date: 11/2/2017  Hospital Length of Stay: 0 days  Attending Physician: Dallas Quach MD  Primary Care Provider: Lyla Bryan MD     Isolation Status: No active isolations    Patient information was obtained from patient, spouse/SO and past medical records.      Consults  Assessment/Plan:     Liver abscess    77-year-old male  with Hepatic dome abscess s/p 2 drains 10/2017. Cxs grew E. Coli, ESBL K. Pneumoniae, E. Faecalis, and C. Perfringens. Also with E. Coli and C. Perfringens bacteremia. Receiving meropenem but now the drain is out of position    -continue meropenem  -await repositioning of drain and repeat cxs  -will need another 2 weeks of meropenem from IR procedure            Thank you for your consult. I will follow-up with patient. Please contact us if you have any additional questions.    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  A/P CT scan 10/11/17 showed: 7.2 cm gas collection in the hepatic dome containing scattered debris with an adjacent gas and fluid collection along the resection bed measuring 2.7 cm in greatest diameter. S/P IR  drains x2 10/12/17, cultures positive for E coli, K pneumoniae ESBL, E faecalis and C perfringens; complicated with bacteremia 10/11/17 with E coli and C perfringens on B/C. Eventually patient was discharge to complete a 2 weeks course of Meropenem.     Patient was seen in ID clinic for follow up. Had received a little more than 2 weeks of Meropenem. Denies any side effects or any problems with the PICC line. Reports that since about a week ago, he has pus drainage from one of his drains. CT was repeated and showed no decrease in size of the abscess    Past Medical History:   Diagnosis Date    Cancer     Prostate/s/p prostatectomy    Coronary artery disease     GERD (gastroesophageal reflux disease)     Hyperlipidemia     Hypertension     Hypothyroidism        Past Surgical History:   Procedure Laterality Date    CAROTID ENDARTERECTOMY Bilateral     CORONARY ARTERY BYPASS GRAFT      PROSTATE SURGERY         Review of patient's allergies indicates:  No Known Allergies    Medications:  Prescriptions Prior to Admission   Medication Sig    alprazolam (XANAX) 0.25 MG tablet Take 0.25 mg by mouth 3 (three) times daily.    aspirin (ECOTRIN) 81 MG EC tablet Take 81 mg by mouth once daily.    atorvastatin (LIPITOR) 80 MG tablet Take 80 mg by mouth once daily.    carvedilol (COREG) 6.25 MG tablet Take 1 tablet (6.25 mg total) by mouth 2 (two) times daily.    clopidogrel (PLAVIX) 75 mg tablet Take 1 tablet (75 mg total) by mouth once daily.    escitalopram oxalate (LEXAPRO) 10 MG tablet Take 10 mg by mouth once daily.    furosemide (LASIX) 40 MG tablet Take 0.5 tablets (20 mg total) by mouth once daily. Refills per cardiology    hydrocortisone 1 % cream Apply topically 2 (two) times daily. Apply to anal area prn.  May use over the counter.    indomethacin (INDOCIN) 25 MG capsule Take 25 mg by mouth 2 (two) times daily with meals.    levothyroxine (SYNTHROID) 100 MCG tablet Take 100 mcg by mouth once daily.     lisinopril (PRINIVIL,ZESTRIL) 5 MG tablet Take 1 tablet (5 mg total) by mouth once daily.    meropenem (MERREM) 1 gram injection     [] meropenem-0.9% sodium chloride 1 gram/50 mL PgBk Inject 50 mLs (1 g total) into the vein every 8 (eight) hours.    ondansetron (ZOFRAN-ODT) 8 MG TbDL Take 1 tablet (8 mg total) by mouth every 6 (six) hours as needed.    oxycodone (ROXICODONE) 5 MG immediate release tablet Take 1 tablet (5 mg total) by mouth every 4 (four) hours as needed for Pain.    pantoprazole (PROTONIX) 40 MG tablet Take 40 mg by mouth once daily.    predniSONE (DELTASONE) 5 MG tablet     promethazine (PHENERGAN) 25 MG tablet Take 1 tablet (25 mg total) by mouth every 6 (six) hours as needed for Nausea.     Antibiotics     Start     Stop Route Frequency Ordered    17 1715  meropenem-0.9% sodium chloride 1 g/50 mL IVPB      -- IV Every 8 hours (non-standard times) 17 1603        Antifungals     None        Antivirals     None             There is no immunization history on file for this patient.    Family History     None        Social History     Social History    Marital status:      Spouse name: N/A    Number of children: N/A    Years of education: N/A     Social History Main Topics    Smoking status: Former Smoker     Types: Cigarettes     Start date: 1956     Quit date: 2013    Smokeless tobacco: Never Used    Alcohol use No    Drug use: Unknown    Sexual activity: Not on file     Other Topics Concern    Not on file     Social History Narrative    No narrative on file     Review of Systems   Constitutional: Negative for activity change, appetite change, chills, diaphoresis, fatigue and fever.   Respiratory: Negative for cough, choking, chest tightness and shortness of breath.    Gastrointestinal: Negative for abdominal distention, abdominal pain, diarrhea, nausea and vomiting.   Skin: Negative for rash.     Objective:     Vital Signs (Most  Recent):  Temp: 98.2 °F (36.8 °C) (11/03/17 1119)  Pulse: 81 (11/03/17 1119)  Resp: 18 (11/03/17 1119)  BP: (!) 142/64 (11/03/17 1119)  SpO2: 97 % (11/03/17 1119) Vital Signs (24h Range):  Temp:  [97 °F (36.1 °C)-98.7 °F (37.1 °C)] 98.2 °F (36.8 °C)  Pulse:  [76-92] 81  Resp:  [15-20] 18  SpO2:  [96 %-98 %] 97 %  BP: (113-152)/(59-70) 142/64     Weight: 73.4 kg (161 lb 13.1 oz)  Body mass index is 26.93 kg/m².    Estimated Creatinine Clearance: 76.9 mL/min (based on SCr of 0.7 mg/dL).    Physical Exam   Constitutional: He is oriented to person, place, and time. No distress.   HENT:   Head: Normocephalic and atraumatic.   Cardiovascular: Normal rate and regular rhythm.    Pulmonary/Chest: Effort normal. He has no wheezes.   Abdominal: Soft. He exhibits no distension.   Two IR bulb drains in place with purulent drainage, some drainage around anterior drain   Neurological: He is alert and oriented to person, place, and time.   Skin: Skin is warm and dry. He is not diaphoretic.   Psychiatric: He has a normal mood and affect. His behavior is normal.       Significant Labs: All pertinent labs within the past 24 hours have been reviewed.    Significant Imaging: I have reviewed all pertinent imaging results/findings within the past 24 hours.

## 2017-11-03 NOTE — H&P
Inpatient Radiology Pre-procedure Note    History of Present Illness:  Robby Soriano is a 77 y.o. male who presents for drainage exchange/upsize.    Pt with h/o hepatic mass/abscess (segment IV)  s/p L hepatectomy and resection of extrahepatic biliary tree , Eron-en-Y.  CT scan 10/11/17 showed: 7.2 cm gas collection in the hepatic dome containing scattered debris with an adjacent gas and fluid collection s/p  IR drains x2 10/12/17. Now with leakage around tube site.      Admission H&P reviewed.  Past Medical History:   Diagnosis Date    Cancer     Prostate/s/p prostatectomy    Coronary artery disease     GERD (gastroesophageal reflux disease)     Hyperlipidemia     Hypertension     Hypothyroidism      Past Surgical History:   Procedure Laterality Date    CAROTID ENDARTERECTOMY Bilateral     CORONARY ARTERY BYPASS GRAFT      PROSTATE SURGERY         Review of Systems:   As documented in primary team H&P    Home Meds:   Prior to Admission medications    Medication Sig Start Date End Date Taking? Authorizing Provider   alprazolam (XANAX) 0.25 MG tablet Take 0.25 mg by mouth 3 (three) times daily.    Historical Provider, MD   aspirin (ECOTRIN) 81 MG EC tablet Take 81 mg by mouth once daily.    Historical Provider, MD   atorvastatin (LIPITOR) 80 MG tablet Take 80 mg by mouth once daily.    Historical Provider, MD   carvedilol (COREG) 6.25 MG tablet Take 1 tablet (6.25 mg total) by mouth 2 (two) times daily. 10/18/17 10/18/18  Miriam Valderrama NP   clopidogrel (PLAVIX) 75 mg tablet Take 1 tablet (75 mg total) by mouth once daily. 10/19/17 10/19/18  Miriam Valderrama NP   escitalopram oxalate (LEXAPRO) 10 MG tablet Take 10 mg by mouth once daily.    Historical Provider, MD   furosemide (LASIX) 40 MG tablet Take 0.5 tablets (20 mg total) by mouth once daily. Refills per cardiology 10/19/17 11/2/17  Kade Jansen MD   hydrocortisone 1 % cream Apply topically 2 (two) times daily. Apply to anal area prn.  May use  over the counter. 10/18/17 11/2/17  Miriam Valderrama NP   indomethacin (INDOCIN) 25 MG capsule Take 25 mg by mouth 2 (two) times daily with meals.    Historical Provider, MD   levothyroxine (SYNTHROID) 100 MCG tablet Take 100 mcg by mouth once daily.    Historical Provider, MD   lisinopril (PRINIVIL,ZESTRIL) 5 MG tablet Take 1 tablet (5 mg total) by mouth once daily. 10/19/17 10/19/18  Miriam Valderrama NP   meropenem (MERREM) 1 gram injection  10/30/17   Historical Provider, MD   meropenem-0.9% sodium chloride 1 gram/50 mL PgBk Inject 50 mLs (1 g total) into the vein every 8 (eight) hours. 10/18/17 11/2/17  Miriam Valderrama NP   ondansetron (ZOFRAN-ODT) 8 MG TbDL Take 1 tablet (8 mg total) by mouth every 6 (six) hours as needed. 9/6/17   Stephanie Jama MD   oxycodone (ROXICODONE) 5 MG immediate release tablet Take 1 tablet (5 mg total) by mouth every 4 (four) hours as needed for Pain. 8/8/17   Liberty Lentz MD   pantoprazole (PROTONIX) 40 MG tablet Take 40 mg by mouth once daily.    Historical Provider, MD   predniSONE (DELTASONE) 5 MG tablet  10/23/17   Historical Provider, MD   promethazine (PHENERGAN) 25 MG tablet Take 1 tablet (25 mg total) by mouth every 6 (six) hours as needed for Nausea. 9/6/17   Stephanie Jama MD     Scheduled Meds:    meropenem (MERREM) IVPB  1 g Intravenous Q8H    potassium phosphate IVPB  20 mmol Intravenous Once     Continuous Infusions:    sodium chloride 0.9% 75 mL/hr at 11/03/17 0050     PRN Meds:diphenhydrAMINE, HYDROmorphone, HYDROmorphone, ondansetron, promethazine (PHENERGAN) IVPB, sodium chloride 0.9%  Anticoagulants/Antiplatelets: aspirin and Lovenox Last dose yesterday morning.     Allergies: Review of patient's allergies indicates:  No Known Allergies  Sedation Hx: have not been any systemic reactions    Labs:    Recent Labs  Lab 11/03/17  0913   INR 0.9       Recent Labs  Lab 11/03/17  0500   WBC 11.71   HGB 9.6*   HCT 29.1*   MCV 91   PLT SEE COMMENT       Recent Labs  Lab 11/03/17  0500   GLU 59*   *   K 4.1   CL 96   CO2 28   BUN 20   CREATININE 0.7   CALCIUM 8.7   MG 1.6   ALT 26   AST 20   ALBUMIN 1.9*   BILITOT 0.8         Vitals:  Temp: 98.2 °F (36.8 °C) (11/03/17 1119)  Pulse: 81 (11/03/17 1119)  Resp: 18 (11/03/17 1119)  BP: (!) 142/64 (11/03/17 1119)  SpO2: 97 % (11/03/17 1119)     Physical Exam:  ASA: 3  Mallampati: 2    General: no acute distress  Mental Status: alert and oriented to person, place and time  HEENT: normocephalic, atraumatic  Chest: unlabored breathing  Heart: regular heart rate  Abdomen: nondistended  Extremity: moves all extremities    Plan: Drainage exchange/upsize  Sedation Plan: moderate     Bowenmokashmir Carpenter  Diagnostic and interventional radiology  PGY- II  [phone]

## 2017-11-04 VITALS
DIASTOLIC BLOOD PRESSURE: 62 MMHG | HEART RATE: 89 BPM | BODY MASS INDEX: 26.96 KG/M2 | OXYGEN SATURATION: 96 % | WEIGHT: 161.81 LBS | HEIGHT: 65 IN | RESPIRATION RATE: 16 BRPM | TEMPERATURE: 98 F | SYSTOLIC BLOOD PRESSURE: 135 MMHG

## 2017-11-04 PROCEDURE — 63600175 PHARM REV CODE 636 W HCPCS: Performed by: STUDENT IN AN ORGANIZED HEALTH CARE EDUCATION/TRAINING PROGRAM

## 2017-11-04 PROCEDURE — 25000003 PHARM REV CODE 250: Performed by: STUDENT IN AN ORGANIZED HEALTH CARE EDUCATION/TRAINING PROGRAM

## 2017-11-04 PROCEDURE — G0378 HOSPITAL OBSERVATION PER HR: HCPCS

## 2017-11-04 RX ORDER — MEROPENEM AND SODIUM CHLORIDE 1 G/50ML
1 INJECTION, SOLUTION INTRAVENOUS EVERY 8 HOURS
Qty: 2100 ML | Refills: 0 | Status: SHIPPED | OUTPATIENT
Start: 2017-11-04 | End: 2017-11-18

## 2017-11-04 RX ORDER — MEROPENEM AND SODIUM CHLORIDE 1 G/50ML
1 INJECTION, SOLUTION INTRAVENOUS EVERY 8 HOURS
Qty: 2100 ML | Refills: 0 | Status: SHIPPED | OUTPATIENT
Start: 2017-11-04 | End: 2017-11-04

## 2017-11-04 RX ADMIN — MEROPENEM AND SODIUM CHLORIDE 1 G: 1 INJECTION, SOLUTION INTRAVENOUS at 02:11

## 2017-11-04 RX ADMIN — PREDNISONE 25 MG: 5 TABLET ORAL at 08:11

## 2017-11-04 RX ADMIN — MEROPENEM AND SODIUM CHLORIDE 1 G: 1 INJECTION, SOLUTION INTRAVENOUS at 08:11

## 2017-11-04 RX ADMIN — PANTOPRAZOLE SODIUM 40 MG: 40 TABLET, DELAYED RELEASE ORAL at 08:11

## 2017-11-04 RX ADMIN — ESCITALOPRAM OXALATE 10 MG: 10 TABLET ORAL at 08:11

## 2017-11-04 RX ADMIN — ASPIRIN 81 MG: 81 TABLET, COATED ORAL at 08:11

## 2017-11-04 RX ADMIN — INDOMETHACIN 25 MG: 25 CAPSULE ORAL at 08:11

## 2017-11-04 RX ADMIN — ATORVASTATIN CALCIUM 80 MG: 20 TABLET, FILM COATED ORAL at 08:11

## 2017-11-04 RX ADMIN — CLOPIDOGREL 75 MG: 75 TABLET, FILM COATED ORAL at 08:11

## 2017-11-04 RX ADMIN — ALPRAZOLAM 0.25 MG: 0.25 TABLET ORAL at 06:11

## 2017-11-04 NOTE — PLAN OF CARE
Problem: Patient Care Overview  Goal: Plan of Care Review  Outcome: Ongoing (interventions implemented as appropriate)  Plan of care went over with patient: rest and sleep, frequent v/s , maintain IV and receive antibiotic tx, maintaining the two KAIDEN drains. Verbalizes understanding.    0600 - Pt had a good night rest and ready to go home today. Pt's KAIDEN drains draining adequately, #1 KAIDEN drain drains very litte, #2 KAIDEN drain drained about 60ccs.

## 2017-11-04 NOTE — PROGRESS NOTES
[facility]  General Surgery  Progress Note    Subjective:     History of Present Illness:  Robby Soriano is a 77 y.o. male well known to our service who presented today for follow up to both our clinic and ID clinic. Was seen by Ida Gallego NP this morning with some concern about the amount and character of persistent RUQ drainage. CT Abdomen/Pelvis ordered for this afternoon in [place] but given multiple appointments patient was unable to make it. ID also saw patient this afternoon to follow up meropenem course and they too were concerned about the drain output. Patient denies any other symptoms. No fevers, chills, nausea, vomiting, changes in bowel habits, abdominal tenderness. Reports he has had concern about the anterior drain for about a week.      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 OR cultures (8/3/17) of hepatic abscess showed Clostridium perfringens, E coli, K pneumoniae, E faecalis given 5 days of IV antibiotics post operatively.   He was then seen in our clinic for follow up 10/11/17 due to jaundice and fatigue with subjective fevers and was admitted that day with A/P CT scan 10/11/17 showed: 7.2 cm gas collection in the hepatic dome containing scattered debris with an adjacent gas and fluid collection along the resection bed measuring 2.7 cm in greatest diameter. Now s/p IR drains x2 10/12/17 with cultures positive for E coli, K pneumoniae ESBL, E faecalis and C perfringens; complicated with bacteremia 10/11/17 with E coli and C perfringens on B/C. Patient was then discharged with  on 10/18 and told to complete a 2 weeks course of  Meropenem.    Post-Op Info:  * No surgery found *         Interval History: No acute events overnight, afebrile, vital signs stable. Both right sided IR drains replaced yesterday.     Medications:  Continuous Infusions:   sodium chloride 0.9% 75 mL/hr at 11/03/17 1522     Scheduled Meds:   ALPRAZolam  0.25 mg Oral TID    aspirin  81 mg Oral Daily    atorvastatin  80 mg Oral Daily    clopidogrel  75 mg Oral Daily    escitalopram oxalate  10 mg Oral Daily    indomethacin  25 mg Oral BID WM    levothyroxine  100 mcg Oral Daily    meropenem (MERREM) IVPB  1 g Intravenous Q8H    pantoprazole  40 mg Oral Daily    predniSONE  25 mg Oral Daily     PRN Meds:diphenhydrAMINE, ondansetron, oxyCODONE, promethazine (PHENERGAN) IVPB, sodium chloride 0.9%     Review of patient's allergies indicates:  No Known Allergies  Objective:     Vital Signs (Most Recent):  Temp: 97.8 °F (36.6 °C) (11/04/17 0814)  Pulse: 89 (11/04/17 0814)  Resp: 16 (11/04/17 0814)  BP: 135/62 (11/04/17 0814)  SpO2: 96 % (11/04/17 0814) Vital Signs (24h Range):  Temp:  [96 °F (35.6 °C)-98.2 °F (36.8 °C)] 97.8 °F (36.6 °C)  Pulse:  [74-96] 89  Resp:  [2-20] 16  SpO2:  [95 %-99 %] 96 %  BP: (121-163)/(59-77) 135/62     Weight: 73.4 kg (161 lb 13.1 oz)  Body mass index is 26.93 kg/m².    Intake/Output - Last 3 Shifts       11/02 0700 - 11/03 0659 11/03 0700 - 11/04 0659 11/04 0700 - 11/05 0659    I.V. (mL/kg) 850 (11.6) 900 (12.3)     IV Piggyback 150 100     Total Intake(mL/kg) 1000 (13.6) 1000 (13.6)     Urine (mL/kg/hr) 0      Drains 90 60 (0)     Total Output 90 60      Net +910 +940             Urine Occurrence 600 x            Physical Exam   Constitutional: He is oriented to person, place, and time. No distress.   HENT:   Head: Normocephalic and atraumatic.   Cardiovascular: Normal rate and regular rhythm.    Pulmonary/Chest: Effort normal. He has no wheezes.   Abdominal: Soft. He exhibits no distension.   Two IR bulb drains in place with  purulent drainage, no leakage around drains   Neurological: He is alert and oriented to person, place, and time.   Skin: Skin is warm and dry. He is not diaphoretic.   Psychiatric: He has a normal mood and affect. His behavior is normal.       Significant Labs:  CBC:   Recent Labs  Lab 11/03/17  0500   WBC 11.71   RBC 3.21*   HGB 9.6*   HCT 29.1*   PLT SEE COMMENT   MCV 91   MCH 29.9   MCHC 33.0     CMP:   Recent Labs  Lab 11/03/17  0500   GLU 59*   CALCIUM 8.7   ALBUMIN 1.9*   PROT 5.3*   *   K 4.1   CO2 28   CL 96   BUN 20   CREATININE 0.7   ALKPHOS 112   ALT 26   AST 20   BILITOT 0.8       Significant Diagnostics:  I have reviewed all pertinent imaging results/findings within the past 24 hours.    Assessment/Plan:     * Liver abscess    76 yo M with malpositioning of hepatic abscess drains    - New IR drains placed yesterday without further issue  - Regular diet  - Home meds  -Per ID wants to cont meropenem for 2 more weeks  - Dispo: Home this morning with CRISTI Harrison MD  General Surgery  [facility]

## 2017-11-04 NOTE — PLAN OF CARE
10:20 am CM received call from Dr Harrison, that pt being discharged with IV antibiotics.  CM went to patient's room and patient had already left floor.  [place]  Put in call to [facility] to see if patient on the list.  Spoke with Liberty on call who Is checking on patient's med.  Also, put in call to [facility], [phone].       Faxed orders to [facility] [phone].  Also, faxed orders to [facility], [phone].  Received call back from [facility] and they stated they declined the referral, was not authorized by their insurance, as they had already made a payment to last  Service.  CM tried to reach patient on phone number, [phone].  Left message to call.  Will send referral to Kiz[facility] COLIN, put in call, [phone], awaiting call back.      11:50  Did receive call back from [facility] and she stated she called her director and they would take the patient.  Will let [facility] COLIN know, as this was the patient's choice to change  companies.  They stated they did not have discharge on pt, and they would get in touch with [facility] and work out the discharge paperwork.    1200 pm   Did speak to Liberty with [facility], as once I spoke to patient, they let me know they did not have any meds at home.  They will get a supply delivered today, and then their bigger supply on Monday.

## 2017-11-04 NOTE — NURSING
Pt discharge instructions given and verbalized understanding. Explained to pt that there were issues with IV antibiotics and it was trying to get resolved. Pt stated that there was a mix up last time and he understood how everything would work. Says that IV antibiotics would be delivered to his house like the last time he discharged.

## 2017-11-04 NOTE — PLAN OF CARE
[facility]    HOME HEALTH ORDERS  FACE TO FACE ENCOUNTER    Patient Name: Robby Soriano  YOB: 1940    PCP: Lyla Bryan MD   PCP Address: [address] [place], [place] [address]FRANNIE HARRIS [facility] [place] [address]  PCP Phone Number: [phone]  PCP Fax: [phone]    Encounter Date: 11/04/2017    Admit to Home Health    Diagnoses:  Active Hospital Problems    Diagnosis  POA    *Liver abscess [K75.0]  Yes      Resolved Hospital Problems    Diagnosis Date Resolved POA   No resolved problems to display.       No future appointments.  Follow-up Information     Dallas Quach MD In 3 weeks.    Specialty:  General Surgery  Why:  with CT  Contact information:  [address] [place] [id number] [phone]             Song Otero MD In 2 weeks.    Specialty:  Infectious Diseases  Contact information:  [facility] [id number] [phone]                     I have seen and examined this patient face to face today. My clinical findings that support the need for the home health skilled services and home bound status are the following:  Weakness/numbness causing balance and gait disturbance due to Weakness/Debility and Surgery making it taxing to leave home.    Allergies:Review of patient's allergies indicates:  No Known Allergies    Diet: regular diet    Activities: activity as tolerated    Nursing:   SN to complete comprehensive assessment including routine vital signs. Instruct on disease process and s/s of complications to report to MD. Review/verify medication list sent home with the patient at time of discharge  and instruct patient/caregiver as needed. Frequency may be adjusted depending on start of care date.    Notify MD if SBP > 160 or < 90; DBP > 90 or < 50; HR > 120 or < 50; Temp > 101;      CONSULTS:    Physical Therapy to evaluate and treat. Evaluate for home safety and equipment needs; Establish/upgrade home exercise program. Perform / instruct on therapeutic  exercises, gait training, transfer training, and Range of Motion.  Occupational Therapy to evaluate and treat. Evaluate home environment for safety and equipment needs. Perform/Instruct on transfers, ADL training, ROM, and therapeutic exercises.  Aide to provide assistance with personal care, ADLs, and vital signs.    MISCELLANEOUS CARE:  Home Infusion Therapy:   SN to perform Infusion Therapy/Central Line Care.  Review Central Line Care & Central Line Flush with patient.    IVAB via PICC line:   Meropenem-0.9% sodium chloride 1 gram/50 mL PgBk.  Inject 50 mLs (1 g total) into the vein every 8 (eight) hours.  (Last dose on 11/17/2017)    Scrub the Hub: Prior to accessing the line, always perform a 30 second alcohol scrub  Each lumen of the central line is to be flushed at least daily with 10 mL Normal Saline and 3 mL Heparin flush (100 units/mL)  Skilled Nurse (SN) may draw blood from IV access  Blood Draw Procedure:   - Aspirate at least 5 mL of blood   - Discard   - Obtain specimen   - Change posiflow cap   - Flush with 20 mL Normal Saline followed by a                 3-5 mL Heparin flush (100 units/mL)  Central :   - Sterile dressing changes are done weekly and as needed.   - Use chlor-hexadine scrub to cleanse site, apply Biopatch to insertion site,       apply securement device dressing   - Posi-flow caps are changed weekly and after EVERY lab draw.   - If sterile gauze is under dressing to control oozing,                 dressing change must be performed every 24 hours until gauze is not needed.    PICC line to right basilic. Picc line care per nursing protocol.     WOUND CARE ORDERS  n/a      Medications: Review discharge medications with patient and family and provide education.    Current Discharge Medication List      CONTINUE these medications which have CHANGED    Details   meropenem-0.9% sodium chloride 1 gram/50 mL PgBk Inject 50 mLs (1 g total) into the vein every 8 (eight)  hours.  Qty: 2100 mL, Refills: 0         CONTINUE these medications which have NOT CHANGED    Details   alprazolam (XANAX) 0.25 MG tablet Take 0.25 mg by mouth 3 (three) times daily.      aspirin (ECOTRIN) 81 MG EC tablet Take 81 mg by mouth once daily.      atorvastatin (LIPITOR) 80 MG tablet Take 80 mg by mouth once daily.      carvedilol (COREG) 6.25 MG tablet Take 1 tablet (6.25 mg total) by mouth 2 (two) times daily.  Qty: 60 tablet, Refills: 1    Comments: Refills per cardiology      clopidogrel (PLAVIX) 75 mg tablet Take 1 tablet (75 mg total) by mouth once daily.  Qty: 30 tablet, Refills: 1    Comments: Refills per cardiology      escitalopram oxalate (LEXAPRO) 10 MG tablet Take 10 mg by mouth once daily.      indomethacin (INDOCIN) 25 MG capsule Take 25 mg by mouth 2 (two) times daily with meals.      levothyroxine (SYNTHROID) 100 MCG tablet Take 100 mcg by mouth once daily.      lisinopril (PRINIVIL,ZESTRIL) 5 MG tablet Take 1 tablet (5 mg total) by mouth once daily.  Qty: 30 tablet, Refills: 0    Comments: Refills per cardiology      meropenem (MERREM) 1 gram injection       ondansetron (ZOFRAN-ODT) 8 MG TbDL Take 1 tablet (8 mg total) by mouth every 6 (six) hours as needed.  Qty: 30 tablet, Refills: 3    Associated Diagnoses: Sclerosing cholangitis; Autoimmune cholangitis      oxycodone (ROXICODONE) 5 MG immediate release tablet Take 1 tablet (5 mg total) by mouth every 4 (four) hours as needed for Pain.  Qty: 31 tablet, Refills: 0      pantoprazole (PROTONIX) 40 MG tablet Take 40 mg by mouth once daily.      predniSONE (DELTASONE) 5 MG tablet       promethazine (PHENERGAN) 25 MG tablet Take 1 tablet (25 mg total) by mouth every 6 (six) hours as needed for Nausea.  Qty: 40 tablet, Refills: 1    Associated Diagnoses: Sclerosing cholangitis; Autoimmune cholangitis         STOP taking these medications       furosemide (LASIX) 40 MG tablet Comments:   Reason for Stopping:         hydrocortisone 1 % cream  Comments:   Reason for Stopping:               I certify that this patient is confined to his home and needs intermittent skilled nursing care, physical therapy and occupational therapy.

## 2017-11-04 NOTE — SUBJECTIVE & OBJECTIVE
Interval History: No acute events overnight, afebrile, vital signs stable. Both right sided IR drains replaced yesterday.     Medications:  Continuous Infusions:   sodium chloride 0.9% 75 mL/hr at 11/03/17 1522     Scheduled Meds:   ALPRAZolam  0.25 mg Oral TID    aspirin  81 mg Oral Daily    atorvastatin  80 mg Oral Daily    clopidogrel  75 mg Oral Daily    escitalopram oxalate  10 mg Oral Daily    indomethacin  25 mg Oral BID WM    levothyroxine  100 mcg Oral Daily    meropenem (MERREM) IVPB  1 g Intravenous Q8H    pantoprazole  40 mg Oral Daily    predniSONE  25 mg Oral Daily     PRN Meds:diphenhydrAMINE, ondansetron, oxyCODONE, promethazine (PHENERGAN) IVPB, sodium chloride 0.9%     Review of patient's allergies indicates:  No Known Allergies  Objective:     Vital Signs (Most Recent):  Temp: 97.8 °F (36.6 °C) (11/04/17 0814)  Pulse: 89 (11/04/17 0814)  Resp: 16 (11/04/17 0814)  BP: 135/62 (11/04/17 0814)  SpO2: 96 % (11/04/17 0814) Vital Signs (24h Range):  Temp:  [96 °F (35.6 °C)-98.2 °F (36.8 °C)] 97.8 °F (36.6 °C)  Pulse:  [74-96] 89  Resp:  [2-20] 16  SpO2:  [95 %-99 %] 96 %  BP: (121-163)/(59-77) 135/62     Weight: 73.4 kg (161 lb 13.1 oz)  Body mass index is 26.93 kg/m².    Intake/Output - Last 3 Shifts       11/02 0700 - 11/03 0659 11/03 0700 - 11/04 0659 11/04 0700 - 11/05 0659    I.V. (mL/kg) 850 (11.6) 900 (12.3)     IV Piggyback 150 100     Total Intake(mL/kg) 1000 (13.6) 1000 (13.6)     Urine (mL/kg/hr) 0      Drains 90 60 (0)     Total Output 90 60      Net +910 +940             Urine Occurrence 600 x            Physical Exam   Constitutional: He is oriented to person, place, and time. No distress.   HENT:   Head: Normocephalic and atraumatic.   Cardiovascular: Normal rate and regular rhythm.    Pulmonary/Chest: Effort normal. He has no wheezes.   Abdominal: Soft. He exhibits no distension.   Two IR bulb drains in place with purulent drainage, no leakage around drains   Neurological: He  is alert and oriented to person, place, and time.   Skin: Skin is warm and dry. He is not diaphoretic.   Psychiatric: He has a normal mood and affect. His behavior is normal.       Significant Labs:  CBC:   Recent Labs  Lab 11/03/17  0500   WBC 11.71   RBC 3.21*   HGB 9.6*   HCT 29.1*   PLT SEE COMMENT   MCV 91   MCH 29.9   MCHC 33.0     CMP:   Recent Labs  Lab 11/03/17  0500   GLU 59*   CALCIUM 8.7   ALBUMIN 1.9*   PROT 5.3*   *   K 4.1   CO2 28   CL 96   BUN 20   CREATININE 0.7   ALKPHOS 112   ALT 26   AST 20   BILITOT 0.8       Significant Diagnostics:  I have reviewed all pertinent imaging results/findings within the past 24 hours.

## 2017-11-04 NOTE — ASSESSMENT & PLAN NOTE
78 yo M with malpositioning of hepatic abscess drains    - New IR drains placed yesterday without further issue  - Regular diet  - Home meds  -Per ID wants to cont meropenem for 2 more weeks  - Dispo: Home this morning with CRISTI

## 2017-11-05 ENCOUNTER — HOSPITAL ENCOUNTER (EMERGENCY)
Facility: HOSPITAL | Age: 77
Discharge: HOME | End: 2017-11-05
Attending: EMERGENCY MEDICINE
Payer: MEDICARE

## 2017-11-05 VITALS
DIASTOLIC BLOOD PRESSURE: 60 MMHG | OXYGEN SATURATION: 98 % | SYSTOLIC BLOOD PRESSURE: 125 MMHG | TEMPERATURE: 98 F | BODY MASS INDEX: 26.82 KG/M2 | WEIGHT: 161 LBS | RESPIRATION RATE: 18 BRPM | HEIGHT: 65 IN | HEART RATE: 89 BPM

## 2017-11-05 VITALS
WEIGHT: 161 LBS | SYSTOLIC BLOOD PRESSURE: 188 MMHG | DIASTOLIC BLOOD PRESSURE: 85 MMHG | HEART RATE: 103 BPM | HEIGHT: 65 IN | OXYGEN SATURATION: 98 % | TEMPERATURE: 99 F | BODY MASS INDEX: 26.82 KG/M2 | RESPIRATION RATE: 17 BRPM

## 2017-11-05 DIAGNOSIS — T85.698A BROKEN JACKSON-PRATT DRAIN, INITIAL ENCOUNTER: Primary | ICD-10-CM

## 2017-11-05 PROCEDURE — 99283 EMERGENCY DEPT VISIT LOW MDM: CPT | Mod: 27

## 2017-11-05 PROCEDURE — 99283 EMERGENCY DEPT VISIT LOW MDM: CPT

## 2017-11-05 PROCEDURE — 99282 EMERGENCY DEPT VISIT SF MDM: CPT | Mod: ,,, | Performed by: EMERGENCY MEDICINE

## 2017-11-05 RX ORDER — MEROPENEM AND SODIUM CHLORIDE 1 G/50ML
1 INJECTION, SOLUTION INTRAVENOUS
Status: DISCONTINUED | OUTPATIENT
Start: 2017-11-05 | End: 2017-11-05

## 2017-11-05 NOTE — ED NOTES
Drain inspected with Md,  Supervisor notified regarding needed equipment, air leak noted at t-piece port.

## 2017-11-05 NOTE — ED NOTES
Surgery does not have the the supplies needed for patient. IR called. Replacement IR drains to be tubed by Aparna.

## 2017-11-05 NOTE — ED NOTES
Leak noted in tubing, ends clipped and tested, KAIDEN bulb retained suction, after clipping tubing and reinsterting to t-piece.  Pt tolerated well.

## 2017-11-05 NOTE — ED NOTES
Patient identifiers verified and correct for Robby Russoanc.    LOC: The patient is awake, alert and aware of environment with an appropriate affect, the patient is oriented x 3 and speaking appropriately.  APPEARANCE: Patient resting comfortably and in no acute distress, patient is clean and well groomed, patient's clothing is properly fastened.  SKIN: The skin is warm and dry, color consistent with ethnicity, patient has normal skin turgor and moist mucus membranes, 2 biliary drains to right upper quadrant of abdomen, PICC to right upper arm.  MUSCULOSKELETAL: Patient moving all extremities spontaneously, no obvious swelling or deformities noted.  RESPIRATORY: Airway is open and patent, respirations are spontaneous, patient has a normal effort and rate, no accessory muscle use noted, bilateral breath sounds clear  CARDIAC: Patient has a normal rate and regular rhythm, no periphreal edema noted, capillary refill < 3 seconds.  ABDOMEN: Soft and non tender to palpation, no distention noted, normoactive bowel sounds present in all four quadrants, biliary drain x2 noted to right upper quadrant draining small amount of green output   NEUROLOGIC: PERRL, 3mm bilaterally, eyes open spontaneously, behavior appropriate to situation, follows commands, facial expression symmetrical, bilateral hand grasp equal and even, purposeful motor response noted, normal sensation in all extremities when touched with a finger.

## 2017-11-05 NOTE — ED PROVIDER NOTES
Encounter Date: 11/5/2017       History     Chief Complaint   Patient presents with    Drain problem     77-year-old male presents complaining of a portion of the hardware for one of his drains is leaking, causing an inability to create a vacuum seal.  Patient is here to see if we have a replacement stopcock.  Patient has no complaints at this time he denies fever cough shortness of breath nausea vomiting or any other complaint.          Review of patient's allergies indicates:  No Known Allergies  Past Medical History:   Diagnosis Date    Cancer     Prostate/s/p prostatectomy    Coronary artery disease     GERD (gastroesophageal reflux disease)     Hyperlipidemia     Hypertension     Hypothyroidism      Past Surgical History:   Procedure Laterality Date    CAROTID ENDARTERECTOMY Bilateral     CORONARY ARTERY BYPASS GRAFT      PROSTATE SURGERY       History reviewed. No pertinent family history.  Social History   Substance Use Topics    Smoking status: Former Smoker     Types: Cigarettes     Start date: 1/1/1956     Quit date: 6/18/2013    Smokeless tobacco: Never Used    Alcohol use No     Review of Systems   Constitutional: Negative for activity change, appetite change, chills, diaphoresis, fatigue and fever.   HENT: Negative for congestion, rhinorrhea, sore throat, trouble swallowing and voice change.    Eyes: Negative for visual disturbance.   Respiratory: Negative for cough, choking, chest tightness, shortness of breath, wheezing and stridor.    Cardiovascular: Negative for chest pain, palpitations and leg swelling.   Gastrointestinal: Negative for abdominal distention, abdominal pain, blood in stool, constipation, diarrhea, nausea and vomiting.   Genitourinary: Negative for difficulty urinating, dysuria, flank pain and frequency.   Musculoskeletal: Negative for arthralgias, back pain, joint swelling, myalgias and neck pain.   Skin: Negative for color change and rash.   Neurological: Negative for  dizziness, syncope, speech difficulty, weakness, numbness and headaches.   Hematological: Negative for adenopathy. Does not bruise/bleed easily.   All other systems reviewed and are negative.      Physical Exam     Initial Vitals [11/05/17 0101]   BP Pulse Resp Temp SpO2   (!) 188/85 103 17 98.6 °F (37 °C) 98 %      MAP       119.33         Physical Exam    Nursing note and vitals reviewed.  Constitutional: He appears well-developed and well-nourished. He is not diaphoretic. No distress.   HENT:   Head: Normocephalic and atraumatic.   Right Ear: External ear normal.   Left Ear: External ear normal.   Nose: Nose normal.   Mouth/Throat: Oropharynx is clear and moist. No oropharyngeal exudate.   Eyes: Conjunctivae and EOM are normal. Pupils are equal, round, and reactive to light. Right eye exhibits no discharge. Left eye exhibits no discharge. No scleral icterus.   Neck: Normal range of motion. Neck supple. No thyromegaly present. No tracheal deviation present. No JVD present.   Cardiovascular: Normal rate, regular rhythm, normal heart sounds and intact distal pulses. Exam reveals no gallop and no friction rub.    No murmur heard.  Pulmonary/Chest: Breath sounds normal. No stridor. No respiratory distress. He has no wheezes. He has no rhonchi. He has no rales. He exhibits no tenderness.   Abdominal: Soft. Bowel sounds are normal. He exhibits no distension and no mass. There is no tenderness. There is no rebound and no guarding.   Musculoskeletal: Normal range of motion. He exhibits no edema or tenderness.   Lymphadenopathy:     He has no cervical adenopathy.   Neurological: He is alert and oriented to person, place, and time. He has normal strength and normal reflexes. He displays normal reflexes. No cranial nerve deficit or sensory deficit.   Skin: Skin is warm and dry. No rash noted. No erythema.         ED Course   Procedures  Labs Reviewed - No data to display          Medical Decision Making:   History:   Old  Medical Records: I decided to obtain old medical records.  Initial Assessment:   Urgent evaluation of a 77-year-old male presenting with malfunctioning device.  Will attempt to replace the malfunctioning part however as these procedures are not performed at this hospital I do not know if we have the replacement.  Patient [place] [facility] [place]              Attending Attestation:             Attending ED Notes:   Patient's tubing was replaced, this fixed patient's leaking problem for his KAIDEN drain.  Patient discharged home he is to follow-up with his surgeon in the next 2-3 days and cautioned to return immediately to the ER for any worsening or any further concerns.  Patient's KAIDEN drain is now holding suction.          ED Course      Clinical Impression:   The encounter diagnosis was Broken Adebayo-Monson drain, initial encounter.                           Delmer Bolden MD  11/05/17 0334

## 2017-11-05 NOTE — ED PROVIDER NOTES
"Encounter Date: 11/5/2017       History     Chief Complaint   Patient presents with    Post-op Problem     Patient with "broke" J tubes.  Patient states the vaccuum suction does not work on either.       77M with h/o CAD s/p CABG, HTN, HLD, prostate CA s/p prostatectomy 2005 who is now s/p complicated hepatic surgical process, currently being treated for hepatic abscess.  In brief, was diagnosed with hepatic mass with associated biliary stricture, underwent L hepatectomy and resection of extrahepatic biliary tree in 8/2017, with pathology showing sclerosing cholangitis. His course has been complicated by abscess in resection bed which required IR drainage. He is on course of cefepime at home. Recently admitted on 11/3 for re-positioning of his two KAIDEN drains. Overnight, presented to [facility] with problem of KAIDEN drain not holding suction. This was addressed and resolved. This morning, on attempting to flush drain, the plastic luer lock of one drain broke off. Additionally reports that second drain intermittently doesn't hold suction. No other complaints.     Has been having approx 3-10oz per day in each drain, reports subtracting the 8cc BID flush.           Review of patient's allergies indicates:  No Known Allergies  Past Medical History:   Diagnosis Date    Cancer     Prostate/s/p prostatectomy    Coronary artery disease     GERD (gastroesophageal reflux disease)     Hyperlipidemia     Hypertension     Hypothyroidism      Past Surgical History:   Procedure Laterality Date    CAROTID ENDARTERECTOMY Bilateral     CORONARY ARTERY BYPASS GRAFT      PROSTATE SURGERY       No family history on file.  Social History   Substance Use Topics    Smoking status: Former Smoker     Types: Cigarettes     Start date: 1/1/1956     Quit date: 6/18/2013    Smokeless tobacco: Never Used    Alcohol use No     Review of Systems   Constitutional: Negative for chills and fever.   HENT: Negative for sore throat.  "   Eyes: Negative for visual disturbance.   Respiratory: Negative for shortness of breath.    Cardiovascular: Negative for chest pain.   Gastrointestinal: Negative for abdominal pain, diarrhea, nausea and vomiting.   Genitourinary: Negative for difficulty urinating.   Musculoskeletal: Negative for gait problem.   Skin: Negative for wound.   Neurological: Negative for seizures, syncope, light-headedness and headaches.   All other systems reviewed and are negative.      Physical Exam     Initial Vitals [11/05/17 1132]   BP Pulse Resp Temp SpO2   (!) 117/57 106 16 -- 97 %      MAP       77         Physical Exam    Nursing note and vitals reviewed.  Constitutional: He appears well-developed and well-nourished. He is not diaphoretic. No distress.   HENT:   Head: Normocephalic and atraumatic.   Mouth/Throat: No oropharyngeal exudate.   Eyes: Conjunctivae are normal. No scleral icterus.   Neck: Normal range of motion. Neck supple.   Cardiovascular: Normal rate, regular rhythm and intact distal pulses.   Pulmonary/Chest: Breath sounds normal. No respiratory distress.   Abdominal: Soft. He exhibits no distension. There is no tenderness.   KAIDEN drain #2 with broken luer lock distal to 3-way stopcock    KAIDEN drain #1 with system intact -- re-placed to suction and now with drainage into bulb.    Sites of insertion clean and dry without signs of surrounding infection   Musculoskeletal: Normal range of motion.   Neurological: He is alert and oriented to person, place, and time.   Skin: Skin is warm and dry.         ED Course   Procedures  Labs Reviewed - No data to display                APC / Resident Notes:   HO3 MDM:  Some time delay in obtaining proper equipment to change luer lock on KAIDEN drain. This was ultimately obtained and tubing including stopcock / luer lock / KAIDEN bulb was changed. Labeled as KAIDEN #2, in accordance w previous system. KAIDEN #1 and KAIDEN #2 now draining appropriately and holding suction. Patient discharged in stable  condition with follow-up with surgery.     E Chris Long MD  PGY-2, [employer] EM  11/5/2017 4:17 PM                  ED Course      Clinical Impression:   The encounter diagnosis was Broken Adebayo-Monson drain, initial encounter.    Disposition:   Disposition: Discharged  Condition: Stable                        Ary Long MD  Resident  11/05/17 4640

## 2017-11-05 NOTE — ED NOTES
Bilary drain tubing and bulbs replaced by MD. IR drains flushed and suction appears to be working properly.

## 2017-11-05 NOTE — DISCHARGE SUMMARY
[facility]  DISCHARGE SUMMARY  General Surgery      Admit Date:  11/2/2017    Discharge Date and Time:  11/4/2017  12:00 PM    Attending Physician:  Dallas Quach MD    Discharge Provider:  Sanna Harrison MD     Reason for Admission:  Liver abscess     Procedures Performed:  * No surgery found *    Hospital Course:   Robby Soriano is a 77 y.o. male well known to the surgical oncology service who was admitted from the clinic setting after presentation to ID and our clinic with concern for persistent drainage around the anterior RUQ biliary tube for approximately one week. Patient was otherwise asymptomatic on presentation. No fevers, chills, nausea, vomiting or abdominal tenderness. Patient was admitted to observation and CT Abdomen was obtained to evaluate the drain placement in relation to the known hepatic abscesses. CT scan revealed interval retraction of the PTC drain which had previously been within the hepatic dome abscess so that the sideholes of the drain were mostly out of the collection. The more anterior drain was in good position. IR was consulted for replacement of both drains given malposition of one and leakage around the other. They upsized both drains and repositioned the more lateral drain within the abscess. Following the procedure the patient's diet was advanced. ID was following along for antibiotic coverage and recommended 2 more weeks on meropenem with an end date of 11/17/17. The following morning patient had tolerated a diet without issue and was deemed stable for discharge home with appropriate follow up.     Consults:  ID and IR    Significant Diagnostic Studies:    11/3/17 CT Abdomen/Pelvis  Interval retraction of the percutaneous drain previously within the hepatic dome collection of fluid and air.  The sideholes of this drain are now mostly out of the collection, and adjustment should be considered.  The size of this collection appears relatively unchanged  compared with 10/11/2017.    Good positioning of the more anterior percutaneous drain with decreased fluid in the air and fluid collection.    New, complex fluid and air collection at the right lateral chest wall inferior to the anterior percutaneous drain entry point, likely a subcutaneous abscess. Significant subcutaneous emphysema in the right lateral chest wall.    No evidence of anastomotic leak at the hepaticojejunostomy.     Other incidental findings as above      Recent Labs  Lab 11/02/17 2357 11/03/17  0500   WBC 10.37 11.71   HGB 9.6* 9.6*   HCT 29.3* 29.1*   PLT SEE COMMENT SEE COMMENT   METAMYELOCYT  --  1.0   MYELOPCT  --  2.0     Recent Labs  Lab 11/02/17 2357 11/03/17  0500   * 132*   K 5.5* 4.1   CL 97 96   CO2 29 28   BUN 24* 20   CREATININE 0.8 0.7   * 59*   CALCIUM 8.3* 8.7   MG  --  1.6   PHOS  --  2.0*   AST 20 20   ALT 29 26   ALKPHOS 117 112   BILITOT 0.8 0.8   PROT 5.4* 5.3*   ALBUMIN 1.9* 1.9*     Recent Labs  Lab 11/03/17  0913   INR 0.9   No results for input(s): PH, PCO2, PO2, HCO3 in the last 72 hours.      Final Diagnoses:   Principal Problem:  Liver abscess   Secondary Diagnoses:    Active Hospital Problems    Diagnosis  POA    *Liver abscess [K75.0]  Yes      Resolved Hospital Problems    Diagnosis Date Resolved POA   No resolved problems to display.       Discharged Condition:  Good    Disposition:  Home or Self Care    Follow Up/Patient Instructions: 3 weeks with Dr. Quach    Discharge Procedure Orders  CT Abdomen Pelvis With Contrast   Standing Status: Future  Standing Exp. Date: 11/04/18   Order Specific Question Answer Comments   Oral/Rectal Contrast instructions: Routine Oral Contrast    Special CT ABD Protocol Request? Routine    Is the patient allergic to iodine or contrast? Has a steroid / antihistamine prep been administered? No    Is the patient on ANY Metformin drug such as Glugophage/Glucovance?           Should be off drug 48 hours after contrast. Check  renal function before restart. No    History of Kidney Disease - including: decreased kidney function, dialysis, kidney transplay, single kidney, kidney cancer, kidney surgery? None    Does the patient have high blood pressure requiring medical treatment? Yes    Diabetes? Yes    May the Radiologist modify the order per protocol to meet the clinical needs of the patient? Yes      Diet general     Activity as tolerated     Call MD for:  increased confusion or weakness     Call MD for:  persistent dizziness, light-headedness, or visual disturbances     Call MD for:  worsening rash     Call MD for:  severe persistent headache     Call MD for:  difficulty breathing or increased cough     Call MD for:  redness, tenderness, or signs of infection (pain, swelling, redness, odor or green/yellow discharge around incision site)     Call MD for:  severe uncontrolled pain     Call MD for:  persistent nausea and vomiting or diarrhea     Call MD for:  temperature >100.4       Follow-up Information     Dallas Quach MD In 3 weeks.    Specialty:  General Surgery  Why:  with CT  Contact information:  [address]  [facility] [address]  [phone]             Song Otero MD In 2 weeks.    Specialty:  Infectious Diseases  Contact information:  [address]  [facility] [address]  [phone]                   Temi Harrison MD  PGY-1 General Surgery   [phone]

## 2017-11-05 NOTE — ED NOTES
Patient presents from home stating his bilary drains are not working properly. Patient has 2 drains, tubing is broken on one and the second drain is not holding suction. Denies fevers. Patient stating some increased drainage from insertion site. Denies increased pain to area.

## 2017-11-06 ENCOUNTER — TELEPHONE (OUTPATIENT)
Dept: SURGERY | Facility: CLINIC | Age: 77
End: 2017-11-06

## 2017-11-06 NOTE — TELEPHONE ENCOUNTER
----- Message from Lyudmila Leslie sent at 11/6/2017  3:00 PM CST -----  Contact: jo-ann/wife  [phone]//caller states that she needs to speak with nurse in ref anh fox about an appt for the pt in  Ref to pts drain//please call//thank you

## 2017-11-09 ENCOUNTER — TELEPHONE (OUTPATIENT)
Dept: SURGERY | Facility: CLINIC | Age: 77
End: 2017-11-09

## 2017-11-09 NOTE — TELEPHONE ENCOUNTER
----- Message from Steffi Crisostomo sent at 11/9/2017 10:08 AM CST -----  Contact: wife/ jo-ann Gruber States that she needs a call returned by a nurse in reference to an appointment for walter that she doesn't understand , Please call Jo-Ann  @ [phone] . Thanks :)

## 2017-11-16 ENCOUNTER — TELEPHONE (OUTPATIENT)
Dept: SURGERY | Facility: CLINIC | Age: 77
End: 2017-11-16

## 2017-11-16 ENCOUNTER — TELEPHONE (OUTPATIENT)
Dept: INFECTIOUS DISEASES | Facility: CLINIC | Age: 77
End: 2017-11-16

## 2017-11-16 NOTE — TELEPHONE ENCOUNTER
Rad from [facility] states patient eoc is 11/17. Patient follow up with you on 11/27. Please Advise.

## 2017-11-17 ENCOUNTER — OFFICE VISIT (OUTPATIENT)
Dept: INFECTIOUS DISEASES | Facility: CLINIC | Age: 77
End: 2017-11-17
Payer: MEDICARE

## 2017-11-17 ENCOUNTER — TELEPHONE (OUTPATIENT)
Dept: SURGERY | Facility: CLINIC | Age: 77
End: 2017-11-17

## 2017-11-17 ENCOUNTER — TELEPHONE (OUTPATIENT)
Dept: INFECTIOUS DISEASES | Facility: CLINIC | Age: 77
End: 2017-11-17

## 2017-11-17 ENCOUNTER — LAB VISIT (OUTPATIENT)
Dept: LAB | Facility: HOSPITAL | Age: 77
End: 2017-11-17
Attending: SURGERY
Payer: MEDICARE

## 2017-11-17 VITALS
HEART RATE: 155 BPM | DIASTOLIC BLOOD PRESSURE: 76 MMHG | WEIGHT: 162.06 LBS | TEMPERATURE: 98 F | BODY MASS INDEX: 27 KG/M2 | HEIGHT: 65 IN | SYSTOLIC BLOOD PRESSURE: 131 MMHG

## 2017-11-17 DIAGNOSIS — K75.0 LIVER ABSCESS: Primary | ICD-10-CM

## 2017-11-17 DIAGNOSIS — K75.0 LIVER ABSCESS: ICD-10-CM

## 2017-11-17 DIAGNOSIS — R78.81 BACTEREMIA: ICD-10-CM

## 2017-11-17 DIAGNOSIS — K75.0 PYOGENIC HEPATIC ABSCESS: Primary | ICD-10-CM

## 2017-11-17 LAB
ALBUMIN SERPL BCP-MCNC: 2.5 G/DL
ALP SERPL-CCNC: 161 U/L
ALT SERPL W/O P-5'-P-CCNC: 55 U/L
ANION GAP SERPL CALC-SCNC: 9 MMOL/L
ANISOCYTOSIS BLD QL SMEAR: SLIGHT
AST SERPL-CCNC: 41 U/L
BASOPHILS # BLD AUTO: 0.05 K/UL
BASOPHILS NFR BLD: 0.4 %
BILIRUB SERPL-MCNC: 1 MG/DL
BUN SERPL-MCNC: 18 MG/DL
CALCIUM SERPL-MCNC: 9.1 MG/DL
CHLORIDE SERPL-SCNC: 99 MMOL/L
CO2 SERPL-SCNC: 26 MMOL/L
CREAT SERPL-MCNC: 0.8 MG/DL
DIFFERENTIAL METHOD: ABNORMAL
EOSINOPHIL # BLD AUTO: 0.1 K/UL
EOSINOPHIL NFR BLD: 1 %
ERYTHROCYTE [DISTWIDTH] IN BLOOD BY AUTOMATED COUNT: 17.8 %
EST. GFR  (AFRICAN AMERICAN): >60 ML/MIN/1.73 M^2
EST. GFR  (NON AFRICAN AMERICAN): >60 ML/MIN/1.73 M^2
GLUCOSE SERPL-MCNC: 105 MG/DL
HCT VFR BLD AUTO: 36.3 %
HGB BLD-MCNC: 11.8 G/DL
IMM GRANULOCYTES # BLD AUTO: 0.27 K/UL
IMM GRANULOCYTES NFR BLD AUTO: 2.1 %
LYMPHOCYTES # BLD AUTO: 1.5 K/UL
LYMPHOCYTES NFR BLD: 12.1 %
MCH RBC QN AUTO: 30.6 PG
MCHC RBC AUTO-ENTMCNC: 32.5 G/DL
MCV RBC AUTO: 94 FL
MONOCYTES # BLD AUTO: 0.9 K/UL
MONOCYTES NFR BLD: 7.1 %
NEUTROPHILS # BLD AUTO: 9.8 K/UL
NEUTROPHILS NFR BLD: 77.3 %
NRBC BLD-RTO: 0 /100 WBC
PLATELET # BLD AUTO: ABNORMAL K/UL
PLATELET BLD QL SMEAR: ABNORMAL
PMV BLD AUTO: ABNORMAL FL
POLYCHROMASIA BLD QL SMEAR: ABNORMAL
POTASSIUM SERPL-SCNC: 4.4 MMOL/L
PROT SERPL-MCNC: 6.8 G/DL
RBC # BLD AUTO: 3.86 M/UL
SODIUM SERPL-SCNC: 134 MMOL/L
WBC # BLD AUTO: 12.74 K/UL

## 2017-11-17 PROCEDURE — 36415 COLL VENOUS BLD VENIPUNCTURE: CPT

## 2017-11-17 PROCEDURE — 99215 OFFICE O/P EST HI 40 MIN: CPT | Mod: S$GLB,,, | Performed by: INTERNAL MEDICINE

## 2017-11-17 PROCEDURE — 80053 COMPREHEN METABOLIC PANEL: CPT

## 2017-11-17 PROCEDURE — 99999 PR PBB SHADOW E&M-EST. PATIENT-LVL III: CPT | Mod: PBBFAC,,, | Performed by: INTERNAL MEDICINE

## 2017-11-17 PROCEDURE — 85025 COMPLETE CBC W/AUTO DIFF WBC: CPT

## 2017-11-17 NOTE — TELEPHONE ENCOUNTER
----- Message from Meena Hassan RN sent at 11/17/2017 11:30 AM CST -----  Can you send the above patient to the 2nd floor for labwork once yall are done with him?    His home health has not been able to successfully draw labs this week.    THanks,  Lainey

## 2017-11-17 NOTE — TELEPHONE ENCOUNTER
----- Message from Amando Agee sent at 11/17/2017 10:13 AM CST -----  Contact: Myriam//[facility]  Caller states that every attempt to draw the pts lab failed bc the specimen clotted;caller would like to know if they could get an order to d/c the CBC;caller states that if these labs are needed she recommends sending the pt to the local lab to have it done/please call back at [phone]//thank you

## 2017-11-17 NOTE — TELEPHONE ENCOUNTER
Patient was told that he had a lab appt made by Dr. Quach. Pt expressed verbal understanding. JAMARI

## 2017-11-17 NOTE — PROGRESS NOTES
Subjective:      Patient ID: Robby Soriano is a 77 y.o. male.    Chief Complaint:Follow-up    History of Present Illness    76 yo male with a history of CAD s/p CABG, HTN, HLD, and prostate CA s/p prostatectomy 2005 who was found to have hepatic mass/abscess and stricture of upper bile duct on ERCP. He is s/p L hepatectomy and resection of extrahepatic biliary tree , Eron-en-Y on 8/3/17 with final pathology demonstrating IGG-4 associated sclerosing cholangitis for which he is on prednisone for.  OR cultures (8/3/17) of hepatic abscess showed Clostridium perfringens, E coli, K pneumoniae, E faecalis.  He was given 5 days of IV antibiotics post operatively and discharged.     He was seen in clinic for follow up on 10/11/17 due to jaundice and fatigue with subjective fevers and was admitted that day.  He was found to have a liver abscess and IR placed drains.  He was initially on zosyn and fluconazole but was transitioned to meropenem at the time of discharge.    He was re-admitted in early November because 1 of his drains had stopped working.  This was revised by IR and he was discharged.  He is now here today for follow up.  He is still on meropenem.  He feels well.  Denies any fevers.      Review of Systems   Constitution: Negative for chills, decreased appetite, fever, weakness, malaise/fatigue, night sweats, weight gain and weight loss.   HENT: Negative for congestion, ear pain, hearing loss, hoarse voice, sore throat and tinnitus.    Eyes: Negative for blurred vision, redness and visual disturbance.   Cardiovascular: Negative for chest pain, leg swelling and palpitations.   Respiratory: Positive for shortness of breath. Negative for cough, hemoptysis and sputum production.    Hematologic/Lymphatic: Negative for adenopathy. Does not bruise/bleed easily.   Skin: Negative for dry skin, itching, rash and suspicious lesions.   Musculoskeletal: Positive for back pain. Negative for joint pain, myalgias and neck pain.    Gastrointestinal: Negative for abdominal pain, constipation, diarrhea, heartburn, nausea and vomiting.   Genitourinary: Negative for dysuria, flank pain, frequency, hematuria, hesitancy and urgency.   Neurological: Negative for dizziness, headaches, numbness and paresthesias.   Psychiatric/Behavioral: Positive for depression. Negative for memory loss. The patient is nervous/anxious. The patient does not have insomnia.      Objective:   Physical Exam   Constitutional: He is oriented to person, place, and time. He appears well-developed and well-nourished. No distress.   HENT:   Head: Normocephalic and atraumatic.   Right Ear: External ear normal.   Left Ear: External ear normal.   Nose: Nose normal.   Mouth/Throat: Oropharynx is clear and moist. No oropharyngeal exudate.   Eyes: Conjunctivae and EOM are normal. Pupils are equal, round, and reactive to light. Right eye exhibits no discharge. Left eye exhibits no discharge. No scleral icterus.   Neck: Normal range of motion. Neck supple. No JVD present. No tracheal deviation present. No thyromegaly present.   Cardiovascular: Normal rate, regular rhythm and intact distal pulses.  Exam reveals no gallop and no friction rub.    No murmur heard.  Pulmonary/Chest: Effort normal and breath sounds normal. No stridor. No respiratory distress. He has no wheezes. He has no rales. He exhibits no tenderness.   Abdominal: Soft. Bowel sounds are normal. He exhibits no distension and no mass. There is no tenderness. There is no rebound and no guarding.   2 abdominal drains are in place.   Musculoskeletal: Normal range of motion. He exhibits no edema or tenderness.   Lymphadenopathy:     He has no cervical adenopathy.   Neurological: He is alert and oriented to person, place, and time. He has normal reflexes. He displays normal reflexes. No cranial nerve deficit. He exhibits normal muscle tone. Coordination normal.   Skin: Skin is warm. No rash noted. He is not diaphoretic. No  erythema. No pallor.   Psychiatric: He has a normal mood and affect. His behavior is normal. Judgment and thought content normal.   Nursing note and vitals reviewed.    Assessment:       1. Pyogenic hepatic abscess    2. Bacteremia        76 y/o male with E coli bacteremia and polymicrobial liver abscess.  His most recent CT scan of the abdomen shows residual abscess in the liver.  Drains are in place to achieve source control.  I will extend his meropenem for another 2 weeks.  He will follow up with surgery clinic and ID clinic in 2 weeks.  Plan:       Pyogenic hepatic abscess   -IV meropenem    Bacteremia   -IV meropenem

## 2017-11-27 ENCOUNTER — HOSPITAL ENCOUNTER (OUTPATIENT)
Dept: RADIOLOGY | Facility: HOSPITAL | Age: 77
Discharge: HOME | End: 2017-11-27
Attending: STUDENT IN AN ORGANIZED HEALTH CARE EDUCATION/TRAINING PROGRAM
Payer: MEDICARE

## 2017-11-27 ENCOUNTER — OFFICE VISIT (OUTPATIENT)
Dept: SURGERY | Facility: CLINIC | Age: 77
End: 2017-11-27
Payer: MEDICARE

## 2017-11-27 ENCOUNTER — OFFICE VISIT (OUTPATIENT)
Dept: INFECTIOUS DISEASES | Facility: CLINIC | Age: 77
End: 2017-11-27
Payer: MEDICARE

## 2017-11-27 VITALS
BODY MASS INDEX: 28.72 KG/M2 | TEMPERATURE: 98 F | SYSTOLIC BLOOD PRESSURE: 121 MMHG | DIASTOLIC BLOOD PRESSURE: 64 MMHG | WEIGHT: 172.38 LBS | HEIGHT: 65 IN | HEART RATE: 90 BPM

## 2017-11-27 VITALS
HEIGHT: 65 IN | HEART RATE: 90 BPM | BODY MASS INDEX: 28.72 KG/M2 | SYSTOLIC BLOOD PRESSURE: 121 MMHG | DIASTOLIC BLOOD PRESSURE: 64 MMHG | WEIGHT: 172.38 LBS | TEMPERATURE: 98 F

## 2017-11-27 DIAGNOSIS — K75.0 LIVER ABSCESS: Primary | ICD-10-CM

## 2017-11-27 DIAGNOSIS — K75.0 LIVER ABSCESS: ICD-10-CM

## 2017-11-27 PROCEDURE — 99024 POSTOP FOLLOW-UP VISIT: CPT | Mod: S$GLB,,, | Performed by: SURGERY

## 2017-11-27 PROCEDURE — 99999 PR PBB SHADOW E&M-EST. PATIENT-LVL III: CPT | Mod: PBBFAC,,, | Performed by: INTERNAL MEDICINE

## 2017-11-27 PROCEDURE — 25500020 PHARM REV CODE 255: Performed by: STUDENT IN AN ORGANIZED HEALTH CARE EDUCATION/TRAINING PROGRAM

## 2017-11-27 PROCEDURE — 74177 CT ABD & PELVIS W/CONTRAST: CPT | Mod: 26,,, | Performed by: RADIOLOGY

## 2017-11-27 PROCEDURE — 74177 CT ABD & PELVIS W/CONTRAST: CPT | Mod: TC

## 2017-11-27 PROCEDURE — 99215 OFFICE O/P EST HI 40 MIN: CPT | Mod: S$GLB,,, | Performed by: INTERNAL MEDICINE

## 2017-11-27 PROCEDURE — 99999 PR PBB SHADOW E&M-EST. PATIENT-LVL III: CPT | Mod: PBBFAC,,, | Performed by: SURGERY

## 2017-11-27 RX ADMIN — IOHEXOL 15 ML: 350 INJECTION, SOLUTION INTRAVENOUS at 11:11

## 2017-11-27 RX ADMIN — IOHEXOL 15 ML: 350 INJECTION, SOLUTION INTRAVENOUS at 10:11

## 2017-11-27 RX ADMIN — IOHEXOL 75 ML: 350 INJECTION, SOLUTION INTRAVENOUS at 11:11

## 2017-11-27 NOTE — PROGRESS NOTES
Patient seen with Dr Langford.  Mr Soriano feels well  He is seeing his cardiologist next week.  He is on the last week of his prednisone taper  CT personally reviewed; we will remove the lateral IR drain today. The medial collection is MUCH improved but there is still a small space and the drain is putting out approx 100 ccs/day of bilious fluid so we will leave this drain in for another three weeks  Labs reviewed. IgG4 pending.  RTC three weeks

## 2017-11-27 NOTE — PROGRESS NOTES
" Follow up Visit  Surgical Oncology    76 yo male with a history of CAD s/p CABG, HTN, HLD, and prostate CA s/p prostatectomy 2005. He underwent L hepatectomy and resection of extrahepatic biliary tree , Eron-en-Y on 8/3/17 for findings of hepatic mass/abscess and stricture of upper bile duct on ERCP. Final pathology demonstrated IGG-4 associated sclerosing cholangitis for which he is on prednisone. Diagnosed with a liver abscess and IR placed drains 10/2017. He was re-admitted in early November because 1 of his drains had stopped working.  This was revised by IR and he was discharged.  He is now here today for follow up.  He is still on meropenem managed by ID.     Subj: Feeling well today, no complaints.  No fevers or chills. Tolerating a diet well and normal bowel function.  CT today reveals complete resolution of the posterior R hepatic collection (marked as drain "#1" on his records) and near complete resolution of the anterior collection (#2).  He brings records of drain output revealing about 30cc / 24 hours from drain #1 for the past several days.   Drain #2 has had between 90-120cc output daily.  Has been having some tremors since starting steroids but is on a taper; will be down to once daily Wednesday and complete course next week.    Current Outpatient Prescriptions on File Prior to Visit   Medication Sig Dispense Refill    alprazolam (XANAX) 0.25 MG tablet Take 0.25 mg by mouth 3 (three) times daily.      atorvastatin (LIPITOR) 80 MG tablet Take 80 mg by mouth once daily.      clopidogrel (PLAVIX) 75 mg tablet Take 1 tablet (75 mg total) by mouth once daily. 30 tablet 1    escitalopram oxalate (LEXAPRO) 10 MG tablet Take 10 mg by mouth once daily.      indomethacin (INDOCIN) 25 MG capsule Take 25 mg by mouth 2 (two) times daily with meals.      levothyroxine (SYNTHROID) 100 MCG tablet Take 100 mcg by mouth once daily.      meropenem (MERREM) 1 gram injection       ondansetron (ZOFRAN-ODT) 8 MG " TbDL Take 1 tablet (8 mg total) by mouth every 6 (six) hours as needed. 30 tablet 3    oxycodone (ROXICODONE) 5 MG immediate release tablet Take 1 tablet (5 mg total) by mouth every 4 (four) hours as needed for Pain. 31 tablet 0    pantoprazole (PROTONIX) 40 MG tablet Take 40 mg by mouth once daily.      predniSONE (DELTASONE) 5 MG tablet       [DISCONTINUED] promethazine (PHENERGAN) 25 MG tablet Take 1 tablet (25 mg total) by mouth every 6 (six) hours as needed for Nausea. 40 tablet 1     Current Facility-Administered Medications on File Prior to Visit   Medication Dose Route Frequency Provider Last Rate Last Dose    [COMPLETED] omnipaque 350 iohexol 15 mL  15 mL Oral ONCE PRN Sanna Harrison MD   15 mL at 11/27/17 1115    [COMPLETED] omnipaque 350 iohexol 15 mL  15 mL Oral ONCE PRN Sanna Harrison MD   15 mL at 11/27/17 1015    [COMPLETED] omnipaque 350 iohexol 75 mL  75 mL Intravenous ONCE PRN Sanna Harrison MD   75 mL at 11/27/17 1144       Wt Readings from Last 3 Encounters:   11/27/17 78.2 kg (172 lb 6.4 oz)   11/27/17 78.2 kg (172 lb 6.4 oz)   11/17/17 73.5 kg (162 lb 0.6 oz)     Temp: 98.1 °F (36.7 °C) (11/27/17 1354)  Pulse: 90 (11/27/17 1354)  BP: 121/64 (11/27/17 1354)    Obj:   Gen: well developed, well nourished, appears stated age, no distress  CVS: regular rate and rhythm  Pulm:  clear to auscultation bilaterally and normal respiratory effort  Abd: soft, non-tender non-distented; bowel sounds normal; no masses,  no organomegaly. Two RUQ IR drains; medial (#2) and lateral (#1), both with bile tinged drainage. No signs of infection.  Ext: wwp    ROS - Negative except as in HPI    Imaging: CT 11/27/2017  Postoperative changes of left hepatic lobe resection and hepaticojejunostomy.  The gallbladder has been surgically absent.  2 percutaneous drains are again identified, previously situated within air-containing fluid collections.  There has been near interval resolution of previously  identified fluid collections with minimal residual air and fluid within the anterior most collection.  No abnormal arterial enhancement or portal venous washout within the liver.  There is a 1.5 cm cystic focus along the posterior branch of the right portal vein, similar to prior examination potentially representing a cyst or a bile duct remnant.  There is mild periportal edema. The right portal vein, main portal vein, SMV, and splenic vein are patent.  The gallbladder surgically absent.  No intrahepatic biliary ductal dilatation.  Small volume of pneumobilia within the caudate lobe.    The stomach, spleen, pancreas, and adrenal glands are unremarkable.      A/P: Robby Soriano is a 77 y.o. male IGG-4 associated sclerosing cholangitis s/p left hepatectomy 8/2017 and hepatic abscesses 10/2017 s/p IR drain placement.  Doing well.  Will remove lateral drain today in clinic.  Keep medial drain for 3 more week.  Continue antibiotics; will d/w ID  RTC for drain removal in 3 weeks.    Ian Langford MD  Surgery Resident, PGY-V  [phone]

## 2017-11-27 NOTE — PROGRESS NOTES
Infectious Diseases Clinic Note    Subjective:       Patient ID: Robby Soriano is a 77 y.o. male.    Chief Complaint: Follow-up    HPI    Feeling well, abd pain resolved drain still with output though clearing up  Afebrile, tolerating abx well      Past Medical History:   Diagnosis Date    Cancer     Prostate/s/p prostatectomy    Coronary artery disease     GERD (gastroesophageal reflux disease)     Hyperlipidemia     Hypertension     Hypothyroidism        Social History     Social History    Marital status:      Spouse name: N/A    Number of children: N/A    Years of education: N/A     Occupational History    Not on file.     Social History Main Topics    Smoking status: Former Smoker     Types: Cigarettes     Start date: 1/1/1956     Quit date: 6/18/2013    Smokeless tobacco: Never Used    Alcohol use No    Drug use: No    Sexual activity: Not on file     Other Topics Concern    Not on file     Social History Narrative    No narrative on file         Current Outpatient Prescriptions:     alprazolam (XANAX) 0.25 MG tablet, Take 0.25 mg by mouth 3 (three) times daily., Disp: , Rfl:     atorvastatin (LIPITOR) 80 MG tablet, Take 80 mg by mouth once daily., Disp: , Rfl:     clopidogrel (PLAVIX) 75 mg tablet, Take 1 tablet (75 mg total) by mouth once daily., Disp: 30 tablet, Rfl: 1    escitalopram oxalate (LEXAPRO) 10 MG tablet, Take 10 mg by mouth once daily., Disp: , Rfl:     indomethacin (INDOCIN) 25 MG capsule, Take 25 mg by mouth 2 (two) times daily with meals., Disp: , Rfl:     levothyroxine (SYNTHROID) 100 MCG tablet, Take 100 mcg by mouth once daily., Disp: , Rfl:     meropenem (MERREM) 1 gram injection, , Disp: , Rfl:     pantoprazole (PROTONIX) 40 MG tablet, Take 40 mg by mouth once daily., Disp: , Rfl:     predniSONE (DELTASONE) 5 MG tablet, , Disp: , Rfl:     ondansetron (ZOFRAN-ODT) 8 MG TbDL, Take 1 tablet (8 mg total) by mouth every 6 (six) hours as needed., Disp: 30  tablet, Rfl: 3    oxycodone (ROXICODONE) 5 MG immediate release tablet, Take 1 tablet (5 mg total) by mouth every 4 (four) hours as needed for Pain., Disp: 31 tablet, Rfl: 0  No current facility-administered medications for this visit.     Review of Systems   Constitutional: Negative for activity change, appetite change, chills, fatigue and fever.   HENT: Negative for congestion, dental problem, mouth sores and sinus pressure.    Eyes: Negative for pain, redness and visual disturbance.   Respiratory: Negative for cough, shortness of breath and wheezing.    Cardiovascular: Negative for chest pain and leg swelling.   Gastrointestinal: Negative for abdominal distention, abdominal pain, diarrhea, nausea and vomiting.   Endocrine: Negative for polyuria.   Genitourinary: Negative for decreased urine volume, dysuria and frequency.   Musculoskeletal: Negative for joint swelling.   Skin: Negative for color change.   Allergic/Immunologic: Negative for food allergies.   Neurological: Negative for dizziness, weakness and headaches.   Hematological: Negative for adenopathy.   Psychiatric/Behavioral: Negative for agitation and confusion. The patient is not nervous/anxious.            Objective:      Vitals:    11/27/17 1251   BP: 121/64   Pulse: 90   Temp: 98.1 °F (36.7 °C)     Physical Exam   Constitutional: He is oriented to person, place, and time. He appears well-developed and well-nourished.   HENT:   Head: Normocephalic and atraumatic.   Mouth/Throat: Oropharynx is clear and moist.   Eyes: Conjunctivae are normal.   Neck: Neck supple.   Cardiovascular: Normal rate, regular rhythm and normal heart sounds.    No murmur heard.  Pulmonary/Chest: Effort normal and breath sounds normal. No respiratory distress. He has no wheezes.   Abdominal: Soft. Bowel sounds are normal. He exhibits no distension. There is no tenderness.   2 RUQ drains with bilious drainage   Musculoskeletal: Normal range of motion. He exhibits no edema or  tenderness.   Lymphadenopathy:     He has no cervical adenopathy.   Neurological: He is alert and oriented to person, place, and time. Coordination normal.   Skin: Skin is warm and dry. No rash noted.   Psychiatric: He has a normal mood and affect. His behavior is normal.           Assessment/Plan:       No diagnosis found.    76 y/o M h/o CAD s/p CABG, HTN, HLD, prostate CA s/p prostatectomy 2005, found to have hepatic mass/abscess (segment IV) near hilum and stricture of upper bile duct on ERCP s/p L hepatectomy and resection of extrahepatic biliary tree, Eron-en-Y, 8/3/17 with final path demonstrating IGG-4 associated sclerosing cholangitis started on prednisone 40 mg daily and OR cultures of hepatic abscess growing clostridium perfringens, Ecoli, kleb pneumo, E faecalis given 5 days of IV abx post operatively, seen in clinic for f/u 10/11 c/o jaundice and fatigue and fevers found to have 7.2 cm gas collection in hepatic dome s/p IR drainage x 2 which grew Ecoli, ESBL kleb pneumo, E faecalis, C. perfringens and was sent home with meropenem presented to ID clinic 11/2 with worsening abdominal pain and was readmitted with drains not functioning and were repositioned at that time (11/3) and abx continued with 11/27 repeat CT with near complete resolution of both previously visualized hepatic collections ssen by surgery 11/27 and lateral IR drain removed and leaving in other drain for another 3 weeks  - extended IV abx 7 more days, given current output from drain is bilious and collections on CT resolved

## 2017-11-27 NOTE — Clinical Note
Extended abx 1 week after you removed last drain, been trying to call you to discuss.  Let me know if you are ok stopping abx, as current drain still putting out, appears bilious, not purulent and CT looks great.  Thanks!

## 2017-12-06 ENCOUNTER — TELEPHONE (OUTPATIENT)
Dept: SURGERY | Facility: CLINIC | Age: 77
End: 2017-12-06

## 2017-12-06 NOTE — TELEPHONE ENCOUNTER
----- Message from Steffi Crisostomo sent at 12/6/2017 10:42 AM CST -----  Contact: wife  Allyn  States that she needs a call returned by a nurse in reference to his medication and if he needs to stay on his pic line medication ? Please call Allyn  @ [phone].                                                              Thanks :)

## 2017-12-07 ENCOUNTER — TELEPHONE (OUTPATIENT)
Dept: INFECTIOUS DISEASES | Facility: CLINIC | Age: 77
End: 2017-12-07

## 2017-12-12 ENCOUNTER — TELEPHONE (OUTPATIENT)
Dept: SURGERY | Facility: CLINIC | Age: 77
End: 2017-12-12

## 2017-12-13 ENCOUNTER — HOSPITAL ENCOUNTER (OUTPATIENT)
Dept: RADIOLOGY | Facility: HOSPITAL | Age: 77
Discharge: HOME | DRG: 871 | End: 2017-12-13
Attending: NURSE PRACTITIONER
Payer: MEDICARE

## 2017-12-13 ENCOUNTER — HOSPITAL ENCOUNTER (INPATIENT)
Facility: HOSPITAL | Age: 77
LOS: 5 days | Discharge: HOME HEALTH | DRG: 871 | End: 2017-12-18
Attending: EMERGENCY MEDICINE | Admitting: EMERGENCY MEDICINE
Payer: MEDICARE

## 2017-12-13 ENCOUNTER — OFFICE VISIT (OUTPATIENT)
Dept: INFECTIOUS DISEASES | Facility: CLINIC | Age: 77
DRG: 871 | End: 2017-12-13
Payer: MEDICARE

## 2017-12-13 ENCOUNTER — OFFICE VISIT (OUTPATIENT)
Dept: SURGERY | Facility: CLINIC | Age: 77
DRG: 871 | End: 2017-12-13
Payer: MEDICARE

## 2017-12-13 ENCOUNTER — TELEPHONE (OUTPATIENT)
Dept: SURGERY | Facility: CLINIC | Age: 77
End: 2017-12-13

## 2017-12-13 VITALS
BODY MASS INDEX: 27.95 KG/M2 | HEIGHT: 65 IN | SYSTOLIC BLOOD PRESSURE: 100 MMHG | HEART RATE: 164 BPM | DIASTOLIC BLOOD PRESSURE: 66 MMHG | TEMPERATURE: 98 F | WEIGHT: 167.75 LBS

## 2017-12-13 VITALS
SYSTOLIC BLOOD PRESSURE: 121 MMHG | WEIGHT: 167.75 LBS | TEMPERATURE: 98 F | BODY MASS INDEX: 27.95 KG/M2 | HEIGHT: 65 IN | HEART RATE: 56 BPM | DIASTOLIC BLOOD PRESSURE: 63 MMHG

## 2017-12-13 DIAGNOSIS — K75.0 HEPATIC ABSCESS: ICD-10-CM

## 2017-12-13 DIAGNOSIS — D89.84 IGG4-RELATED SCLEROSING CHOLANGITIS: Primary | ICD-10-CM

## 2017-12-13 DIAGNOSIS — K83.09 IGG4-RELATED SCLEROSING CHOLANGITIS: Primary | ICD-10-CM

## 2017-12-13 DIAGNOSIS — R00.0 TACHYCARDIA: ICD-10-CM

## 2017-12-13 DIAGNOSIS — I47.20 VENTRICULAR TACHYCARDIA: ICD-10-CM

## 2017-12-13 DIAGNOSIS — A41.9 SEPSIS, DUE TO UNSPECIFIED ORGANISM: Primary | ICD-10-CM

## 2017-12-13 DIAGNOSIS — K75.0 LIVER ABSCESS: ICD-10-CM

## 2017-12-13 DIAGNOSIS — I48.92 ATRIAL FLUTTER: ICD-10-CM

## 2017-12-13 DIAGNOSIS — K83.09 AUTOIMMUNE CHOLANGITIS: Primary | ICD-10-CM

## 2017-12-13 PROBLEM — E03.9 ACQUIRED HYPOTHYROIDISM: Status: ACTIVE | Noted: 2017-12-13

## 2017-12-13 PROBLEM — I77.9 CAROTID DISEASE, BILATERAL: Status: ACTIVE | Noted: 2017-12-13

## 2017-12-13 PROBLEM — D63.8 ANEMIA, CHRONIC DISEASE: Status: ACTIVE | Noted: 2017-08-04

## 2017-12-13 LAB
BNP SERPL-MCNC: 96 PG/ML
FLUAV AG SPEC QL IA: NEGATIVE
FLUBV AG SPEC QL IA: NEGATIVE
INR PPP: 1
LACTATE SERPL-SCNC: 0.6 MMOL/L
LACTATE SERPL-SCNC: 3.8 MMOL/L
PROCALCITONIN SERPL IA-MCNC: 0.5 NG/ML
PROTHROMBIN TIME: 10.8 SEC
SPECIMEN SOURCE: NORMAL
TROPONIN I SERPL DL<=0.01 NG/ML-MCNC: 0.04 NG/ML

## 2017-12-13 PROCEDURE — 99024 POSTOP FOLLOW-UP VISIT: CPT | Mod: S$GLB,,, | Performed by: SURGERY

## 2017-12-13 PROCEDURE — 63600175 PHARM REV CODE 636 W HCPCS: Performed by: EMERGENCY MEDICINE

## 2017-12-13 PROCEDURE — 99285 EMERGENCY DEPT VISIT HI MDM: CPT | Mod: ,,, | Performed by: EMERGENCY MEDICINE

## 2017-12-13 PROCEDURE — 93010 ELECTROCARDIOGRAM REPORT: CPT | Mod: ,,, | Performed by: INTERNAL MEDICINE

## 2017-12-13 PROCEDURE — 96365 THER/PROPH/DIAG IV INF INIT: CPT

## 2017-12-13 PROCEDURE — 25000003 PHARM REV CODE 250: Performed by: INTERNAL MEDICINE

## 2017-12-13 PROCEDURE — 99999 PR PBB SHADOW E&M-EST. PATIENT-LVL III: CPT | Mod: PBBFAC,,, | Performed by: SURGERY

## 2017-12-13 PROCEDURE — 99291 CRITICAL CARE FIRST HOUR: CPT | Mod: 25

## 2017-12-13 PROCEDURE — 99999 PR PBB SHADOW E&M-EST. PATIENT-LVL III: CPT | Mod: PBBFAC,,, | Performed by: INTERNAL MEDICINE

## 2017-12-13 PROCEDURE — 87400 INFLUENZA A/B EACH AG IA: CPT

## 2017-12-13 PROCEDURE — 99214 OFFICE O/P EST MOD 30 MIN: CPT | Mod: S$GLB,,, | Performed by: INTERNAL MEDICINE

## 2017-12-13 PROCEDURE — 25000003 PHARM REV CODE 250: Performed by: EMERGENCY MEDICINE

## 2017-12-13 PROCEDURE — 74160 CT ABDOMEN W/CONTRAST: CPT | Mod: TC

## 2017-12-13 PROCEDURE — 84145 PROCALCITONIN (PCT): CPT

## 2017-12-13 PROCEDURE — 83605 ASSAY OF LACTIC ACID: CPT

## 2017-12-13 PROCEDURE — 85610 PROTHROMBIN TIME: CPT

## 2017-12-13 PROCEDURE — 84484 ASSAY OF TROPONIN QUANT: CPT

## 2017-12-13 PROCEDURE — 96361 HYDRATE IV INFUSION ADD-ON: CPT

## 2017-12-13 PROCEDURE — 93005 ELECTROCARDIOGRAM TRACING: CPT

## 2017-12-13 PROCEDURE — 74160 CT ABDOMEN W/CONTRAST: CPT | Mod: 26,,, | Performed by: RADIOLOGY

## 2017-12-13 PROCEDURE — 83880 ASSAY OF NATRIURETIC PEPTIDE: CPT

## 2017-12-13 PROCEDURE — 99223 1ST HOSP IP/OBS HIGH 75: CPT | Mod: ,,, | Performed by: INTERNAL MEDICINE

## 2017-12-13 PROCEDURE — 63600175 PHARM REV CODE 636 W HCPCS: Performed by: INTERNAL MEDICINE

## 2017-12-13 PROCEDURE — 11000001 HC ACUTE MED/SURG PRIVATE ROOM

## 2017-12-13 RX ORDER — ONDANSETRON 8 MG/1
8 TABLET, ORALLY DISINTEGRATING ORAL EVERY 6 HOURS PRN
Status: DISCONTINUED | OUTPATIENT
Start: 2017-12-13 | End: 2017-12-18 | Stop reason: HOSPADM

## 2017-12-13 RX ORDER — CLOPIDOGREL BISULFATE 75 MG/1
75 TABLET ORAL DAILY
Status: DISCONTINUED | OUTPATIENT
Start: 2017-12-14 | End: 2017-12-14

## 2017-12-13 RX ORDER — SODIUM CHLORIDE 9 MG/ML
INJECTION, SOLUTION INTRAVENOUS CONTINUOUS
Status: DISCONTINUED | OUTPATIENT
Start: 2017-12-13 | End: 2017-12-14

## 2017-12-13 RX ORDER — ENOXAPARIN SODIUM 100 MG/ML
40 INJECTION SUBCUTANEOUS EVERY 24 HOURS
Status: DISCONTINUED | OUTPATIENT
Start: 2017-12-14 | End: 2017-12-18 | Stop reason: HOSPADM

## 2017-12-13 RX ORDER — MEROPENEM AND SODIUM CHLORIDE 1 G/50ML
1 INJECTION, SOLUTION INTRAVENOUS
Status: DISCONTINUED | OUTPATIENT
Start: 2017-12-13 | End: 2017-12-18

## 2017-12-13 RX ORDER — ACETAMINOPHEN 325 MG/1
650 TABLET ORAL EVERY 6 HOURS PRN
Status: DISCONTINUED | OUTPATIENT
Start: 2017-12-13 | End: 2017-12-18 | Stop reason: HOSPADM

## 2017-12-13 RX ORDER — ALPRAZOLAM 0.25 MG/1
0.25 TABLET ORAL DAILY PRN
Status: DISCONTINUED | OUTPATIENT
Start: 2017-12-13 | End: 2017-12-18 | Stop reason: HOSPADM

## 2017-12-13 RX ORDER — LEVOTHYROXINE SODIUM 100 UG/1
100 TABLET ORAL DAILY
Status: DISCONTINUED | OUTPATIENT
Start: 2017-12-14 | End: 2017-12-18 | Stop reason: HOSPADM

## 2017-12-13 RX ORDER — PANTOPRAZOLE SODIUM 40 MG/1
40 TABLET, DELAYED RELEASE ORAL DAILY
Status: DISCONTINUED | OUTPATIENT
Start: 2017-12-14 | End: 2017-12-18 | Stop reason: HOSPADM

## 2017-12-13 RX ORDER — MEROPENEM AND SODIUM CHLORIDE 500 MG/50ML
500 INJECTION, SOLUTION INTRAVENOUS
Status: COMPLETED | OUTPATIENT
Start: 2017-12-13 | End: 2017-12-13

## 2017-12-13 RX ORDER — SODIUM CHLORIDE 0.9 % (FLUSH) 0.9 %
5 SYRINGE (ML) INJECTION
Status: DISCONTINUED | OUTPATIENT
Start: 2017-12-13 | End: 2017-12-18 | Stop reason: HOSPADM

## 2017-12-13 RX ORDER — ATORVASTATIN CALCIUM 20 MG/1
80 TABLET, FILM COATED ORAL DAILY
Status: DISCONTINUED | OUTPATIENT
Start: 2017-12-14 | End: 2017-12-18 | Stop reason: HOSPADM

## 2017-12-13 RX ORDER — INDOMETHACIN 25 MG/1
25 CAPSULE ORAL 2 TIMES DAILY WITH MEALS
Status: DISCONTINUED | OUTPATIENT
Start: 2017-12-13 | End: 2017-12-13

## 2017-12-13 RX ORDER — ENOXAPARIN SODIUM 100 MG/ML
40 INJECTION SUBCUTANEOUS EVERY 24 HOURS
Status: DISCONTINUED | OUTPATIENT
Start: 2017-12-13 | End: 2017-12-13

## 2017-12-13 RX ORDER — ADENOSINE 3 MG/ML
6 INJECTION, SOLUTION INTRAVENOUS
Status: DISCONTINUED | OUTPATIENT
Start: 2017-12-13 | End: 2017-12-13

## 2017-12-13 RX ORDER — ESCITALOPRAM OXALATE 10 MG/1
10 TABLET ORAL DAILY
Status: DISCONTINUED | OUTPATIENT
Start: 2017-12-14 | End: 2017-12-18 | Stop reason: HOSPADM

## 2017-12-13 RX ORDER — SODIUM CHLORIDE 9 MG/ML
1000 INJECTION, SOLUTION INTRAVENOUS
Status: COMPLETED | OUTPATIENT
Start: 2017-12-13 | End: 2017-12-13

## 2017-12-13 RX ORDER — ASPIRIN 81 MG/1
81 TABLET ORAL DAILY
Status: DISCONTINUED | OUTPATIENT
Start: 2017-12-14 | End: 2017-12-18 | Stop reason: HOSPADM

## 2017-12-13 RX ADMIN — SODIUM CHLORIDE: 0.9 INJECTION, SOLUTION INTRAVENOUS at 10:12

## 2017-12-13 RX ADMIN — SODIUM CHLORIDE 1274 ML: 0.9 INJECTION, SOLUTION INTRAVENOUS at 01:12

## 2017-12-13 RX ADMIN — MEROPENEM AND SODIUM CHLORIDE 1 G: 1 INJECTION, SOLUTION INTRAVENOUS at 09:12

## 2017-12-13 RX ADMIN — INDOMETHACIN 25 MG: 25 CAPSULE ORAL at 05:12

## 2017-12-13 RX ADMIN — SODIUM CHLORIDE 1000 ML: 0.9 INJECTION, SOLUTION INTRAVENOUS at 01:12

## 2017-12-13 RX ADMIN — SODIUM CHLORIDE: 0.9 INJECTION, SOLUTION INTRAVENOUS at 03:12

## 2017-12-13 RX ADMIN — MEROPENEM AND SODIUM CHLORIDE 500 MG: 500 INJECTION, SOLUTION INTRAVENOUS at 02:12

## 2017-12-13 NOTE — ED NOTES
Patient identifiers verified and correct for Robby Soriano.   LOC: The patient is awake, alert and aware of environment with an appropriate affect, the patient is oriented x 3 and speaking appropriately.   APPEARANCE: Patient appears comfortable and in no acute distress, patient is clean and well groomed.  SKIN: The skin is warm and dry, color consistent with ethnicity, patient has normal skin turgor and moist mucus membranes, skin intact, no breakdown or bruising noted.   MUSCULOSKELETAL: Patient moving all extremities spontaneously, no swelling noted.  RESPIRATORY: Airway is open and patent, respirations are spontaneous, patient has a normal effort and rate, no accessory muscle use noted, pt placed on continuous pulse ox with O2 sats noted at 97% on room air.  CARDIAC: Pt placed on cardiac monitor. Patient has a tachy rate and regular rhythm, no edema noted, capillary refill < 3 seconds.   GASTRO: Soft and non tender to palpation, no distention noted, normoactive bowel sounds present in all four quadrants. Pt states bowel movements have been regular.Drain in place to RUQ  : Pt denies any pain or frequency with urination.  NEURO: Pt opens eyes spontaneously, behavior appropriate to situation, follows commands, facial expression symmetrical, bilateral hand grasp equal and even, purposeful motor response noted, normal sensation in all extremities when touched with a finger.

## 2017-12-13 NOTE — PROGRESS NOTES
Antibiotics stopped Sunday  He has been off of Prednisone since shortly after his last visit here  His drain continues to function well with good output and they have been flushing it several times a day  Yesterday he noted some chills and last night, low grade fever and was instructed to come in.   Today, he says he feels well. No c/o pain  Slightly ashen appearance  Vitals:    12/13/17 1134   BP: 100/66   Pulse: (!) 164   Temp: 97.8 °F (36.6 °C)     Chest clear  Heart: regular tachycardia with no m  Abd: drain with brownish thick output. Abd benign    Imp:  Uncontrolled tachycardia  Recent MI  Possible sepsis    Rec:  To ED for evaluation and admission

## 2017-12-13 NOTE — ED TRIAGE NOTES
Pt had liver surgery in August and at follow up appt today pt was advised to come to the ED for evaluation of rapid heart rate. Pt has abscess to liver with drain in place to RUQ. Pt reports having an MI about a month ago and is fearful of his heart rate. Pt denies CP/n/v

## 2017-12-13 NOTE — ASSESSMENT & PLAN NOTE
77 y.o. 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 Now with chills, tachycardia, and CT evidence of recurrent collection. Currently admitted to hospital medicine.  -Continue antibiotics per ID recommendations given that they have been managing him outpatient and he has previously grown multiple organisms  -Lactic acidosis - fluid resuscitation and serial lactate monitoring  -Agree with IR consult for drain placement; culture  -Blood cultures  -Will follow with you

## 2017-12-13 NOTE — HPI
Robby Soriano is a 77 y.o. male well known to the surgical oncology service for a history of IGG-4 associated sclerosing cholangitis. He is s/p left hepatectomy and extrahepatic bile duct resection with eron-en-y reconstruction in 8/2017, complicated by hepatic abscesses requiring IR drainage.    He was seen in clinic today in follow up and reported chills last night, darkening drainage from biliary drain and also now with new onset drainage around drain insertion site, no change in amount of drainage in actual drain, also has been weak over past few days, also with new nasal congestion, and mild cough with clear sputum production and mild CHOWDHURY. He has been off of Prednisone since shortly after his last visit.   Today, he says he feels well. No c/o pain. He was however noted to have significant tachycardia and given his cardiac history vs concern for on going sepsis, he was referred to the ER for further evaluation.      Prior history as follows:     Of note he has a history of CAD s/p CABG, HTN, HLD, prostate CA s/p prostatectomy 2005 and was found to have hepatic mass/abscess (segment IV) near hilum with stricture of upper bile duct on ERCP s/p L hepatectomy and resection of extrahepatic biliary tree , Eron-en-Y on 8/3/17 with final pathology demonstrating IGG-4 associated sclerosing cholangitis (on prednisone 40 mg daily). OR cultures (8/3/17) of hepatic abscess showed Clostridium perfringens, E coli, K pneumoniae, E faecalis given 5 days of IV antibiotics post operatively.   He was then seen in our clinic for follow up 10/11/17 due to jaundice and fatigue with subjective fevers and was admitted that day with A/P CT scan 10/11/17 showed: 7.2 cm gas collection in the hepatic dome containing scattered debris with an adjacent gas and fluid collection along the resection bed measuring 2.7 cm in greatest diameter. Now s/p IR drains x2 10/12/17 with cultures positive for E coli, K pneumoniae ESBL, E faecalis and C  perfringens; complicated with bacteremia 10/11/17 with E coli and C perfringens on B/C. Patient was then discharged with  on 10/18 and told to complete a 2 weeks course of Meropenem.

## 2017-12-13 NOTE — CONSULTS
[facility]  General Surgery  Consult Note    Patient Name: Robby Soriano  MRN: [id number]  Code Status: Prior  Admission Date: 12/13/2017  Hospital Length of Stay: 0 days  Attending Physician: Cele Ugarte MD  Primary Care Provider: Lyla Bryan MD    Patient information was obtained from patient, past medical records and ER records.     Inpatient consult to General Surgery  Consult performed by: LEEANN DIMAS  Consult ordered by: LUIS BAR        Subjective:     Principal Problem: <principal problem not specified>    History of Present Illness: Robby Soriaon is a 77 y.o. male well known to the surgical oncology service for a history of IGG-4 associated sclerosing cholangitis. He is s/p left hepatectomy and extrahepatic bile duct resection with marci-en-y reconstruction in 8/2017, complicated by hepatic abscesses requiring IR drainage.    He was seen in clinic today in follow up and reported chills last night, darkening drainage from biliary drain and also now with new onset drainage around drain insertion site, no change in amount of drainage in actual drain, also has been weak over past few days, also with new nasal congestion, and mild cough with clear sputum production and mild CHOWDHURY. He has been off of Prednisone since shortly after his last visit.   Today, he says he feels well. No c/o pain. He was however noted to have significant tachycardia and given his cardiac history vs concern for on going sepsis, he was referred to the ER for further evaluation.      Prior history as follows:     Of note he has a history of CAD s/p CABG, HTN, HLD, prostate CA s/p prostatectomy 2005 and was found to have hepatic mass/abscess (segment IV) near hilum with stricture of upper bile duct on ERCP s/p L hepatectomy and resection of extrahepatic biliary tree , Marci-en-Y on 8/3/17 with final pathology demonstrating IGG-4 associated sclerosing cholangitis (on prednisone 40 mg daily). OR cultures  (8/3/17) of hepatic abscess showed Clostridium perfringens, E coli, K pneumoniae, E faecalis given 5 days of IV antibiotics post operatively.   He was then seen in our clinic for follow up 10/11/17 due to jaundice and fatigue with subjective fevers and was admitted that day with A/P CT scan 10/11/17 showed: 7.2 cm gas collection in the hepatic dome containing scattered debris with an adjacent gas and fluid collection along the resection bed measuring 2.7 cm in greatest diameter. Now s/p IR drains x2 10/12/17 with cultures positive for E coli, K pneumoniae ESBL, E faecalis and C perfringens; complicated with bacteremia 10/11/17 with E coli and C perfringens on B/C. Patient was then discharged with  on 10/18 and told to complete a 2 weeks course of Meropenem.    Current Facility-Administered Medications on File Prior to Encounter   Medication    [COMPLETED] omnipaque 350 iohexol 75 mL     Current Outpatient Prescriptions on File Prior to Encounter   Medication Sig    alprazolam (XANAX) 0.25 MG tablet Take 0.25 mg by mouth 3 (three) times daily.    atorvastatin (LIPITOR) 80 MG tablet Take 80 mg by mouth once daily.    clopidogrel (PLAVIX) 75 mg tablet Take 1 tablet (75 mg total) by mouth once daily.    escitalopram oxalate (LEXAPRO) 10 MG tablet Take 10 mg by mouth once daily.    levothyroxine (SYNTHROID) 100 MCG tablet Take 100 mcg by mouth once daily.    ondansetron (ZOFRAN-ODT) 8 MG TbDL Take 1 tablet (8 mg total) by mouth every 6 (six) hours as needed.    pantoprazole (PROTONIX) 40 MG tablet Take 40 mg by mouth once daily.    [DISCONTINUED] oxycodone (ROXICODONE) 5 MG immediate release tablet Take 1 tablet (5 mg total) by mouth every 4 (four) hours as needed for Pain.    [DISCONTINUED] predniSONE (DELTASONE) 5 MG tablet     indomethacin (INDOCIN) 25 MG capsule Take 25 mg by mouth 2 (two) times daily with meals.    meropenem (MERREM) 1 gram injection        Review of patient's allergies indicates:  No  Known Allergies    Past Medical History:   Diagnosis Date    Cancer     Prostate/s/p prostatectomy    Coronary artery disease     GERD (gastroesophageal reflux disease)     Hyperlipidemia     Hypertension     Hypothyroidism      Past Surgical History:   Procedure Laterality Date    CAROTID ENDARTERECTOMY Bilateral     CORONARY ARTERY BYPASS GRAFT      PROSTATE SURGERY       Family History     None        Social History Main Topics    Smoking status: Former Smoker     Types: Cigarettes     Start date: 1/1/1956     Quit date: 6/18/2013    Smokeless tobacco: Never Used    Alcohol use No    Drug use: No    Sexual activity: Not on file     Review of Systems   For pertinent positives please see HPI   Constitutional: Negative for fever  HENT: Negative for congestion and ear pain.   Respiratory: Negative for cough   Cardiovascular: Negative for chest pain and palpitations.   Gastrointestinal: Negative for nausea, vomiting, abdominal pain, diarrhea, constipation and abdominal distention.   Genitourinary: Negative for dysuria and hematuria.   Musculoskeletal: Negative for myalgias and arthralgias.   Skin: Negative for rash and wound.   Neurological: Negative for weakness and numbness.   Psychiatric/Behavioral: Negative for confusion and dysphoric mood    Objective:     Vital Signs (Most Recent):  Temp: 98.1 °F (36.7 °C) (12/13/17 1206)  Pulse: 97 (12/13/17 1631)  Resp: (!) 21 (12/13/17 1631)  BP: (!) 115/58 (12/13/17 1631)  SpO2: 97 % (12/13/17 1631) Vital Signs (24h Range):  Temp:  [97.8 °F (36.6 °C)-98.1 °F (36.7 °C)] 98.1 °F (36.7 °C)  Pulse:  [] 97  Resp:  [19-24] 21  SpO2:  [97 %-100 %] 97 %  BP: ()/(55-67) 115/58     Weight: 75.8 kg (167 lb)  Body mass index is 27.37 kg/m².    Physical Exam  General Appearance: Well-developed well-nourished in no apparent distress  Eyes: anicteric, no conjunctival injection  Neck: supple, non-tender  Respiratory: no accessory muscle use, lungs clear to  auscultation  Cardiovascular: mild tachycardia, regular rhythm  GI: normal active bowel sounds, soft, non-tender. RUQ drain with brownish thick output.  Lymphatic- no cervical or groin lymphadenopathy  Musculoskeletal- no clubbing, no cyanosis; no edema  Neurology: speech normal, sensation grossly intact, A&O x3    Significant Labs:  CBC:   Recent Labs  Lab 12/13/17  0937   WBC 12.63   RBC 3.36*   HGB 10.4*   HCT 31.8*   *   MCV 95   MCH 31.0   MCHC 32.7     BMP:   Recent Labs  Lab 12/13/17  0937   *   *   K 4.2      CO2 27   BUN 16   CREATININE 1.1   CALCIUM 9.6     LFTs:   Recent Labs  Lab 12/13/17  0937   ALT 25   AST 27   ALKPHOS 175*   BILITOT 1.4*   PROT 7.2   ALBUMIN 2.6*     Coagulation:   Recent Labs  Lab 12/13/17  1239   LABPROT 10.8   INR 1.0     Microbiology Results (last 7 days)     Procedure Component Value Units Date/Time    Gram stain [[id number]]     Order Status:  No result Specimen:  Abscess     Culture, Anaerobe [[id number]]     Order Status:  No result Specimen:  Abscess from Liver     Aerobic culture [[id number]]     Order Status:  No result Specimen:  Abscess from Liver         Significant Diagnostics:  I have reviewed all pertinent imaging results/findings within the past 24 hours.   CT A/P:    1. Interval drain removal and fluid reaccumulation within the superior hepatic collection.  Stable position of a percutaneous drain in the anterior collection with minimal residual air and fluid.  Previously identified cystic focus on the right branch of the portal vein is smaller on today's study.    2.  Small right pleural effusion.    3.  Calcific atherosclerosis of the aorta and coronary arteries.    4.  Additional findings as above.    Assessment/Plan:     Sepsis    77 y.o. 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 Now with chills, tachycardia, and CT  evidence of recurrent collection. Currently admitted to hospital medicine.  -Continue antibiotics per ID recommendations given that they have been managing him outpatient and he has previously grown multiple organisms  -Lactic acidosis - fluid resuscitation and serial lactate monitoring  -Agree with IR consult for drain placement; culture  -Blood cultures  -Will follow with you          VTE Risk Mitigation     None          Thank you for your consult. I will follow-up with patient. Please contact us if you have any additional questions.    Ian Langford MD  General Surgery  [facility]

## 2017-12-13 NOTE — PROGRESS NOTES
Patient AAO, arrived for CT-Scan, with double lumen power PICC-Line, right brachial, assessed, blood return noted,  flushed with 10ml normal saline, post CT-Scan procedure, flushed with 10ml normal saline, patient tolerated procedure well, D/C instruction given to patient by CT-Tech.

## 2017-12-13 NOTE — ED PROVIDER NOTES
Encounter Date: 12/13/2017    SCRIBE #1 NOTE: I, Jeffrey Fiore, am scribing for, and in the presence of,  Dr. Ugarte . I have scribed the entire note.       History     Chief Complaint   Patient presents with    Abscess     liver abscess, sent from clinic with hr 164,      Time patient was seen by the provider: 12:19 PM    The patient is a 77 y.o. male with hx of: coronary artery disease, hyperlipidemia, HTN, and hypothyroidism that presents to the ED with a complaint of tachycardia. Pt was seen at the clinic and was found to have a HR of 164 so he was referred to the ED. He endorses chills after coming to the ED. He has no other complaints at this time.         The history is provided by the patient and the spouse.     Review of patient's allergies indicates:  No Known Allergies  Past Medical History:   Diagnosis Date    Cancer     Prostate/s/p prostatectomy    Coronary artery disease     GERD (gastroesophageal reflux disease)     Hyperlipidemia     Hypertension     Hypothyroidism      Past Surgical History:   Procedure Laterality Date    CAROTID ENDARTERECTOMY Bilateral     CORONARY ARTERY BYPASS GRAFT      PROSTATE SURGERY       History reviewed. No pertinent family history.  Social History   Substance Use Topics    Smoking status: Former Smoker     Types: Cigarettes     Start date: 1/1/1956     Quit date: 6/18/2013    Smokeless tobacco: Never Used    Alcohol use No     Review of Systems   Constitutional: Positive for chills. Negative for fever.   HENT: Negative for sore throat.    Respiratory: Negative for shortness of breath.    Cardiovascular: Negative for chest pain.        Positive for tachycardia.    Gastrointestinal: Negative for nausea.   Genitourinary: Negative for dysuria.   Musculoskeletal: Negative for back pain.   Skin: Negative for rash.   Neurological: Negative for weakness.   Hematological: Does not bruise/bleed easily.       Physical Exam     Initial Vitals [12/13/17 1206]   BP  Pulse Resp Temp SpO2   135/67 (!) 152 (!) 24 98.1 °F (36.7 °C) 99 %      MAP       89.67         Physical Exam     PHYSICAL EXAM:  Vital signs and nursing assessment noted:    GEN:   NAD, A & Ox3, atraumatic, well appearing, nontoxic appearing, shivering   HEENT:  PERRLA, EOMI, moist membranes, nl conjunctiva, no scleral icterus, no nystagmus, no nodes/nodules, soft, supple, FROM, no trachial deviation, nexus negative  CV:   Tachycardic, no m/r/g, 2+ radial pulses, <2sec cap refill, no obvious JVD  RESP:  CTA B, no w/r/r, equal and bilateral chest rise, no respiratory distress  ABD:   soft, Nontender, Nondistended, +BS, no guarding/rebound  BACK:  FROM, no midline tenderness, no paraspinal tenderness  :   Deferred  EXT:   FROM, MONGE x 4, no edema, no calf tenderness, no swelling, Drain in RUQ   LYMPH:  no gross adenopathy  NEURO:  CN II-XII grossly intact, no obvious motor/sensory deficit, no tremor ,negative Romberg,  nl gait/coordination  PSYCH:   no SI/HI, no anxiety, nl mood/affect, nl judgement/thought process  SKIN:  Warm, dry, intact, no rashes/lesions or masses, nl color, no pallor, jaundice       ED Course   Procedures  Labs Reviewed   LACTIC ACID, PLASMA - Abnormal; Notable for the following:        Result Value    Lactate (Lactic Acid) 3.8 (*)     All other components within normal limits   PROCALCITONIN - Abnormal; Notable for the following:     Procalcitonin 0.50 (*)     All other components within normal limits   TROPONIN I - Abnormal; Notable for the following:     Troponin I 0.037 (*)     All other components within normal limits    Narrative:     ADD-ON #[id number] PER BHAVANI GREENBERG MD 14:29  12/13/2017    PROTIME-INR   TROPONIN I   B-TYPE NATRIURETIC PEPTIDE   B-TYPE NATRIURETIC PEPTIDE    Narrative:     ADD-ON #[id number] PER BHAVANI GREENBERG MD 14:29  12/13/2017   ADD ON BNP PER DR. BHAVANI GREENBERG AT  12/13/2017  15:01 (USED   LAVX)   INFLUENZA A AND B ANTIGEN     EKG Readings:  (Independently Interpreted)   1219   with some supraventricular rhythm with an incomplete left bundle branch block. Non specific ST changes.    1321 . Sinus Tachycardia with a 1st degree AV block with premature ventricular complexes. An incomplete left bundle branch block.         X-Rays:   Independently Interpreted Readings:   Abdomen: CT prior to arrival showed new complete resolution of hepatic fluid collection.      Medical Decision Making:   History:   Old Medical Records: I decided to obtain old medical records.  Old Records Summarized: other records.       <> Summary of Records: Creatine 1.1, white count is 12.6 and hemoglobin was 10.4. Down from 11.6 two weeks ago.     Reviewing cultures subhepatic abscess: 3 different bacteria that all 3 are susceptible to meropenem  Initial Assessment:   77-year-old male with history of liver abscesses and was treated percutaneous drains and long-term IV meropenem that stopped on December 9 (4 days ago). . Patient developed intense chills.  Seen in Dr. Quach's office today who noted elevated heart rate.      Differential Diagnosis:   Tachycardia differential includes but not exclusive to: Sepsis, cardiac dysrhythmia, metabolic disturbances, medication side effect, UTI, myocardial infarction, dehydration, anxiety, pain        Independently Interpreted Test(s):   I have ordered and independently interpreted X-rays - see summary below.       <> Summary of X-Ray Reading(s): CT abdomen and pelvis that was done prior to arrival shows trained to be in place with fluid collection in liver      I have ordered and independently interpreted EKG Reading(s) - see prior notes  Clinical Tests:   Lab Tests: Ordered and Reviewed  The following lab test(s) were unremarkable: BNP       <> Summary of Lab: Elevated lactic acid, elevated pro calcitonin, elevated troponin  Radiological Study: Reviewed  Medical Tests: Ordered and Reviewed  ED Management:  Treatment plan includes  physical exam, cardiac monitoring, labs,  IVF and supportive care.  1313 Pt's blood pressure was 88 systolic. Noted that previous EKG was questionable for SVT so a repeat one was done. His blood pressure showed 109 systolic and he was sinus tachycardia. Will hold off on adenosine.   13:33 Called ID for meropenem. They said to start meropenem.   Patient improved after treatment and tolerating PO    Family and patient updated on care.  Pt agrees with assessment, disposition and treatment plan and has no further questions or complaints at this time.  Demonstrates understanding.    Further plan per inpatient team      Other:   I have discussed this case with another health care provider.      Critical care was necessary to treat or prevent imminent or life-threatening deterioration.   Critical care time: 31 min  Time spent at the bedside, reviewing test results, discussing the case with staff and/or consult(s), documenting the medical record and time spent with family members discussing treatment and management decisions.    The time involved in the performance of separately reportable procedures was not counted toward critical care time.           Scribe Attestation:   Scribe #1: I performed the above scribed service and the documentation accurately describes the services I performed. I attest to the accuracy of the note.    Attending Attestation:           Physician Attestation for Scribe:      Comments: I, Dr. Cele Ugarte, personally performed the services described in this documentation. All medical record entries made by the scribe were at my direction and in my presence.  I have reviewed the chart and agree that the record reflects my personal performance and is accurate and complete. Cele Ugarte MD.  5:24 PM 12/14/2017              ED Course      Clinical Impression:   The primary encounter diagnosis was Sepsis, due to unspecified organism. Diagnoses of Tachycardia, Hepatic abscess, Atrial flutter,  and Liver abscess were also pertinent to this visit.    Disposition:   Disposition: Admitted  Condition: Serious                        Cele Ugarte MD  12/14/17 2450

## 2017-12-13 NOTE — SUBJECTIVE & OBJECTIVE
Current Facility-Administered Medications on File Prior to Encounter   Medication    [COMPLETED] omnipaque 350 iohexol 75 mL     Current Outpatient Prescriptions on File Prior to Encounter   Medication Sig    alprazolam (XANAX) 0.25 MG tablet Take 0.25 mg by mouth 3 (three) times daily.    atorvastatin (LIPITOR) 80 MG tablet Take 80 mg by mouth once daily.    clopidogrel (PLAVIX) 75 mg tablet Take 1 tablet (75 mg total) by mouth once daily.    escitalopram oxalate (LEXAPRO) 10 MG tablet Take 10 mg by mouth once daily.    levothyroxine (SYNTHROID) 100 MCG tablet Take 100 mcg by mouth once daily.    ondansetron (ZOFRAN-ODT) 8 MG TbDL Take 1 tablet (8 mg total) by mouth every 6 (six) hours as needed.    pantoprazole (PROTONIX) 40 MG tablet Take 40 mg by mouth once daily.    [DISCONTINUED] oxycodone (ROXICODONE) 5 MG immediate release tablet Take 1 tablet (5 mg total) by mouth every 4 (four) hours as needed for Pain.    [DISCONTINUED] predniSONE (DELTASONE) 5 MG tablet     indomethacin (INDOCIN) 25 MG capsule Take 25 mg by mouth 2 (two) times daily with meals.    meropenem (MERREM) 1 gram injection        Review of patient's allergies indicates:  No Known Allergies    Past Medical History:   Diagnosis Date    Cancer     Prostate/s/p prostatectomy    Coronary artery disease     GERD (gastroesophageal reflux disease)     Hyperlipidemia     Hypertension     Hypothyroidism      Past Surgical History:   Procedure Laterality Date    CAROTID ENDARTERECTOMY Bilateral     CORONARY ARTERY BYPASS GRAFT      PROSTATE SURGERY       Family History     None        Social History Main Topics    Smoking status: Former Smoker     Types: Cigarettes     Start date: 1/1/1956     Quit date: 6/18/2013    Smokeless tobacco: Never Used    Alcohol use No    Drug use: No    Sexual activity: Not on file     Review of Systems   For pertinent positives please see HPI   Constitutional: Negative for fever  HENT: Negative for  congestion and ear pain.   Respiratory: Negative for cough   Cardiovascular: Negative for chest pain and palpitations.   Gastrointestinal: Negative for nausea, vomiting, abdominal pain, diarrhea, constipation and abdominal distention.   Genitourinary: Negative for dysuria and hematuria.   Musculoskeletal: Negative for myalgias and arthralgias.   Skin: Negative for rash and wound.   Neurological: Negative for weakness and numbness.   Psychiatric/Behavioral: Negative for confusion and dysphoric mood    Objective:     Vital Signs (Most Recent):  Temp: 98.1 °F (36.7 °C) (12/13/17 1206)  Pulse: 97 (12/13/17 1631)  Resp: (!) 21 (12/13/17 1631)  BP: (!) 115/58 (12/13/17 1631)  SpO2: 97 % (12/13/17 1631) Vital Signs (24h Range):  Temp:  [97.8 °F (36.6 °C)-98.1 °F (36.7 °C)] 98.1 °F (36.7 °C)  Pulse:  [] 97  Resp:  [19-24] 21  SpO2:  [97 %-100 %] 97 %  BP: ()/(55-67) 115/58     Weight: 75.8 kg (167 lb)  Body mass index is 27.37 kg/m².    Physical Exam  General Appearance: Well-developed well-nourished in no apparent distress  Eyes: anicteric, no conjunctival injection  Neck: supple, non-tender  Respiratory: no accessory muscle use, lungs clear to auscultation  Cardiovascular: mild tachycardia, regular rhythm  GI: normal active bowel sounds, soft, non-tender. RUQ drain with brownish thick output.  Lymphatic- no cervical or groin lymphadenopathy  Musculoskeletal- no clubbing, no cyanosis; no edema  Neurology: speech normal, sensation grossly intact, A&O x3    Significant Labs:  CBC:   Recent Labs  Lab 12/13/17  0937   WBC 12.63   RBC 3.36*   HGB 10.4*   HCT 31.8*   *   MCV 95   MCH 31.0   MCHC 32.7     BMP:   Recent Labs  Lab 12/13/17  0937   *   *   K 4.2      CO2 27   BUN 16   CREATININE 1.1   CALCIUM 9.6     LFTs:   Recent Labs  Lab 12/13/17  0937   ALT 25   AST 27   ALKPHOS 175*   BILITOT 1.4*   PROT 7.2   ALBUMIN 2.6*     Coagulation:   Recent Labs  Lab 12/13/17  1239   LABPROT 10.8    INR 1.0     Microbiology Results (last 7 days)     Procedure Component Value Units Date/Time    Gram stain [[id number]]     Order Status:  No result Specimen:  Abscess     Culture, Anaerobe [[id number]]     Order Status:  No result Specimen:  Abscess from Liver     Aerobic culture [[id number]]     Order Status:  No result Specimen:  Abscess from Liver         Significant Diagnostics:  I have reviewed all pertinent imaging results/findings within the past 24 hours.   CT A/P:    1. Interval drain removal and fluid reaccumulation within the superior hepatic collection.  Stable position of a percutaneous drain in the anterior collection with minimal residual air and fluid.  Previously identified cystic focus on the right branch of the portal vein is smaller on today's study.    2.  Small right pleural effusion.    3.  Calcific atherosclerosis of the aorta and coronary arteries.    4.  Additional findings as above.

## 2017-12-13 NOTE — H&P
History & Physical  Hospital Medicine      SUBJECTIVE:     Chief Complaint/Reason for Admission: tachycardia    History of Present Illness:  Patient is a 77 y.o. 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 Patient has had cholangiocarcinoma at hepatic hilum s/p left lobectomy with resection of middle and left hepatic veins, resection of common bile duct, and anastomosis of right hepatic duct 8/3/2017 by Dr. Quach. He develop liver abscesses and was treated percutaneous drains and long-term IV meropenem. One of the drains draining the superior posterior collection was removed 11.27. His took his last IV meropenem on 12/9. Patient developed intense chills lasting up to 20 minutes yesterday and called Dr. Quach's office. He came in Dr. Quach found his HR in 160s.  CT abd pelvis with contrast showed re-accumulation of superior hepatic fluid collection and anterior fluid collection (drain still in place) minimal. Patient denies abdominal pain, fever, palpitations, chest pain, PND or orthopnea. He has generalized weakness after chills. Patient also admits to cough with clear sputum 3-4 days and mild CHOWDHURY for several days. He denies sore throat, wheezing and has chronic sinus problems. Patient has leaking around his current drain that has been on-going since its first and second placements. He denies that site has drainage, erythema, sweling or pain.     Allergies: Patient has No Known Allergies.     PTA Medications   Medication Sig    alprazolam (XANAX) 0.25 MG tablet Take 0.25 mg by mouth 3 (three) times daily.    atorvastatin (LIPITOR) 80 MG tablet Take 80 mg by mouth once daily.    clopidogrel (PLAVIX) 75 mg tablet Take 1 tablet (75 mg total) by mouth once daily.    escitalopram oxalate (LEXAPRO) 10 MG tablet Take 10 mg by  mouth once daily.    levothyroxine (SYNTHROID) 100 MCG tablet Take 100 mcg by mouth once daily.    ondansetron (ZOFRAN-ODT) 8 MG TbDL Take 1 tablet (8 mg total) by mouth every 6 (six) hours as needed.    pantoprazole (PROTONIX) 40 MG tablet Take 40 mg by mouth once daily.    indomethacin (INDOCIN) 25 MG capsule Take 25 mg by mouth 2 (two) times daily with meals.    meropenem (MERREM) 1 gram injection      Past Medical History: Patient has a past medical history of Cancer; Coronary artery disease; GERD (gastroesophageal reflux disease); Hyperlipidemia; Hypertension; and Hypothyroidism.  Past Surgical History: Patient has a past surgical history that includes Prostate surgery; Coronary artery bypass graft; and Carotid endarterectomy (Bilateral).  Social History: Patient reports that he quit smoking about 4 years ago. His smoking use included Cigarettes. He started smoking about 61 years ago. He has never used smokeless tobacco. He reports that he does not drink alcohol or use drugs.  Family History: family history is not on file.  PCP: Lyla Bryan MD    Review of Systems:  General/Constitutional - see HPI, no night sweats  Skin - no rash  ENT - no rhinorrhea, see HPI  Cardiovascular - see HPI  Respiratory - see HPI  Gastrointestinal - no nausea, no vomiting  Genitourinary - no dysuria, no urinary retention  Musculoskeletal - no arthralgias, no mylagias  Neurologic - no generalized weakness, no dizziness  Psychiatric - no depression, no anxiety  Endocrine - no polyuria, no polydypsia  All other systems reviewed and are negative    OBJECTIVE:     Vital Signs (Most Recent)  Temp: 98.1 °F (36.7 °C) (12/13/17 1206)  Pulse: 99 (12/13/17 1431)  Resp: 19 (12/13/17 1434)  BP: (!) 121/58 (12/13/17 1431)  SpO2: 100 % (12/13/17 1431)    Physical Exam:  General Appearance: Well-developed well-nourished in no apparent distress, Well-groomed in hospital gown.  Eyes: anicteric, no scleral or conjunctival injection, no  discharge  Ears, Nose, Mouth and Throat- Ears symmetric without pits, nose symmetric, MMM, pink, OP clear, no thrush  Neck: supple, non-tender, no goiter  Respiratory: no accessory muscle use, lungs clear to auscultation  Cardiovascular: no thrills, no heave, regular rate and rhythm without murmur  GI: normal active bowel sounds, soft, non-tender, no organomegaly, non-distended, no mass; drain to abdomen non-erythematous, no edema, and no drainage (recent bandage change).   Lymphatic- no cervical or groin lymphadenopathy  Musculoskeletal- no clubbing, no cyanosis; no edema  Skin: Normal temperature, turgor and texture; no rash, no subcutaneous nodules  Neurology: speech normal, sensation grossly intact, A&O x3    Laboratory    Recent Labs  Lab 12/13/17  0937   *   K 4.2      CO2 27   BUN 16   CREATININE 1.1   *   CALCIUM 9.6       Recent Labs  Lab 12/13/17  0937 12/13/17  1239   ALKPHOS 175*  --    ALT 25  --    AST 27  --    ALBUMIN 2.6*  --    PROT 7.2  --    BILITOT 1.4*  --    INR  --  1.0         Recent Labs  Lab 12/13/17  0937   WBC 12.63   HGB 10.4*   HCT 31.8*   *   GRAN 75.5*  9.5*   LYMPH 10.3*  1.3   MONO 12.1  1.5*     CT abdomen and pelvis    Technique: The patient was surveyed from the lung bases through the pelvis after the administration of 75 cc Omni 350 IV contrast as well as oral contrast. The data was reconstructed for coronal, sagittal, and axial images.    Clinical indication: Abscess of liver     Comparison: CT 11/27/17, 11/3/17    Findings:    There is bandlike atelectasis of the right lung base.  No consolidation or mass identified.  Small right pleural effusion.  There are post surgical changes of CABG noting dense coronary atherosclerosis.  The visualized portions of the heart appear normal.  There is calcification of the aortic valve.  No pericardial effusion    There are postsurgical changes of left hepatic lobe resection and Eron-en-Y hepaticojejunostomy.  The gallbladder is surgically absent.  There has been interval removal of a percutaneous drain within the most superior collection at the dome of the liver.  There has been reaccumulation of fluid within this collection which now measures 5.1 x 3.4 x 3.3 cm.  Another percutaneous drain within the anterior collection appears in stable position with minimal residual air and fluid within the collection.  A previously identified cystic focus along the right branch of the portal vein is smaller and barely perceptible on today's exam.  A small amount of pneumobilia is again identified in the caudate lobe.  No intrahepatic or extrahepatic biliary ductal dilatation is identified. The spleen is unremarkable.      The stomach, pancreas, and adrenal glands are unremarkable.    The kidneys are normal in size and location.  There is a stable subcentimeter hypodensity within the lower pole of the right kidney. No hydronephrosis is seen.  The ureters appear normal in course and caliber.     The visualized loops of small and large bowel show no evidence of obstruction or inflammation. No ascites, free fluid, or intraperitoneal free air is identified. There is no evidence of lymph node enlargement in the abdomen or pelvis.    The abdominal aorta is normal in course and caliber with extensive atherosclerotic calcifications.    The osseous structures demonstrate degenerative changes.  There is partially visualized spondylolisthesis bilaterally at the L5 level. The extraperitoneal soft tissues are unremarkable.   Impression         1. Interval drain removal and fluid reaccumulation within the superior hepatic collection.  Stable position of a percutaneous drain in the anterior collection with minimal residual air and fluid.  Previously identified cystic focus on the right branch of the portal vein is smaller on today's study.    2.  Small right pleural effusion.    3.  Calcific atherosclerosis of the aorta and coronary arteries.    4.   Additional findings as above.  ______________________________________     Electronically signed by resident: MARIELA NICHOLSON  Date: 12/13/17  Time: 12:11           EKG shows sinus tachycardia    ASSESSMENT/PLAN:     Sepsis due to hepatic abscess  Lactic acidosis resolved in ER with IVF fluid resuscitation. Given a dose of meropenem in ER. Continue meropenem 1 g IV q8h and NS at 100 mL/h. ID consulted. Surgical oncology consult recommend IR percutaneous drainage of the recurring superior hepatic fluid collection.     CAD s/p CABG  Recent STEMI medically managed  Carotid disease (s/p bilateral CEA)  Continue clopidogrel 75 mg daily, ASA 81 mg daily, atorvastatin 80 mg daily    hypothyroidism - levothyroxine 100 mcg daily    IGG-4 Sclerosing cholangitis  Patient recently finished a long prednisone taper    Anemia of chronic disease     Essential HTN - not currently on medications.     Depression - continue escitalopram 10 mg daily    TIM - protonix 40 mg daily

## 2017-12-13 NOTE — PROGRESS NOTES
Infectious Diseases Clinic Note    Subjective:       Patient ID: Robby Soriano is a 77 y.o. male.    Chief Complaint: No chief complaint on file.    HPI    Developed chills last night, darkening drainage from biliary drain and also now with new onset drainage around drain insertion site, no change in amount of drainage in actual drain, also has been weak over past few days, also with new nasal congestion, and mild cough with clear sputum production, mild CHOWDHURY, checked temp last night and was 99 also was having lumbar back pain that is now resolved - has been off of abx x       Past Medical History:   Diagnosis Date    Cancer     Prostate/s/p prostatectomy    Coronary artery disease     GERD (gastroesophageal reflux disease)     Hyperlipidemia     Hypertension     Hypothyroidism        Social History     Social History    Marital status:      Spouse name: N/A    Number of children: N/A    Years of education: N/A     Occupational History    Not on file.     Social History Main Topics    Smoking status: Former Smoker     Types: Cigarettes     Start date: 1/1/1956     Quit date: 6/18/2013    Smokeless tobacco: Never Used    Alcohol use No    Drug use: No    Sexual activity: Not on file     Other Topics Concern    Not on file     Social History Narrative    No narrative on file         Current Outpatient Prescriptions:     alprazolam (XANAX) 0.25 MG tablet, Take 0.25 mg by mouth 3 (three) times daily., Disp: , Rfl:     atorvastatin (LIPITOR) 80 MG tablet, Take 80 mg by mouth once daily., Disp: , Rfl:     clopidogrel (PLAVIX) 75 mg tablet, Take 1 tablet (75 mg total) by mouth once daily., Disp: 30 tablet, Rfl: 1    escitalopram oxalate (LEXAPRO) 10 MG tablet, Take 10 mg by mouth once daily., Disp: , Rfl:     levothyroxine (SYNTHROID) 100 MCG tablet, Take 100 mcg by mouth once daily., Disp: , Rfl:     meropenem (MERREM) 1 gram injection, , Disp: , Rfl:     pantoprazole (PROTONIX) 40 MG  tablet, Take 40 mg by mouth once daily., Disp: , Rfl:     predniSONE (DELTASONE) 5 MG tablet, , Disp: , Rfl:     indomethacin (INDOCIN) 25 MG capsule, Take 25 mg by mouth 2 (two) times daily with meals., Disp: , Rfl:     ondansetron (ZOFRAN-ODT) 8 MG TbDL, Take 1 tablet (8 mg total) by mouth every 6 (six) hours as needed., Disp: 30 tablet, Rfl: 3    oxycodone (ROXICODONE) 5 MG immediate release tablet, Take 1 tablet (5 mg total) by mouth every 4 (four) hours as needed for Pain., Disp: 31 tablet, Rfl: 0    Review of Systems        Objective:      Vitals:    12/13/17 0842   BP: 121/63   Pulse: (!) 56   Temp: 97.8 °F (36.6 °C)     Physical Exam        Assessment/Plan:       No diagnosis found.    76 y/o M h/o CAD s/p CABG, HTN, HLD, prostate CA s/p prostatectomy 2005, found to have hepatic mass/abscess (segment IV) near hilum and stricture of upper bile duct on ERCP s/p L hepatectomy and resection of extrahepatic biliary tree, Eron-en-Y, 8/3/17 with final path demonstrating IGG-4 associated sclerosing cholangitis started on prednisone 40 mg daily and OR cultures of hepatic abscess growing clostridium perfringens, Ecoli, kleb pneumo, E faecalis given 5 days of IV abx post operatively, seen in clinic for f/u 10/11 c/o jaundice and fatigue and fevers found to have 7.2 cm gas collection in hepatic dome s/p IR drainage x 2 which grew Ecoli, ESBL kleb pneumo, E faecalis, C. perfringens and was sent home with meropenem presented to ID clinic 11/2 with worsening abdominal pain and was readmitted with drains not functioning and were repositioned at that time (11/3) and abx continued with 11/27 repeat CT with near complete resolution of both previously visualized hepatic collections ssen by surgery 11/27 and lateral IR drain removed and leaving in other drain for another 3 weeks extended IV abx 7 more days, given current output from drain was bilious and collections on CT resolved but 2 days after completion of IV abx pt  developed leakage around drain insertion site and shaking chills and called by my office and dr vela office to come in for urgent evaluation.  Pt has CT a/p pending and f/u with Dr. Quach today.  Depending on imaging will consider restarting antibiotics, case discussed with Dr. Vela team and family at length  - also will check labs and bcx today

## 2017-12-14 PROBLEM — I48.92 PAROXYSMAL ATRIAL FLUTTER: Status: ACTIVE | Noted: 2017-12-14

## 2017-12-14 LAB
DIASTOLIC DYSFUNCTION: YES
ESTIMATED AVG GLUCOSE: 88 MG/DL
ESTIMATED PA SYSTOLIC PRESSURE: 21.34
GRAM STN SPEC: NORMAL
HBA1C MFR BLD HPLC: 4.7 %
LACTATE SERPL-SCNC: 0.5 MMOL/L
RETIRED EF AND QEF - SEE NOTES: 35 (ref 55–65)
TROPONIN I SERPL DL<=0.01 NG/ML-MCNC: 0.03 NG/ML

## 2017-12-14 PROCEDURE — 99232 SBSQ HOSP IP/OBS MODERATE 35: CPT | Mod: ,,, | Performed by: INTERNAL MEDICINE

## 2017-12-14 PROCEDURE — 93010 ELECTROCARDIOGRAM REPORT: CPT | Mod: ,,, | Performed by: INTERNAL MEDICINE

## 2017-12-14 PROCEDURE — 11000001 HC ACUTE MED/SURG PRIVATE ROOM

## 2017-12-14 PROCEDURE — 0W9G30Z DRAINAGE OF PERITONEAL CAVITY WITH DRAINAGE DEVICE, PERCUTANEOUS APPROACH: ICD-10-PCS | Performed by: RADIOLOGY

## 2017-12-14 PROCEDURE — 87075 CULTR BACTERIA EXCEPT BLOOD: CPT

## 2017-12-14 PROCEDURE — 83036 HEMOGLOBIN GLYCOSYLATED A1C: CPT

## 2017-12-14 PROCEDURE — 87205 SMEAR GRAM STAIN: CPT | Mod: 59

## 2017-12-14 PROCEDURE — 25000003 PHARM REV CODE 250: Performed by: PHYSICIAN ASSISTANT

## 2017-12-14 PROCEDURE — 87205 SMEAR GRAM STAIN: CPT

## 2017-12-14 PROCEDURE — 99222 1ST HOSP IP/OBS MODERATE 55: CPT | Mod: ,,, | Performed by: INTERNAL MEDICINE

## 2017-12-14 PROCEDURE — 93306 TTE W/DOPPLER COMPLETE: CPT | Mod: 26,,, | Performed by: INTERNAL MEDICINE

## 2017-12-14 PROCEDURE — 99222 1ST HOSP IP/OBS MODERATE 55: CPT | Mod: GC,,, | Performed by: INTERNAL MEDICINE

## 2017-12-14 PROCEDURE — 87075 CULTR BACTERIA EXCEPT BLOOD: CPT | Mod: 59

## 2017-12-14 PROCEDURE — 84484 ASSAY OF TROPONIN QUANT: CPT

## 2017-12-14 PROCEDURE — 36415 COLL VENOUS BLD VENIPUNCTURE: CPT

## 2017-12-14 PROCEDURE — 87070 CULTURE OTHR SPECIMN AEROBIC: CPT | Mod: 59

## 2017-12-14 PROCEDURE — 97165 OT EVAL LOW COMPLEX 30 MIN: CPT

## 2017-12-14 PROCEDURE — 25000003 PHARM REV CODE 250: Performed by: INTERNAL MEDICINE

## 2017-12-14 PROCEDURE — 93005 ELECTROCARDIOGRAM TRACING: CPT

## 2017-12-14 PROCEDURE — 83605 ASSAY OF LACTIC ACID: CPT

## 2017-12-14 PROCEDURE — C8929 TTE W OR WO FOL WCON,DOPPLER: HCPCS

## 2017-12-14 PROCEDURE — 63600175 PHARM REV CODE 636 W HCPCS: Performed by: RADIOLOGY

## 2017-12-14 PROCEDURE — 87186 SC STD MICRODIL/AGAR DIL: CPT | Mod: 59

## 2017-12-14 PROCEDURE — 87070 CULTURE OTHR SPECIMN AEROBIC: CPT

## 2017-12-14 PROCEDURE — 63600175 PHARM REV CODE 636 W HCPCS: Performed by: INTERNAL MEDICINE

## 2017-12-14 PROCEDURE — 87076 CULTURE ANAEROBE IDENT EACH: CPT

## 2017-12-14 PROCEDURE — 87077 CULTURE AEROBIC IDENTIFY: CPT

## 2017-12-14 RX ORDER — METOPROLOL SUCCINATE 50 MG/1
50 TABLET, EXTENDED RELEASE ORAL DAILY
Status: DISCONTINUED | OUTPATIENT
Start: 2017-12-15 | End: 2017-12-18 | Stop reason: HOSPADM

## 2017-12-14 RX ORDER — METOPROLOL TARTRATE 1 MG/ML
2.5 INJECTION, SOLUTION INTRAVENOUS EVERY 5 MIN PRN
Status: DISCONTINUED | OUTPATIENT
Start: 2017-12-14 | End: 2017-12-18 | Stop reason: HOSPADM

## 2017-12-14 RX ORDER — FENTANYL CITRATE 50 UG/ML
INJECTION, SOLUTION INTRAMUSCULAR; INTRAVENOUS CODE/TRAUMA/SEDATION MEDICATION
Status: COMPLETED | OUTPATIENT
Start: 2017-12-14 | End: 2017-12-14

## 2017-12-14 RX ORDER — MIDAZOLAM HYDROCHLORIDE 1 MG/ML
INJECTION INTRAMUSCULAR; INTRAVENOUS CODE/TRAUMA/SEDATION MEDICATION
Status: COMPLETED | OUTPATIENT
Start: 2017-12-14 | End: 2017-12-14

## 2017-12-14 RX ADMIN — ENOXAPARIN SODIUM 40 MG: 100 INJECTION SUBCUTANEOUS at 05:12

## 2017-12-14 RX ADMIN — ATORVASTATIN CALCIUM 80 MG: 20 TABLET, FILM COATED ORAL at 11:12

## 2017-12-14 RX ADMIN — MIDAZOLAM HYDROCHLORIDE 0.5 MG: 1 INJECTION, SOLUTION INTRAMUSCULAR; INTRAVENOUS at 09:12

## 2017-12-14 RX ADMIN — LEVOTHYROXINE SODIUM 100 MCG: 100 TABLET ORAL at 05:12

## 2017-12-14 RX ADMIN — SODIUM CHLORIDE 500 ML: 0.9 INJECTION, SOLUTION INTRAVENOUS at 09:12

## 2017-12-14 RX ADMIN — MIDAZOLAM HYDROCHLORIDE 1 MG: 1 INJECTION, SOLUTION INTRAMUSCULAR; INTRAVENOUS at 09:12

## 2017-12-14 RX ADMIN — ESCITALOPRAM OXALATE 10 MG: 10 TABLET ORAL at 11:12

## 2017-12-14 RX ADMIN — FENTANYL CITRATE 25 MCG: 50 INJECTION, SOLUTION INTRAMUSCULAR; INTRAVENOUS at 09:12

## 2017-12-14 RX ADMIN — MEROPENEM AND SODIUM CHLORIDE 1 G: 1 INJECTION, SOLUTION INTRAVENOUS at 09:12

## 2017-12-14 RX ADMIN — MEROPENEM AND SODIUM CHLORIDE 1 G: 1 INJECTION, SOLUTION INTRAVENOUS at 05:12

## 2017-12-14 RX ADMIN — MEROPENEM AND SODIUM CHLORIDE 1 G: 1 INJECTION, SOLUTION INTRAVENOUS at 12:12

## 2017-12-14 RX ADMIN — PANTOPRAZOLE SODIUM 40 MG: 40 TABLET, DELAYED RELEASE ORAL at 11:12

## 2017-12-14 RX ADMIN — FENTANYL CITRATE 50 MCG: 50 INJECTION, SOLUTION INTRAMUSCULAR; INTRAVENOUS at 09:12

## 2017-12-14 NOTE — PLAN OF CARE
Problem: Fall Risk (Adult)  Intervention: Patient Rounds  Fall precautions maintained with no injuries noted this shift.       Problem: Patient Care Overview  Goal: Plan of Care Review  Outcome: Ongoing (interventions implemented as appropriate)  New KAIDEN drain placed near old KAIDEN drain. Site free from redness, warmth, pain. Patient tolerating regular diet without difficulty. No c/o pain this shift. Skin intact. EKG done with NSR noted. Troponin done and slightly elevated at 0.033. 2D echo done with EF = 30 - 40%. Cardiology consult done for aflutter on admit.    Problem: Infection, Risk/Actual (Adult)  Intervention: Manage Suspected/Actual Infection  Patient continues to receive Meropenem 1 gram IVPB therapy every 8 hours; temp max= 99.0 this shift.

## 2017-12-14 NOTE — ASSESSMENT & PLAN NOTE
77 y.o. 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 Now with chills, tachycardia, and CT evidence of recurrent collection. Currently admitted to hospital medicine.  -Continue antibiotics per ID recommendations given that they have been managing him outpatient and he has previously grown multiple organisms  -Lactic acidosis - fluid resuscitation and serial lactate monitoring  -IR for drain placement this morning, may have diet after; culture  -Blood cultures  -Will follow with you

## 2017-12-14 NOTE — PLAN OF CARE
Problem: Occupational Therapy Goal  Goal: Occupational Therapy Goal  Goals to be met by: 12/21/2017     Patient will increase functional independence with ADLs by performing:    UE Dressing with [place].  LE Dressing with [place] utilizing adaptive equipment as necessary.  Grooming while standing with Supervision.  Toileting from toilet with Supervision for hygiene and clothing management.   Toilet transfer to toilet with Supervision.      OT evaluation complete and POC established.      ELLEN Pham  12/14/2017

## 2017-12-14 NOTE — PROGRESS NOTES
[facility]  General Surgery  Progress Note    Subjective:     History of Present Illness:  Robby Soriano is a 77 y.o. male well known to the surgical oncology service for a history of IGG-4 associated sclerosing cholangitis. He is s/p left hepatectomy and extrahepatic bile duct resection with eron-en-y reconstruction in 8/2017, complicated by hepatic abscesses requiring IR drainage.    He was seen in clinic today in follow up and reported chills last night, darkening drainage from biliary drain and also now with new onset drainage around drain insertion site, no change in amount of drainage in actual drain, also has been weak over past few days, also with new nasal congestion, and mild cough with clear sputum production and mild CHOWDHURY. He has been off of Prednisone since shortly after his last visit.   Today, he says he feels well. No c/o pain. He was however noted to have significant tachycardia and given his cardiac history vs concern for on going sepsis, he was referred to the ER for further evaluation.      Prior history as follows:     Of note he has a history of CAD s/p CABG, HTN, HLD, prostate CA s/p prostatectomy 2005 and was found to have hepatic mass/abscess (segment IV) near hilum with stricture of upper bile duct on ERCP s/p L hepatectomy and resection of extrahepatic biliary tree , Eron-en-Y on 8/3/17 with final pathology demonstrating IGG-4 associated sclerosing cholangitis (on prednisone 40 mg daily). OR cultures (8/3/17) of hepatic abscess showed Clostridium perfringens, E coli, K pneumoniae, E faecalis given 5 days of IV antibiotics post operatively.   He was then seen in our clinic for follow up 10/11/17 due to jaundice and fatigue with subjective fevers and was admitted that day with A/P CT scan 10/11/17 showed: 7.2 cm gas collection in the hepatic dome containing scattered debris with an adjacent gas and fluid collection along the resection bed measuring 2.7 cm in greatest  diameter. Now s/p IR drains x2 10/12/17 with cultures positive for E coli, K pneumoniae ESBL, E faecalis and C perfringens; complicated with bacteremia 10/11/17 with E coli and C perfringens on B/C. Patient was then discharged with HH on 10/18 and told to complete a 2 weeks course of Meropenem.    Post-Op Info:  * No surgery found *         Interval History: No acute events overnight, afebrile, vital signs improved from when patient was seen in clinic. NPO since admission given plan for IR for hepatic abscess drain. Denies pain, nausea or vomiting.     Medications:  Continuous Infusions:   sodium chloride 0.9% 100 mL/hr at 12/13/17 2203     Scheduled Meds:   aspirin  81 mg Oral Daily    atorvastatin  80 mg Oral Daily    clopidogrel  75 mg Oral Daily    enoxaparin  40 mg Subcutaneous Daily    escitalopram oxalate  10 mg Oral Daily    levothyroxine  100 mcg Oral Daily    meropenem (MERREM) IVPB  1 g Intravenous Q8H    pantoprazole  40 mg Oral Daily     PRN Meds:acetaminophen, ALPRAZolam, ondansetron, sodium chloride 0.9%     Review of patient's allergies indicates:  No Known Allergies  Objective:     Vital Signs (Most Recent):  Temp: 98.2 °F (36.8 °C) (12/14/17 0743)  Pulse: (!) 119 (12/14/17 0743)  Resp: 20 (12/14/17 0743)  BP: 123/70 (12/14/17 0743)  SpO2: 96 % (12/14/17 0743) Vital Signs (24h Range):  Temp:  [97.4 °F (36.3 °C)-98.3 °F (36.8 °C)] 98.2 °F (36.8 °C)  Pulse:  [] 119  Resp:  [18-24] 20  SpO2:  [95 %-100 %] 96 %  BP: ()/(55-70) 123/70     Weight: 76.7 kg (169 lb 1.5 oz)  Body mass index is 27.71 kg/m².    Intake/Output - Last 3 Shifts       12/12 0700 - 12/13 0659 12/13 0700 - 12/14 0659 12/14 0700 - 12/15 0659    P.O.   60    I.V. (mL/kg)  2274 (29.6)     IV Piggyback  1324     Total Intake(mL/kg)  3598 (46.9) 60 (0.8)    Urine (mL/kg/hr)  550     Drains  100     Total Output   650      Net   +2948 +60                 Physical Exam   Constitutional: He is oriented to person, place,  and time. He appears well-developed and well-nourished.   HENT:   Head: Normocephalic and atraumatic.   Nose: Nose normal.   Eyes: Conjunctivae and EOM are normal. Pupils are equal, round, and reactive to light. No scleral icterus.   Neck: Normal range of motion. No thyromegaly present.   Cardiovascular: Normal rate and regular rhythm.  Exam reveals no gallop and no friction rub.    No murmur heard.  Pulmonary/Chest: Effort normal and breath sounds normal.   Abdominal: Soft. Bowel sounds are normal. He exhibits no distension. There is no tenderness.   Right sided biliary drain in place with bilious output   Musculoskeletal: Normal range of motion.   Lymphadenopathy:     He has no cervical adenopathy.   Neurological: He is alert and oriented to person, place, and time.   Skin: Skin is warm and dry. No rash noted.       Significant Labs:  CBC:   Recent Labs  Lab 12/13/17  0937   WBC 12.63   RBC 3.36*   HGB 10.4*   HCT 31.8*   *   MCV 95   MCH 31.0   MCHC 32.7     CMP:   Recent Labs  Lab 12/13/17  0937   *   CALCIUM 9.6   ALBUMIN 2.6*   PROT 7.2   *   K 4.2   CO2 27      BUN 16   CREATININE 1.1   ALKPHOS 175*   ALT 25   AST 27   BILITOT 1.4*       Significant Diagnostics:  I have reviewed all pertinent imaging results/findings within the past 24 hours.    Assessment/Plan:     Sepsis    77 y.o. 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 Now with chills, tachycardia, and CT evidence of recurrent collection. Currently admitted to hospital medicine.  -Continue antibiotics per ID recommendations given that they have been managing him outpatient and he has previously grown multiple organisms  -Lactic acidosis - fluid resuscitation and serial lactate monitoring  -IR for drain placement this morning, may have diet after; culture  -Blood cultures  -Will follow with you             Sanna Harrison MD  General Surgery  [facility]

## 2017-12-14 NOTE — SUBJECTIVE & OBJECTIVE
Interval History: No acute events overnight, afebrile, vital signs improved from when patient was seen in clinic. NPO since admission given plan for IR for hepatic abscess drain. Denies pain, nausea or vomiting.     Medications:  Continuous Infusions:   sodium chloride 0.9% 100 mL/hr at 12/13/17 2203     Scheduled Meds:   aspirin  81 mg Oral Daily    atorvastatin  80 mg Oral Daily    clopidogrel  75 mg Oral Daily    enoxaparin  40 mg Subcutaneous Daily    escitalopram oxalate  10 mg Oral Daily    levothyroxine  100 mcg Oral Daily    meropenem (MERREM) IVPB  1 g Intravenous Q8H    pantoprazole  40 mg Oral Daily     PRN Meds:acetaminophen, ALPRAZolam, ondansetron, sodium chloride 0.9%     Review of patient's allergies indicates:  No Known Allergies  Objective:     Vital Signs (Most Recent):  Temp: 98.2 °F (36.8 °C) (12/14/17 0743)  Pulse: (!) 119 (12/14/17 0743)  Resp: 20 (12/14/17 0743)  BP: 123/70 (12/14/17 0743)  SpO2: 96 % (12/14/17 0743) Vital Signs (24h Range):  Temp:  [97.4 °F (36.3 °C)-98.3 °F (36.8 °C)] 98.2 °F (36.8 °C)  Pulse:  [] 119  Resp:  [18-24] 20  SpO2:  [95 %-100 %] 96 %  BP: ()/(55-70) 123/70     Weight: 76.7 kg (169 lb 1.5 oz)  Body mass index is 27.71 kg/m².    Intake/Output - Last 3 Shifts       12/12 0700 - 12/13 0659 12/13 0700 - 12/14 0659 12/14 0700 - 12/15 0659    P.O.   60    I.V. (mL/kg)  2274 (29.6)     IV Piggyback  1324     Total Intake(mL/kg)  3598 (46.9) 60 (0.8)    Urine (mL/kg/hr)  550     Drains  100     Total Output   650      Net   +2948 +60                 Physical Exam   Constitutional: He is oriented to person, place, and time. He appears well-developed and well-nourished.   HENT:   Head: Normocephalic and atraumatic.   Nose: Nose normal.   Eyes: Conjunctivae and EOM are normal. Pupils are equal, round, and reactive to light. No scleral icterus.   Neck: Normal range of motion. No thyromegaly present.   Cardiovascular: Normal rate and regular rhythm.  Exam  reveals no gallop and no friction rub.    No murmur heard.  Pulmonary/Chest: Effort normal and breath sounds normal.   Abdominal: Soft. Bowel sounds are normal. He exhibits no distension. There is no tenderness.   Right sided biliary drain in place with bilious output   Musculoskeletal: Normal range of motion.   Lymphadenopathy:     He has no cervical adenopathy.   Neurological: He is alert and oriented to person, place, and time.   Skin: Skin is warm and dry. No rash noted.       Significant Labs:  CBC:   Recent Labs  Lab 12/13/17  0937   WBC 12.63   RBC 3.36*   HGB 10.4*   HCT 31.8*   *   MCV 95   MCH 31.0   MCHC 32.7     CMP:   Recent Labs  Lab 12/13/17  0937   *   CALCIUM 9.6   ALBUMIN 2.6*   PROT 7.2   *   K 4.2   CO2 27      BUN 16   CREATININE 1.1   ALKPHOS 175*   ALT 25   AST 27   BILITOT 1.4*       Significant Diagnostics:  I have reviewed all pertinent imaging results/findings within the past 24 hours.

## 2017-12-14 NOTE — SUBJECTIVE & OBJECTIVE
Review of Systems   Constitutional: Positive for chills and fatigue. Negative for unexpected weight change.   HENT: Negative for sore throat.    Eyes: Negative for visual disturbance.   Respiratory: Positive for cough and shortness of breath. Negative for apnea, chest tightness and wheezing.    Gastrointestinal: Negative for abdominal pain and anal bleeding.   Genitourinary: Negative for dysuria.   Musculoskeletal: Negative for arthralgias, back pain and myalgias.   Skin: Positive for wound. Negative for color change and rash.   Allergic/Immunologic: Negative for immunocompromised state.   Neurological: Negative for dizziness.   Psychiatric/Behavioral: Negative for agitation.   All other systems reviewed and are negative.    Objective:     Vital Signs (Most Recent):  Temp: 98.2 °F (36.8 °C) (12/14/17 0743)  Pulse: (!) 119 (12/14/17 0743)  Resp: 20 (12/14/17 0743)  BP: 123/70 (12/14/17 0743)  SpO2: 96 % (12/14/17 0743) Vital Signs (24h Range):  Temp:  [97.4 °F (36.3 °C)-98.3 °F (36.8 °C)] 98.2 °F (36.8 °C)  Pulse:  [] 119  Resp:  [18-24] 20  SpO2:  [95 %-100 %] 96 %  BP: ()/(55-70) 123/70     Weight: 76.7 kg (169 lb 1.5 oz)  Body mass index is 27.71 kg/m².    Estimated Creatinine Clearance: 54.3 mL/min (based on SCr of 1.1 mg/dL).    Physical Exam   Constitutional: He is oriented to person, place, and time. He appears well-developed and well-nourished.   HENT:   Head: Normocephalic and atraumatic.   Nose: Nose normal.   Eyes: Conjunctivae and EOM are normal. Pupils are equal, round, and reactive to light. No scleral icterus.   Neck: Normal range of motion. No JVD present. No thyromegaly present.   Cardiovascular: Normal rate and regular rhythm.  Exam reveals no gallop and no friction rub.    No murmur heard.  Pulmonary/Chest: Effort normal and breath sounds normal. No respiratory distress. He has no wheezes.   Abdominal: Soft. Bowel sounds are normal. He exhibits no distension and no mass. There is no  tenderness. There is no guarding.   Right sided drain w/ output noted    Musculoskeletal: Normal range of motion.   Lymphadenopathy:     He has no cervical adenopathy.   Neurological: He is alert and oriented to person, place, and time. No cranial nerve deficit.   Skin: Skin is warm and dry. Capillary refill takes less than 2 seconds. No rash noted.   Psychiatric: He has a normal mood and affect.       Significant Labs:   CBC:   Recent Labs  Lab 12/13/17  0937   WBC 12.63   HGB 10.4*   HCT 31.8*   *     CMP:   Recent Labs  Lab 12/13/17  0937   *   K 4.2      CO2 27   *   BUN 16   CREATININE 1.1   CALCIUM 9.6   PROT 7.2   ALBUMIN 2.6*   BILITOT 1.4*   ALKPHOS 175*   AST 27   ALT 25   ANIONGAP 8   EGFRNONAA >60.0     Coagulation:   Recent Labs  Lab 12/13/17  1239   INR 1.0     Urine Studies:   Recent Labs  Lab 10/12/17  0500   COLORU [place]   APPEARANCEUA Cloudy*   PHUR 5.0   SPECGRAV >=1.030*   PROTEINUA 1+*   GLUCUA Negative   KETONESU Negative   BILIRUBINUA 1+*   OCCULTUA 3+*   NITRITE Negative   UROBILINOGEN 4.0   LEUKOCYTESUR Negative   RBCUA 2   WBCUA 0   BACTERIA None   SQUAMEPITHEL 1   HYALINECASTS 0     All pertinent labs within the past 24 hours have been reviewed.    Significant Imaging: CT: I have reviewed all pertinent results/findings within the past 24 hours:  11/4:  1. Near complete resolution of both previously visualized hepatic fluid collections with percutaneous drains in place.  Interval decrease in size of fluid collection within the right anterolateral abdominal wall.    2.  Postoperative changes of left hepatic lobe resection and hepaticojejunostomy.    3.  Dense aortic and coronary atherosclerosis.

## 2017-12-14 NOTE — CONSULTS
Consult to ID received; see full consult note to follow.     Thuan Cassidy MD  PGY-2 House Staff, Internal Medicine

## 2017-12-14 NOTE — NURSING
Pt stated that his KAIDEN drain if flushed at home with NS. No orders to flush drain at this time. Spoke to Dallas Ervin, NP with [facility] about flushing drain tonight, ordered to hold off on flushing it tonight, will speak with Dr. Garcia in the morning about placing orders to flush drain.

## 2017-12-14 NOTE — CONSULTS
[facility]  Cardiology  Consult Note    Patient Name: Robby Soriano  MRN: [id number]  Admission Date: 12/13/2017  Hospital Length of Stay: 1 days  Code Status: Full Code   Attending Provider: Luis Bar MD   Consulting Provider: Ayesha Barraza MD  Primary Care Physician: Lyla Bryan MD  Principal Problem:Liver abscess    Patient information was obtained from patient, spouse/SO, past medical records and ER records.     Inpatient consult to Cardiology  Consult performed by: NATE MERCADO  Consult ordered by: LUIS BAR  Reason for consult: A flutter        Subjective:     Chief Complaint:  Atrial flutter     HPI:   76yo M with CAD s/p CABG, recent NSTEMI ~2 months ago, IgG4 sclerosing cholangitis, hypothyroidism and anxiety. Admitted from Surg Onc clinic with tachycardia (HR 160s). He is s/p left hepatectomy and extrahepatic bile duct resection with marci-en-y reconstruction in 8/2017 for IgG4 sclerosing cholangitis, complicated by hepatic abscesses requiring IR drainage and extended IV antibiotics. Antibiotics stopped on Saturday, started to experience chills on Tuesday with darker drainage from biliary drain, as well as feeling generally weaker. Saw Dr. Quach in clinic on yesterday and sent to ED with HR of 164. Patient denies any symptoms- no chest pain, no neck/jaw pain, no SOB, no palpitations, no dizziness/light-headedness.     In ED was found to be in A. Flutter with RVR to 130s, and have lactate of 3.8, was given 2L NaCl which resolved tachycardia and lactate. Admitted to medicine and new drain placed by IR today. Cardiology consulted for A. Flutter on admission. Today he is in NSR. No chest/neck/jaw pain, no SOB, no palpitations. No orthopnea/PND. No leg swelling. No further fever or chills. He is s/p CABG ~4 years ago, and had NSTEMI in October 2017 that was managed medically as patient and his wife preferred to defer LHC given his liver abscess. EF at that time was 25% from  60% 2 days prior, per patient he had another echo with his Cardiologist in [place] that showed his EF had normalized. After NSTEMI, he was discharged on ACEI and BB, however they were stopped by PCP for ?hypotension. His aspirin was also stopped recently for nosebleeds. Prior to this admission his only cardiac meds were clopidogrel and atorvastatin.     Past Medical History:   Diagnosis Date    Cancer     Prostate/s/p prostatectomy    Coronary artery disease     GERD (gastroesophageal reflux disease)     Hyperlipidemia     Hypertension     Hypothyroidism        Past Surgical History:   Procedure Laterality Date    CAROTID ENDARTERECTOMY Bilateral     CORONARY ARTERY BYPASS GRAFT      PROSTATE SURGERY         Review of patient's allergies indicates:  No Known Allergies    No current facility-administered medications on file prior to encounter.      Current Outpatient Prescriptions on File Prior to Encounter   Medication Sig    alprazolam (XANAX) 0.25 MG tablet Take 0.25 mg by mouth 3 (three) times daily.    atorvastatin (LIPITOR) 80 MG tablet Take 80 mg by mouth once daily.    clopidogrel (PLAVIX) 75 mg tablet Take 1 tablet (75 mg total) by mouth once daily.    escitalopram oxalate (LEXAPRO) 10 MG tablet Take 10 mg by mouth once daily.    levothyroxine (SYNTHROID) 100 MCG tablet Take 100 mcg by mouth once daily.    ondansetron (ZOFRAN-ODT) 8 MG TbDL Take 1 tablet (8 mg total) by mouth every 6 (six) hours as needed.    pantoprazole (PROTONIX) 40 MG tablet Take 40 mg by mouth once daily.    indomethacin (INDOCIN) 25 MG capsule Take 25 mg by mouth 2 (two) times daily with meals.    meropenem (MERREM) 1 gram injection      Family History     None        Social History Main Topics    Smoking status: Former Smoker     Types: Cigarettes     Start date: 1/1/1956     Quit date: 6/18/2013    Smokeless tobacco: Never Used    Alcohol use No    Drug use: No    Sexual activity: Not on file     Review of  Systems   Constitution: Positive for chills and malaise/fatigue. Negative for fever.   Eyes: Negative for blurred vision and double vision.   Cardiovascular: Negative for chest pain, claudication, irregular heartbeat, near-syncope, orthopnea, palpitations, paroxysmal nocturnal dyspnea and syncope.   Respiratory: Negative for cough and shortness of breath.    Gastrointestinal: Negative for nausea and vomiting.   Neurological: Positive for disturbances in coordination. Negative for headaches and light-headedness.     Objective:     Vital Signs (Most Recent):  Temp: 98 °F (36.7 °C) (12/14/17 1130)  Pulse: 99 (12/14/17 1130)  Resp: 18 (12/14/17 1130)  BP: 135/63 (12/14/17 1130)  SpO2: 95 % (12/14/17 1130) Vital Signs (24h Range):  Temp:  [97.4 °F (36.3 °C)-98.3 °F (36.8 °C)] 98 °F (36.7 °C)  Pulse:  [] 99  Resp:  [12-23] 18  SpO2:  [95 %-100 %] 95 %  BP: (102-135)/(54-70) 135/63     Weight: 76.7 kg (169 lb 1.5 oz)  Body mass index is 27.71 kg/m².    SpO2: 95 %  O2 Device (Oxygen Therapy): room air      Intake/Output Summary (Last 24 hours) at 12/14/17 1509  Last data filed at 12/14/17 1426   Gross per 24 hour   Intake              620 ml   Output             1041 ml   Net             -421 ml       Lines/Drains/Airways     Peripherally Inserted Central Catheter Line                 PICC Double Lumen 10/17/17 1030 right basilic 58 days          Drain                 Closed/Suction Drain 10/12/17 1711 Right;Anterior Abdomen Bulb 8 Fr. 62 days         Closed/Suction Drain 12/14/17 1007 Right Abdomen Other (Comment) 8 Fr. less than 1 day          Peripheral Intravenous Line                 Peripheral IV - Single Lumen 12/13/17 1337 Left Antecubital 1 day                Physical Exam   Constitutional: He is oriented to person, place, and time. No distress.   Neck: JVD present.   Cardiovascular: Normal rate, normal heart sounds and intact distal pulses.  Exam reveals no gallop and no friction rub.    No murmur  heard.  Pulmonary/Chest: Effort normal. He has wheezes. He has no rales.   Abdominal: Soft. He exhibits no distension. There is no tenderness.   Musculoskeletal: He exhibits no edema.   Neurological: He is alert and oriented to person, place, and time.   Skin: Skin is warm and dry. He is not diaphoretic.   Nursing note and vitals reviewed.      Significant Labs:   CMP   Recent Labs  Lab 12/13/17  0937   *   K 4.2      CO2 27   *   BUN 16   CREATININE 1.1   CALCIUM 9.6   PROT 7.2   ALBUMIN 2.6*   BILITOT 1.4*   ALKPHOS 175*   AST 27   ALT 25   ANIONGAP 8   ESTGFRAFRICA >60.0   EGFRNONAA >60.0   , CBC   Recent Labs  Lab 12/13/17  0937   WBC 12.63   HGB 10.4*   HCT 31.8*   *   , INR   Recent Labs  Lab 12/13/17  1239   INR 1.0   , Troponin   Recent Labs  Lab 12/13/17  1239 12/14/17  1300   TROPONINI 0.037* 0.033*    and All pertinent lab results from the last 24 hours have been reviewed.    Significant Imaging:   EKG 12/14/2017:  - NSR  - LBB    EKG 12/13/2017:  - A flutter with 2:1 conduction    Echo 10/13/2017:    1 - Severely depressed left ventricular systolic function (EF 25-30%). LV function is significantly decreased compared to the prior study.     2 - Impaired LV relaxation, normal LAP (grade 1 diastolic dysfunction).     3 - Normal right ventricular systolic function .     4 - The estimated PA systolic pressure is 27 mmHg.     5 - Trivial to mild aortic regurgitation.     6 - Mild mitral regurgitation.     7 - Trivial to mild tricuspid regurgitation.     8 - Mild left atrial enlargement.     9 - No wall motion abnormalities.     CXR 12/13/2017:        X-Ray Chest AP Portable (Final result)  Result time 12/14/17 08:45:47    Final result by Nicolas Corrales Jr., MD (12/14/17 08:45:47)                 Narrative:    Chest single view compared to October 2017.  Right-sided PICC catheter overlies the right innominate vein.  There is right pleural fluid.  Surgical drain in the right upper  quadrant.  Heart size and pulmonary vessels are normal.  Lungs are clear.    Impression see above      Electronically signed by: BLAINE JASSO MD  Date:     12/14/17  Time:    08:45                                 Assessment and Plan:     Paroxysmal atrial flutter    76yo with CAD and paroxysmal atrial flutter, suspect related to sepsis from liver abscess.   - Now in SR   - CHADSVASC = 4 (age, HTN, CAD), annual risk of stroke approximately 5%    Recommend:  - agree with repeating echo  - start Toprol 50mg qday  - he needs anticoagulation with warfarin or NOAC, we discussed risks and benefits of anticoagulation including serious bleeding, the patient and his wife elected to proceed with warfarin, would start warfarin with Lovenox or heparin bridge    Thank you for the consult, we will continue to follow with you. Please contact us with any questions.             VTE Risk Mitigation         Ordered     enoxaparin injection 40 mg  Daily     Route:  Subcutaneous        12/13/17 1937     Low Risk of VTE  Once      12/14/17 1125          Ayesha Barraza MD  Cardiology   [facility]

## 2017-12-14 NOTE — HPI
Mr Soriano is a 78 yo M with IgG4 sclerosing cholangiocarcinoma s/p resection in 08/2017, polymicrobial hepatic abscess in the past s/p drain placement in 10/2107, recent STEMI medically managed from 10/2017 initially presented to [facility] with tachycardia,chills, dyspnea, cough,  and elevated lactate concerning for sepsis. In his prior surgery for resection of his cholangiocarcinoma in August 2017, he was noted to have lobectomy of L side, resection middle,left hepatic veins, CBD, and anastomosis of R hepatic duct which was complicated by a liver abscess s/p percutaneous drain placement and a prolonged course of IV meropenem- danielito stop date 12/9. A repeat CT a/p showed superior hepatic fluid reaccumulation. Patient is currently denying abdominal pain, fever, palpitations, angina, PND. ID was consulted for hepatic abscess on prior meropenem which has not resolved since therapy completed. Micro history is significant for an E. Coli bacteremia and E. Faecalis (vanc-sensitive) during his October hospital course. He is scheduled for IR guided abscess drain w/ biopsies this admission and is on meropenem. He is also in questionable supraventricular arrhythmia with tachycardic rate in 160s, Cardiology consulted.

## 2017-12-14 NOTE — SUBJECTIVE & OBJECTIVE
Past Medical History:   Diagnosis Date    Cancer     Prostate/s/p prostatectomy    Coronary artery disease     GERD (gastroesophageal reflux disease)     Hyperlipidemia     Hypertension     Hypothyroidism        Past Surgical History:   Procedure Laterality Date    CAROTID ENDARTERECTOMY Bilateral     CORONARY ARTERY BYPASS GRAFT      PROSTATE SURGERY         Review of patient's allergies indicates:  No Known Allergies    No current facility-administered medications on file prior to encounter.      Current Outpatient Prescriptions on File Prior to Encounter   Medication Sig    alprazolam (XANAX) 0.25 MG tablet Take 0.25 mg by mouth 3 (three) times daily.    atorvastatin (LIPITOR) 80 MG tablet Take 80 mg by mouth once daily.    clopidogrel (PLAVIX) 75 mg tablet Take 1 tablet (75 mg total) by mouth once daily.    escitalopram oxalate (LEXAPRO) 10 MG tablet Take 10 mg by mouth once daily.    levothyroxine (SYNTHROID) 100 MCG tablet Take 100 mcg by mouth once daily.    ondansetron (ZOFRAN-ODT) 8 MG TbDL Take 1 tablet (8 mg total) by mouth every 6 (six) hours as needed.    pantoprazole (PROTONIX) 40 MG tablet Take 40 mg by mouth once daily.    indomethacin (INDOCIN) 25 MG capsule Take 25 mg by mouth 2 (two) times daily with meals.    meropenem (MERREM) 1 gram injection      Family History     None        Social History Main Topics    Smoking status: Former Smoker     Types: Cigarettes     Start date: 1/1/1956     Quit date: 6/18/2013    Smokeless tobacco: Never Used    Alcohol use No    Drug use: No    Sexual activity: Not on file     Review of Systems   Constitution: Positive for chills and malaise/fatigue. Negative for fever.   Eyes: Negative for blurred vision and double vision.   Cardiovascular: Negative for chest pain, claudication, irregular heartbeat, near-syncope, orthopnea, palpitations, paroxysmal nocturnal dyspnea and syncope.   Respiratory: Negative for cough and shortness of breath.     Gastrointestinal: Negative for nausea and vomiting.   Neurological: Positive for disturbances in coordination. Negative for headaches and light-headedness.     Objective:     Vital Signs (Most Recent):  Temp: 98 °F (36.7 °C) (12/14/17 1130)  Pulse: 99 (12/14/17 1130)  Resp: 18 (12/14/17 1130)  BP: 135/63 (12/14/17 1130)  SpO2: 95 % (12/14/17 1130) Vital Signs (24h Range):  Temp:  [97.4 °F (36.3 °C)-98.3 °F (36.8 °C)] 98 °F (36.7 °C)  Pulse:  [] 99  Resp:  [12-23] 18  SpO2:  [95 %-100 %] 95 %  BP: (102-135)/(54-70) 135/63     Weight: 76.7 kg (169 lb 1.5 oz)  Body mass index is 27.71 kg/m².    SpO2: 95 %  O2 Device (Oxygen Therapy): room air      Intake/Output Summary (Last 24 hours) at 12/14/17 1509  Last data filed at 12/14/17 1426   Gross per 24 hour   Intake              620 ml   Output             1041 ml   Net             -421 ml       Lines/Drains/Airways     Peripherally Inserted Central Catheter Line                 PICC Double Lumen 10/17/17 1030 right basilic 58 days          Drain                 Closed/Suction Drain 10/12/17 1711 Right;Anterior Abdomen Bulb 8 Fr. 62 days         Closed/Suction Drain 12/14/17 1007 Right Abdomen Other (Comment) 8 Fr. less than 1 day          Peripheral Intravenous Line                 Peripheral IV - Single Lumen 12/13/17 1337 Left Antecubital 1 day                Physical Exam   Constitutional: He is oriented to person, place, and time. No distress.   Neck: JVD present.   Cardiovascular: Normal rate, normal heart sounds and intact distal pulses.  Exam reveals no gallop and no friction rub.    No murmur heard.  Pulmonary/Chest: Effort normal. He has wheezes. He has no rales.   Abdominal: Soft. He exhibits no distension. There is no tenderness.   Musculoskeletal: He exhibits no edema.   Neurological: He is alert and oriented to person, place, and time.   Skin: Skin is warm and dry. He is not diaphoretic.   Nursing note and vitals reviewed.      Significant Labs:    CMP   Recent Labs  Lab 12/13/17  0937   *   K 4.2      CO2 27   *   BUN 16   CREATININE 1.1   CALCIUM 9.6   PROT 7.2   ALBUMIN 2.6*   BILITOT 1.4*   ALKPHOS 175*   AST 27   ALT 25   ANIONGAP 8   ESTGFRAFRICA >60.0   EGFRNONAA >60.0   , CBC   Recent Labs  Lab 12/13/17  0937   WBC 12.63   HGB 10.4*   HCT 31.8*   *   , INR   Recent Labs  Lab 12/13/17  1239   INR 1.0   , Troponin   Recent Labs  Lab 12/13/17  1239 12/14/17  1300   TROPONINI 0.037* 0.033*    and All pertinent lab results from the last 24 hours have been reviewed.    Significant Imaging:   EKG 12/14/2017:  - NSR  - LBB    EKG 12/13/2017:  - A flutter with 2:1 conduction    Echo 10/13/2017:    1 - Severely depressed left ventricular systolic function (EF 25-30%). LV function is significantly decreased compared to the prior study.     2 - Impaired LV relaxation, normal LAP (grade 1 diastolic dysfunction).     3 - Normal right ventricular systolic function .     4 - The estimated PA systolic pressure is 27 mmHg.     5 - Trivial to mild aortic regurgitation.     6 - Mild mitral regurgitation.     7 - Trivial to mild tricuspid regurgitation.     8 - Mild left atrial enlargement.     9 - No wall motion abnormalities.     CXR 12/13/2017:        X-Ray Chest AP Portable (Final result)  Result time 12/14/17 08:45:47    Final result by Blaine Corrales Jr., MD (12/14/17 08:45:47)                 Narrative:    Chest single view compared to October 2017.  Right-sided PICC catheter overlies the right innominate vein.  There is right pleural fluid.  Surgical drain in the right upper quadrant.  Heart size and pulmonary vessels are normal.  Lungs are clear.    Impression see above      Electronically signed by: BLAINE CORRALES MD  Date:     12/14/17  Time:    08:45

## 2017-12-14 NOTE — H&P
Inpatient Radiology Pre-procedure Note    History of Present Illness:  Robby Soriano is a 77 y.o. male who presents for hepatic abscess drainage, in a patient with sclerosing cholangitis s/p L lobe resection, with subsequent hepatic abscesses requiring drainage, now with reaccumulation of the superior hepatic abscess.    Admission H&P reviewed.  Past Medical History:   Diagnosis Date    Cancer     Prostate/s/p prostatectomy    Coronary artery disease     GERD (gastroesophageal reflux disease)     Hyperlipidemia     Hypertension     Hypothyroidism      Past Surgical History:   Procedure Laterality Date    CAROTID ENDARTERECTOMY Bilateral     CORONARY ARTERY BYPASS GRAFT      PROSTATE SURGERY         Review of Systems:   As documented in primary team H&P    Home Meds:   Prior to Admission medications    Medication Sig Start Date End Date Taking? Authorizing Provider   alprazolam (XANAX) 0.25 MG tablet Take 0.25 mg by mouth 3 (three) times daily.   Yes Historical Provider, MD   atorvastatin (LIPITOR) 80 MG tablet Take 80 mg by mouth once daily.   Yes Historical Provider, MD   clopidogrel (PLAVIX) 75 mg tablet Take 1 tablet (75 mg total) by mouth once daily. 10/19/17 10/19/18 Yes Miriam Valderrama NP   escitalopram oxalate (LEXAPRO) 10 MG tablet Take 10 mg by mouth once daily.   Yes Historical Provider, MD   levothyroxine (SYNTHROID) 100 MCG tablet Take 100 mcg by mouth once daily.   Yes Historical Provider, MD   ondansetron (ZOFRAN-ODT) 8 MG TbDL Take 1 tablet (8 mg total) by mouth every 6 (six) hours as needed. 9/6/17  Yes Stephanie Jama MD   pantoprazole (PROTONIX) 40 MG tablet Take 40 mg by mouth once daily.   Yes Historical Provider, MD   indomethacin (INDOCIN) 25 MG capsule Take 25 mg by mouth 2 (two) times daily with meals.    Historical Provider, MD   meropenem (MERREM) 1 gram injection  10/30/17   Historical Provider, MD     Scheduled Meds:    aspirin  81 mg Oral Daily    atorvastatin  80 mg  Oral Daily    clopidogrel  75 mg Oral Daily    enoxaparin  40 mg Subcutaneous Daily    escitalopram oxalate  10 mg Oral Daily    levothyroxine  100 mcg Oral Daily    meropenem (MERREM) IVPB  1 g Intravenous Q8H    pantoprazole  40 mg Oral Daily     Continuous Infusions:    sodium chloride 0.9% 100 mL/hr at 12/13/17 2203     PRN Meds:acetaminophen, ALPRAZolam, ondansetron, sodium chloride 0.9%  Anticoagulants/Antiplatelets: aspirin (last taken 24 hours ago), clopidogrel (last taken 24 hours ago), low dose lovenox.    Allergies: Review of patient's allergies indicates:  No Known Allergies  Sedation Hx: have not been any systemic reactions    Labs:    Recent Labs  Lab 12/13/17  1239   INR 1.0       Recent Labs  Lab 12/13/17  0937   WBC 12.63   HGB 10.4*   HCT 31.8*   MCV 95   *      Recent Labs  Lab 12/13/17  0937   *   *   K 4.2      CO2 27   BUN 16   CREATININE 1.1   CALCIUM 9.6   ALT 25   AST 27   ALBUMIN 2.6*   BILITOT 1.4*         Vitals:  Temp: 98.2 °F (36.8 °C) (12/14/17 0743)  Pulse: (!) 119 (12/14/17 0743)  Resp: 20 (12/14/17 0743)  BP: 123/70 (12/14/17 0743)  SpO2: 96 % (12/14/17 0743)     Physical Exam:  ASA: 3  Mallampati: 3    General: no acute distress  Mental Status: alert and oriented to person, place and time  HEENT: normocephalic, atraumatic  Chest: unlabored breathing  Heart: regular heart rate  Abdomen: nondistended  Extremity: moves all extremities    Plan: abscess drain on therapeutic anticoagulation. Discussed with surgery team and hospital medicine team, who request proceeding with drain placement on anticoagulation.  Sedation Plan: urszula Teresa MD  Radiology

## 2017-12-14 NOTE — CONSULTS
[facility]  Infectious Disease  Consult Note    Patient Name: Robby Soriano  MRN: [id number]  Admission Date: 12/13/2017  Hospital Length of Stay: 1 days  Attending Physician: Allyn Garcia MD  Primary Care Provider: Lyla Bryan MD     Isolation Status: No active isolations    Patient information was obtained from patient and past medical records.      Consults  Assessment/Plan:     * Liver abscess    - recommend switching to ertapenem 1g q24h   -follow cultures from septic workup, IR guided abscess bx.   -previous cultures + for E. Coli bacteremia, E. Faecalis (vanc-sensitive), K. pneumo              Thank you for your consult. I will follow-up with patient. Please contact us if you have any additional questions.    Thuan Cassidy MD  Infectious Disease  [facility]    Subjective:     Principal Problem: Liver abscess    HPI: Mr Soriano is a 78 yo M with IgG4 sclerosing cholangiocarcinoma s/p resection in 08/2017, polymicrobial hepatic abscess in the past s/p drain placement in 10/2107, recent STEMI medically managed from 10/2017 initially presented to [facility] with tachycardia,chills, dyspnea, cough,  and elevated lactate concerning for sepsis. In his prior surgery for resection of his cholangiocarcinoma in August 2017, he was noted to have lobectomy of L side, resection middle,left hepatic veins, CBD, and anastomosis of R hepatic duct which was complicated by a liver abscess s/p percutaneous drain placement and a prolonged course of IV meropenem- danielito stop date 12/9. A repeat CT a/p showed superior hepatic fluid reaccumulation. Patient is currently denying abdominal pain, fever, palpitations, angina, PND. ID was consulted for hepatic abscess on prior meropenem which has not resolved since therapy completed. Micro history is significant for an E. Coli bacteremia and E. Faecalis (vanc-sensitive),  K. pneumo during his October hospital course. He is scheduled for IR guided  abscess drain w/ biopsies this admission and is on meropenem. He is also in questionable supraventricular arrhythmia with tachycardic rate in 160s, Cardiology consulted. He has had 1 drain pulled prior to admission, and his remaining drain was still draining purulence to brown fluid.         Review of Systems   Constitutional: Positive for chills and fatigue. Negative for unexpected weight change.   HENT: Negative for sore throat.    Eyes: Negative for visual disturbance.   Respiratory: Positive for cough and shortness of breath. Negative for apnea, chest tightness and wheezing.    Gastrointestinal: Negative for abdominal pain and anal bleeding.   Genitourinary: Negative for dysuria.   Musculoskeletal: Negative for arthralgias, back pain and myalgias.   Skin: Positive for wound. Negative for color change and rash.   Allergic/Immunologic: Negative for immunocompromised state.   Neurological: Negative for dizziness.   Psychiatric/Behavioral: Negative for agitation.   All other systems reviewed and are negative.    Objective:     Vital Signs (Most Recent):  Temp: 98.2 °F (36.8 °C) (12/14/17 0743)  Pulse: (!) 119 (12/14/17 0743)  Resp: 20 (12/14/17 0743)  BP: 123/70 (12/14/17 0743)  SpO2: 96 % (12/14/17 0743) Vital Signs (24h Range):  Temp:  [97.4 °F (36.3 °C)-98.3 °F (36.8 °C)] 98.2 °F (36.8 °C)  Pulse:  [] 119  Resp:  [18-24] 20  SpO2:  [95 %-100 %] 96 %  BP: ()/(55-70) 123/70     Weight: 76.7 kg (169 lb 1.5 oz)  Body mass index is 27.71 kg/m².    Estimated Creatinine Clearance: 54.3 mL/min (based on SCr of 1.1 mg/dL).    Physical Exam   Constitutional: He is oriented to person, place, and time. He appears well-developed and well-nourished.   HENT:   Head: Normocephalic and atraumatic.   Nose: Nose normal.   Eyes: Conjunctivae and EOM are normal. Pupils are equal, round, and reactive to light. No scleral icterus.   Neck: Normal range of motion. No JVD present. No thyromegaly present.   Cardiovascular:  Normal rate and regular rhythm.  Exam reveals no gallop and no friction rub.    No murmur heard.  Pulmonary/Chest: Effort normal and breath sounds normal. No respiratory distress. He has no wheezes.   Abdominal: Soft. Bowel sounds are normal. He exhibits no distension and no mass. There is no tenderness. There is no guarding.   Right sided drain w/ output noted    Musculoskeletal: Normal range of motion.   Lymphadenopathy:     He has no cervical adenopathy.   Neurological: He is alert and oriented to person, place, and time. No cranial nerve deficit.   Skin: Skin is warm and dry. Capillary refill takes less than 2 seconds. No rash noted.   Psychiatric: He has a normal mood and affect.       Significant Labs:   CBC:   Recent Labs  Lab 12/13/17  0937   WBC 12.63   HGB 10.4*   HCT 31.8*   *     CMP:   Recent Labs  Lab 12/13/17  0937   *   K 4.2      CO2 27   *   BUN 16   CREATININE 1.1   CALCIUM 9.6   PROT 7.2   ALBUMIN 2.6*   BILITOT 1.4*   ALKPHOS 175*   AST 27   ALT 25   ANIONGAP 8   EGFRNONAA >60.0     Coagulation:   Recent Labs  Lab 12/13/17  1239   INR 1.0     Urine Studies:   Recent Labs  Lab 10/12/17  0500   COLORU Mig[place]   APPEARANCEUA Cloudy*   PHUR 5.0   SPECGRAV >=1.030*   PROTEINUA 1+*   GLUCUA Negative   KETONESU Negative   BILIRUBINUA 1+*   OCCULTUA 3+*   NITRITE Negative   UROBILINOGEN 4.0   LEUKOCYTESUR Negative   RBCUA 2   WBCUA 0   BACTERIA None   SQUAMEPITHEL 1   HYALINECASTS 0     All pertinent labs within the past 24 hours have been reviewed.  Microbiology Results (last 7 days)     Procedure Component Value Units Date/Time    Gram stain [[id number]]     Order Status:  No result Specimen:  Abscess     Culture, Anaerobe [[id number]]     Order Status:  No result Specimen:  Abscess from Liver     Aerobic culture [[id number]]     Order Status:  No result Specimen:  Abscess from Liver           Significant Imaging: CT: I have reviewed all pertinent results/findings within the past  24 hours:  11/4:  1. Near complete resolution of both previously visualized hepatic fluid collections with percutaneous drains in place.  Interval decrease in size of fluid collection within the right anterolateral abdominal wall.    2.  Postoperative changes of left hepatic lobe resection and hepaticojejunostomy.    3.  Dense aortic and coronary atherosclerosis.

## 2017-12-14 NOTE — PT/OT/SLP EVAL
Occupational Therapy   Evaluation    Name: Robby Soriano  MRN: [id number]  Admitting Diagnosis:  Liver abscess      Recommendations:     Discharge Recommendations: home with home health  Discharge Equipment Recommendations:  none  Barriers to discharge:  None    History:     Occupational Profile:  Living Environment: Pt lives with wife in double wide trailer; 5STE with ramp access available.  Bathroom contains walk-in shower with seat and grab bars present.    Previous level of function: Pt reports being (I) with all ADLs and was utilizing a rollator for mobility.  Roles and Routines: Pt enjoys woodwork (making swings) and gardening  Equipment Owned:  rollator, shower chair, grab bar  Assistance upon Discharge: Wife able to provide assist 24/7    Past Medical History:   Diagnosis Date    Cancer     Prostate/s/p prostatectomy    Coronary artery disease     GERD (gastroesophageal reflux disease)     Hyperlipidemia     Hypertension     Hypothyroidism        Past Surgical History:   Procedure Laterality Date    CAROTID ENDARTERECTOMY Bilateral     CORONARY ARTERY BYPASS GRAFT      PROSTATE SURGERY         Subjective     Chief Complaint: Intermittent weakness  Patient/Family stated goals: Resume PLOV  Communicated with: RN prior to session.  Pain/Comfort:  · Pain Rating 1: 0/10  · Pain Rating Post-Intervention 1: 0/10    Objective:     Patient found with:  (resting comfortably in bed); wife present during session.    General Precautions: Standard, fall   Orthopedic Precautions:N/A   Braces: N/A     Occupational Performance:    Bed Mobility:    · Patient completed Rolling/Turning to Right with modified independence  · Patient completed Scooting/Bridging with with side rail  · Patient completed Supine to Sit with independence  · Patient completed Sit to Supine with independence    Functional Mobility/Transfers:  · Patient completed Sit <> Stand Transfer with stand by assistance  with  no assistive device and  rolling walker x 1 trial from EOB  · Pt ambulated 20 ft within room with SBA and RW.  Mild gait instability noted, but no instances of LOB noted.    Activities of Daily Living:  · LB Dressing: total assistance for doffing socks from home and donning yellow  socks while seated EOB    Cognitive/Visual Perceptual:  Cognitive/Psychosocial Skills:  -       Oriented to: Person, Place, Time and Situation   -       Follows Commands/attention:Follows multistep  commands  -       Communication: clear/fluent  -       Memory: No Deficits noted  -       Safety awareness/insight to disability: intact   -       Mood/Affect/Coping skills/emotional control: Appropriate to situation    Physical Exam:  Balance: Supervision for EOB, SBA for standing  Postural examination/scapula alignment: Rounded shoulders  Skin integrity: Visible skin intact  Edema:  None noted  Sensation: Intact  Motor Planning: WFL  Dominant hand: pt uses both hands; L for feeding and grooming  Upper Extremity Range of Motion:    -       Right Upper Extremity: WNL  -       Left Upper Extremity: WNL  Upper Extremity Strength:  -       Right Upper Extremity: WNL  -       Left Upper Extremity: WNL   Strength:  5/5 both hands  Fine Motor Coordination: Intact  Gross motor coordination: WFL    Patient left supine with call button in reach and wife present    AMPA 6 Click:  [facility] Total Score: 19    Treatment & Education:  *Pt educated on role of OT and POC discussed  Education:    Assessment:     Robby Soriano is a 77 y.o. male with a medical diagnosis of Liver abscess.  He presents with good participation during session.  Performance deficits affecting function are impaired endurance, impaired balance, impaired self care skills, gait instability.  Pt demonstrates strength and ROM in (B) UE needed for ADLs that is WNL, and is able to ambulate with SBA and RW.  PTA pt reports requiring assist for ADLs except for grooming, feeding, and UB dressing.  Pt was  "utilizing a rollator for mobility.  Pt is not completely at PLOF and would benefit from skilled OT services to address problems listed below and increase independence with ADLs.  Home health is recommended upon d/c from hospital.    Rehab Prognosis:  Good; patient would benefit from acute skilled OT services to address these deficits and reach maximum level of function.         Clinical Decision Makin.  OT Low:  "Pt evaluation falls under low complexity for evaluation coding due to performance deficits noted in 1-3 areas as stated above and 0 co-morbities affecting current functional status. Data obtained from problem focused assessments. No modifications or assistance was required for completion of evaluation. Only brief occupational profile and history review completed."     Plan:     Patient to be seen 3 x/week to address the above listed problems via self-care/home management, therapeutic activities, therapeutic exercises  · Plan of Care Expires: 18  · Plan of Care Reviewed with: patient    This Plan of care has been discussed with the patient who was involved in its development and understands and is in agreement with the identified goals and treatment plan    GOALS:    Occupational Therapy Goals        Problem: Occupational Therapy Goal    Goal Priority Disciplines Outcome Interventions   Occupational Therapy Goal     OT, PT/OT     Description:  Goals to be met by: 2017     Patient will increase functional independence with ADLs by performing:    UE Dressing with [place].  LE Dressing with Min A utilizing adaptive equipment as necessary.  Grooming while standing with Supervision.  Toileting from toilet with Supervision for hygiene and clothing management.   Toilet transfer to toilet with Supervision.                      Time Tracking:     OT Date of Treatment: 17  OT Start Time: 1350  OT Stop Time: 1401  OT Total Time (min): 11 min    Billable Minutes:Evaluation 11    Mariama SCHULER " ELLEN Drake  12/14/2017

## 2017-12-14 NOTE — ASSESSMENT & PLAN NOTE
76yo with CAD and paroxysmal atrial flutter, suspect related to sepsis from liver abscess.   - Now in SR   - CHADSVASC = 4 (age, HTN, CAD), annual risk of stroke approximately 5%    Recommend:  - agree with repeating echo  - start Toprol 50mg qday  - he needs anticoagulation with warfarin or NOAC, we discussed risks and benefits of anticoagulation including serious bleeding, the patient and his wife elected to proceed with warfarin, would start warfarin with Lovenox or heparin bridge    Thank you for the consult, we will continue to follow with you. Please contact us with any questions.

## 2017-12-14 NOTE — PROGRESS NOTES
Progress Note  Hospital Medicine      Admit Date: 12/13/2017    SUBJECTIVE:     Follow-up For:  Liver abscess    HPI/Interval history: No new complaints.    Review of Systems: Gen: no fever, no chills, Heart: no chest pain, palpitations; Resp: no SOB, no cough    OBJECTIVE:     Vital Signs Range (Last 24H):  Temp:  [97.4 °F (36.3 °C)-98.3 °F (36.8 °C)]   Pulse:  []   Resp:  [12-23]   BP: ()/(54-70)   SpO2:  [95 %-100 %]     Physical Exam:  General appearance: NAD, conversant  Neck: FROM, supple   Lungs: Clear to auscultation, no accessory muscle use  CV: RRR, no heave  Abdomen: Soft, non-tender; no masses or HSM  Extremities: No peripheral edema or digital cyanosis  Skin: no rash, lesions or ulcers  Psych: Alert and oriented to person, place and time      Recent Labs  Lab 12/13/17  0937   *   K 4.2      CO2 27   BUN 16   CREATININE 1.1   *   CALCIUM 9.6       Recent Labs  Lab 12/13/17  0937 12/13/17  1239   ALKPHOS 175*  --    ALT 25  --    AST 27  --    ALBUMIN 2.6*  --    PROT 7.2  --    BILITOT 1.4*  --    INR  --  1.0         Recent Labs  Lab 12/13/17  0937   WBC 12.63   HGB 10.4*   HCT 31.8*   *   GRAN 75.5*  9.5*   LYMPH 10.3*  1.3   MONO 12.1  1.5*       ASSESSMENT/PLAN:   Sepsis due to hepatic abscess  Lactic acidosis resolved in ER with IVF fluid resuscitation. Given a dose of meropenem in ER. Continue meropenem 1 g IV q8h and NS at 100 mL/h. ID consulted. Surgical oncology consult (Dr. Quach) following. Patient underwent IR percutaneous drainage of the recurring superior hepatic fluid collection 12/14.      CAD s/p CABG  Recent STEMI medically managed  Carotid disease (s/p bilateral CEA)  Continue clopidogrel 75 mg daily, ASA 81 mg daily, atorvastatin 80 mg daily     hypothyroidism - levothyroxine 100 mcg daily     IGG-4 Sclerosing cholangitis  Patient recently finished a long prednisone taper     Anemia of chronic disease      Essential HTN - not currently on  medications.      Depression - continue escitalopram 10 mg daily     TIM - protonix 40 mg daily    Tachycardic - resolved with hydration.

## 2017-12-14 NOTE — HPI
76yo M with CAD s/p CABG, recent NSTEMI ~2 months ago, IgG4 sclerosing cholangitis, hypothyroidism and anxiety. Admitted from Surg Onc clinic with tachycardia (HR 160s). He is s/p left hepatectomy and extrahepatic bile duct resection with marci-en-y reconstruction in 8/2017 for IgG4 sclerosing cholangitis, complicated by hepatic abscesses requiring IR drainage and extended IV antibiotics. Antibiotics stopped on Saturday, started to experience chills on Tuesday with darker drainage from biliary drain, as well as feeling generally weaker. Saw Dr. Quach in clinic on yesterday and sent to ED with HR of 164. Patient denies any symptoms- no chest pain, no neck/jaw pain, no SOB, no palpitations, no dizziness/light-headedness.     In ED was found to be in A. Flutter with RVR to 130s, and have lactate of 3.8, was given 2L NaCl which resolved tachycardia and lactate. Admitted to medicine and new drain placed by IR today. Cardiology consulted for A. Flutter on admission. Today he is in NSR. No chest/neck/jaw pain, no SOB, no palpitations. No orthopnea/PND. No leg swelling. No further fever or chills. He is s/p CABG ~4 years ago, and had NSTEMI in October 2017 that was managed medically as patient and his wife preferred to defer LHC given his liver abscess. EF at that time was 25% from 60% 2 days prior, per patient he had another echo with his Cardiologist in [place] that showed his EF had normalized. After NSTEMI, he was discharged on ACEI and BB, however they were stopped by PCP for ?hypotension. His aspirin was also stopped recently for nosebleeds. Prior to this admission his only cardiac meds were clopidogrel and atorvastatin.

## 2017-12-14 NOTE — ASSESSMENT & PLAN NOTE
-continue Merem for now; follow cultures from septic workup, IR guided abscess bx.   -previous cultures + for E. Coli bacteremia, E. Faecalis (vanc-sensitive)

## 2017-12-15 LAB
ALBUMIN SERPL BCP-MCNC: 2.1 G/DL
ALP SERPL-CCNC: 154 U/L
ALT SERPL W/O P-5'-P-CCNC: 20 U/L
ANION GAP SERPL CALC-SCNC: 7 MMOL/L
AST SERPL-CCNC: 26 U/L
BASOPHILS # BLD AUTO: 0.04 K/UL
BASOPHILS NFR BLD: 0.6 %
BILIRUB SERPL-MCNC: 0.8 MG/DL
BUN SERPL-MCNC: 10 MG/DL
CALCIUM SERPL-MCNC: 8.4 MG/DL
CHLORIDE SERPL-SCNC: 106 MMOL/L
CO2 SERPL-SCNC: 24 MMOL/L
CREAT SERPL-MCNC: 0.8 MG/DL
CRP SERPL-MCNC: 147.5 MG/L
DIFFERENTIAL METHOD: ABNORMAL
EOSINOPHIL # BLD AUTO: 0.2 K/UL
EOSINOPHIL NFR BLD: 2.5 %
ERYTHROCYTE [DISTWIDTH] IN BLOOD BY AUTOMATED COUNT: 15.1 %
ERYTHROCYTE [SEDIMENTATION RATE] IN BLOOD BY WESTERGREN METHOD: 127 MM/HR
EST. GFR  (AFRICAN AMERICAN): >60 ML/MIN/1.73 M^2
EST. GFR  (NON AFRICAN AMERICAN): >60 ML/MIN/1.73 M^2
GLUCOSE SERPL-MCNC: 110 MG/DL
HCT VFR BLD AUTO: 24.7 %
HCT VFR BLD AUTO: 25 %
HGB BLD-MCNC: 8.1 G/DL
HGB BLD-MCNC: 8.3 G/DL
IMM GRANULOCYTES # BLD AUTO: 0.06 K/UL
IMM GRANULOCYTES NFR BLD AUTO: 0.8 %
INR PPP: 1
LYMPHOCYTES # BLD AUTO: 1.3 K/UL
LYMPHOCYTES NFR BLD: 17.2 %
MAGNESIUM SERPL-MCNC: 1.8 MG/DL
MCH RBC QN AUTO: 29.8 PG
MCHC RBC AUTO-ENTMCNC: 32.4 G/DL
MCV RBC AUTO: 92 FL
MONOCYTES # BLD AUTO: 0.9 K/UL
MONOCYTES NFR BLD: 12.4 %
NEUTROPHILS # BLD AUTO: 4.8 K/UL
NEUTROPHILS NFR BLD: 66.5 %
NRBC BLD-RTO: 0 /100 WBC
PHOSPHATE SERPL-MCNC: 2.8 MG/DL
PLATELET # BLD AUTO: 77 K/UL
PMV BLD AUTO: 10.4 FL
POTASSIUM SERPL-SCNC: 3.7 MMOL/L
PROT SERPL-MCNC: 5.6 G/DL
PROTHROMBIN TIME: 10.4 SEC
RBC # BLD AUTO: 2.72 M/UL
SODIUM SERPL-SCNC: 137 MMOL/L
WBC # BLD AUTO: 7.25 K/UL

## 2017-12-15 PROCEDURE — 99232 SBSQ HOSP IP/OBS MODERATE 35: CPT | Mod: ,,, | Performed by: INTERNAL MEDICINE

## 2017-12-15 PROCEDURE — 99233 SBSQ HOSP IP/OBS HIGH 50: CPT | Mod: GC,,, | Performed by: INTERNAL MEDICINE

## 2017-12-15 PROCEDURE — 85610 PROTHROMBIN TIME: CPT

## 2017-12-15 PROCEDURE — 83735 ASSAY OF MAGNESIUM: CPT

## 2017-12-15 PROCEDURE — 85018 HEMOGLOBIN: CPT

## 2017-12-15 PROCEDURE — 25000003 PHARM REV CODE 250: Performed by: INTERNAL MEDICINE

## 2017-12-15 PROCEDURE — 85025 COMPLETE CBC W/AUTO DIFF WBC: CPT

## 2017-12-15 PROCEDURE — 63600175 PHARM REV CODE 636 W HCPCS: Performed by: INTERNAL MEDICINE

## 2017-12-15 PROCEDURE — 84100 ASSAY OF PHOSPHORUS: CPT

## 2017-12-15 PROCEDURE — 11000001 HC ACUTE MED/SURG PRIVATE ROOM

## 2017-12-15 PROCEDURE — 86140 C-REACTIVE PROTEIN: CPT

## 2017-12-15 PROCEDURE — 25000003 PHARM REV CODE 250: Performed by: STUDENT IN AN ORGANIZED HEALTH CARE EDUCATION/TRAINING PROGRAM

## 2017-12-15 PROCEDURE — 80053 COMPREHEN METABOLIC PANEL: CPT

## 2017-12-15 PROCEDURE — 36415 COLL VENOUS BLD VENIPUNCTURE: CPT

## 2017-12-15 PROCEDURE — 85651 RBC SED RATE NONAUTOMATED: CPT

## 2017-12-15 PROCEDURE — 85014 HEMATOCRIT: CPT

## 2017-12-15 RX ORDER — WARFARIN 2.5 MG/1
2.5 TABLET ORAL DAILY
Status: DISCONTINUED | OUTPATIENT
Start: 2017-12-15 | End: 2017-12-18

## 2017-12-15 RX ORDER — CLOPIDOGREL BISULFATE 75 MG/1
75 TABLET ORAL DAILY
Status: DISCONTINUED | OUTPATIENT
Start: 2017-12-16 | End: 2017-12-18 | Stop reason: HOSPADM

## 2017-12-15 RX ADMIN — ENOXAPARIN SODIUM 40 MG: 100 INJECTION SUBCUTANEOUS at 04:12

## 2017-12-15 RX ADMIN — LEVOTHYROXINE SODIUM 100 MCG: 100 TABLET ORAL at 06:12

## 2017-12-15 RX ADMIN — METOPROLOL SUCCINATE 50 MG: 50 TABLET, EXTENDED RELEASE ORAL at 09:12

## 2017-12-15 RX ADMIN — PANTOPRAZOLE SODIUM 40 MG: 40 TABLET, DELAYED RELEASE ORAL at 09:12

## 2017-12-15 RX ADMIN — ATORVASTATIN CALCIUM 80 MG: 20 TABLET, FILM COATED ORAL at 09:12

## 2017-12-15 RX ADMIN — MEROPENEM AND SODIUM CHLORIDE 1 G: 1 INJECTION, SOLUTION INTRAVENOUS at 08:12

## 2017-12-15 RX ADMIN — MEROPENEM AND SODIUM CHLORIDE 1 G: 1 INJECTION, SOLUTION INTRAVENOUS at 12:12

## 2017-12-15 RX ADMIN — ESCITALOPRAM OXALATE 10 MG: 10 TABLET ORAL at 09:12

## 2017-12-15 RX ADMIN — WARFARIN SODIUM 2.5 MG: 2.5 TABLET ORAL at 04:12

## 2017-12-15 RX ADMIN — MEROPENEM AND SODIUM CHLORIDE 1 G: 1 INJECTION, SOLUTION INTRAVENOUS at 03:12

## 2017-12-15 NOTE — SUBJECTIVE & OBJECTIVE
Interval History: Afebrile, Tmax 99.6F.     Review of Systems   Constitutional: Positive for chills and fatigue. Negative for unexpected weight change.   HENT: Negative for sore throat.    Eyes: Negative for visual disturbance.   Respiratory: Positive for cough and shortness of breath. Negative for apnea, chest tightness and wheezing.    Gastrointestinal: Negative for abdominal pain and anal bleeding.   Genitourinary: Negative for dysuria.   Musculoskeletal: Negative for arthralgias, back pain and myalgias.   Skin: Positive for wound. Negative for color change and rash.   Allergic/Immunologic: Negative for immunocompromised state.   Neurological: Negative for dizziness.   Psychiatric/Behavioral: Negative for agitation.   All other systems reviewed and are negative.    Objective:     Vital Signs (Most Recent):  Temp: 97 °F (36.1 °C) (12/15/17 0351)  Pulse: 94 (12/15/17 0659)  Resp: 17 (12/15/17 0351)  BP: (!) 146/66 (12/15/17 0351)  SpO2: 97 % (12/15/17 0351) Vital Signs (24h Range):  Temp:  [97 °F (36.1 °C)-99.6 °F (37.6 °C)] 97 °F (36.1 °C)  Pulse:  [] 94  Resp:  [12-20] 17  SpO2:  [93 %-100 %] 97 %  BP: (102-149)/(54-68) 146/66     Weight: 76.7 kg (169 lb 1.5 oz)  Body mass index is 27.71 kg/m².    Estimated Creatinine Clearance: 74.7 mL/min (based on SCr of 0.8 mg/dL).    Physical Exam   Constitutional: He is oriented to person, place, and time. He appears well-developed and well-nourished.   HENT:   Head: Normocephalic and atraumatic.   Nose: Nose normal.   Eyes: Conjunctivae and EOM are normal. Pupils are equal, round, and reactive to light. No scleral icterus.   Neck: Normal range of motion. No JVD present. No thyromegaly present.   Cardiovascular: Normal rate and regular rhythm.  Exam reveals no gallop and no friction rub.    No murmur heard.  Pulmonary/Chest: Effort normal and breath sounds normal. No respiratory distress. He has no wheezes.   Abdominal: Soft. Bowel sounds are normal. He exhibits no  distension and no mass. There is no tenderness. There is no guarding.   Right sided drain w/ output noted    Musculoskeletal: Normal range of motion.   Lymphadenopathy:     He has no cervical adenopathy.   Neurological: He is alert and oriented to person, place, and time. No cranial nerve deficit.   Skin: Skin is warm and dry. Capillary refill takes less than 2 seconds. No rash noted.   Psychiatric: He has a normal mood and affect.       Significant Labs:   Bilirubin:   Recent Labs  Lab 11/17/17  1210 11/27/17  0840 12/13/17  0937 12/15/17  0445   BILITOT 1.0 0.9 1.4* 0.8     CBC:   Recent Labs  Lab 12/13/17  0937 12/15/17  0445   WBC 12.63 7.25   HGB 10.4* 8.1*   HCT 31.8* 25.0*   * 77*     CMP:   Recent Labs  Lab 12/13/17  0937 12/15/17  0445   * 137   K 4.2 3.7    106   CO2 27 24   * 110   BUN 16 10   CREATININE 1.1 0.8   CALCIUM 9.6 8.4*   PROT 7.2 5.6*   ALBUMIN 2.6* 2.1*   BILITOT 1.4* 0.8   ALKPHOS 175* 154*   AST 27 26   ALT 25 20   ANIONGAP 8 7*   EGFRNONAA >60.0 >60.0     Coagulation:   Recent Labs  Lab 12/13/17  1239   INR 1.0     POCT Glucose: No results for input(s): POCTGLUCOSE in the last 48 hours.  Urine Studies:   Recent Labs  Lab 10/12/17  0500   COLORU Mig[place]   APPEARANCEUA Cloudy*   PHUR 5.0   SPECGRAV >=1.030*   PROTEINUA 1+*   GLUCUA Negative   KETONESU Negative   BILIRUBINUA 1+*   OCCULTUA 3+*   NITRITE Negative   UROBILINOGEN 4.0   LEUKOCYTESUR Negative   RBCUA 2   WBCUA 0   BACTERIA None   SQUAMEPITHEL 1   HYALINECASTS 0     All pertinent labs within the past 24 hours have been reviewed.  Microbiology Results (last 7 days)     Procedure Component Value Units Date/Time    Aerobic culture [[id number]] Collected:  12/14/17 0930    Order Status:  Completed Specimen:  Abscess from Abdomen Updated:  12/15/17 0757     Aerobic Bacterial Culture No growth    Gram stain [[id number]] Collected:  12/14/17 0952    Order Status:  Completed Specimen:  Abscess from Abdomen Updated:   12/14/17 1519     Gram Stain Result No WBC's      No organisms seen    Gram stain [[id number]] Collected:  12/14/17 0955    Order Status:  Completed Specimen:  Abscess from Abdomen Updated:  12/14/17 1505     Gram Stain Result Moderate WBC's      No organisms seen    Culture, Anaerobe [[id number]] Collected:  12/14/17 0952    Order Status:  Sent Specimen:  Abscess from Abdomen Updated:  12/14/17 1233    Aerobic culture [[id number]] Collected:  12/14/17 0955    Order Status:  Sent Specimen:  Abscess from Abdomen Updated:  12/14/17 1214    Culture, Anaerobe [[id number]] Collected:  12/14/17 0952    Order Status:  Sent Specimen:  Abscess from Abdomen Updated:  12/14/17 1213          Significant Imaging: I have reviewed all pertinent imaging results/findings within the past 24 hours.

## 2017-12-15 NOTE — ASSESSMENT & PLAN NOTE
- Continue clopidogrel 75mg qday  - Continue atorvastatin 80mg qday  - Continue Toprol  - Was on lisinopril previously, ?stopped due to hypotension, would consider restart if BP able to tolerate low dose

## 2017-12-15 NOTE — ASSESSMENT & PLAN NOTE
78yo with CAD and paroxysmal atrial flutter, suspect related to sepsis from liver abscess.   - Now in SR   - Echo with EF 35-40%, global hypokinesis  - CHADSVASC = 4 (age, HTN, CAD), annual risk of stroke approximately 5%    Recommend:  - continue Toprol 50mg qday  - start warfarin with Lovenox or heparin bridge  - ENT consult as he previously was intolerant of DAPT due to profuse nosebleeds, would ask ENT re ?prophylactic cautery    Thank you for the consult, we will sign off. Please contact us with any questions.

## 2017-12-15 NOTE — ASSESSMENT & PLAN NOTE
-cont ertapenem 1g q24h for now; follow results of IR biopsy culture and consider addition of vancomycin based on growth.

## 2017-12-15 NOTE — PROGRESS NOTES
Progress Note  Hospital Medicine      Admit Date: 12/13/2017    SUBJECTIVE:     Follow-up For:  Liver abscess    HPI/Interval history: No new complaints.    Review of Systems: Gen: no fever, no chills, Heart: no chest pain, palpitations; Resp: no SOB, no cough    OBJECTIVE:     Vital Signs Range (Last 24H):  Temp:  [97 °F (36.1 °C)-99.6 °F (37.6 °C)]   Pulse:  []   Resp:  [16-20]   BP: (115-149)/(57-68)   SpO2:  [93 %-97 %]     Physical Exam:  General appearance: NAD, conversant  Neck: FROM, supple   Lungs: Clear to auscultation, no accessory muscle use  CV: RRR, no heave  Abdomen: Soft, non-tender; no masses or HSM  Extremities: No peripheral edema or digital cyanosis  Skin: no rash, lesions or ulcers  Psych: Alert and oriented to person, place and time      Recent Labs  Lab 12/13/17  0937 12/15/17  0445   * 137   K 4.2 3.7    106   CO2 27 24   BUN 16 10   CREATININE 1.1 0.8   * 110   CALCIUM 9.6 8.4*   MG  --  1.8   PHOS  --  2.8       Recent Labs  Lab 12/13/17  0937 12/13/17  1239 12/15/17  0445   ALKPHOS 175*  --  154*   ALT 25  --  20   AST 27  --  26   ALBUMIN 2.6*  --  2.1*   PROT 7.2  --  5.6*   BILITOT 1.4*  --  0.8   INR  --  1.0  --          Recent Labs  Lab 12/13/17  0937 12/15/17  0445   WBC 12.63 7.25   HGB 10.4* 8.1*   HCT 31.8* 25.0*   * 77*   GRAN 75.5*  9.5* 66.5  4.8   LYMPH 10.3*  1.3 17.2*  1.3   MONO 12.1  1.5* 12.4  0.9       ASSESSMENT/PLAN:   Sepsis due to hepatic abscess  Lactic acidosis resolved in ER with IVF fluid resuscitation. Given a dose of meropenem in ER. Continue meropenem 1 g IV q8h and NS at 100 mL/h. ID consulted. Surgical oncology consult (Dr. Quach) following. Patient underwent IR percutaneous drainage of the recurring superior hepatic fluid collection 12/14. GNR growing in abscess culture.    Old drain (anterior fluid collection) still putting out 100mL + per day.   New drain (superior fluid collection) put out 50 mL yesterday    CAD  s/p CABG  Recent STEMI medically managed  Carotid disease (s/p bilateral CEA)  Continue clopidogrel 75 mg daily, ASA 81 mg daily, atorvastatin 80 mg daily     hypothyroidism - levothyroxine 100 mcg daily     IGG-4 Sclerosing cholangitis  Patient recently finished a long prednisone taper     Anemia of chronic disease   Acutely dropped 10.4 --> 8.4 no apparent bleed  Recheck Hgb/Hct  Check stool guiac     Essential HTN - not currently on medications.      Depression - continue escitalopram 10 mg daily     TIM - protonix 40 mg daily    Atrial flutter - brought on by sepsis upon admission. Cardiology consulted. Metoprolol XL 50 mg daily. Start anticoagulation. Wife and  chose wafarin. Will start warafarin at 2.5 mg given that he is on antibiotics. No need to bridge for atrial firbillation as his risk for CVA in the next 5 days is low.

## 2017-12-15 NOTE — ASSESSMENT & PLAN NOTE
77 y.o. 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 Now with chills, tachycardia, and CT evidence of recurrent collection. Currently admitted to hospital medicine. Now s/p IR drain placement 12/14/2017.  -Continue antibiotics per ID recommendations given that they have been managing him outpatient and he has previously grown multiple organisms  -Lactic acidosis - resolved  -Drain flushes to keep patent (ordered)  -Follow cultures  -Given drop in H/H and history of plavix consider rechecking CBC  -Will follow with you

## 2017-12-15 NOTE — SUBJECTIVE & OBJECTIVE
Interval History:   Underwent IR drain placement yesterday  No acute events  Some soreness but no pain, fevers or chills  H/H down on morning CBC; drain slightly bloody; gram stain no organisms    Medications:  Continuous Infusions:  Scheduled Meds:   aspirin  81 mg Oral Daily    atorvastatin  80 mg Oral Daily    enoxaparin  40 mg Subcutaneous Daily    escitalopram oxalate  10 mg Oral Daily    levothyroxine  100 mcg Oral Daily    meropenem (MERREM) IVPB  1 g Intravenous Q8H    metoprolol succinate  50 mg Oral Daily    pantoprazole  40 mg Oral Daily     PRN Meds:acetaminophen, ALPRAZolam, metoprolol, ondansetron, sodium chloride 0.9%     Review of patient's allergies indicates:  No Known Allergies  Objective:     Vital Signs (Most Recent):  Temp: 97 °F (36.1 °C) (12/15/17 0351)  Pulse: 94 (12/15/17 0659)  Resp: 17 (12/15/17 0351)  BP: (!) 146/66 (12/15/17 0351)  SpO2: 97 % (12/15/17 0351) Vital Signs (24h Range):  Temp:  [97 °F (36.1 °C)-99.6 °F (37.6 °C)] 97 °F (36.1 °C)  Pulse:  [] 94  Resp:  [12-20] 17  SpO2:  [93 %-100 %] 97 %  BP: (102-149)/(54-68) 146/66     Weight: 76.7 kg (169 lb 1.5 oz)  Body mass index is 27.71 kg/m².    Intake/Output - Last 3 Shifts       12/13 0700 - 12/14 0659 12/14 0700 - 12/15 0659 12/15 0700 - 12/16 0659    P.O.  420     I.V. (mL/kg) 2274 (29.6) 100 (1.3)     IV Piggyback 1324 50     Total Intake(mL/kg) 3598 (46.9) 570 (7.4)     Urine (mL/kg/hr) 550 300 (0.2)     Emesis/NG output  0 (0)     Drains 100 90 (0) 110 (1)    Other  0 (0)     Stool  1 (0)     Blood  0 (0)     Total Output 650 391 110    Net +2948 +179 -110           Urine Occurrence  0 x     Stool Occurrence  1 x     Emesis Occurrence  0 x           Physical Exam   Constitutional: He is oriented to person, place, and time.   Eyes: No scleral icterus.   Cardiovascular: Normal rate and regular rhythm.  Exam reveals no gallop and no friction rub.    No murmur heard.  Pulmonary/Chest: Effort normal and breath  sounds normal.   Abdominal: Soft. Bowel sounds are normal. He exhibits no distension. There is no tenderness.   Right sided drains in place x2; new drain lateral; serosanguinous with some debris.   Musculoskeletal: Normal range of motion.   Neurological: He is alert and oriented to person, place, and time.   Skin: Skin is warm and dry. No rash noted.       Significant Labs:  CBC:   Recent Labs  Lab 12/15/17  0445   WBC 7.25   RBC 2.72*   HGB 8.1*   HCT 25.0*   PLT 77*   MCV 92   MCH 29.8   MCHC 32.4     BMP:   Recent Labs  Lab 12/15/17  0445         K 3.7      CO2 24   BUN 10   CREATININE 0.8   CALCIUM 8.4*   MG 1.8     LFTs:   Recent Labs  Lab 12/15/17  0445   ALT 20   AST 26   ALKPHOS 154*   BILITOT 0.8   PROT 5.6*   ALBUMIN 2.1*     Coagulation:   Recent Labs  Lab 12/13/17  1239   LABPROT 10.8   INR 1.0     Microbiology Results (last 7 days)     Procedure Component Value Units Date/Time    Aerobic culture [[id number]] Collected:  12/14/17 0955    Order Status:  Completed Specimen:  Abscess from Abdomen Updated:  12/15/17 0755     Aerobic Bacterial Culture No growth    Gram stain [[id number]] Collected:  12/14/17 0952    Order Status:  Completed Specimen:  Abscess from Abdomen Updated:  12/14/17 1519     Gram Stain Result No WBC's      No organisms seen    Gram stain [[id number]] Collected:  12/14/17 0955    Order Status:  Completed Specimen:  Abscess from Abdomen Updated:  12/14/17 1505     Gram Stain Result Moderate WBC's      No organisms seen    Culture, Anaerobe [[id number]] Collected:  12/14/17 0952    Order Status:  Sent Specimen:  Abscess from Abdomen Updated:  12/14/17 1233    Aerobic culture [[id number]] Collected:  12/14/17 0955    Order Status:  Sent Specimen:  Abscess from Abdomen Updated:  12/14/17 1214    Culture, Anaerobe [[id number]] Collected:  12/14/17 0952    Order Status:  Sent Specimen:  Abscess from Abdomen Updated:  12/14/17 1213          Significant Diagnostics:  I have  reviewed all pertinent imaging results/findings within the past 24 hours.

## 2017-12-15 NOTE — PLAN OF CARE
12/15/17 1115   Discharge Assessment   Assessment Type Discharge Planning Assessment   Confirmed/corrected address and phone number on facesheet? Yes   Assessment information obtained from? Patient   Expected Length of Stay (days) 2   Communicated expected length of stay with patient/caregiver yes   Prior to hospitilization cognitive status: Alert/Oriented   Prior to hospitalization functional status: Independent   Current cognitive status: Alert/Oriented   Current Functional Status: Independent   Lives With spouse   Able to Return to Prior Arrangements yes   Is patient able to care for self after discharge? Yes   Readmission Within The Last 30 Days no previous admission in last 30 days   Equipment Currently Used at Home rollator;shower chair;grab bar   Do you have any problems affording any of your prescribed medications? No   Is the patient taking medications as prescribed? no   Does the patient have transportation home? Yes   Transportation Available family or friend will provide   Does the patient receive services at the [facility]? No   Discharge Plan A Home Health   Patient/Family In Agreement With Plan yes

## 2017-12-15 NOTE — SUBJECTIVE & OBJECTIVE
Review of Systems   Constitution: Negative for chills, fever and malaise/fatigue.   Eyes: Negative for blurred vision and double vision.   Cardiovascular: Negative for chest pain, claudication, irregular heartbeat, near-syncope, orthopnea, palpitations, paroxysmal nocturnal dyspnea and syncope.   Respiratory: Negative for cough and shortness of breath.    Gastrointestinal: Negative for nausea and vomiting.   Neurological: Positive for disturbances in coordination. Negative for headaches and light-headedness.     Objective:     Vital Signs (Most Recent):  Temp: 98.1 °F (36.7 °C) (12/15/17 0800)  Pulse: 87 (12/15/17 1033)  Resp: 18 (12/15/17 0800)  BP: (!) 115/57 (12/15/17 0800)  SpO2: (!) 94 % (12/15/17 0800) Vital Signs (24h Range):  Temp:  [97 °F (36.1 °C)-99.6 °F (37.6 °C)] 98.1 °F (36.7 °C)  Pulse:  [] 87  Resp:  [16-20] 18  SpO2:  [93 %-97 %] 94 %  BP: (115-149)/(57-68) 115/57     Weight: 76.7 kg (169 lb 1.5 oz)  Body mass index is 27.71 kg/m².    SpO2: (!) 94 %  O2 Device (Oxygen Therapy): room air      Intake/Output Summary (Last 24 hours) at 12/15/17 1205  Last data filed at 12/15/17 0849   Gross per 24 hour   Intake              530 ml   Output              531 ml   Net               -1 ml       Lines/Drains/Airways     Peripherally Inserted Central Catheter Line                 PICC Double Lumen 10/17/17 1030 right basilic 59 days          Drain                 Closed/Suction Drain 10/12/17 1711 Right;Anterior Abdomen Bulb 8 Fr. 63 days         Closed/Suction Drain 12/14/17 1007 Right Abdomen Other (Comment) 8 Fr. 1 day          Peripheral Intravenous Line                 Peripheral IV - Single Lumen 12/13/17 1337 Left Antecubital 1 day                Physical Exam   Constitutional: He is oriented to person, place, and time. No distress.   Neck: JVD present.   Cardiovascular: Normal rate, normal heart sounds and intact distal pulses.  Exam reveals no gallop and no friction rub.    No murmur  heard.  Pulmonary/Chest: Effort normal. He has wheezes. He has no rales.   Abdominal: Soft. He exhibits no distension. There is no tenderness.   Musculoskeletal: He exhibits no edema.   Neurological: He is alert and oriented to person, place, and time.   Skin: Skin is warm and dry. He is not diaphoretic.   Nursing note and vitals reviewed.      Significant Labs:   CMP     Recent Labs  Lab 12/15/17  0445      K 3.7      CO2 24      BUN 10   CREATININE 0.8   CALCIUM 8.4*   PROT 5.6*   ALBUMIN 2.1*   BILITOT 0.8   ALKPHOS 154*   AST 26   ALT 20   ANIONGAP 7*   ESTGFRAFRICA >60.0   EGFRNONAA >60.0   , CBC     Recent Labs  Lab 12/15/17  0445   WBC 7.25   HGB 8.1*   HCT 25.0*   PLT 77*   , INR     Recent Labs  Lab 12/13/17  1239   INR 1.0   , Troponin     Recent Labs  Lab 12/13/17  1239 12/14/17  1300   TROPONINI 0.037* 0.033*    and All pertinent lab results from the last 24 hours have been reviewed.    Significant Imaging:   Echo 12/14/2017:    1 - Technically difficult study.     2 - Moderately depressed left ventricular systolic function (EF 35-40%).     3 - Mildly depressed right ventricular systolic function .     4 - Impaired LV relaxation, normal LAP (grade 1 diastolic dysfunction).    EKG 12/14/2017:  - NSR  - LBB    EKG 12/13/2017:  - A flutter with 2:1 conduction    CXR 12/13/2017:        X-Ray Chest AP Portable (Final result)  Result time 12/14/17 08:45:47    Final result by Blaine Corrales Jr., MD (12/14/17 08:45:47)                 Narrative:    Chest single view compared to October 2017.  Right-sided PICC catheter overlies the right innominate vein.  There is right pleural fluid.  Surgical drain in the right upper quadrant.  Heart size and pulmonary vessels are normal.  Lungs are clear.    Impression see above      Electronically signed by: BLAINE CORRALES MD  Date:     12/14/17  Time:    08:45

## 2017-12-15 NOTE — PROGRESS NOTES
[facility]  Infectious Disease  Progress Note    Patient Name: Robby Soriano  MRN: [id number]  Admission Date: 12/13/2017  Length of Stay: 2 days  Attending Physician: Allyn Garcia MD  Primary Care Provider: Lyla Bryan MD    Isolation Status: No active isolations  Assessment/Plan:      * Liver abscess    -continue meropenem for now   -now showing preliminary GNR lactose  in IR biopys; past cultures showed ESBL K. pneumo  -recommend obtaining ESR/CRP (ordered)             Anticipated Disposition: inpt    Thank you for your consult. I will follow-up with patient. Please contact us if you have any additional questions.    Thuan Cassidy MD  Infectious Disease  [facility]    Subjective:     Principal Problem:Liver abscess    HPI: Mr Soriano is a 76 yo M with IgG4 sclerosing cholangiocarcinoma s/p resection in 08/2017, polymicrobial hepatic abscess in the past s/p drain placement in 10/2107, recent STEMI medically managed from 10/2017 [place] [facility] [place], dyspnea, cough,  and elevated lactate concerning for sepsis. In his prior surgery for resection of his cholangiocarcinoma in August 2017, he was noted to have lobectomy of L side, resection middle,left hepatic veins, CBD, and anastomosis of R hepatic duct which was complicated by a liver abscess s/p percutaneous drain placement and a prolonged course of IV meropenem- danielito stop date 12/9. A repeat CT a/p showed superior hepatic fluid reaccumulation. Patient is currently denying abdominal pain, fever, palpitations, angina, PND. ID was consulted for hepatic abscess on prior meropenem which has not resolved since therapy completed. Micro history is significant for an E. Coli bacteremia and E. Faecalis (vanc-sensitive) during his October hospital course. He is scheduled for IR guided abscess drain w/ biopsies this admission and is on meropenem. He is also in questionable  supraventricular arrhythmia with tachycardic rate in 160s, Cardiology consulted.   Interval History: Afebrile, Tmax 99.6F. Reports an episode of rigors yesterday evening.     Review of Systems   Constitutional: Positive for chills and fatigue. Negative for unexpected weight change.   HENT: Negative for sore throat.    Eyes: Negative for visual disturbance.   Respiratory: Positive for cough and shortness of breath. Negative for apnea, chest tightness and wheezing.    Gastrointestinal: Negative for abdominal pain and anal bleeding.   Genitourinary: Negative for dysuria.   Musculoskeletal: Negative for arthralgias, back pain and myalgias.   Skin: Positive for wound. Negative for color change and rash.   Allergic/Immunologic: Negative for immunocompromised state.   Neurological: Negative for dizziness.   Psychiatric/Behavioral: Negative for agitation.   All other systems reviewed and are negative.    Objective:     Vital Signs (Most Recent):  Temp: 97 °F (36.1 °C) (12/15/17 0351)  Pulse: 94 (12/15/17 0659)  Resp: 17 (12/15/17 0351)  BP: (!) 146/66 (12/15/17 0351)  SpO2: 97 % (12/15/17 0351) Vital Signs (24h Range):  Temp:  [97 °F (36.1 °C)-99.6 °F (37.6 °C)] 97 °F (36.1 °C)  Pulse:  [] 94  Resp:  [12-20] 17  SpO2:  [93 %-100 %] 97 %  BP: (102-149)/(54-68) 146/66     Weight: 76.7 kg (169 lb 1.5 oz)  Body mass index is 27.71 kg/m².    Estimated Creatinine Clearance: 74.7 mL/min (based on SCr of 0.8 mg/dL).    Physical Exam   Constitutional: He is oriented to person, place, and time. He appears well-developed and well-nourished.   HENT:   Head: Normocephalic and atraumatic.   Nose: Nose normal.   Eyes: Conjunctivae and EOM are normal. Pupils are equal, round, and reactive to light. No scleral icterus.   Neck: Normal range of motion. No JVD present. No thyromegaly present.   Cardiovascular: Normal rate and regular rhythm.  Exam reveals no gallop and no friction rub.    No murmur heard.  Pulmonary/Chest: Effort normal  and breath sounds normal. No respiratory distress. He has no wheezes.   Abdominal: Soft. Bowel sounds are normal. He exhibits no distension and no mass. There is no tenderness. There is no guarding.   Right sided drain w/ output noted    Musculoskeletal: Normal range of motion.   Lymphadenopathy:     He has no cervical adenopathy.   Neurological: He is alert and oriented to person, place, and time. No cranial nerve deficit.   Skin: Skin is warm and dry. Capillary refill takes less than 2 seconds. No rash noted.   Psychiatric: He has a normal mood and affect.       Significant Labs:   Bilirubin:   Recent Labs  Lab 11/17/17  1210 11/27/17  0840 12/13/17  0937 12/15/17  0445   BILITOT 1.0 0.9 1.4* 0.8     CBC:   Recent Labs  Lab 12/13/17  0937 12/15/17  0445   WBC 12.63 7.25   HGB 10.4* 8.1*   HCT 31.8* 25.0*   * 77*     CMP:   Recent Labs  Lab 12/13/17  0937 12/15/17  0445   * 137   K 4.2 3.7    106   CO2 27 24   * 110   BUN 16 10   CREATININE 1.1 0.8   CALCIUM 9.6 8.4*   PROT 7.2 5.6*   ALBUMIN 2.6* 2.1*   BILITOT 1.4* 0.8   ALKPHOS 175* 154*   AST 27 26   ALT 25 20   ANIONGAP 8 7*   EGFRNONAA >60.0 >60.0     Coagulation:   Recent Labs  Lab 12/13/17  1239   INR 1.0     POCT Glucose: No results for input(s): POCTGLUCOSE in the last 48 hours.  Urine Studies:   Recent Labs  Lab 10/12/17  0500   COLORU [place]   APPEARANCEUA Cloudy*   PHUR 5.0   SPECGRAV >=1.030*   PROTEINUA 1+*   GLUCUA Negative   KETONESU Negative   BILIRUBINUA 1+*   OCCULTUA 3+*   NITRITE Negative   UROBILINOGEN 4.0   LEUKOCYTESUR Negative   RBCUA 2   WBCUA 0   BACTERIA None   SQUAMEPITHEL 1   HYALINECASTS 0     All pertinent labs within the past 24 hours have been reviewed.  Microbiology Results (last 7 days)     Procedure Component Value Units Date/Time    Aerobic culture [[id number]] Collected:  12/14/17 0955    Order Status:  Completed Specimen:  Abscess from Abdomen Updated:  12/15/17 0755     Aerobic Bacterial Culture No  growth    Gram stain [[id number]] Collected:  12/14/17 0952    Order Status:  Completed Specimen:  Abscess from Abdomen Updated:  12/14/17 1519     Gram Stain Result No WBC's      No organisms seen    Gram stain [[id number]] Collected:  12/14/17 0955    Order Status:  Completed Specimen:  Abscess from Abdomen Updated:  12/14/17 1505     Gram Stain Result Moderate WBC's      No organisms seen    Culture, Anaerobe [[id number]] Collected:  12/14/17 0952    Order Status:  Sent Specimen:  Abscess from Abdomen Updated:  12/14/17 1233    Aerobic culture [[id number]] Collected:  12/14/17 0955    Order Status:  Sent Specimen:  Abscess from Abdomen Updated:  12/14/17 1214    Culture, Anaerobe [[id number]] Collected:  12/14/17 0952    Order Status:  Sent Specimen:  Abscess from Abdomen Updated:  12/14/17 1213          Significant Imaging: I have reviewed all pertinent imaging results/findings within the past 24 hours.

## 2017-12-15 NOTE — PROGRESS NOTES
[facility]  Cardiology  Progress Note    Patient Name: Robby Soriano  MRN: [id number]  Admission Date: 12/13/2017  Hospital Length of Stay: 2 days  Code Status: Full Code   Attending Physician: Allyn Garcia MD   Primary Care Physician: Lyla Bryan MD  Expected Discharge Date: 12/17/2017  Principal Problem:Liver abscess    Subjective:     Hospital Course:   12/15: Still in RVR at times to 150. No cardiac complaints.    Review of Systems   Constitution: Negative for chills, fever and malaise/fatigue.   Eyes: Negative for blurred vision and double vision.   Cardiovascular: Negative for chest pain, claudication, irregular heartbeat, near-syncope, orthopnea, palpitations, paroxysmal nocturnal dyspnea and syncope.   Respiratory: Negative for cough and shortness of breath.    Gastrointestinal: Negative for nausea and vomiting.   Neurological: Positive for disturbances in coordination. Negative for headaches and light-headedness.     Objective:     Vital Signs (Most Recent):  Temp: 98.1 °F (36.7 °C) (12/15/17 0800)  Pulse: 87 (12/15/17 1033)  Resp: 18 (12/15/17 0800)  BP: (!) 115/57 (12/15/17 0800)  SpO2: (!) 94 % (12/15/17 0800) Vital Signs (24h Range):  Temp:  [97 °F (36.1 °C)-99.6 °F (37.6 °C)] 98.1 °F (36.7 °C)  Pulse:  [] 87  Resp:  [16-20] 18  SpO2:  [93 %-97 %] 94 %  BP: (115-149)/(57-68) 115/57     Weight: 76.7 kg (169 lb 1.5 oz)  Body mass index is 27.71 kg/m².    SpO2: (!) 94 %  O2 Device (Oxygen Therapy): room air      Intake/Output Summary (Last 24 hours) at 12/15/17 1205  Last data filed at 12/15/17 0849   Gross per 24 hour   Intake              530 ml   Output              531 ml   Net               -1 ml       Lines/Drains/Airways     Peripherally Inserted Central Catheter Line                 PICC Double Lumen 10/17/17 1030 right basilic 59 days          Drain                 Closed/Suction Drain 10/12/17 1711 Right;Anterior Abdomen Bulb 8 Fr. 63 days         Closed/Suction  Drain 12/14/17 1007 Right Abdomen Other (Comment) 8 Fr. 1 day          Peripheral Intravenous Line                 Peripheral IV - Single Lumen 12/13/17 1337 Left Antecubital 1 day                Physical Exam   Constitutional: He is oriented to person, place, and time. No distress.   Neck: JVD present.   Cardiovascular: Normal rate, normal heart sounds and intact distal pulses.  Exam reveals no gallop and no friction rub.    No murmur heard.  Pulmonary/Chest: Effort normal. He has wheezes. He has no rales.   Abdominal: Soft. He exhibits no distension. There is no tenderness.   Musculoskeletal: He exhibits no edema.   Neurological: He is alert and oriented to person, place, and time.   Skin: Skin is warm and dry. He is not diaphoretic.   Nursing note and vitals reviewed.      Significant Labs:   CMP     Recent Labs  Lab 12/15/17  0445      K 3.7      CO2 24      BUN 10   CREATININE 0.8   CALCIUM 8.4*   PROT 5.6*   ALBUMIN 2.1*   BILITOT 0.8   ALKPHOS 154*   AST 26   ALT 20   ANIONGAP 7*   ESTGFRAFRICA >60.0   EGFRNONAA >60.0   , CBC     Recent Labs  Lab 12/15/17  0445   WBC 7.25   HGB 8.1*   HCT 25.0*   PLT 77*   , INR     Recent Labs  Lab 12/13/17  1239   INR 1.0   , Troponin     Recent Labs  Lab 12/13/17  1239 12/14/17  1300   TROPONINI 0.037* 0.033*    and All pertinent lab results from the last 24 hours have been reviewed.    Significant Imaging:   Echo 12/14/2017:    1 - Technically difficult study.     2 - Moderately depressed left ventricular systolic function (EF 35-40%).     3 - Mildly depressed right ventricular systolic function .     4 - Impaired LV relaxation, normal LAP (grade 1 diastolic dysfunction).    EKG 12/14/2017:  - NSR  - LBB    EKG 12/13/2017:  - A flutter with 2:1 conduction    CXR 12/13/2017:        X-Ray Chest AP Portable (Final result)  Result time 12/14/17 08:45:47    Final result by Nicolas Corrales Jr., MD (12/14/17 08:45:47)                 Narrative:    Chest single  view compared to October 2017.  Right-sided PICC catheter overlies the right innominate vein.  There is right pleural fluid.  Surgical drain in the right upper quadrant.  Heart size and pulmonary vessels are normal.  Lungs are clear.    Impression see above      Electronically signed by: BLAINE JASSO MD  Date:     12/14/17  Time:    08:45                                 Assessment and Plan:     Paroxysmal atrial flutter    78yo with CAD and paroxysmal atrial flutter, suspect related to sepsis from liver abscess.   - Now in SR   - Echo with EF 35-40%, global hypokinesis  - CHADSVASC = 4 (age, HTN, CAD), annual risk of stroke approximately 5%    Recommend:  - continue Toprol 50mg qday  - start warfarin with Lovenox or heparin bridge  - ENT consult as he previously was intolerant of DAPT due to profuse nosebleeds, would ask ENT re ?prophylactic cautery    Thank you for the consult, we will sign off. Please contact us with any questions.         Coronary artery disease involving coronary bypass graft of native heart without angina pectoris    - Continue clopidogrel 75mg qday  - Continue atorvastatin 80mg qday  - Continue Toprol  - Was on lisinopril previously, ?stopped due to hypotension, would consider restarting if BP able to tolerate low dose            VTE Risk Mitigation         Ordered     warfarin (COUMADIN) tablet 2.5 mg  Daily     Route:  Oral        12/15/17 1054     enoxaparin injection 40 mg  Daily     Route:  Subcutaneous        12/13/17 1937     Low Risk of VTE  Once      12/14/17 1125          Ayesha Barraza MD  Cardiology  [facility]

## 2017-12-15 NOTE — PLAN OF CARE
Problem: Patient Care Overview  Goal: Plan of Care Review  Outcome: Ongoing (interventions implemented as appropriate)  Pt free from fall and injury.  Pt afebrile.

## 2017-12-15 NOTE — PROGRESS NOTES
[facility]  General Surgery  Progress Note    Subjective:     History of Present Illness:  Robby Soriano is a 77 y.o. male well known to the surgical oncology service for a history of IGG-4 associated sclerosing cholangitis. He is s/p left hepatectomy and extrahepatic bile duct resection with eron-en-y reconstruction in 8/2017, complicated by hepatic abscesses requiring IR drainage.    He was seen in clinic today in follow up and reported chills last night, darkening drainage from biliary drain and also now with new onset drainage around drain insertion site, no change in amount of drainage in actual drain, also has been weak over past few days, also with new nasal congestion, and mild cough with clear sputum production and mild CHOWDHURY. He has been off of Prednisone since shortly after his last visit.   Today, he says he feels well. No c/o pain. He was however noted to have significant tachycardia and given his cardiac history vs concern for on going sepsis, he was referred to the ER for further evaluation.      Prior history as follows:     Of note he has a history of CAD s/p CABG, HTN, HLD, prostate CA s/p prostatectomy 2005 and was found to have hepatic mass/abscess (segment IV) near hilum with stricture of upper bile duct on ERCP s/p L hepatectomy and resection of extrahepatic biliary tree , Eron-en-Y on 8/3/17 with final pathology demonstrating IGG-4 associated sclerosing cholangitis (on prednisone 40 mg daily). OR cultures (8/3/17) of hepatic abscess showed Clostridium perfringens, E coli, K pneumoniae, E faecalis given 5 days of IV antibiotics post operatively.   He was then seen in our clinic for follow up 10/11/17 due to jaundice and fatigue with subjective fevers and was admitted that day with A/P CT scan 10/11/17 showed: 7.2 cm gas collection in the hepatic dome containing scattered debris with an adjacent gas and fluid collection along the resection bed measuring 2.7 cm in greatest  diameter. Now s/p IR drains x2 10/12/17 with cultures positive for E coli, K pneumoniae ESBL, E faecalis and C perfringens; complicated with bacteremia 10/11/17 with E coli and C perfringens on B/C. Patient was then discharged with  on 10/18 and told to complete a 2 weeks course of Meropenem.    Post-Op Info:  * No surgery found *         Interval History:   Underwent IR drain placement yesterday  No acute events  Some soreness but no pain, fevers or chills  H/H down on morning CBC; drain slightly bloody; gram stain no organisms    Medications:  Continuous Infusions:  Scheduled Meds:   aspirin  81 mg Oral Daily    atorvastatin  80 mg Oral Daily    enoxaparin  40 mg Subcutaneous Daily    escitalopram oxalate  10 mg Oral Daily    levothyroxine  100 mcg Oral Daily    meropenem (MERREM) IVPB  1 g Intravenous Q8H    metoprolol succinate  50 mg Oral Daily    pantoprazole  40 mg Oral Daily     PRN Meds:acetaminophen, ALPRAZolam, metoprolol, ondansetron, sodium chloride 0.9%     Review of patient's allergies indicates:  No Known Allergies  Objective:     Vital Signs (Most Recent):  Temp: 97 °F (36.1 °C) (12/15/17 0351)  Pulse: 94 (12/15/17 0659)  Resp: 17 (12/15/17 0351)  BP: (!) 146/66 (12/15/17 0351)  SpO2: 97 % (12/15/17 0351) Vital Signs (24h Range):  Temp:  [97 °F (36.1 °C)-99.6 °F (37.6 °C)] 97 °F (36.1 °C)  Pulse:  [] 94  Resp:  [12-20] 17  SpO2:  [93 %-100 %] 97 %  BP: (102-149)/(54-68) 146/66     Weight: 76.7 kg (169 lb 1.5 oz)  Body mass index is 27.71 kg/m².    Intake/Output - Last 3 Shifts       12/13 0700 - 12/14 0659 12/14 0700 - 12/15 0659 12/15 0700 - 12/16 0659    P.O.  420     I.V. (mL/kg) 2274 (29.6) 100 (1.3)     IV Piggyback 1324 50     Total Intake(mL/kg) 3598 (46.9) 570 (7.4)     Urine (mL/kg/hr) 550 300 (0.2)     Emesis/NG output  0 (0)     Drains 100 90 (0) 110 (1)    Other  0 (0)     Stool  1 (0)     Blood  0 (0)     Total Output 650 391 110    Net +2948 +179 -110           Urine  Occurrence  0 x     Stool Occurrence  1 x     Emesis Occurrence  0 x           Physical Exam   Constitutional: He is oriented to person, place, and time.   Eyes: No scleral icterus.   Cardiovascular: Normal rate and regular rhythm.  Exam reveals no gallop and no friction rub.    No murmur heard.  Pulmonary/Chest: Effort normal and breath sounds normal.   Abdominal: Soft. Bowel sounds are normal. He exhibits no distension. There is no tenderness.   Right sided drains in place x2; new drain lateral; serosanguinous with some debris.   Musculoskeletal: Normal range of motion.   Neurological: He is alert and oriented to person, place, and time.   Skin: Skin is warm and dry. No rash noted.       Significant Labs:  CBC:   Recent Labs  Lab 12/15/17  0445   WBC 7.25   RBC 2.72*   HGB 8.1*   HCT 25.0*   PLT 77*   MCV 92   MCH 29.8   MCHC 32.4     BMP:   Recent Labs  Lab 12/15/17  0445         K 3.7      CO2 24   BUN 10   CREATININE 0.8   CALCIUM 8.4*   MG 1.8     LFTs:   Recent Labs  Lab 12/15/17  0445   ALT 20   AST 26   ALKPHOS 154*   BILITOT 0.8   PROT 5.6*   ALBUMIN 2.1*     Coagulation:   Recent Labs  Lab 12/13/17  1239   LABPROT 10.8   INR 1.0     Microbiology Results (last 7 days)     Procedure Component Value Units Date/Time    Aerobic culture [[id number]] Collected:  12/14/17 0955    Order Status:  Completed Specimen:  Abscess from Abdomen Updated:  12/15/17 0755     Aerobic Bacterial Culture No growth    Gram stain [[id number]] Collected:  12/14/17 0952    Order Status:  Completed Specimen:  Abscess from Abdomen Updated:  12/14/17 1519     Gram Stain Result No WBC's      No organisms seen    Gram stain [[id number]] Collected:  12/14/17 0955    Order Status:  Completed Specimen:  Abscess from Abdomen Updated:  12/14/17 1505     Gram Stain Result Moderate WBC's      No organisms seen    Culture, Anaerobe [[id number]] Collected:  12/14/17 0952    Order Status:  Sent Specimen:  Abscess from Abdomen  Updated:  12/14/17 1233    Aerobic culture [[id number]] Collected:  12/14/17 0955    Order Status:  Sent Specimen:  Abscess from Abdomen Updated:  12/14/17 1214    Culture, Anaerobe [[id number]] Collected:  12/14/17 0952    Order Status:  Sent Specimen:  Abscess from Abdomen Updated:  12/14/17 1213          Significant Diagnostics:  I have reviewed all pertinent imaging results/findings within the past 24 hours.    Assessment/Plan:     Sepsis    77 y.o. 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 Now with chills, tachycardia, and CT evidence of recurrent collection. Currently admitted to hospital medicine. Now s/p IR drain placement 12/14/2017.  -Continue antibiotics per ID recommendations given that they have been managing him outpatient and he has previously grown multiple organisms  -Lactic acidosis - resolved  -Drain flushes to keep patent (ordered)  -Follow cultures  -Given drop in H/H and history of plavix consider rechecking CBC  -Will follow with you            Ian Langford MD  General Surgery  [facility]

## 2017-12-15 NOTE — PLAN OF CARE
CM contacted Minh with [facility], notified pt for possible d/c home over weekend, early next week.  If pt d/c over weekend, pt will receive antibiotic supply delivered to room prior to d/c.  Minh is on call and should be contacted ASAP once final antibiotic decided along with updated HH orders with end dates to ensure timely delivery.  [facility] is current home health agency.  CM did place referral in [facility] care, agency on call person should be contacted at d/c for notification only.      Shakira Waller, RN  Case Management  [phone]

## 2017-12-15 NOTE — PLAN OF CARE
Problem: Patient Care Overview  Goal: Plan of Care Review  Outcome: Ongoing (interventions implemented as appropriate)  Patient remained free of falls and injuries during shift.  Patient took medications without incident.  Patient twice during shift had spikes in his heart rate up to 150.  Both time the heart rate fell back to around 100 within 5-10 minutes.  No other concerns noted.

## 2017-12-16 LAB
ANISOCYTOSIS BLD QL SMEAR: SLIGHT
BACTERIA SPEC AEROBE CULT: NORMAL
BASOPHILS # BLD AUTO: 0.04 K/UL
BASOPHILS NFR BLD: 0.5 %
DIFFERENTIAL METHOD: ABNORMAL
EOSINOPHIL # BLD AUTO: 0.4 K/UL
EOSINOPHIL NFR BLD: 4.7 %
ERYTHROCYTE [DISTWIDTH] IN BLOOD BY AUTOMATED COUNT: 15 %
HCT VFR BLD AUTO: 26.9 %
HGB BLD-MCNC: 8.9 G/DL
HYPOCHROMIA BLD QL SMEAR: ABNORMAL
IMM GRANULOCYTES # BLD AUTO: 0.08 K/UL
IMM GRANULOCYTES NFR BLD AUTO: 1 %
INR PPP: 1
LYMPHOCYTES # BLD AUTO: 1 K/UL
LYMPHOCYTES NFR BLD: 13 %
MCH RBC QN AUTO: 30.6 PG
MCHC RBC AUTO-ENTMCNC: 33.1 G/DL
MCV RBC AUTO: 92 FL
MONOCYTES # BLD AUTO: 0.8 K/UL
MONOCYTES NFR BLD: 10.3 %
NEUTROPHILS # BLD AUTO: 5.6 K/UL
NEUTROPHILS NFR BLD: 70.5 %
NRBC BLD-RTO: 0 /100 WBC
OB PNL STL: NEGATIVE
OVALOCYTES BLD QL SMEAR: ABNORMAL
PLATELET # BLD AUTO: 146 K/UL
PMV BLD AUTO: 9.8 FL
POIKILOCYTOSIS BLD QL SMEAR: SLIGHT
POLYCHROMASIA BLD QL SMEAR: ABNORMAL
PROTHROMBIN TIME: 10.3 SEC
RBC # BLD AUTO: 2.91 M/UL
WBC # BLD AUTO: 7.94 K/UL

## 2017-12-16 PROCEDURE — 97161 PT EVAL LOW COMPLEX 20 MIN: CPT

## 2017-12-16 PROCEDURE — 85610 PROTHROMBIN TIME: CPT

## 2017-12-16 PROCEDURE — 85025 COMPLETE CBC W/AUTO DIFF WBC: CPT

## 2017-12-16 PROCEDURE — 25000003 PHARM REV CODE 250: Performed by: STUDENT IN AN ORGANIZED HEALTH CARE EDUCATION/TRAINING PROGRAM

## 2017-12-16 PROCEDURE — 63600175 PHARM REV CODE 636 W HCPCS: Performed by: INTERNAL MEDICINE

## 2017-12-16 PROCEDURE — 25000003 PHARM REV CODE 250: Performed by: INTERNAL MEDICINE

## 2017-12-16 PROCEDURE — 99232 SBSQ HOSP IP/OBS MODERATE 35: CPT | Mod: ,,, | Performed by: INTERNAL MEDICINE

## 2017-12-16 PROCEDURE — 11000001 HC ACUTE MED/SURG PRIVATE ROOM

## 2017-12-16 PROCEDURE — 36415 COLL VENOUS BLD VENIPUNCTURE: CPT

## 2017-12-16 PROCEDURE — 99232 SBSQ HOSP IP/OBS MODERATE 35: CPT | Mod: GC,,, | Performed by: INTERNAL MEDICINE

## 2017-12-16 PROCEDURE — 82272 OCCULT BLD FECES 1-3 TESTS: CPT

## 2017-12-16 RX ADMIN — METOPROLOL SUCCINATE 50 MG: 50 TABLET, EXTENDED RELEASE ORAL at 08:12

## 2017-12-16 RX ADMIN — MEROPENEM AND SODIUM CHLORIDE 1 G: 1 INJECTION, SOLUTION INTRAVENOUS at 09:12

## 2017-12-16 RX ADMIN — PANTOPRAZOLE SODIUM 40 MG: 40 TABLET, DELAYED RELEASE ORAL at 08:12

## 2017-12-16 RX ADMIN — ENOXAPARIN SODIUM 40 MG: 100 INJECTION SUBCUTANEOUS at 04:12

## 2017-12-16 RX ADMIN — ONDANSETRON 8 MG: 8 TABLET, ORALLY DISINTEGRATING ORAL at 11:12

## 2017-12-16 RX ADMIN — LEVOTHYROXINE SODIUM 100 MCG: 100 TABLET ORAL at 07:12

## 2017-12-16 RX ADMIN — ATORVASTATIN CALCIUM 80 MG: 20 TABLET, FILM COATED ORAL at 08:12

## 2017-12-16 RX ADMIN — MEROPENEM AND SODIUM CHLORIDE 1 G: 1 INJECTION, SOLUTION INTRAVENOUS at 05:12

## 2017-12-16 RX ADMIN — CLOPIDOGREL 75 MG: 75 TABLET, FILM COATED ORAL at 08:12

## 2017-12-16 RX ADMIN — WARFARIN SODIUM 2.5 MG: 2.5 TABLET ORAL at 04:12

## 2017-12-16 RX ADMIN — ESCITALOPRAM OXALATE 10 MG: 10 TABLET ORAL at 08:12

## 2017-12-16 RX ADMIN — MEROPENEM AND SODIUM CHLORIDE 1 G: 1 INJECTION, SOLUTION INTRAVENOUS at 12:12

## 2017-12-16 NOTE — PLAN OF CARE
Problem: Patient Care Overview  Goal: Plan of Care Review  Outcome: Ongoing (interventions implemented as appropriate)  Patient remained free of falls and injuries during shift.  Patient took medications as ordered.  No other concerns noted.

## 2017-12-16 NOTE — PLAN OF CARE
Problem: Physical Therapy Goal  Goal: Physical Therapy Goal  Outcome: Outcome(s) achieved Date Met: 12/16/17  PT evaluation completed. No further acute PT services needed.    Linda Simmons, PT, DPT  12/16/2017

## 2017-12-16 NOTE — SUBJECTIVE & OBJECTIVE
Interval History: afebrile, feeling better, no overnight acute events, drains output: 190mL, other 30 mL in last 24 hours    Review of Systems   Constitutional: Negative for chills and fever.   HENT: Negative for sore throat.    Respiratory: Negative for cough, chest tightness and shortness of breath.    Cardiovascular: Negative for chest pain, palpitations and leg swelling.   Gastrointestinal: Negative for abdominal distention, abdominal pain, diarrhea, nausea and vomiting.   Genitourinary: Negative for dysuria, flank pain and urgency.   Skin: Negative for rash.   Neurological: Negative for dizziness, light-headedness and numbness.   Psychiatric/Behavioral: Negative for confusion.     Objective:     Vital Signs (Most Recent):  Temp: 96.3 °F (35.7 °C) (12/16/17 1114)  Pulse: 81 (12/16/17 1114)  Resp: 16 (12/16/17 1114)  BP: (!) 127/57 (12/16/17 1114)  SpO2: 95 % (12/16/17 1114) Vital Signs (24h Range):  Temp:  [96.3 °F (35.7 °C)-98.3 °F (36.8 °C)] 96.3 °F (35.7 °C)  Pulse:  [] 81  Resp:  [16-18] 16  SpO2:  [93 %-97 %] 95 %  BP: (118-140)/(57-63) 127/57     Weight: 76.7 kg (169 lb 1.5 oz)  Body mass index is 27.71 kg/m².    Estimated Creatinine Clearance: 74.7 mL/min (based on SCr of 0.8 mg/dL).    Physical Exam   Constitutional: He is oriented to person, place, and time. No distress.   HENT:   Head: Normocephalic and atraumatic.   Mouth/Throat: No oropharyngeal exudate.   Eyes: No scleral icterus.   Cardiovascular: Normal rate, regular rhythm and normal heart sounds.    Pulmonary/Chest: Effort normal and breath sounds normal. No respiratory distress. He has no wheezes. He has no rales.   Abdominal: Soft. Bowel sounds are normal. He exhibits no distension. There is no tenderness. There is no rebound and no guarding.   2 drains in place RUQ   Musculoskeletal: He exhibits no edema.   Neurological: He is alert and oriented to person, place, and time.   Skin: No rash noted.   Vitals reviewed.      Significant Labs:    Bilirubin:   Recent Labs  Lab 11/17/17  1210 11/27/17  0840 12/13/17  0937 12/15/17  0445   BILITOT 1.0 0.9 1.4* 0.8     Blood Culture:   Recent Labs  Lab 10/12/17  2208 10/12/17  2235 10/14/17  0355 10/14/17  0410 12/13/17  0937   LABBLOO Gram stain aer bottle: Gram negative rods  Results called to and read back by:Harry Cook RN 10/13/2017  15:51  ESCHERICHIA COLI No growth after 5 days. No growth after 5 days. No growth after 5 days. No Growth to date  No Growth to date  No Growth to date  No Growth to date  No Growth to date  No Growth to date  No Growth to date  No Growth to date     BMP:   Recent Labs  Lab 12/15/17  0445         K 3.7      CO2 24   BUN 10   CREATININE 0.8   CALCIUM 8.4*   MG 1.8     CBC:   Recent Labs  Lab 12/15/17  0445 12/15/17  1236 12/16/17  0522   WBC 7.25  --  7.94   HGB 8.1* 8.3* 8.9*   HCT 25.0* 24.7* 26.9*   PLT 77*  --  146*     CMP:   Recent Labs  Lab 12/15/17  0445      K 3.7      CO2 24      BUN 10   CREATININE 0.8   CALCIUM 8.4*   PROT 5.6*   ALBUMIN 2.1*   BILITOT 0.8   ALKPHOS 154*   AST 26   ALT 20   ANIONGAP 7*   EGFRNONAA >60.0     Microbiology Results (last 7 days)     Procedure Component Value Units Date/Time    Aerobic culture [[id number]]  (Susceptibility) Collected:  12/14/17 0955    Order Status:  Completed Specimen:  Abscess from Abdomen Updated:  12/16/17 1206     Aerobic Bacterial Culture --     KLEBSIELLA PNEUMONIAE  Few      Culture, Anaerobe [[id number]] Collected:  12/14/17 0952    Order Status:  Completed Specimen:  Abscess from Abdomen Updated:  12/15/17 1433     Anaerobic Culture Culture in progress    Culture, Anaerobe [[id number]] Collected:  12/14/17 0952    Order Status:  Completed Specimen:  Abscess from Abdomen Updated:  12/15/17 1433     Anaerobic Culture Culture in progress    Aerobic culture [[id number]] Collected:  12/14/17 0955    Order Status:  Completed Specimen:  Abscess from Abdomen Updated:   12/15/17 0755     Aerobic Bacterial Culture No growth    Gram stain [[id number]] Collected:  12/14/17 0952    Order Status:  Completed Specimen:  Abscess from Abdomen Updated:  12/14/17 1519     Gram Stain Result No WBC's      No organisms seen    Gram stain [[id number]] Collected:  12/14/17 0955    Order Status:  Completed Specimen:  Abscess from Abdomen Updated:  12/14/17 1505     Gram Stain Result Moderate WBC's      No organisms seen          Significant Imaging: I have reviewed all pertinent imaging results/findings within the past 24 hours.

## 2017-12-16 NOTE — ASSESSMENT & PLAN NOTE
76 y/o male with a history of CAD s/p CABG, HTN, HLD, prostate CA s/p prostatectomy 2005, found to have hepatic mass/abscess (segment IV) near hilum and stricture of upper bile duct on ERCP s/p L hepatectomy and resection of extrahepatic biliary tree , Eron-en-Y on 8/3/17 with final pathology demonstrating IGG-4 associated sclerosing cholangitis; he has complicated with multiple liver abscess; now readmitted on 12/13/17 with a new liver abscess, s/p IR drain 12/13/17, cultures positive for K pneumoniae.    - based on sensitivities would narrow therapy to Ertapenem 1g IV q 24 hours

## 2017-12-16 NOTE — PROGRESS NOTES
[facility]  Infectious Disease  Progress Note    Patient Name: Robby Soriano  MRN: [id number]  Admission Date: 12/13/2017  Length of Stay: 3 days  Attending Physician: Allyn Garcia MD  Primary Care Provider: Lyla Bryan MD    Isolation Status: No active isolations  Assessment/Plan:      * Liver abscess    78 y/o male with a history of CAD s/p CABG, HTN, HLD, prostate CA s/p prostatectomy 2005, found to have hepatic mass/abscess (segment IV) near hilum and stricture of upper bile duct on ERCP s/p L hepatectomy and resection of extrahepatic biliary tree , Eron-en-Y on 8/3/17 with final pathology demonstrating IGG-4 associated sclerosing cholangitis; he has complicated with multiple liver abscess; now readmitted on 12/13/17 with a new liver abscess, s/p IR drain 12/13/17, cultures positive for K pneumoniae.    - based on current and previous sensitivities would narrow therapy to Ertapenem 1g IV q 24 hours            Anticipated Disposition: pending    Thank you for your consult. I will follow-up with patient. Please contact us if you have any additional questions.    Song Velazco MD  Infectious Disease Fellow, PGY-5  Pager: [phone]  [facility]    Subjective:     Principal Problem:Liver abscess    HPI: Mr Soriano is a 76 yo M with IgG4 sclerosing cholangiocarcinoma s/p resection in 08/2017, polymicrobial hepatic abscess in the past s/p drain placement in 10/2107, recent STEMI medically managed from 10/2017 initially presented to [facility] with tachycardia,chills, dyspnea, cough,  and elevated lactate concerning for sepsis. In his prior surgery for resection of his cholangiocarcinoma in August 2017, he was noted to have lobectomy of L side, resection middle,left hepatic veins, CBD, and anastomosis of R hepatic duct which was complicated by a liver abscess s/p percutaneous drain placement and a prolonged course of IV meropenem- danielito stop date 12/9. A repeat CT a/p showed  superior hepatic fluid reaccumulation. Patient is currently denying abdominal pain, fever, palpitations, angina, PND. ID was consulted for hepatic abscess on prior meropenem which has not resolved since therapy completed. Micro history is significant for an E. Coli bacteremia and E. Faecalis (vanc-sensitive) during his October hospital course. He is scheduled for IR guided abscess drain w/ biopsies this admission and is on meropenem. He is also in questionable supraventricular arrhythmia with tachycardic rate in 160s, Cardiology consulted.   Interval History: afebrile, feeling better, no overnight acute events, drains output: 190mL, other 30 mL in last 24 hours    Review of Systems   Constitutional: Negative for chills and fever.   HENT: Negative for sore throat.    Respiratory: Negative for cough, chest tightness and shortness of breath.    Cardiovascular: Negative for chest pain, palpitations and leg swelling.   Gastrointestinal: Negative for abdominal distention, abdominal pain, diarrhea, nausea and vomiting.   Genitourinary: Negative for dysuria, flank pain and urgency.   Skin: Negative for rash.   Neurological: Negative for dizziness, light-headedness and numbness.   Psychiatric/Behavioral: Negative for confusion.     Objective:     Vital Signs (Most Recent):  Temp: 96.3 °F (35.7 °C) (12/16/17 1114)  Pulse: 81 (12/16/17 1114)  Resp: 16 (12/16/17 1114)  BP: (!) 127/57 (12/16/17 1114)  SpO2: 95 % (12/16/17 1114) Vital Signs (24h Range):  Temp:  [96.3 °F (35.7 °C)-98.3 °F (36.8 °C)] 96.3 °F (35.7 °C)  Pulse:  [] 81  Resp:  [16-18] 16  SpO2:  [93 %-97 %] 95 %  BP: (118-140)/(57-63) 127/57     Weight: 76.7 kg (169 lb 1.5 oz)  Body mass index is 27.71 kg/m².    Estimated Creatinine Clearance: 74.7 mL/min (based on SCr of 0.8 mg/dL).    Physical Exam   Constitutional: He is oriented to person, place, and time. No distress.   HENT:   Head: Normocephalic and atraumatic.   Mouth/Throat: No oropharyngeal exudate.    Eyes: No scleral icterus.   Cardiovascular: Normal rate, regular rhythm and normal heart sounds.    Pulmonary/Chest: Effort normal and breath sounds normal. No respiratory distress. He has no wheezes. He has no rales.   Abdominal: Soft. Bowel sounds are normal. He exhibits no distension. There is no tenderness. There is no rebound and no guarding.   2 drains in place RUQ   Musculoskeletal: He exhibits no edema.   Neurological: He is alert and oriented to person, place, and time.   Skin: No rash noted.   Vitals reviewed.      Significant Labs:   Bilirubin:   Recent Labs  Lab 11/17/17  1210 11/27/17  0840 12/13/17  0937 12/15/17  0445   BILITOT 1.0 0.9 1.4* 0.8     Blood Culture:   Recent Labs  Lab 10/12/17  2208 10/12/17  2235 10/14/17  0355 10/14/17  0410 12/13/17  0937   LABBLOO Gram stain aer bottle: Gram negative rods  Results called to and read back by:Harry Cook RN 10/13/2017  15:51  ESCHERICHIA COLI No growth after 5 days. No growth after 5 days. No growth after 5 days. No Growth to date  No Growth to date  No Growth to date  No Growth to date  No Growth to date  No Growth to date  No Growth to date  No Growth to date     BMP:   Recent Labs  Lab 12/15/17  0445         K 3.7      CO2 24   BUN 10   CREATININE 0.8   CALCIUM 8.4*   MG 1.8     CBC:   Recent Labs  Lab 12/15/17  0445 12/15/17  1236 12/16/17  0522   WBC 7.25  --  7.94   HGB 8.1* 8.3* 8.9*   HCT 25.0* 24.7* 26.9*   PLT 77*  --  146*     CMP:   Recent Labs  Lab 12/15/17  0445      K 3.7      CO2 24      BUN 10   CREATININE 0.8   CALCIUM 8.4*   PROT 5.6*   ALBUMIN 2.1*   BILITOT 0.8   ALKPHOS 154*   AST 26   ALT 20   ANIONGAP 7*   EGFRNONAA >60.0     Microbiology Results (last 7 days)     Procedure Component Value Units Date/Time    Aerobic culture [[id number]]  (Susceptibility) Collected:  12/14/17 0955    Order Status:  Completed Specimen:  Abscess from Abdomen Updated:  12/16/17 1206     Aerobic  Bacterial Culture --     KLEBSIELLA PNEUMONIAE  Few      Culture, Anaerobe [[id number]] Collected:  12/14/17 0952    Order Status:  Completed Specimen:  Abscess from Abdomen Updated:  12/15/17 1433     Anaerobic Culture Culture in progress    Culture, Anaerobe [[id number]] Collected:  12/14/17 0952    Order Status:  Completed Specimen:  Abscess from Abdomen Updated:  12/15/17 1433     Anaerobic Culture Culture in progress    Aerobic culture [[id number]] Collected:  12/14/17 0955    Order Status:  Completed Specimen:  Abscess from Abdomen Updated:  12/15/17 0755     Aerobic Bacterial Culture No growth    Gram stain [[id number]] Collected:  12/14/17 0952    Order Status:  Completed Specimen:  Abscess from Abdomen Updated:  12/14/17 1519     Gram Stain Result No WBC's      No organisms seen    Gram stain [[id number]] Collected:  12/14/17 0955    Order Status:  Completed Specimen:  Abscess from Abdomen Updated:  12/14/17 1505     Gram Stain Result Moderate WBC's      No organisms seen          Significant Imaging: I have reviewed all pertinent imaging results/findings within the past 24 hours.

## 2017-12-16 NOTE — PT/OT/SLP EVAL
"Physical Therapy Evaluation and Discharge Note    Patient Name:  Robby Soriano   MRN:  [id number]    Recommendations:     Discharge Recommendations:  home   Discharge Equipment Recommendations: none   Barriers to discharge: None    Assessment:     Robby Soriano is a 77 y.o. male admitted with a medical diagnosis of Liver abscess. Pt currently with impaired endurance but able to mobilize safely at this time with Rollator and RW. Pt with no further acute PT deficits and is appropriate for discharge home; aware of need of walking program and daily mobility to prevent debility from occurring.     Recent Surgery: * No surgery found *      Plan:     During this hospitalization, patient does not require further acute PT services.  Please re-consult if situation changes.     Plan of Care Reviewed with: patient, spouse    Subjective     Communicated with nsg prior to session.  Patient found supine in bed c/ wife at bedside upon PT entry to room, agreeable to evaluation.      Chief Complaint: wanting to remain (I)   Patient comments/goals: "I didn't think home health really helped. I know what I have to do" per pt during session  Pain/Comfort:  · Pain Rating 1: 0/10    Patients cultural, spiritual, Yazidism conflicts given the current situation:      Patient History:  · Home environment: lives with wife in double wide trailor with 3 MARIAH and ramp accessible  · Assistance provided:  wife  · Bathroom set up:  Tub/shower    Previous Level of Mobilty  · Transfers: (I)  · Gait: (I); HH and CD utilized rollator    Additional Roles of Patient  · Working: no  · Driving: yes  · Hobbies:gardening    DME: rollator      Objective:     Patient found with: peripheral IV     General Precautions: Standard, fall   Orthopedic Precautions:N/A   Braces: N/A       Exams:  Cognitive Exam  Patient is oriented to Person, Place, Time and Situation and follows 100% of multi-step commands    Fine Motor Coordination    -       Intact  RLE heel shin " and LLE heel shin   Postural Exam Patient presented with the following abnormalities:    -       No postural abnormalities identified   Sensation    -       Intact  light/touch (B) LE   Skin Integrity/Edema     -       Skin integrity: Visible skin intact  -       Edema: None noted in (B) LE   R LE ROM WFL   R LE Strength  WFL   L LE ROM WFL   L LE Strength  WFL       Functional Mobility  Bed Mobility  Scooting: supervision  Supine to Sit: supervision   Sit to Supine: supervision   Transfers Sit to Stand:  supervision with rolling walker for 1 trials     Gait Gait Distance: 200 ft  Assistance Level: supervision  Description: normal katherine with no instability denoted during gait         Balance   Static Sitting supervision   Dynamic Sitting supervision   Static Standing stand by assistance   Dynamic Standing stand by assistance         AM-PAC 6 CLICK MOBILITY  Total Score:23       Therapeutic Activities and Exercises:   Educated pt on role of PT, PT POC, need for daily mobility, and possibly home health PT due to fluctuating health status. Pt verbalized not wanting home health PT and is aware of current minimal risk of falling. Aware to utilize rollator as needed and to steadily incr mobilty.       EDUCATION  Pt educated pt on incr OOB activity including sitting in bedside chair majority of day and amb with nsg and PCT.   Educated pt on being appropriate to transfer with nsg and PCT; safe to transfer with 1 person assistance.  **Mobility Technician appropriate to perform: out of bed, up to chair, back to bed, bed exercises, PROM, ambulation in room, ambulation in hallway.  Updated white board with appropriate PT information; notified nsg   Pt verbalized  agreement.      Patient left supine with all lines intact, call button in reach and wife present.    GOALS:    Physical Therapy Goals     Not on file          Multidisciplinary Problems (Resolved)        Problem: Physical Therapy Goal    Goal Priority Disciplines  Outcome Goal Variances Interventions   Physical Therapy Goal   (Resolved)     PT/OT, PT Outcome(s) achieved                     History:     Past Medical History:   Diagnosis Date    Cancer     Prostate/s/p prostatectomy    Coronary artery disease     GERD (gastroesophageal reflux disease)     Hyperlipidemia     Hypertension     Hypothyroidism        Past Surgical History:   Procedure Laterality Date    CAROTID ENDARTERECTOMY Bilateral     CORONARY ARTERY BYPASS GRAFT      PROSTATE SURGERY         Clinical Decision Making:     History  Co-morbidities and personal factors that may impact the plan of care Examination  Body Structures and Functions, activity limitations and participation restrictions that may impact the plan of care Clinical Presentation   Decision Making/ Complexity Score   Co-morbidities:   [] Time since onset of injury / illness / exacerbation  [] Status of current condition  []Patient's cognitive status and safety concerns    [] Multiple Medical Problems (see med hx)  Personal Factors:   [] Patient's age  [] Prior Level of function   [] Patient's home situation (environment and family support)  [] Patient's level of motivation  [] Expected progression of patient      HISTORY:(criteria)    [x] 32687 - no personal factors/history    [] 01904 - has 1-2 personal factor/comorbidity     [] 05803 - has >3 personal factor/comorbidity     Body Regions:  [] Objective examination findings  [] Head     []  Neck  [] Trunk   [] Upper Extremity  [] Lower Extremity    Body Systems:  [] For communication ability, affect, cognition, language, and learning style: the assessment of the ability to make needs known, consciousness, orientation (person, place, and time), expected emotional /behavioral responses, and learning preferences (eg, learning barriers, education  needs)  [x] For the neuromuscular system: a general assessment of gross coordinated movement (eg, balance, gait, locomotion, transfers, and  transitions) and motor function  (motor control and motor learning)  [] For the musculoskeletal system: the assessment of gross symmetry, gross range of motion, gross strength, height, and weight  [] For the integumentary system: the assessment of pliability(texture), presence of scar formation, skin color, and skin integrity  [] For cardiovascular/pulmonary system: the assessment of heart rate, respiratory rate, blood pressure, and edema     Activity limitations:    [] Patient's cognitive status and saf ety concerns          [] Status of current condition      [] Weight bearing restriction  [] Cardiopulmunary Restriction    Participation Restrictions:   [] Goals and goal agreement with the patient     [] Rehab potential (prognosis) and probable outcome      Examination of Body System: (criteria)    [x] 77250 - addressing 1-2 elements    [] 20895 - addressing a total of 3 or more elements     [] 93422 -  Addressing a total of 4 or more elements         Clinical Presentation: (criteria)  Stable - 62862     On examination of body system using standardized tests and measures patient presents with 1-2 elements from any of the following: body structures and functions, activity limitations, and/or participation restrictions.  Leading to a clinical presentation that is considered stable and/or uncomplicated                              Clinical Decision Making  (Eval Complexity):  Low- 95054     Time Tracking:     PT Received On: 12/16/17  PT Start Time: 0924     PT Stop Time: 0934  PT Total Time (min): 10 min     Billable Minutes: Evaluation 10      Linda Simmons, PT, DPT  12/16/2017

## 2017-12-16 NOTE — PROGRESS NOTES
Progress Note  Hospital Medicine      Admit Date: 12/13/2017    SUBJECTIVE:     Follow-up For:  Liver abscess    HPI/Interval history: Complains of nausea. Thinks from sausage he ate    Review of Systems: Gen: no fever, no chills, Heart: no chest pain, palpitations; Resp: no SOB, no cough    OBJECTIVE:     Vital Signs Range (Last 24H):  Temp:  [96.3 °F (35.7 °C)-98.3 °F (36.8 °C)]   Pulse:  []   Resp:  [16-18]   BP: (114-140)/(57-65)   SpO2:  [93 %-97 %]     Physical Exam:  General appearance: NAD, conversant  Neck: FROM, supple   Lungs: Clear to auscultation, no accessory muscle use  CV: RRR, no heave  Abdomen: Soft, non-tender; no masses or HSM  Extremities: No peripheral edema or digital cyanosis  Skin: no rash, lesions or ulcers  Psych: Alert and oriented to person, place and time      Recent Labs  Lab 12/13/17  0937 12/15/17  0445   * 137   K 4.2 3.7    106   CO2 27 24   BUN 16 10   CREATININE 1.1 0.8   * 110   CALCIUM 9.6 8.4*   MG  --  1.8   PHOS  --  2.8       Recent Labs  Lab 12/13/17  0937 12/13/17  1239 12/15/17  0445 12/15/17  1236 12/16/17  0522   ALKPHOS 175*  --  154*  --   --    ALT 25  --  20  --   --    AST 27  --  26  --   --    ALBUMIN 2.6*  --  2.1*  --   --    PROT 7.2  --  5.6*  --   --    BILITOT 1.4*  --  0.8  --   --    INR  --  1.0  --  1.0 1.0         Recent Labs  Lab 12/13/17  0937 12/15/17  0445 12/15/17  1236 12/16/17  0522   WBC 12.63 7.25  --  7.94   HGB 10.4* 8.1* 8.3* 8.9*   HCT 31.8* 25.0* 24.7* 26.9*   * 77*  --  146*   GRAN 75.5*  9.5* 66.5  4.8  --  70.5  5.6   LYMPH 10.3*  1.3 17.2*  1.3  --  13.0*  1.0   MONO 12.1  1.5* 12.4  0.9  --  10.3  0.8       ASSESSMENT/PLAN:   Sepsis due to hepatic abscess  Lactic acidosis resolved in ER with IVF fluid resuscitation. Given a dose of meropenem in ER. Continue meropenem 1 g IV q8h and NS at 100 mL/h. ID consulted. Surgical oncology consult (Dr. Quach) following. Patient underwent IR  percutaneous drainage of the recurring superior hepatic fluid collection 12/14. Klebsiella growing in abscess culture. Still waiting for sensitivities    Old drain (anterior fluid collection) still putting out 100mL + per day.   New drain (superior fluid collection) put out 10 mL yesterday    CAD s/p CABG  Recent STEMI medically managed  Carotid disease (s/p bilateral CEA)  Continue clopidogrel 75 mg daily, ASA 81 mg daily, atorvastatin 80 mg daily     hypothyroidism - levothyroxine 100 mcg daily     IGG-4 Sclerosing cholangitis  Patient recently finished a long prednisone taper     Anemia of chronic disease   Acutely dropped 10.4 --> 8.4 no apparent bleed  Recheck Hgb/Hct  Check stool guiac     Essential HTN - not currently on medications.      Depression - continue escitalopram 10 mg daily     TIM - protonix 40 mg daily    Atrial flutter - brought on by sepsis upon admission. Cardiology consulted. Metoprolol XL 50 mg daily. Start anticoagulation. Wife and  chose wafarin. Will start warafarin at 2.5 mg given that he is on antibiotics. No need to bridge for atrial firbillation as his risk for CVA in the next 5 days is low.

## 2017-12-17 PROBLEM — I48.92 PAROXYSMAL ATRIAL FLUTTER: Status: ACTIVE | Noted: 2017-12-17

## 2017-12-17 LAB
ANISOCYTOSIS BLD QL SMEAR: SLIGHT
BASOPHILS # BLD AUTO: 0.05 K/UL
BASOPHILS NFR BLD: 0.6 %
DIFFERENTIAL METHOD: ABNORMAL
EOSINOPHIL # BLD AUTO: 0.4 K/UL
EOSINOPHIL NFR BLD: 4.2 %
ERYTHROCYTE [DISTWIDTH] IN BLOOD BY AUTOMATED COUNT: 14.9 %
HCT VFR BLD AUTO: 25.7 %
HGB BLD-MCNC: 8.6 G/DL
HYPOCHROMIA BLD QL SMEAR: ABNORMAL
IMM GRANULOCYTES # BLD AUTO: 0.11 K/UL
IMM GRANULOCYTES NFR BLD AUTO: 1.3 %
INR PPP: 0.9
LYMPHOCYTES # BLD AUTO: 1.2 K/UL
LYMPHOCYTES NFR BLD: 14 %
MCH RBC QN AUTO: 30.5 PG
MCHC RBC AUTO-ENTMCNC: 33.5 G/DL
MCV RBC AUTO: 91 FL
MONOCYTES # BLD AUTO: 0.9 K/UL
MONOCYTES NFR BLD: 9.9 %
NEUTROPHILS # BLD AUTO: 6.1 K/UL
NEUTROPHILS NFR BLD: 70 %
NRBC BLD-RTO: 0 /100 WBC
OVALOCYTES BLD QL SMEAR: ABNORMAL
PLATELET # BLD AUTO: 182 K/UL
PMV BLD AUTO: ABNORMAL FL
POIKILOCYTOSIS BLD QL SMEAR: SLIGHT
POLYCHROMASIA BLD QL SMEAR: ABNORMAL
PROTHROMBIN TIME: 10.1 SEC
RBC # BLD AUTO: 2.82 M/UL
WBC # BLD AUTO: 8.65 K/UL

## 2017-12-17 PROCEDURE — 85025 COMPLETE CBC W/AUTO DIFF WBC: CPT

## 2017-12-17 PROCEDURE — 36415 COLL VENOUS BLD VENIPUNCTURE: CPT

## 2017-12-17 PROCEDURE — 63600175 PHARM REV CODE 636 W HCPCS: Performed by: INTERNAL MEDICINE

## 2017-12-17 PROCEDURE — 25000003 PHARM REV CODE 250: Performed by: STUDENT IN AN ORGANIZED HEALTH CARE EDUCATION/TRAINING PROGRAM

## 2017-12-17 PROCEDURE — 99233 SBSQ HOSP IP/OBS HIGH 50: CPT | Mod: GC,,, | Performed by: INTERNAL MEDICINE

## 2017-12-17 PROCEDURE — 25000003 PHARM REV CODE 250: Performed by: INTERNAL MEDICINE

## 2017-12-17 PROCEDURE — 85610 PROTHROMBIN TIME: CPT

## 2017-12-17 PROCEDURE — 11000001 HC ACUTE MED/SURG PRIVATE ROOM

## 2017-12-17 PROCEDURE — 99232 SBSQ HOSP IP/OBS MODERATE 35: CPT | Mod: ,,, | Performed by: INTERNAL MEDICINE

## 2017-12-17 RX ADMIN — MEROPENEM AND SODIUM CHLORIDE 1 G: 1 INJECTION, SOLUTION INTRAVENOUS at 08:12

## 2017-12-17 RX ADMIN — CLOPIDOGREL 75 MG: 75 TABLET, FILM COATED ORAL at 09:12

## 2017-12-17 RX ADMIN — WARFARIN SODIUM 2.5 MG: 2.5 TABLET ORAL at 04:12

## 2017-12-17 RX ADMIN — MEROPENEM AND SODIUM CHLORIDE 1 G: 1 INJECTION, SOLUTION INTRAVENOUS at 06:12

## 2017-12-17 RX ADMIN — METOPROLOL SUCCINATE 50 MG: 50 TABLET, EXTENDED RELEASE ORAL at 09:12

## 2017-12-17 RX ADMIN — MEROPENEM AND SODIUM CHLORIDE 1 G: 1 INJECTION, SOLUTION INTRAVENOUS at 12:12

## 2017-12-17 RX ADMIN — ESCITALOPRAM OXALATE 10 MG: 10 TABLET ORAL at 09:12

## 2017-12-17 RX ADMIN — PANTOPRAZOLE SODIUM 40 MG: 40 TABLET, DELAYED RELEASE ORAL at 09:12

## 2017-12-17 RX ADMIN — ATORVASTATIN CALCIUM 80 MG: 20 TABLET, FILM COATED ORAL at 09:12

## 2017-12-17 RX ADMIN — LEVOTHYROXINE SODIUM 100 MCG: 100 TABLET ORAL at 06:12

## 2017-12-17 RX ADMIN — ENOXAPARIN SODIUM 40 MG: 100 INJECTION SUBCUTANEOUS at 04:12

## 2017-12-17 NOTE — CONSULTS
[facility]  Infectious Disease  Consult Note    Patient Name: Robby Soriano  MRN: [id number]  Admission Date: 12/13/2017  Hospital Length of Stay: 4 days  Attending Physician: Allyn Garcia MD  Primary Care Provider: Lyla Bryan MD     Isolation Status: No active isolations    Patient information was obtained from patient, spouse/SO and past medical records.      Consults  Assessment/Plan:     * Liver abscess    76 y/o male with a history of CAD s/p CABG, HTN, HLD, prostate CA s/p prostatectomy 2005, found to have hepatic mass/abscess (segment IV) near hilum and stricture of upper bile duct on ERCP s/p L hepatectomy and resection of extrahepatic biliary tree , Eron-en-Y on 8/3/17 with final pathology demonstrating IGG-4 associated sclerosing cholangitis; he has complicated with multiple liver abscess; now readmitted on 12/13/17 with a new liver abscess, s/p IR drain 12/13/17, cultures positive for K pneumoniae.    - based on sensitivities would narrow therapy to Ertapenem 1g IV q 24 hours for now.             Thank you for your consult. I will follow-up with patient. Please contact us if you have any additional questions.    Thuan Cassidy MD  Infectious Disease  [facility]    Subjective:     Principal Problem: Liver abscess    HPI: Mr Soriano is a 78 yo M with IgG4 sclerosing cholangiocarcinoma s/p resection in 08/2017, polymicrobial hepatic abscess in the past s/p drain placement in 10/2107, recent STEMI medically managed from 10/2017 [facility] [place] [facility] with tachycardia,chills, dyspnea, cough,  and elevated lactate concerning for sepsis. In his prior surgery for resection of his cholangiocarcinoma in August 2017, he was noted to have lobectomy of L side, resection middle,left hepatic veins, CBD, and anastomosis of R hepatic duct which was complicated by a liver abscess s/p percutaneous drain placement and a prolonged course of IV meropenem- danielito stop date  12/9. A repeat CT a/p showed superior hepatic fluid reaccumulation. Patient is currently denying abdominal pain, fever, palpitations, angina, PND. ID was consulted for hepatic abscess on prior meropenem which has not resolved since therapy completed. Micro history is significant for an E. Coli bacteremia and E. Faecalis (vanc-sensitive) during his October hospital course. He is scheduled for IR guided abscess drain w/ biopsies this admission and is on meropenem. He is also in questionable supraventricular arrhythmia with tachycardic rate in 160s, Cardiology consulted.     Interval History: Denies recent rigors, fevers. Some mild nausea reported. Symptomatically improving.     Review of Systems   Constitutional: Negative for chills and fever.   HENT: Negative for sore throat.    Respiratory: Negative for cough, chest tightness and shortness of breath.    Cardiovascular: Negative for chest pain, palpitations and leg swelling.   Gastrointestinal: Positive for nausea. Negative for abdominal distention, abdominal pain, diarrhea and vomiting.   Genitourinary: Negative for dysuria, flank pain and urgency.   Skin: Negative for rash.   Neurological: Negative for dizziness, light-headedness and numbness.   Psychiatric/Behavioral: Negative for confusion.     Objective:     Vital Signs (Most Recent):  Temp: 97.4 °F (36.3 °C) (12/17/17 1115)  Pulse: 83 (12/17/17 1115)  Resp: 18 (12/17/17 1115)  BP: (!) 113/59 (12/17/17 1115)  SpO2: 97 % (12/17/17 1115) Vital Signs (24h Range):  Temp:  [96.3 °F (35.7 °C)-98.4 °F (36.9 °C)] 97.4 °F (36.3 °C)  Pulse:  [76-95] 83  Resp:  [14-18] 18  SpO2:  [95 %-97 %] 97 %  BP: (113-129)/(58-67) 113/59     Weight: 76.7 kg (169 lb 1.5 oz)  Body mass index is 27.71 kg/m².    Estimated Creatinine Clearance: 74.7 mL/min (based on SCr of 0.8 mg/dL).    Physical Exam   Constitutional: He is oriented to person, place, and time. No distress.   HENT:   Head: Normocephalic and atraumatic.   Mouth/Throat: No  oropharyngeal exudate.   Eyes: No scleral icterus.   Cardiovascular: Normal rate, regular rhythm and normal heart sounds.    Pulmonary/Chest: Effort normal and breath sounds normal. No respiratory distress. He has no wheezes. He has no rales.   Abdominal: Soft. Bowel sounds are normal. He exhibits no distension. There is no tenderness. There is no rebound and no guarding.   2 drains in place RUQ   Musculoskeletal: He exhibits no edema.   Neurological: He is alert and oriented to person, place, and time.   Skin: No rash noted.   Vitals reviewed.      Significant Labs:   Blood Culture:   Recent Labs  Lab 10/12/17  2208 10/12/17  2235 10/14/17  0355 10/14/17  0410 12/13/17  0937   LABBLOO Gram stain aer bottle: Gram negative rods  Results called to and read back by:Harry Cook RN 10/13/2017  15:51  ESCHERICHIA COLI No growth after 5 days. No growth after 5 days. No growth after 5 days. No Growth to date  No Growth to date  No Growth to date  No Growth to date  No Growth to date  No Growth to date  No Growth to date  No Growth to date     Microbiology Results (last 7 days)     Procedure Component Value Units Date/Time    Aerobic culture [[id number]]  (Susceptibility) Collected:  12/14/17 0955    Order Status:  Completed Specimen:  Abscess from Abdomen Updated:  12/16/17 1206     Aerobic Bacterial Culture --     KLEBSIELLA PNEUMONIAE  Few      Culture, Anaerobe [[id number]] Collected:  12/14/17 0952    Order Status:  Completed Specimen:  Abscess from Abdomen Updated:  12/15/17 1433     Anaerobic Culture Culture in progress    Culture, Anaerobe [[id number]] Collected:  12/14/17 0952    Order Status:  Completed Specimen:  Abscess from Abdomen Updated:  12/15/17 1433     Anaerobic Culture Culture in progress    Aerobic culture [[id number]] Collected:  12/14/17 0955    Order Status:  Completed Specimen:  Abscess from Abdomen Updated:  12/15/17 0755     Aerobic Bacterial Culture No growth    Gram stain [[id number]]  Collected:  12/14/17 0952    Order Status:  Completed Specimen:  Abscess from Abdomen Updated:  12/14/17 1519     Gram Stain Result No WBC's      No organisms seen    Gram stain [[id number]] Collected:  12/14/17 0955    Order Status:  Completed Specimen:  Abscess from Abdomen Updated:  12/14/17 1505     Gram Stain Result Moderate WBC's      No organisms seen          CBC:   Recent Labs  Lab 12/15/17  1236 12/16/17  0522 12/17/17  0458   WBC  --  7.94 8.65   HGB 8.3* 8.9* 8.6*   HCT 24.7* 26.9* 25.7*   PLT  --  146* 182     CMP: No results for input(s): NA, K, CL, CO2, GLU, BUN, CREATININE, CALCIUM, PROT, ALBUMIN, BILITOT, ALKPHOS, AST, ALT, ANIONGAP, EGFRNONAA in the last 48 hours.    Invalid input(s): ESTGFAFRICA  Coagulation:   Recent Labs  Lab 12/17/17  0458   INR 0.9     Urine Culture:   Recent Labs  Lab 10/12/17  0500   LABURIN No growth     All pertinent labs within the past 24 hours have been reviewed.    Significant Imaging: I have reviewed all pertinent imaging results/findings within the past 24 hours. CT ap pending.

## 2017-12-17 NOTE — SUBJECTIVE & OBJECTIVE
Interval History: Feeling much better. Drains functioning and clearing up. Afebrile    Medications:  Continuous Infusions:  Scheduled Meds:   aspirin  81 mg Oral Daily    atorvastatin  80 mg Oral Daily    clopidogrel  75 mg Oral Daily    enoxaparin  40 mg Subcutaneous Daily    escitalopram oxalate  10 mg Oral Daily    levothyroxine  100 mcg Oral Daily    meropenem (MERREM) IVPB  1 g Intravenous Q8H    metoprolol succinate  50 mg Oral Daily    pantoprazole  40 mg Oral Daily    warfarin  2.5 mg Oral Daily     PRN Meds:acetaminophen, ALPRAZolam, metoprolol, ondansetron, sodium chloride 0.9%     Review of patient's allergies indicates:  No Known Allergies  Objective:     Vital Signs (Most Recent):  Temp: 96.3 °F (35.7 °C) (12/17/17 0745)  Pulse: 90 (12/17/17 0745)  Resp: 14 (12/17/17 0745)  BP: 127/67 (12/17/17 0745)  SpO2: 96 % (12/17/17 0745) Vital Signs (24h Range):  Temp:  [96.3 °F (35.7 °C)-98.4 °F (36.9 °C)] 96.3 °F (35.7 °C)  Pulse:  [76-95] 90  Resp:  [14-18] 14  SpO2:  [95 %-96 %] 96 %  BP: (113-129)/(57-67) 127/67     Weight: 76.7 kg (169 lb 1.5 oz)  Body mass index is 27.71 kg/m².    Intake/Output - Last 3 Shifts       12/15 0700 - 12/16 0659 12/16 0700 - 12/17 0659 12/17 0700 - 12/18 0659    P.O.  600     I.V. (mL/kg)       Other 20      IV Piggyback       Total Intake(mL/kg) 20 (0.3) 600 (7.8)     Urine (mL/kg/hr)       Emesis/NG output       Drains 220 (0.1) 55 (0)     Other       Stool       Blood       Total Output 220 55      Net -200 +545                   Physical Exam   Constitutional: He is oriented to person, place, and time.   Eyes: No scleral icterus.   Cardiovascular: Normal rate and regular rhythm.  Exam reveals no gallop and no friction rub.    No murmur heard.  Pulmonary/Chest: Effort normal and breath sounds normal.   Abdominal: Soft. Bowel sounds are normal. He exhibits no distension. There is no tenderness.   Right sided drains in place x2; new drain lateral; serosanguinous with  some debris.   Musculoskeletal: Normal range of motion.   Neurological: He is alert and oriented to person, place, and time.   Skin: Skin is warm and dry. No rash noted.       Significant Labs:  CBC:   Recent Labs  Lab 12/17/17  0458   WBC 8.65   RBC 2.82*   HGB 8.6*   HCT 25.7*      MCV 91   MCH 30.5   MCHC 33.5     BMP:   Recent Labs  Lab 12/15/17  0445         K 3.7      CO2 24   BUN 10   CREATININE 0.8   CALCIUM 8.4*   MG 1.8     LFTs:   Recent Labs  Lab 12/15/17  0445   ALT 20   AST 26   ALKPHOS 154*   BILITOT 0.8   PROT 5.6*   ALBUMIN 2.1*       Significant Diagnostics:  I have reviewed all pertinent imaging results/findings within the past 24 hours.

## 2017-12-17 NOTE — SUBJECTIVE & OBJECTIVE
Interval History: Denies recent rigors, fevers. Some mild nausea reported. Symptomatically improving.     Review of Systems   Constitutional: Negative for chills and fever.   HENT: Negative for sore throat.    Respiratory: Negative for cough, chest tightness and shortness of breath.    Cardiovascular: Negative for chest pain, palpitations and leg swelling.   Gastrointestinal: Positive for nausea. Negative for abdominal distention, abdominal pain, diarrhea and vomiting.   Genitourinary: Negative for dysuria, flank pain and urgency.   Skin: Negative for rash.   Neurological: Negative for dizziness, light-headedness and numbness.   Psychiatric/Behavioral: Negative for confusion.     Objective:     Vital Signs (Most Recent):  Temp: 97.4 °F (36.3 °C) (12/17/17 1115)  Pulse: 83 (12/17/17 1115)  Resp: 18 (12/17/17 1115)  BP: (!) 113/59 (12/17/17 1115)  SpO2: 97 % (12/17/17 1115) Vital Signs (24h Range):  Temp:  [96.3 °F (35.7 °C)-98.4 °F (36.9 °C)] 97.4 °F (36.3 °C)  Pulse:  [76-95] 83  Resp:  [14-18] 18  SpO2:  [95 %-97 %] 97 %  BP: (113-129)/(58-67) 113/59     Weight: 76.7 kg (169 lb 1.5 oz)  Body mass index is 27.71 kg/m².    Estimated Creatinine Clearance: 74.7 mL/min (based on SCr of 0.8 mg/dL).    Physical Exam   Constitutional: He is oriented to person, place, and time. No distress.   HENT:   Head: Normocephalic and atraumatic.   Mouth/Throat: No oropharyngeal exudate.   Eyes: No scleral icterus.   Cardiovascular: Normal rate, regular rhythm and normal heart sounds.    Pulmonary/Chest: Effort normal and breath sounds normal. No respiratory distress. He has no wheezes. He has no rales.   Abdominal: Soft. Bowel sounds are normal. He exhibits no distension. There is no tenderness. There is no rebound and no guarding.   2 drains in place RUQ   Musculoskeletal: He exhibits no edema.   Neurological: He is alert and oriented to person, place, and time.   Skin: No rash noted.   Vitals reviewed.      Significant Labs:    Blood Culture:   Recent Labs  Lab 10/12/17  2208 10/12/17  2235 10/14/17  0355 10/14/17  0410 12/13/17  0937   LABBLOO Gram stain aer bottle: Gram negative rods  Results called to and read back by:Harry Cook RN 10/13/2017  15:51  ESCHERICHIA COLI No growth after 5 days. No growth after 5 days. No growth after 5 days. No Growth to date  No Growth to date  No Growth to date  No Growth to date  No Growth to date  No Growth to date  No Growth to date  No Growth to date     CBC:   Recent Labs  Lab 12/15/17  1236 12/16/17  0522 12/17/17  0458   WBC  --  7.94 8.65   HGB 8.3* 8.9* 8.6*   HCT 24.7* 26.9* 25.7*   PLT  --  146* 182     CMP: No results for input(s): NA, K, CL, CO2, GLU, BUN, CREATININE, CALCIUM, PROT, ALBUMIN, BILITOT, ALKPHOS, AST, ALT, ANIONGAP, EGFRNONAA in the last 48 hours.    Invalid input(s): ESTGFAFRICA  Coagulation:   Recent Labs  Lab 12/17/17  0458   INR 0.9     Urine Culture:   Recent Labs  Lab 10/12/17  0500   LABURIN No growth     All pertinent labs within the past 24 hours have been reviewed.    Significant Imaging: I have reviewed all pertinent imaging results/findings within the past 24 hours. CT ap pending.

## 2017-12-17 NOTE — ASSESSMENT & PLAN NOTE
78 y/o male with a history of CAD s/p CABG, HTN, HLD, prostate CA s/p prostatectomy 2005, found to have hepatic mass/abscess (segment IV) near hilum and stricture of upper bile duct on ERCP s/p L hepatectomy and resection of extrahepatic biliary tree , Eron-en-Y on 8/3/17 with final pathology demonstrating IGG-4 associated sclerosing cholangitis; he has complicated with multiple liver abscess; now readmitted on 12/13/17 with a new liver abscess, s/p IR drain 12/13/17, cultures positive for K pneumoniae.    - based on sensitivities would narrow therapy to Ertapenem 1g IV q 24 hours for now.

## 2017-12-17 NOTE — PROGRESS NOTES
Progress Note  Hospital Medicine      Admit Date: 12/13/2017    SUBJECTIVE:     Follow-up For:  Liver abscess    HPI/Interval history: Complains of nausea. Thinks from sausage he ate    Review of Systems: Gen: no fever, no chills, Heart: no chest pain, palpitations; Resp: no SOB, no cough    OBJECTIVE:     Vital Signs Range (Last 24H):  Temp:  [96.3 °F (35.7 °C)-98.4 °F (36.9 °C)]   Pulse:  [76-95]   Resp:  [14-18]   BP: (113-129)/(57-67)   SpO2:  [95 %-96 %]     Physical Exam:  General appearance: NAD, conversant  Neck: FROM, supple   Lungs: Clear to auscultation, no accessory muscle use  CV: RRR, no heave  Abdomen: Soft, non-tender; no masses or HSM  Extremities: No peripheral edema or digital cyanosis  Skin: no rash, lesions or ulcers  Psych: Alert and oriented to person, place and time      Recent Labs  Lab 12/13/17  0937 12/15/17  0445   * 137   K 4.2 3.7    106   CO2 27 24   BUN 16 10   CREATININE 1.1 0.8   * 110   CALCIUM 9.6 8.4*   MG  --  1.8   PHOS  --  2.8       Recent Labs  Lab 12/13/17 0937  12/15/17  0445 12/15/17  1236 12/16/17  0522 12/17/17  0458   ALKPHOS 175*  --  154*  --   --   --    ALT 25  --  20  --   --   --    AST 27  --  26  --   --   --    ALBUMIN 2.6*  --  2.1*  --   --   --    PROT 7.2  --  5.6*  --   --   --    BILITOT 1.4*  --  0.8  --   --   --    INR  --   < >  --  1.0 1.0 0.9   < > = values in this interval not displayed.      Recent Labs  Lab 12/15/17  0445 12/15/17  1236 12/16/17  0522 12/17/17  0458   WBC 7.25  --  7.94 8.65   HGB 8.1* 8.3* 8.9* 8.6*   HCT 25.0* 24.7* 26.9* 25.7*   PLT 77*  --  146* 182   GRAN 66.5  4.8  --  70.5  5.6 70.0  6.1   LYMPH 17.2*  1.3  --  13.0*  1.0 14.0*  1.2   MONO 12.4  0.9  --  10.3  0.8 9.9  0.9       ASSESSMENT/PLAN:   Sepsis due to hepatic abscess  Lactic acidosis resolved in ER with IVF fluid resuscitation. Given a dose of meropenem in ER. Continue meropenem 1 g IV q8h and NS at 100 mL/h. ID consulted. Surgical  oncology consult (Dr. Quach) following. Patient underwent IR percutaneous drainage of the recurring superior hepatic fluid collection 12/14. Klebsiella growing in abscess culture. Still waiting for sensitivities    Old drain (anterior fluid collection) still putting out 100mL + per day.   New drain (superior fluid collection) put out 10 mL yesterday    CAD s/p CABG  Recent STEMI medically managed  Carotid disease (s/p bilateral CEA)  Continue clopidogrel 75 mg daily, ASA 81 mg daily, atorvastatin 80 mg daily     hypothyroidism - levothyroxine 100 mcg daily     IGG-4 Sclerosing cholangitis  Patient recently finished a long prednisone taper     Anemia of chronic disease   Acutely dropped 10.4 --> 8.4 no apparent bleed  Recheck Hgb/Hct  Check stool guiac - negative  Check iron levels     Essential HTN - not currently on medications.      Depression - continue escitalopram 10 mg daily     TIM - protonix 40 mg daily    Atrial flutter - brought on by sepsis upon admission. Cardiology consulted. Metoprolol XL 50 mg daily. Start anticoagulation. Wife and  chose wafarin. Will start warafarin at 2.5 mg given that he is on antibiotics. No need to bridge for atrial firbillation as his risk for CVA in the next 5 days is low.

## 2017-12-17 NOTE — ASSESSMENT & PLAN NOTE
77 y.o. 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 Now with chills, tachycardia, and CT evidence of recurrent collection. Currently admitted to hospital medicine. Now s/p IR drain placement 12/14/2017.  -Continue antibiotics per ID recommendations given that they have been managing him outpatient and he has previously grown multiple organisms    - Drain flushes to keep patent  - Cont abx  - Management per primary  - F/u with Dr. Quach in 2 weeks with non-contrast CT abd/pelvis

## 2017-12-18 VITALS
DIASTOLIC BLOOD PRESSURE: 56 MMHG | BODY MASS INDEX: 27.17 KG/M2 | RESPIRATION RATE: 18 BRPM | HEIGHT: 66 IN | OXYGEN SATURATION: 95 % | HEART RATE: 72 BPM | SYSTOLIC BLOOD PRESSURE: 124 MMHG | WEIGHT: 169.06 LBS | TEMPERATURE: 98 F

## 2017-12-18 LAB
BACTERIA SPEC AEROBE CULT: NO GROWTH
BACTERIA SPEC ANAEROBE CULT: NORMAL
BASOPHILS # BLD AUTO: 0.06 K/UL
BASOPHILS NFR BLD: 0.7 %
DIFFERENTIAL METHOD: ABNORMAL
EOSINOPHIL # BLD AUTO: 0.4 K/UL
EOSINOPHIL NFR BLD: 5.1 %
ERYTHROCYTE [DISTWIDTH] IN BLOOD BY AUTOMATED COUNT: 15.2 %
FERRITIN SERPL-MCNC: 1696 NG/ML
HCT VFR BLD AUTO: 27.9 %
HGB BLD-MCNC: 9.2 G/DL
IMM GRANULOCYTES # BLD AUTO: 0.15 K/UL
IMM GRANULOCYTES NFR BLD AUTO: 1.8 %
INR PPP: 1
IRON SERPL-MCNC: 43 UG/DL
LYMPHOCYTES # BLD AUTO: 1.6 K/UL
LYMPHOCYTES NFR BLD: 18.6 %
MCH RBC QN AUTO: 30.7 PG
MCHC RBC AUTO-ENTMCNC: 33 G/DL
MCV RBC AUTO: 93 FL
MONOCYTES # BLD AUTO: 0.9 K/UL
MONOCYTES NFR BLD: 10.1 %
NEUTROPHILS # BLD AUTO: 5.4 K/UL
NEUTROPHILS NFR BLD: 63.7 %
NRBC BLD-RTO: 0 /100 WBC
PLATELET # BLD AUTO: 99 K/UL
PMV BLD AUTO: 9.9 FL
PROTHROMBIN TIME: 10.8 SEC
RBC # BLD AUTO: 3 M/UL
SATURATED IRON: 18 %
TOTAL IRON BINDING CAPACITY: 234 UG/DL
TRANSFERRIN SERPL-MCNC: 158 MG/DL
WBC # BLD AUTO: 8.4 K/UL

## 2017-12-18 PROCEDURE — 83540 ASSAY OF IRON: CPT

## 2017-12-18 PROCEDURE — 82728 ASSAY OF FERRITIN: CPT

## 2017-12-18 PROCEDURE — 85025 COMPLETE CBC W/AUTO DIFF WBC: CPT

## 2017-12-18 PROCEDURE — 25000003 PHARM REV CODE 250: Performed by: STUDENT IN AN ORGANIZED HEALTH CARE EDUCATION/TRAINING PROGRAM

## 2017-12-18 PROCEDURE — 36415 COLL VENOUS BLD VENIPUNCTURE: CPT

## 2017-12-18 PROCEDURE — 99233 SBSQ HOSP IP/OBS HIGH 50: CPT | Mod: GC,,, | Performed by: INTERNAL MEDICINE

## 2017-12-18 PROCEDURE — 25000003 PHARM REV CODE 250: Performed by: INTERNAL MEDICINE

## 2017-12-18 PROCEDURE — 63600175 PHARM REV CODE 636 W HCPCS: Performed by: INTERNAL MEDICINE

## 2017-12-18 PROCEDURE — 99239 HOSP IP/OBS DSCHRG MGMT >30: CPT | Mod: ,,, | Performed by: INTERNAL MEDICINE

## 2017-12-18 PROCEDURE — 85610 PROTHROMBIN TIME: CPT

## 2017-12-18 RX ORDER — CIPROFLOXACIN 500 MG/1
500 TABLET ORAL 2 TIMES DAILY
Qty: 42 TABLET | Refills: 0 | Status: SHIPPED | OUTPATIENT
Start: 2017-12-18 | End: 2018-01-08

## 2017-12-18 RX ORDER — WARFARIN SODIUM 5 MG/1
5 TABLET ORAL DAILY
Status: DISCONTINUED | OUTPATIENT
Start: 2017-12-18 | End: 2017-12-18 | Stop reason: HOSPADM

## 2017-12-18 RX ORDER — WARFARIN SODIUM 5 MG/1
5 TABLET ORAL DAILY
Qty: 30 TABLET | Refills: 11 | Status: SHIPPED | OUTPATIENT
Start: 2017-12-18 | End: 2018-01-05

## 2017-12-18 RX ORDER — METOPROLOL SUCCINATE 50 MG/1
50 TABLET, EXTENDED RELEASE ORAL DAILY
Qty: 30 TABLET | Refills: 11 | Status: ON HOLD | OUTPATIENT
Start: 2017-12-18 | End: 2021-01-01 | Stop reason: ALTCHOICE

## 2017-12-18 RX ORDER — ASPIRIN 81 MG/1
81 TABLET ORAL DAILY
Refills: 0 | COMMUNITY
Start: 2017-12-18 | End: 2018-12-18

## 2017-12-18 RX ADMIN — AMPICILLIN SODIUM AND SULBACTAM SODIUM 3 G: 2; 1 INJECTION, POWDER, FOR SOLUTION INTRAMUSCULAR; INTRAVENOUS at 02:12

## 2017-12-18 RX ADMIN — LEVOTHYROXINE SODIUM 100 MCG: 100 TABLET ORAL at 07:12

## 2017-12-18 RX ADMIN — ASPIRIN 81 MG: 81 TABLET, COATED ORAL at 09:12

## 2017-12-18 RX ADMIN — PANTOPRAZOLE SODIUM 40 MG: 40 TABLET, DELAYED RELEASE ORAL at 09:12

## 2017-12-18 RX ADMIN — CLOPIDOGREL 75 MG: 75 TABLET, FILM COATED ORAL at 09:12

## 2017-12-18 RX ADMIN — ESCITALOPRAM OXALATE 10 MG: 10 TABLET ORAL at 09:12

## 2017-12-18 RX ADMIN — MEROPENEM AND SODIUM CHLORIDE 1 G: 1 INJECTION, SOLUTION INTRAVENOUS at 05:12

## 2017-12-18 RX ADMIN — METOPROLOL SUCCINATE 50 MG: 50 TABLET, EXTENDED RELEASE ORAL at 09:12

## 2017-12-18 RX ADMIN — ATORVASTATIN CALCIUM 80 MG: 20 TABLET, FILM COATED ORAL at 09:12

## 2017-12-18 RX ADMIN — MEROPENEM AND SODIUM CHLORIDE 1 G: 1 INJECTION, SOLUTION INTRAVENOUS at 11:12

## 2017-12-18 NOTE — PLAN OF CARE
CM notified of plan for possible d/c today.  Primary staff to place home health orders, Minh with [facility] made aware.  Pt's Cardiologist Dr Keller aware of new coumadin, f/u appt 12/28/2017 11:40am.  CM to fax d/c summary, labs to [phone] once available.      Future Appointments  Date Time Provider Department Center   1/3/2018 11:30 AM Melissa Gallego, [facility]   1/3/2018 1:00 PM David Méndez MD [facility] ID Chandu Ribera       CM spoke with pt, informed of all appts for f/u/.  Records faxed to Dr Keller's office, pt understands once medication has arrived from [facility] they can notify nurse and can d/c once transportation arrives.  HH has been arranged, to be resumed on tomorrow.    Shakira Waller, RN  Case Management  [phone]

## 2017-12-18 NOTE — SUBJECTIVE & OBJECTIVE
Past Medical History:   Diagnosis Date    Cancer     Prostate/s/p prostatectomy    Coronary artery disease     GERD (gastroesophageal reflux disease)     Hyperlipidemia     Hypertension     Hypothyroidism        Past Surgical History:   Procedure Laterality Date    CAROTID ENDARTERECTOMY Bilateral     CORONARY ARTERY BYPASS GRAFT      PROSTATE SURGERY         Review of patient's allergies indicates:  No Known Allergies    Medications:  Prescriptions Prior to Admission   Medication Sig    alprazolam (XANAX) 0.25 MG tablet Take 0.25 mg by mouth 3 (three) times daily.    atorvastatin (LIPITOR) 80 MG tablet Take 80 mg by mouth once daily.    clopidogrel (PLAVIX) 75 mg tablet Take 1 tablet (75 mg total) by mouth once daily.    escitalopram oxalate (LEXAPRO) 10 MG tablet Take 10 mg by mouth once daily.    levothyroxine (SYNTHROID) 100 MCG tablet Take 100 mcg by mouth once daily.    ondansetron (ZOFRAN-ODT) 8 MG TbDL Take 1 tablet (8 mg total) by mouth every 6 (six) hours as needed.    pantoprazole (PROTONIX) 40 MG tablet Take 40 mg by mouth once daily.    indomethacin (INDOCIN) 25 MG capsule Take 25 mg by mouth 2 (two) times daily with meals.    meropenem (MERREM) 1 gram injection      Antibiotics     Start     Stop Route Frequency Ordered    12/13/17 2030  meropenem-0.9% sodium chloride 1 g/50 mL IVPB      -- IV Every 8 hours (non-standard times) 12/13/17 1928        Antifungals     None        Antivirals     None             There is no immunization history on file for this patient.    Family History     None        Social History     Social History    Marital status:      Spouse name: N/A    Number of children: N/A    Years of education: N/A     Social History Main Topics    Smoking status: Former Smoker     Types: Cigarettes     Start date: 1/1/1956     Quit date: 6/18/2013    Smokeless tobacco: Never Used    Alcohol use No    Drug use: No    Sexual activity: Not Asked     Other Topics  Concern    None     Social History Narrative    None     Review of Systems   Constitutional: Negative for chills and fever.   HENT: Negative for sore throat.    Respiratory: Negative for cough, chest tightness and shortness of breath.    Cardiovascular: Negative for chest pain, palpitations and leg swelling.   Gastrointestinal: Negative for abdominal distention, abdominal pain, diarrhea, nausea and vomiting.   Genitourinary: Negative for dysuria, flank pain and urgency.   Musculoskeletal: Negative for arthralgias.   Skin: Negative for rash.   Neurological: Negative for dizziness, light-headedness and numbness.   Psychiatric/Behavioral: Negative for confusion.     Objective:     Vital Signs (Most Recent):  Temp: 97.3 °F (36.3 °C) (12/18/17 0812)  Pulse: 87 (12/18/17 0812)  Resp: 18 (12/18/17 0812)  BP: (!) 145/68 (12/18/17 0812)  SpO2: 96 % (12/18/17 0812) Vital Signs (24h Range):  Temp:  [97.3 °F (36.3 °C)-97.7 °F (36.5 °C)] 97.3 °F (36.3 °C)  Pulse:  [72-91] 87  Resp:  [18] 18  SpO2:  [95 %-97 %] 96 %  BP: (112-145)/(53-68) 145/68     Weight: 76.7 kg (169 lb 1.5 oz)  Body mass index is 27.71 kg/m².    Estimated Creatinine Clearance: 74.7 mL/min (based on SCr of 0.8 mg/dL).    Physical Exam   Constitutional: He is oriented to person, place, and time. No distress.   HENT:   Head: Normocephalic and atraumatic.   Mouth/Throat: No oropharyngeal exudate.   Eyes: No scleral icterus.   Cardiovascular: Normal rate, regular rhythm and normal heart sounds.    Pulmonary/Chest: Effort normal and breath sounds normal. No respiratory distress. He has no wheezes. He has no rales.   Abdominal: Soft. Bowel sounds are normal. He exhibits no distension. There is no tenderness. There is no rebound and no guarding.   2 drains in place RUQ   Musculoskeletal: He exhibits no edema.   Neurological: He is alert and oriented to person, place, and time.   Skin: No rash noted.   Vitals reviewed.      Significant Labs:   Blood Culture:    Recent Labs  Lab 10/12/17  2208 10/12/17  2235 10/14/17  0355 10/14/17  0410 12/13/17  0937   LABBLOO Gram stain aer bottle: Gram negative rods  Results called to and read back by:Harry Cook RN 10/13/2017  15:51  ESCHERICHIA COLI No growth after 5 days. No growth after 5 days. No growth after 5 days. No Growth to date  No Growth to date  No Growth to date  No Growth to date  No Growth to date  No Growth to date  No Growth to date  No Growth to date  No Growth to date  No Growth to date     CBC:   Recent Labs  Lab 12/17/17  0458 12/18/17  0356   WBC 8.65 8.40   HGB 8.6* 9.2*   HCT 25.7* 27.9*    99*     CMP: No results for input(s): NA, K, CL, CO2, GLU, BUN, CREATININE, CALCIUM, PROT, ALBUMIN, BILITOT, ALKPHOS, AST, ALT, ANIONGAP, EGFRNONAA in the last 48 hours.    Invalid input(s): ESTGFAFRICA  Coagulation:   Recent Labs  Lab 12/18/17 0356   INR 1.0     Microbiology Results (last 7 days)     Procedure Component Value Units Date/Time    Aerobic culture [[id number]] Collected:  12/14/17 0955    Order Status:  Completed Specimen:  Abscess from Abdomen Updated:  12/18/17 0911     Aerobic Bacterial Culture No growth    Culture, Anaerobe [[id number]] Collected:  12/14/17 0952    Order Status:  Completed Specimen:  Abscess from Abdomen Updated:  12/18/17 0904     Anaerobic Culture Culture in progress    Culture, Anaerobe [[id number]] Collected:  12/14/17 0952    Order Status:  Completed Specimen:  Abscess from Abdomen Updated:  12/18/17 0901     Anaerobic Culture Culture in progress    Aerobic culture [[id number]]  (Susceptibility) Collected:  12/14/17 0955    Order Status:  Completed Specimen:  Abscess from Abdomen Updated:  12/16/17 1206     Aerobic Bacterial Culture --     KLEBSIELLA PNEUMONIAE  Few      Gram stain [[id number]] Collected:  12/14/17 0952    Order Status:  Completed Specimen:  Abscess from Abdomen Updated:  12/14/17 1519     Gram Stain Result No WBC's      No organisms seen    Gram  stain [[id number]] Collected:  12/14/17 0955    Order Status:  Completed Specimen:  Abscess from Abdomen Updated:  12/14/17 1505     Gram Stain Result Moderate WBC's      No organisms seen        Respiratory Culture: No results for input(s): GSRESP, RESPIRATORYC in the last 4320 hours.  Wound Culture:   Recent Labs  Lab 08/03/17  0947 10/12/17  1705 10/12/17  1714 12/14/17  0955   LABAERO ESCHERICHIA COLIFew  KLEBSIELLA PNEUMONIAEFew  ENTEROCOCCUS FAECALISFew ESCHERICHIA COLIModerate  KLEBSIELLA PNEUMONIAEModerate  ENTEROCOCCUS FAECALISModerate ESCHERICHIA COLIModerate  ENTEROCOCCUS FAECALISMany  KLEBSIELLA PNEUMONIAE ESBLModerate No growth  KLEBSIELLA PNEUMONIAEFew     All pertinent labs within the past 24 hours have been reviewed.    Significant Imaging: I have reviewed all pertinent imaging results/findings within the past 24 hours. CT ap pending

## 2017-12-18 NOTE — DISCHARGE SUMMARY
[facility]  Discharge Summary  [facility]      Admit Date: 12/13/2017    Discharge Date and Time:  12/18/2017 10:03 AM    Discharge Provider: Allyn Garcia    Reason for Admission: tachcyardia    Indwelling Lines/Drains at time of discharge:        PICC Double Lumen 10/17/17 1030 right basilic (Active)   Site Assessment Clean;Dry;Intact;No redness;No swelling 12/18/2017 12:11 AM   Lumen 1 Status Flushed;Normal saline locked 12/18/2017 12:11 AM   Lumen 2 Status Flushed;Normal saline locked 12/18/2017 12:11 AM   Dressing Type Transparent 12/18/2017 12:11 AM   Dressing Status Clean;Dry;Intact 12/18/2017 12:11 AM   Dressing Intervention Dressing reinforced 12/18/2017 12:11 AM   Dressing Change Due 12/18/17 12/17/2017  7:45 AM   Daily Line Review Performed 12/18/2017 12:11 AM            Closed/Suction Drain 10/12/17 1711 Right;Anterior Abdomen Bulb 8 Fr. (Active)   Site Description Healing 12/18/2017 12:11 AM   Dressing Type Transparent 12/18/2017 12:11 AM   Dressing Status Intact;Clean;Dry 12/18/2017 12:11 AM   Drainage Brown 12/18/2017 12:11 AM   Status To bulb suction 12/18/2017 12:11 AM   Output (mL) 50 mL 12/17/2017  4:00 PM            Closed/Suction Drain 12/14/17 1007 Right Abdomen Other (Comment) 8 Fr. (Active)   Site Description Healing 12/18/2017 12:11 AM   Dressing Type Transparent 12/18/2017 12:11 AM   Dressing Status Clean;Dry;Intact 12/18/2017 12:11 AM   Dressing Intervention Dressing reinforced 12/18/2017 12:11 AM   Drainage Brown 12/18/2017 12:11 AM   Status To bulb suction 12/18/2017 12:11 AM   Output (mL) 20 mL 12/17/2017  4:00 PM       Hospital Course (synopsis of major diagnoses, care, treatment, and services provided during the course of the hospital stay):   Sepsis due to Klebsiella pneumoniae hepatic abscess  Lactic acidosis resolved in ER with IVF fluid resuscitation. Given a dose of meropenem in ER. Continue meropenem 1 g IV q8h and NS at 100 mL/h. ID consulted. Surgical  oncology consult (Dr. Quach) following. Patient underwent IR percutaneous drainage of the recurring superior hepatic fluid collection 12/14. Klebsiella growing in abscess culture. Sensitive to ertapenem. However given he previously had infection of multiple organisms. ID felt patient would better benefit from unasyn for coverage of the previously recurring enterococcus with addition of ciprofloxacin for the coverage of Klebsiella grown here. Gave patient a 3 week course given that he will follow up with ID and Dr. Quach in two weeks. Final end dates will be determined at that time.      Old drain (anterior fluid collection) still putting out 100mL + per day.   New drain (superior fluid collection) put out 10-20 mL per day     CAD s/p CABG  Recent STEMI medically managed  Carotid disease (s/p bilateral CEA)  Continue clopidogrel 75 mg daily, ASA 81 mg daily, atorvastatin 80 mg daily. Patient reluctant to take ASA due to nose bleeds. Cardiology recommends restarting and referring patient to ENT to cauterize any superficial vessels seen.      hypothyroidism - levothyroxine 100 mcg daily     IGG-4 Sclerosing cholangitis  Patient recently finished a long prednisone taper     Anemia of chronic disease and acute infection  Acutely dropped 10.4 --> 8.4 no apparent bleed  Discharge Hgb at 9.2  Check stool guiac - negative  Iron levels are depressed, but ferritin in 1000s.     Essential HTN - not currently on medications.      Depression - continue escitalopram 10 mg daily     TIM - protonix 40 mg daily     Atrial flutter - brought on by sepsis upon admission. Cardiology consulted. Metoprolol XL 50 mg daily. Start anticoagulation. Wife and  chose wafarin. Will start warafarin at 2.5 mg given that he is on antibiotics. No need to bridge for atrial firbillation as his risk for CVA in the next 5 days is low and risk for bleed (already on ASA and plavix and recent history of nose bleeds) with addition of lovenox would be  higher. Patient INR still at 1.0 after three days of warfarin 2.5 mg. [email or web address] suggest 4.9 mg daily dosing. Will place patient on warfarin 5 mg daily. Home health will do INR checks q3 days. He will follow up with Dr. Sanju Torres office for further coumadin management.     Consults: Cardiology, Infectious Disease and surgical oncology    Final Diagnoses:    Principal Problem: Liver abscess   Secondary Diagnoses:   Active Hospital Problems    Diagnosis  POA    *Liver abscess [K75.0]  Yes     Priority: 1 - High    Sepsis [A41.9]  Yes     Priority: 1 - High    Carotid disease, bilateral [I77.9]  Yes     Priority: 2     Coronary artery disease involving coronary bypass graft of native heart without angina pectoris [I25.810]  Yes     Priority: 2     Acquired hypothyroidism [E03.9]  Yes     Priority: 3     IgG4-related sclerosing cholangitis [K83.0]  Yes     Priority: 4     Anemia, chronic disease [D63.8]  Yes     Priority: 5     Essential hypertension [I10]  Yes     Priority: 6     Paroxysmal atrial flutter [I48.92]  Yes      Resolved Hospital Problems    Diagnosis Date Resolved POA   No resolved problems to display.       Discharged Condition: stable    Disposition:     Follow Up/Patient Instructions:     Medications:  Reconciled Home Medications:   Current Discharge Medication List      START taking these medications    Details   aspirin (ECOTRIN) 81 MG EC tablet Take 1 tablet (81 mg total) by mouth once daily.  Refills: 0      ciprofloxacin HCl (CIPRO) 500 MG tablet Take 1 tablet (500 mg total) by mouth 2 (two) times daily.  Qty: 42 tablet, Refills: 0      metoprolol succinate (TOPROL-XL) 50 MG 24 hr tablet Take 1 tablet (50 mg total) by mouth once daily.  Qty: 30 tablet, Refills: 11      warfarin (COUMADIN) 5 MG tablet Take 1 tablet (5 mg total) by mouth Daily.  Qty: 30 tablet, Refills: 11         CONTINUE these medications which have NOT CHANGED    Details   alprazolam (XANAX) 0.25 MG tablet Take  0.25 mg by mouth 3 (three) times daily.      atorvastatin (LIPITOR) 80 MG tablet Take 80 mg by mouth once daily.      clopidogrel (PLAVIX) 75 mg tablet Take 1 tablet (75 mg total) by mouth once daily.  Qty: 30 tablet, Refills: 1    Comments: Refills per cardiology      escitalopram oxalate (LEXAPRO) 10 MG tablet Take 10 mg by mouth once daily.      levothyroxine (SYNTHROID) 100 MCG tablet Take 100 mcg by mouth once daily.      ondansetron (ZOFRAN-ODT) 8 MG TbDL Take 1 tablet (8 mg total) by mouth every 6 (six) hours as needed.  Qty: 30 tablet, Refills: 3    Associated Diagnoses: Sclerosing cholangitis; Autoimmune cholangitis      pantoprazole (PROTONIX) 40 MG tablet Take 40 mg by mouth once daily.         STOP taking these medications       oxycodone (ROXICODONE) 5 MG immediate release tablet Comments:   Reason for Stopping:         predniSONE (DELTASONE) 5 MG tablet Comments:   Reason for Stopping:         indomethacin (INDOCIN) 25 MG capsule Comments:   Reason for Stopping:         meropenem (MERREM) 1 gram injection Comments:   Reason for Stopping:               Discharge Procedure Orders  CT Abdomen Pelvis  Without Contrast   Standing Status: Future  Standing Exp. Date: 12/17/18   Order Specific Question Answer Comments   Reason for Exam: Hepatic abscess    Oral/Rectal Contrast instructions: NO Oral Contrast    Special CT ABD Protocol Request? Routine        Follow-up Information     [facility].    Specialties:  Pharmacist, DME Provider, IV Infusion  Why:  [facility]  Contact information:  [address] [place] [address]  [phone]             Dallas Quach MD On 1/3/2018.    Specialty:  General Surgery  Why:  11:30 am hospital follow up  Contact information:  [address]  [facility] [address]  [phone]             [facility] In 1 day.    Specialty:  Home Health Services  Why:  Home Health Agency will see you day after discharge.  Contact  information:  [address]  [facility] [address]  [phone]             Sanju Torres MD. Schedule an appointment as soon as possible for a visit on 12/28/2017.    Specialty:  Cardiology  Why:  11:40 am coumadin follow up  Contact information:  [address] [facility] [address]  [phone]             ENT in [place] In 1 week.    Why:  for nosebleeds  Contact information:  get referral from cardiologist or your PCP           David Méndez MD On 1/3/2018.    Specialty:  Infectious Diseases  Why:  1:00 pm For Infectious Disease follow-up  Contact information:  [address] [facility] [address]  [phone]                   I spent more than 30 minutes total on this discharge including seeing and examining patient, note writing, reconcilling medications, and discussion with other health providers on team.

## 2017-12-18 NOTE — NURSING
Patient resting in bed. No acute changes noted. Wife at bedside all shift. Active in care. Dressing changed to 1 connie drain. 2nd drain clean, dry and intact. CONNIE drains flushed and emptied. Patient ambulates with standby assistance in room and into bathroom. Denies pain. Vs stable

## 2017-12-18 NOTE — PLAN OF CARE
Likely d/c today per staff, awaiting  infusion orders and Sw will upload to [facility] when completed.

## 2017-12-18 NOTE — ASSESSMENT & PLAN NOTE
76 y/o male with a history of CAD s/p CABG, HTN, HLD, prostate CA s/p prostatectomy 2005, found to have hepatic mass/abscess (segment IV) near hilum and stricture of upper bile duct on ERCP s/p L hepatectomy and resection of extrahepatic biliary tree , Eron-en-Y on 8/3/17 with final pathology demonstrating IGG-4 associated sclerosing cholangitis; he has complicated with multiple liver abscess; now readmitted on 12/13/17 with a new liver abscess, s/p IR drain 12/13/17, cultures positive for K pneumoniae- sensitive, no ESBL.     - recommend Ertapenem 1g IV q 24 hours for now.  -Unasyn/ PO Cipro is an alternative regimen, but ertapenem dosing more convenient for home health. Will discuss with primary for dispo planning

## 2017-12-18 NOTE — PLAN OF CARE
[facility]    HOME HEALTH ORDERS  FACE TO FACE ENCOUNTER    Patient Name: Robby Soriano  YOB: 1940    PCP: Lyla Bryan MD   PCP Address: [address] [facility] [place] [address]  PCP Phone Number: [phone]  PCP Fax: [phone]    Encounter Date: 12/18/2017    Admit to Home Health    Diagnoses:  Active Hospital Problems    Diagnosis  POA    *Liver abscess [K75.0]  Yes     Priority: 1 - High    Sepsis [A41.9]  Yes     Priority: 1 - High    Carotid disease, bilateral [I77.9]  Yes     Priority: 2     Coronary artery disease involving coronary bypass graft of native heart without angina pectoris [I25.810]  Yes     Priority: 2     Acquired hypothyroidism [E03.9]  Yes     Priority: 3     IgG4-related sclerosing cholangitis [K83.0]  Yes     Priority: 4     Anemia, chronic disease [D63.8]  Yes     Priority: 5     Essential hypertension [I10]  Yes     Priority: 6     Paroxysmal atrial flutter [I48.92]  Yes      Resolved Hospital Problems    Diagnosis Date Resolved POA   No resolved problems to display.     Follow-up Information     [facility] In 2 weeks.    Specialty:  Infectious Diseases  Why:  hospital follow up  Contact information:  [address] [facility] [id number] [phone]             [facility] Today.    Specialties:  Pharmacist, DME Provider, IV Infusion  Why:  [facility]  Contact information:  [address] [place] [address]  [phone]             Dallas Quach MD In 2 weeks.    Specialty:  General Surgery  Why:  hospital follow up  Contact information:  [address] [facility] [id number] [phone]             [facility] In 1 day.    Specialty:  Home Health Services  Contact information:  [address] [place] [address]  [phone]             Sanju Torres MD. Schedule an appointment as soon as possible for a visit in 1 week.    Specialty:   Cardiology  Why:  coumadin follow up  Contact information:  [address] [facility] [place] [address]  [phone]             ENT in [place] In 1 week.    Why:  for nosebleeds  Contact information:  get referral from cardiologist or your PCP               I have seen and examined this patient face to face today. My clinical findings that support the need for the home health skilled services and home bound status are the following:  Medical restrictions requiring assistance of another human to leave home due to  IV infusion Needs.    Allergies:Review of patient's allergies indicates:  No Known Allergies    Diet: regular diet    Activities: activity as tolerated    Nursing:   SN to complete comprehensive assessment including routine vital signs. Instruct on disease process and s/s of complications to report to MD. Review/verify medication list sent home with the patient at time of discharge  and instruct patient/caregiver as needed. Frequency may be adjusted depending on start of care date.    Notify MD if SBP > 160 or < 90; DBP > 90 or < 50; HR > 120 or < 50; Temp > 101    MISCELLANEOUS CARE:  Home Infusion Therapy:   SN to perform Infusion Therapy/Central Line Care.  Review Central Line Care & Central Line Flush with patient.    Administer (drug and dose): Unasyn 12 g IV once daily as continuous infusion TENTATIVELY until 1/8/2017. End date be determined by ID at appointment in two weeks.     Scrub the Hub: Prior to accessing the line, always perform a 30 second alcohol scrub  Each lumen of the central line is to be flushed at least daily with 10 mL Normal Saline and 3 mL Heparin flush (100 units/mL)  Skilled Nurse (SN) may draw blood from IV access  Blood Draw Procedure:   - Aspirate at least 5 mL of blood   - Discard   - Obtain specimen   - Change posiflow cap   - Flush with 20 mL Normal Saline followed by a                 3-5 mL Heparin flush (100 units/mL)  Central :   - Sterile  dressing changes are done weekly and as needed.   - Use chlor-hexadine scrub to cleanse site, apply Biopatch to insertion site,       apply securement device dressing   - Posi-flow caps are changed weekly and after EVERY lab draw.   - If sterile gauze is under dressing to control oozing,                 dressing change must be performed every 24 hours until gauze is not needed.    Labs: CBC, CMP, ESR, CRP  Weekly - fax to [facility] at [phone] (Ph# [phone])  PT/INR q 3 days - 1st INR 12/21 - fax to Dr. Sanju Torres [phone] (Ph# [phone])    Drain care - empty drain once daily. Record output. Change dressing once daily or prn saturation.    Medications: Review discharge medications with patient and family and provide education.      Current Discharge Medication List      START taking these medications    Details   aspirin (ECOTRIN) 81 MG EC tablet Take 1 tablet (81 mg total) by mouth once daily.  Refills: 0      ciprofloxacin HCl (CIPRO) 500 MG tablet Take 1 tablet (500 mg total) by mouth 2 (two) times daily.  Qty: 42 tablet, Refills: 0      metoprolol succinate (TOPROL-XL) 50 MG 24 hr tablet Take 1 tablet (50 mg total) by mouth once daily.  Qty: 30 tablet, Refills: 11      warfarin (COUMADIN) 5 MG tablet Take 1 tablet (5 mg total) by mouth Daily.  Qty: 30 tablet, Refills: 11         CONTINUE these medications which have NOT CHANGED    Details   alprazolam (XANAX) 0.25 MG tablet Take 0.25 mg by mouth 3 (three) times daily.      atorvastatin (LIPITOR) 80 MG tablet Take 80 mg by mouth once daily.      clopidogrel (PLAVIX) 75 mg tablet Take 1 tablet (75 mg total) by mouth once daily.  Qty: 30 tablet, Refills: 1    Comments: Refills per cardiology      escitalopram oxalate (LEXAPRO) 10 MG tablet Take 10 mg by mouth once daily.      levothyroxine (SYNTHROID) 100 MCG tablet Take 100 mcg by mouth once daily.      ondansetron (ZOFRAN-ODT) 8 MG TbDL Take 1 tablet (8 mg total) by mouth  every 6 (six) hours as needed.  Qty: 30 tablet, Refills: 3    Associated Diagnoses: Sclerosing cholangitis; Autoimmune cholangitis      pantoprazole (PROTONIX) 40 MG tablet Take 40 mg by mouth once daily.         STOP taking these medications       oxycodone (ROXICODONE) 5 MG immediate release tablet Comments:   Reason for Stopping:         predniSONE (DELTASONE) 5 MG tablet Comments:   Reason for Stopping:         indomethacin (INDOCIN) 25 MG capsule Comments:   Reason for Stopping:         meropenem (MERREM) 1 gram injection Comments:   Reason for Stopping:             I certify that this patient is confined to his home and needs intermittent skilled nursing care, physical therapy and occupational therapy.

## 2017-12-18 NOTE — PROGRESS NOTES
[facility]  Infectious Disease  Progress Note    Patient Name: Robby Soriano  MRN: [id number]  Admission Date: 12/13/2017  Length of Stay: 5 days  Attending Physician: Allyn Garcia MD  Primary Care Provider: Lyla Bryan MD    Isolation Status: No active isolations  Assessment/Plan:      * Liver abscess    78 y/o male with a history of CAD s/p CABG, HTN, HLD, prostate CA s/p prostatectomy 2005, found to have hepatic mass/abscess (segment IV) near hilum and stricture of upper bile duct on ERCP s/p L hepatectomy and resection of extrahepatic biliary tree , Eron-en-Y on 8/3/17 with final pathology demonstrating IGG-4 associated sclerosing cholangitis; he has complicated with multiple liver abscess; now readmitted on 12/13/17 with a new liver abscess, s/p IR drain 12/13/17, cultures positive for K pneumoniae- sensitive, no ESBL.       -Unasyn continuous infusion/ PO Cipro x 3 weeks to cover recurrent Enterococcus, K. Pneumo isolated this admission.    -ID follow up in 2 weeks at [facility]              Anticipated Disposition: home with home health per Primary    Thank you for your consult. I will follow-up with patient. Please contact us if you have any additional questions.    Thuan Cassidy MD  Infectious Disease  [facility]    Subjective:     Principal Problem:Liver abscess    HPI: Mr Soriano is a 76 yo M with IgG4 sclerosing cholangiocarcinoma s/p resection in 08/2017, polymicrobial hepatic abscess in the past s/p drain placement in 10/2107, recent STEMI medically managed from 10/2017 initially presented to [facility] with tachycardia,chills, dyspnea, cough,  and elevated lactate concerning for sepsis. In his prior surgery for resection of his cholangiocarcinoma in August 2017, he was noted to have lobectomy of L side, resection middle,left hepatic veins, CBD, and anastomosis of R hepatic duct which was complicated by a liver abscess s/p percutaneous drain placement and a prolonged  course of IV meropenem- danielito stop date 12/9. A repeat CT a/p showed superior hepatic fluid reaccumulation. Patient is currently denying abdominal pain, fever, palpitations, angina, PND. ID was consulted for hepatic abscess on prior meropenem which has not resolved since therapy completed. Micro history is significant for an E. Coli bacteremia and E. Faecalis (vanc-sensitive) during his October hospital course. He is scheduled for IR guided abscess drain w/ biopsies this admission and is on meropenem. He is also in questionable supraventricular arrhythmia with tachycardic rate in 160s, Cardiology consulted.     Interval History: no acute events; no rigors. Ready for discharge home.     Past Medical History:   Diagnosis Date    Cancer     Prostate/s/p prostatectomy    Coronary artery disease     GERD (gastroesophageal reflux disease)     Hyperlipidemia     Hypertension     Hypothyroidism        Past Surgical History:   Procedure Laterality Date    CAROTID ENDARTERECTOMY Bilateral     CORONARY ARTERY BYPASS GRAFT      PROSTATE SURGERY         Review of patient's allergies indicates:  No Known Allergies    Medications:  Prescriptions Prior to Admission   Medication Sig    alprazolam (XANAX) 0.25 MG tablet Take 0.25 mg by mouth 3 (three) times daily.    atorvastatin (LIPITOR) 80 MG tablet Take 80 mg by mouth once daily.    clopidogrel (PLAVIX) 75 mg tablet Take 1 tablet (75 mg total) by mouth once daily.    escitalopram oxalate (LEXAPRO) 10 MG tablet Take 10 mg by mouth once daily.    levothyroxine (SYNTHROID) 100 MCG tablet Take 100 mcg by mouth once daily.    ondansetron (ZOFRAN-ODT) 8 MG TbDL Take 1 tablet (8 mg total) by mouth every 6 (six) hours as needed.    pantoprazole (PROTONIX) 40 MG tablet Take 40 mg by mouth once daily.    indomethacin (INDOCIN) 25 MG capsule Take 25 mg by mouth 2 (two) times daily with meals.    meropenem (MERREM) 1 gram injection      Antibiotics     Start     Stop Route  Frequency Ordered    12/13/17 2030  meropenem-0.9% sodium chloride 1 g/50 mL IVPB      -- IV Every 8 hours (non-standard times) 12/13/17 1928        Antifungals     None        Antivirals     None             There is no immunization history on file for this patient.    Family History     None        Social History     Social History    Marital status:      Spouse name: N/A    Number of children: N/A    Years of education: N/A     Social History Main Topics    Smoking status: Former Smoker     Types: Cigarettes     Start date: 1/1/1956     Quit date: 6/18/2013    Smokeless tobacco: Never Used    Alcohol use No    Drug use: No    Sexual activity: Not Asked     Other Topics Concern    None     Social History Narrative    None     Review of Systems   Constitutional: Negative for chills and fever.   HENT: Negative for sore throat.    Respiratory: Negative for cough, chest tightness and shortness of breath.    Cardiovascular: Negative for chest pain, palpitations and leg swelling.   Gastrointestinal: Negative for abdominal distention, abdominal pain, diarrhea, nausea and vomiting.   Genitourinary: Negative for dysuria, flank pain and urgency.   Musculoskeletal: Negative for arthralgias.   Skin: Negative for rash.   Neurological: Negative for dizziness, light-headedness and numbness.   Psychiatric/Behavioral: Negative for confusion.     Objective:     Vital Signs (Most Recent):  Temp: 97.3 °F (36.3 °C) (12/18/17 0812)  Pulse: 87 (12/18/17 0812)  Resp: 18 (12/18/17 0812)  BP: (!) 145/68 (12/18/17 0812)  SpO2: 96 % (12/18/17 0812) Vital Signs (24h Range):  Temp:  [97.3 °F (36.3 °C)-97.7 °F (36.5 °C)] 97.3 °F (36.3 °C)  Pulse:  [72-91] 87  Resp:  [18] 18  SpO2:  [95 %-97 %] 96 %  BP: (112-145)/(53-68) 145/68     Weight: 76.7 kg (169 lb 1.5 oz)  Body mass index is 27.71 kg/m².    Estimated Creatinine Clearance: 74.7 mL/min (based on SCr of 0.8 mg/dL).    Physical Exam   Constitutional: He is oriented to  person, place, and time. No distress.   HENT:   Head: Normocephalic and atraumatic.   Mouth/Throat: No oropharyngeal exudate.   Eyes: No scleral icterus.   Cardiovascular: Normal rate, regular rhythm and normal heart sounds.    Pulmonary/Chest: Effort normal and breath sounds normal. No respiratory distress. He has no wheezes. He has no rales.   Abdominal: Soft. Bowel sounds are normal. He exhibits no distension. There is no tenderness. There is no rebound and no guarding.   2 drains in place RUQ   Musculoskeletal: He exhibits no edema.   Neurological: He is alert and oriented to person, place, and time.   Skin: No rash noted.   Vitals reviewed.      Significant Labs:   Blood Culture:   Recent Labs  Lab 10/12/17  2208 10/12/17  2235 10/14/17  0355 10/14/17  0410 12/13/17  0937   LABBLOO Gram stain aer bottle: Gram negative rods  Results called to and read back by:Harry Cook RN 10/13/2017  15:51  ESCHERICHIA COLI No growth after 5 days. No growth after 5 days. No growth after 5 days. No Growth to date  No Growth to date  No Growth to date  No Growth to date  No Growth to date  No Growth to date  No Growth to date  No Growth to date  No Growth to date  No Growth to date     CBC:   Recent Labs  Lab 12/17/17  0458 12/18/17  0356   WBC 8.65 8.40   HGB 8.6* 9.2*   HCT 25.7* 27.9*    99*     CMP: No results for input(s): NA, K, CL, CO2, GLU, BUN, CREATININE, CALCIUM, PROT, ALBUMIN, BILITOT, ALKPHOS, AST, ALT, ANIONGAP, EGFRNONAA in the last 48 hours.    Invalid input(s): ESTGFAFRICA  Coagulation:   Recent Labs  Lab 12/18/17  0356   INR 1.0     Microbiology Results (last 7 days)     Procedure Component Value Units Date/Time    Aerobic culture [[id number]] Collected:  12/14/17 0955    Order Status:  Completed Specimen:  Abscess from Abdomen Updated:  12/18/17 0911     Aerobic Bacterial Culture No growth    Culture, Anaerobe [[id number]] Collected:  12/14/17 0952    Order Status:  Completed Specimen:   Abscess from Abdomen Updated:  12/18/17 0904     Anaerobic Culture Culture in progress    Culture, Anaerobe [[id number]] Collected:  12/14/17 0952    Order Status:  Completed Specimen:  Abscess from Abdomen Updated:  12/18/17 0901     Anaerobic Culture Culture in progress    Aerobic culture [[id number]]  (Susceptibility) Collected:  12/14/17 0955    Order Status:  Completed Specimen:  Abscess from Abdomen Updated:  12/16/17 1206     Aerobic Bacterial Culture --     KLEBSIELLA PNEUMONIAE  Few      Gram stain [[id number]] Collected:  12/14/17 0952    Order Status:  Completed Specimen:  Abscess from Abdomen Updated:  12/14/17 1519     Gram Stain Result No WBC's      No organisms seen    Gram stain [[id number]] Collected:  12/14/17 0955    Order Status:  Completed Specimen:  Abscess from Abdomen Updated:  12/14/17 1505     Gram Stain Result Moderate WBC's      No organisms seen        Respiratory Culture: No results for input(s): GSRESP, RESPIRATORYC in the last 4320 hours.  Wound Culture:   Recent Labs  Lab 08/03/17  0947 10/12/17  1705 10/12/17  1714 12/14/17  0955   LABAERO ESCHERICHIA COLIFew  KLEBSIELLA PNEUMONIAEFew  ENTEROCOCCUS FAECALISFew ESCHERICHIA COLIModerate  KLEBSIELLA PNEUMONIAEModerate  ENTEROCOCCUS FAECALISModerate ESCHERICHIA COLIModerate  ENTEROCOCCUS FAECALISMany  KLEBSIELLA PNEUMONIAE ESBLModerate No growth  KLEBSIELLA PNEUMONIAEFew     All pertinent labs within the past 24 hours have been reviewed.    Significant Imaging: I have reviewed all pertinent imaging results/findings within the past 24 hours. CT ap pending

## 2017-12-19 ENCOUNTER — PATIENT OUTREACH (OUTPATIENT)
Dept: ADMINISTRATIVE | Facility: CLINIC | Age: 77
End: 2017-12-19

## 2017-12-19 RX ORDER — ACETAMINOPHEN 325 MG/1
325 TABLET ORAL EVERY 6 HOURS PRN
COMMUNITY

## 2017-12-19 NOTE — PATIENT INSTRUCTIONS
What Is Atrial Flutter/Atrial Fibrillation?      The heart has its own electrical system. This system makes the signals that start each heartbeat. The heartbeat begins in 1 of the 2 upper chambers of the heart (atria). A problem can make the atria beat faster than normal. The atria may beat fast but still evenly. This problem is called atrial flutter. If the atria beat very fast and also unevenly, it is called atrial fibrillation (AFib).  Causes of Atrial Flutter and Atrial Fibrillation  Causes of these problems can include:  · Previous heart attack  · High blood pressure  · Thyroid problems  In many cases, the cause is unknown.  When the Atria Beat Too Fast  The atria may beat fast only once in a while. This is called a paroxysmal heart rhythm problem. If they beat fast all the time, it is a chronic problem.  Atrial Flutter  With atrial flutter, electrical signals travel around and around inside the atria. These circling signals make the atria beat too fast:  · Atrial flutter can cause symptoms similar to AFib. It can also lead to the even faster, uneven rhythms of AFib.  Atrial Fibrillation (AFib)  With AFib, cells in the atria send extra electrical signals. These extra signals make the atria beat very fast. They also beat unevenly:  · The atria beat so fast and unevenly that they may quiver instead of marilee. If the atria dont contract, they dont move enough blood into the 2 lower chambers of the heart (ventricles). This can cause you to feel dizzy or weak.  · Blood that doesnt keep moving can pool and form clots in the atria. These clots can move into other parts of the body and cause serious problems such as a stroke.   Symptoms of Atrial Flutter and AFib  These symptoms include the following:  · Palpitations (a fluttering, fast heartbeat)  · Weakness or tiredness  · Shortness of breath  · Chest pain or tightness  · Dizziness or lightheadedness  · Fainting spells   Date Last Reviewed: 2/26/2014  ©  0991-8243 [employer]. [address] [place] [address], [place] [address]. All rights reserved. This information is not intended as a substitute for professional medical care. Always follow your healthcare professional's instructions.

## 2017-12-20 LAB — BACTERIA SPEC ANAEROBE CULT: NORMAL

## 2017-12-20 NOTE — PT/OT/SLP DISCHARGE
Occupational Therapy Discharge Summary    Robby Soriano  MRN: [id number]   Principal Problem: Liver abscess      Patient Discharged from acute Occupational Therapy on 12/18/2017  Please refer to prior OT note dated 12/14/2017 for functional status.    Assessment:      Patient appropriate for care in another setting.    Objective:     GOALS:    Occupational Therapy Goals        Problem: Occupational Therapy Goal    Goal Priority Disciplines Outcome Interventions   Occupational Therapy Goal     OT, PT/OT     Description:  Goals to be met by: 12/21/2017     Patient will increase functional independence with ADLs by performing:    UE Dressing with [place].  LE Dressing with [place] utilizing adaptive equipment as necessary.  Grooming while standing with Supervision.  Toileting from toilet with Supervision for hygiene and clothing management.   Toilet transfer to toilet with Supervision.                      Reasons for Discontinuation of Therapy Services  Transfer to alternate level of care.      Plan:     Patient Discharged to: Home with Home Health Service    ELLEN Pham  12/20/2017

## 2017-12-27 ENCOUNTER — TELEPHONE (OUTPATIENT)
Dept: SURGERY | Facility: CLINIC | Age: 77
End: 2017-12-27

## 2017-12-27 NOTE — TELEPHONE ENCOUNTER
----- Message from Liberty Lopez sent at 12/27/2017  4:13 PM CST -----  Contact: Pt.'s Wife    Caller states she would like to speak with nurse  in reference to finding out if CT scan will be done or not please call Pt.'s wife  back [phone] Thank You :)

## 2017-12-27 NOTE — TELEPHONE ENCOUNTER
----- Message from Amando Agee sent at 12/27/2017  2:42 PM CST -----  Contact: Allyn//Wife  Caller states that (s)he needs to speak with nurse in ref to rescheduling the pts appt on 01/03 due to issues with transpotation//please call back at [phone]//thank you

## 2017-12-28 DIAGNOSIS — K75.0 LIVER ABSCESS: Primary | ICD-10-CM

## 2018-01-02 ENCOUNTER — HOSPITAL ENCOUNTER (OUTPATIENT)
Dept: RADIOLOGY | Facility: HOSPITAL | Age: 78
Discharge: HOME | End: 2018-01-02
Attending: SURGERY
Payer: MEDICARE

## 2018-01-02 DIAGNOSIS — K75.0 LIVER ABSCESS: ICD-10-CM

## 2018-01-02 PROCEDURE — 74178 CT ABD&PLV WO CNTR FLWD CNTR: CPT | Mod: 26,,, | Performed by: RADIOLOGY

## 2018-01-02 PROCEDURE — 74178 CT ABD&PLV WO CNTR FLWD CNTR: CPT | Mod: TC

## 2018-01-02 PROCEDURE — 25500020 PHARM REV CODE 255

## 2018-01-02 RX ORDER — SODIUM CHLORIDE 9 MG/ML
INJECTION, SOLUTION INTRAVENOUS
Status: DISPENSED
Start: 2018-01-02 | End: 2018-01-02

## 2018-01-02 RX ADMIN — IOHEXOL 75 ML: 350 INJECTION, SOLUTION INTRAVENOUS at 08:01

## 2018-01-02 RX ADMIN — IOHEXOL 30 ML: 350 INJECTION, SOLUTION INTRAVENOUS at 08:01

## 2018-01-03 ENCOUNTER — TELEPHONE (OUTPATIENT)
Dept: SURGERY | Facility: CLINIC | Age: 78
End: 2018-01-03

## 2018-01-03 NOTE — TELEPHONE ENCOUNTER
----- Message from Steffi Crisostomo sent at 1/3/2018  1:13 PM CST -----  Contact: jo-ann/wife  Jo-Ann States that she needs a call returned by a nurse in reference to an letter they received and they missed , Please call Jo-Ann @ [phone]  . Thanks :)

## 2018-01-05 ENCOUNTER — OFFICE VISIT (OUTPATIENT)
Dept: INFECTIOUS DISEASES | Facility: CLINIC | Age: 78
End: 2018-01-05
Payer: MEDICARE

## 2018-01-05 ENCOUNTER — INFUSION (OUTPATIENT)
Dept: INFECTIOUS DISEASES | Facility: HOSPITAL | Age: 78
End: 2018-01-05
Attending: INTERNAL MEDICINE
Payer: MEDICARE

## 2018-01-05 ENCOUNTER — OFFICE VISIT (OUTPATIENT)
Dept: SURGERY | Facility: CLINIC | Age: 78
End: 2018-01-05
Payer: MEDICARE

## 2018-01-05 VITALS
TEMPERATURE: 97 F | WEIGHT: 175.5 LBS | BODY MASS INDEX: 29.24 KG/M2 | DIASTOLIC BLOOD PRESSURE: 68 MMHG | HEIGHT: 65 IN | SYSTOLIC BLOOD PRESSURE: 113 MMHG | HEART RATE: 81 BPM

## 2018-01-05 VITALS
DIASTOLIC BLOOD PRESSURE: 60 MMHG | BODY MASS INDEX: 29.02 KG/M2 | TEMPERATURE: 98 F | SYSTOLIC BLOOD PRESSURE: 123 MMHG | HEART RATE: 81 BPM | HEIGHT: 65 IN | WEIGHT: 174.19 LBS

## 2018-01-05 DIAGNOSIS — K75.0 LIVER ABSCESS: Primary | ICD-10-CM

## 2018-01-05 DIAGNOSIS — Z09 POSTOP CHECK: Primary | ICD-10-CM

## 2018-01-05 PROCEDURE — 99999 PR PBB SHADOW E&M-EST. PATIENT-LVL III: CPT | Mod: PBBFAC,,, | Performed by: NURSE PRACTITIONER

## 2018-01-05 PROCEDURE — 99213 OFFICE O/P EST LOW 20 MIN: CPT | Mod: S$GLB,,, | Performed by: INTERNAL MEDICINE

## 2018-01-05 PROCEDURE — 99999 PR PBB SHADOW E&M-EST. PATIENT-LVL III: CPT | Mod: PBBFAC,,, | Performed by: INTERNAL MEDICINE

## 2018-01-05 PROCEDURE — 99024 POSTOP FOLLOW-UP VISIT: CPT | Mod: S$GLB,,, | Performed by: NURSE PRACTITIONER

## 2018-01-05 RX ORDER — ATORVASTATIN CALCIUM 10 MG/1
TABLET, FILM COATED ORAL
Status: ON HOLD | COMMUNITY
Start: 2018-01-03 | End: 2021-01-01 | Stop reason: ALTCHOICE

## 2018-01-05 RX ORDER — AMPICILLIN AND SULBACTAM 100; 50 MG/ML; MG/ML
INJECTION, POWDER, FOR SOLUTION INTRAVENOUS
COMMUNITY
Start: 2018-01-02 | End: 2019-04-18 | Stop reason: ALTCHOICE

## 2018-01-05 NOTE — PROGRESS NOTES
Subjective:      Patient ID: Robby Soriano is a 77 y.o. male.    Chief Complaint:Follow-up    History of Present Illness    78 yo male with a history of CAD s/p CABG, HTN, HLD, and prostate CA s/p prostatectomy 2005 who was found to have hepatic mass/abscess and stricture of upper bile duct on ERCP. He is s/p L hepatectomy and resection of extrahepatic biliary tree , Eron-en-Y on 8/3/17 with final pathology demonstrating IGG-4 associated sclerosing cholangitis for which he is on prednisone for.  OR cultures (8/3/17) of hepatic abscess showed Clostridium perfringens, E coli, K pneumoniae, E faecalis.  He was given 5 days of IV antibiotics post operatively and discharged.      He was seen in clinic for follow up on 10/11/17 due to jaundice and fatigue with subjective fevers and was admitted that day.  He was found to have a liver abscess and IR placed drains.  He was initially on zosyn and fluconazole but was transitioned to meropenem at the time of discharge.     He was re-admitted in early November because 1 of his drains had stopped working.  This was revised by IR and he was discharged.  He was re-admitted to the hospital again on December 13.  The drains were adjusted. He was switched from meropenem to IV ertapenem.      He is here today for follow up.  The drains are no longer draining any fluid.  Follow up CT scan shows resolution of the abscess.  He feels well.    Review of Systems   Constitution: Negative for chills, decreased appetite, fever, weakness, malaise/fatigue, night sweats, weight gain and weight loss.   HENT: Positive for hearing loss. Negative for congestion, ear pain, hoarse voice, sore throat and tinnitus.    Eyes: Negative for blurred vision, redness and visual disturbance.   Cardiovascular: Negative for chest pain, leg swelling and palpitations.   Respiratory: Negative for cough, hemoptysis, shortness of breath and sputum production.    Hematologic/Lymphatic: Negative for adenopathy. Does  not bruise/bleed easily.   Skin: Positive for dry skin. Negative for itching, rash and suspicious lesions.   Musculoskeletal: Positive for back pain. Negative for joint pain, myalgias and neck pain.   Gastrointestinal: Negative for abdominal pain, constipation, diarrhea, heartburn, nausea and vomiting.   Genitourinary: Negative for dysuria, flank pain, frequency, hematuria, hesitancy and urgency.   Neurological: Negative for dizziness, headaches, numbness and paresthesias.   Psychiatric/Behavioral: Positive for depression. Negative for memory loss. The patient is nervous/anxious. The patient does not have insomnia.      Objective:   Physical Exam   Constitutional: He is oriented to person, place, and time. He appears well-developed and well-nourished. No distress.   HENT:   Head: Normocephalic and atraumatic.   Right Ear: External ear normal.   Left Ear: External ear normal.   Nose: Nose normal.   Mouth/Throat: Oropharynx is clear and moist. No oropharyngeal exudate.   Eyes: Conjunctivae and EOM are normal. Pupils are equal, round, and reactive to light. Right eye exhibits no discharge. Left eye exhibits no discharge. No scleral icterus.   Neck: Normal range of motion. Neck supple. No JVD present. No tracheal deviation present. No thyromegaly present.   Cardiovascular: Normal rate, regular rhythm and intact distal pulses.  Exam reveals no gallop and no friction rub.    No murmur heard.  Pulmonary/Chest: Effort normal and breath sounds normal. No stridor. No respiratory distress. He has no wheezes. He has no rales. He exhibits no tenderness.   Abdominal: Soft. Bowel sounds are normal. He exhibits no distension and no mass. There is no tenderness. There is no rebound and no guarding.   Musculoskeletal: Normal range of motion. He exhibits no edema or tenderness.   Lymphadenopathy:     He has no cervical adenopathy.   Neurological: He is alert and oriented to person, place, and time. He has normal reflexes. He displays  normal reflexes. No cranial nerve deficit. He exhibits normal muscle tone. Coordination normal.   Skin: Skin is warm. No rash noted. He is not diaphoretic. No erythema. No pallor.   Psychiatric: He has a normal mood and affect. His behavior is normal. Judgment and thought content normal.   Nursing note and vitals reviewed.    Assessment:       1. Liver abscess        Labs and studies reviewed. CT scan shows resolution of the liver abscess.  I will discontinue IV antibiotics and remove the picc line.  Advised him to follow up with his PCP in [place].    Plan:       Liver abscess  -     Nursing communication; Future; Expected date: 01/05/2018

## 2018-01-05 NOTE — PROGRESS NOTES
Right upper arm PICC lined removed as per verbal order. Tip intact. Length confirmed. Vaseline gauze dressing applied. Pt instructed to keep the gauze to area x 24 hours. Spouse also updated at bedside. Pt left the unit in NAD.

## 2018-01-07 PROBLEM — K75.0 LIVER ABSCESS: Status: RESOLVED | Noted: 2017-11-02 | Resolved: 2018-01-07

## 2018-01-07 PROBLEM — K83.1 OBSTRUCTIVE JAUNDICE: Status: RESOLVED | Noted: 2017-10-11 | Resolved: 2018-01-07

## 2018-01-07 PROBLEM — R78.81 BACTEREMIA: Status: RESOLVED | Noted: 2017-10-13 | Resolved: 2018-01-07

## 2018-01-07 PROBLEM — A41.9 SEPSIS: Status: RESOLVED | Noted: 2017-12-13 | Resolved: 2018-01-07

## 2018-01-07 NOTE — PROGRESS NOTES
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 He developed a liver abscess which had been treated w antibiotics and IR drain placement (one drain was removed and then replaced). He has been followed by infectious disease and is currently receiving IV ABX through a right PICC line. He will be seeing infectious disease right after our appointment. His recent CT scan to review the liver abscess if as follows:    01/02/18 CT scan: Status post cutaneous drain placement at the hepatic dome, with no distinct residual well-formed fluid collection. Mild perihepatic free fluid is present at the hepatic dome. Stable positioning of more inferior/anterior perihepatic percutaneous drain, without residual fluid collection. Status post left hepatectomy with hepaticojejunostomy. Small right pleural effusion, increased in size.    His drains are putting out very little fluid (less than 10ml each/day) and the CT scan indicates the fluid collections have resolved. I reviewed the images and the fluid at the dome of the liver appears to have resolved, although there is some pleural fluid remaining. The distal abscess has resolved as well.He states that he feels better than he has in a long time and was very pleased that he had his drains removed today. I advised that he should discuss ABX tx w infectious disease directly, which he will do today, although I'm fairily certain they will d/c the PICC and ABX.     OPERATIVE PROCEDURES:    PHYSICAL EXAM:  Physical Exam   Constitutional: He is cooperative. He does not appear ill.   Abdominal: Soft. Bowel sounds are normal. There is no tenderness.       Neurological: He is alert.       ASSESSMENT:    Doing well, abscess resolved.     PLAN:    1. Follow up with ID as planned  2. Follow up w us PRN and with home  providers

## 2019-01-03 ENCOUNTER — TELEPHONE (OUTPATIENT)
Dept: INFECTIOUS DISEASES | Facility: CLINIC | Age: 79
End: 2019-01-03

## 2019-01-03 NOTE — TELEPHONE ENCOUNTER
----- Message from Melinda Faye MA sent at 1/3/2019 10:11 AM CST -----  Contact: wife:   Allyn  tel:    [phone]   Please advise  ----- Message -----  From: David Méndez MD  Sent: 1/3/2019   9:46 AM  To: Melinda Faye MA    Would ask ct as last to see him  ----- Message -----  From: Melinda Faye MA  Sent: 1/3/2019   9:41 AM  To: David Méndez MD    Have you seen this patient recently. It looks like  was last to see pt.  ----- Message -----  From: Aishwarya Stover  Sent: 1/3/2019   9:24 AM  To: Honey WALKER Staff    Needs Advice    Reason for call:        Pt.says she wants to know what is the diagnosis, and what did you find , what problems with her , and  records. Pt. Is due to have a colonoscopy and a upper GI. W/  Dr. Nguyen tel:   [phone]  /  Pt. Is due to see Dr. Nguyen on the 18th.    Wants this info for her records.    Wants to hand carry them to the next dr's. Ofc.             Communication Preference:   Phone   Additional Information:  Pls. Call.

## 2019-01-03 NOTE — TELEPHONE ENCOUNTER
Please print all the infectious diseases notes on record.  Also print out the CT scan of his abdomen from 2018 and all his microbiology labs.  Contact her and find out where she wants this information sent to.

## 2019-01-04 ENCOUNTER — TELEPHONE (OUTPATIENT)
Dept: TRANSPLANT | Facility: CLINIC | Age: 79
End: 2019-01-04

## 2019-01-04 NOTE — TELEPHONE ENCOUNTER
----- Message from Sharlene Collins sent at 1/4/2019  4:17 PM CST -----    Have medical recorders that where scanned into media from [facility]. Will call referring MD office if we need any additional information on the pt.    By: Sharlene Collins  ----- Message -----  From: Jose D Stroud  Sent: 1/4/2019   4:09 PM  To: [facility] Referral [facility]    PT is being referred to Hepatology. I have scanned the referral and records into media mgr within Epic. Please review and contact pt for scheduling,thanks

## 2019-01-07 ENCOUNTER — TELEPHONE (OUTPATIENT)
Dept: TRANSPLANT | Facility: CLINIC | Age: 79
End: 2019-01-07

## 2019-01-07 NOTE — TELEPHONE ENCOUNTER
----- Message from Sharlene Collins sent at 1/7/2019  9:26 AM CST -----    Called ref md office at [phone] to have them send Md notes and labs on the pt, but the nurse was unavailable. Message was sent for her to call back about missing pt info.

## 2019-01-07 NOTE — TELEPHONE ENCOUNTER
----- Message from Sharlene Collins sent at 1/7/2019  2:58 PM CST -----      We have the pt recorders and they are now pending review by the referral nurse.  By:Sharlene Collins        ----- Message -----  From: Jose D Stroud  Sent: 1/7/2019   2:28 PM  To: [facility] Referral Pool    Additional records have been sent over and are in media mgr within Epic,thanks

## 2019-01-09 ENCOUNTER — DOCUMENTATION ONLY (OUTPATIENT)
Dept: TRANSPLANT | Facility: CLINIC | Age: 79
End: 2019-01-09

## 2019-01-09 NOTE — LETTER
January 9, 2019    Carroll Nguyen MD  [address]  [place] [address]      Dear Dr. Nguyen    Patient: Robby Soriano   MR Number: [id number]   YOB: 1940     Thank you for the referral of Robby Soriano to the [facility] program. An initial appointment will be scheduled for your patient with one of our Hepatologists.      Thank you again for your trust in our program.  If there is anything we can do for you or your staff, please feel free to contact us.        Sincerely,        [facility]  [address] [place] [id number]  [phone]

## 2019-01-09 NOTE — LETTER
January 9, 2019    Robby Soriano  [address] [place] [address]      Dear Robby Soriano:    Your doctor has referred you to the [facility]. We are sending this letter as a reminder for you to make an appointment with us to complete the referral process.   Please call us at your earliest convenience at [phone] to schedule your appointment.  We look forward to seeing you soon.  If you received a call, and are scheduled, please disregard this letter.      Sincerely,        [facility]   [address] [place] [id number]  [phone]

## 2019-01-09 NOTE — NURSING
Pt records reviewed.  Pt will be referred to Hepatology due to autoimmune  Initial referral received  from Dr. Lawrence Nguyen  Referral letter sent to provider and patient.

## 2019-01-11 ENCOUNTER — TELEPHONE (OUTPATIENT)
Dept: HEPATOLOGY | Facility: CLINIC | Age: 79
End: 2019-01-11

## 2019-01-11 NOTE — TELEPHONE ENCOUNTER
Dr. Lawrence Nguyen has ordered that patient be scheduled for hepatology consult for autoimmune issues.  I spoke with his wife.  She states that they will only come to [place] because of transportation issues.  Dr. Cee's calender booked.  March calender not open.  Wife told that patient would be put on wait list to see provider 3/2019. Wife states that she will speak with Dr. Nguyen to see if her  can see a provider closer to home.

## 2019-01-31 NOTE — PROCEDURES
Radiology Post-Procedure Note    Pre Op Diagnosis: hepatic abscess, sclerosing cholangitis    Post Op Diagnosis: hepatic abscess    Procedure:  hepatic abscess drainage    Procedure performed by: Harry Miguel MD    Written Informed Consent Obtained: Yes    Specimen Removed: YES initially bilious, then purulent appearing fluid    Estimated Blood Loss: Minimal    Findings: CT was used for localization of abnormal fluid collection. A needle was inserted into the fluid collection and initially bilious, then purulent appearing fluid was aspirated.  A wire was inserted into the collection and the tract was dilated.  A 8.0 Martiniquais all-purpose drainage catheter was inserted and a pigtail loop of the distal end was formed.  The catheter was sutured into place and approximately  6 mL fluid was removed initially for C/S.  Both bilious and purulent appearing specimens sent for C/S.     A specimen was sent to the lab for further analysis and culture.    The patient tolerated procedure well and there were no complications. Please see procedure report under Imaging for further details.    Harry Miguel MD  Staff Radiologist  Department of Radiology  Pager: [phone]    
[Negative] : Heme/Lymph

## 2019-04-18 ENCOUNTER — INITIAL CONSULT (OUTPATIENT)
Dept: HEPATOLOGY | Facility: CLINIC | Age: 79
End: 2019-04-18
Payer: MEDICARE

## 2019-04-18 ENCOUNTER — TELEPHONE (OUTPATIENT)
Dept: HEPATOLOGY | Facility: CLINIC | Age: 79
End: 2019-04-18

## 2019-04-18 VITALS
HEIGHT: 65 IN | SYSTOLIC BLOOD PRESSURE: 120 MMHG | BODY MASS INDEX: 28.69 KG/M2 | WEIGHT: 172.19 LBS | TEMPERATURE: 99 F | RESPIRATION RATE: 12 BRPM | HEART RATE: 54 BPM | DIASTOLIC BLOOD PRESSURE: 60 MMHG

## 2019-04-18 DIAGNOSIS — K83.09 AUTOIMMUNE CHOLANGITIS: Primary | ICD-10-CM

## 2019-04-18 PROCEDURE — 99205 OFFICE O/P NEW HI 60 MIN: CPT | Mod: S$GLB,,, | Performed by: INTERNAL MEDICINE

## 2019-04-18 PROCEDURE — 1101F PT FALLS ASSESS-DOCD LE1/YR: CPT | Mod: CPTII,S$GLB,, | Performed by: INTERNAL MEDICINE

## 2019-04-18 PROCEDURE — 99999 PR PBB SHADOW E&M-EST. PATIENT-LVL III: CPT | Mod: PBBFAC,,, | Performed by: INTERNAL MEDICINE

## 2019-04-18 PROCEDURE — 99205 PR OFFICE/OUTPT VISIT, NEW, LEVL V, 60-74 MIN: ICD-10-PCS | Mod: S$GLB,,, | Performed by: INTERNAL MEDICINE

## 2019-04-18 PROCEDURE — 1101F PR PT FALLS ASSESS DOC 0-1 FALLS W/OUT INJ PAST YR: ICD-10-PCS | Mod: CPTII,S$GLB,, | Performed by: INTERNAL MEDICINE

## 2019-04-18 PROCEDURE — 99999 PR PBB SHADOW E&M-EST. PATIENT-LVL III: ICD-10-PCS | Mod: PBBFAC,,, | Performed by: INTERNAL MEDICINE

## 2019-04-18 RX ORDER — METFORMIN HYDROCHLORIDE 500 MG/1
500 TABLET ORAL 2 TIMES DAILY WITH MEALS
Status: ON HOLD | COMMUNITY
End: 2021-01-01 | Stop reason: ALTCHOICE

## 2019-04-18 RX ORDER — DIPHENHYDRAMINE HCL 25 MG
25 TABLET ORAL DAILY PRN
COMMUNITY

## 2019-04-18 RX ORDER — CALCIUM CARBONATE/VITAMIN D3 500-10/5ML
1 LIQUID (ML) ORAL 2 TIMES DAILY
COMMUNITY

## 2019-04-18 RX ORDER — BUSPIRONE HYDROCHLORIDE 5 MG/1
5 TABLET ORAL DAILY
Status: ON HOLD | COMMUNITY
End: 2021-01-01 | Stop reason: ALTCHOICE

## 2019-04-18 RX ORDER — MONTELUKAST SODIUM 10 MG/1
10 TABLET ORAL NIGHTLY
COMMUNITY

## 2019-04-18 NOTE — Clinical Note
Recommendations:-  Labs today and every 4 months x 12:  CMP-  If alk phos elevated, will start ursodiol 300 mg QD or BID-  Return in 1 year-  Send copy of note to:  Carroll Nguyen MD and Lyla Bryan M.D.

## 2019-04-18 NOTE — TELEPHONE ENCOUNTER
Dr. Cee ordered that patient have cmp done every 4 mths X 4 years.  Patient provided with a standing order to have testing done locally.  Also she ordered a f/u visit in 1 yr; visit placed in recall system.

## 2019-04-18 NOTE — PROGRESS NOTES
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    PCP: Lyla Bryan       HPI:  This is a 79 y.o. male here for evaluation of: autoimmune cholangiopathy    In August of 2017, patient was being evaluated for occult GI blood loss, and as part of evaluation, an ERCP was done, which showed a biliary stricture, it was stented by Dr. Nguyen, and then he was sent to the main campus, was seen by Dr. Quach.  Patient underwent left hepatic lobe resection with Rouex-en-Y anastomosis.  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  He did well after surgery.  Labs showed mild alk phos elevation to 180 until April 2018, then in July 2018 and oct 2018, alk phos started to increase to 400 range, and therefore, he was sent here.      Currently asymptomatic.       FINAL PATHOLOGIC DIAGNOSIS 8/3/17:  1. GALL BLADDER, CHOLECYSTECTOMY:  Chronic cholecystitis  2. LYMPH NODE, SHANIKA HEPATIS, EXCISION:  Benign adipose tissue  No lymph node identified  3. DISTAL BILE DUCT MARGIN, RESECTION: Benign portion of bile duct with chronic inflammation  Negative for malignancy  4. 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 PROXIMAL RIGHT HEPATIC DUCT MARGIN, RESECTION:  Benign portion of bile duct with chronic inflammation  Negative for malignancy  Comment  Multiple sections from the liver mass show extensive acute and chronic inflammation (abscess forming mass).  There are frequent areas of bile ducts concentrically surrounded by fibrosis with lymphoplasmacystic infiltrate, which  is diffusely and strongly positive for IgG4 (more than 200/HPF). Focally  phlebitis is also seen. There is absence of  liver parenchyma (collapse of parenchyma) in the area of mass formed by inflammatory infiltrate.  The surrounding liver parenchyma shows minimal fibrosis and minimal portal tract inflammation.  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  Please also correlate with clinical and imaging findings.  Diagnosed by: Keelye Quintana M.D.  (Electronically Signed: 2017-08-18 13:18:37)  Frozen Section Diagnosis  3. Negative for high grade dysplasia, and negative for carcinoma. IVO  Reported to Sandy  5. Negative for dysplasia and invasive carcinoma. IVO  Reported to Dr. Quach via JESIKA Chicas M.D.  (Reported: 2017-08-03 10:10:00)    Elevated liver enzymes: No  Abnormal imaging: No  Cirrhosis: No  Hepatitis C: No  Hepatitis B: No  Fatty liver: No  Encephalopathy: No  Post-hospital discharge: No  Symptoms: none    Primary hepatic manifestations:  Fatigue:No  Edema:No  Ascites:No  Encephalopathy:No  Abdominal pain:No  GI bleeds: No  Pruritus:No  Weight Changes:No  Changes in Bowel habits: No  Muscle cramps:No    Risk factors for liver disease:  No jaundice  No transfusions  No IVDU  Did not snort cocaine or similar agents  Did not live with anyone with hepatitis B or C  Sexual partner not tested  No hepatotoxic medications  No exposure to industrial toxins  Alcohol: none      ROS:  Constitutional: No fevers, chills, weight changes, fatigue  ENT: No allergies, nosebleeds,   CV: No chest pain  Pulm: No cough, shortness of breath  Ophtho: No vision changes  GI/Liver: see HPI  Derm: No rash, itching  Heme: No swollen glands, bruising  MSK: No joint pains, joint swelling  : No dysuria, hematuria, decrease in urine  output  Endo: No hot or cold intolerance  Neuro: No confusion, disorientation, difficulty with sleep, memory, concentration, syncope, seizure  Psych: No anxiety, depression    Medical History:  has a past medical history of Cancer, Coronary artery disease, GERD (gastroesophageal reflux disease), Hyperlipidemia, Hypertension, and Hypothyroidism.    Surgical History:  has a past surgical history that includes Prostate surgery; Coronary artery bypass graft; and Carotid endarterectomy (Bilateral).    Family History: family history is not on file..     Social History:  reports that he quit smoking about 5 years ago. His smoking use included cigarettes. He started smoking about 63 years ago. He has never used smokeless tobacco. He reports that he does not drink alcohol or use drugs.    Review of patient's allergies indicates:  No Known Allergies    Current Outpatient Medications   Medication Sig    acetaminophen (TYLENOL) 325 MG tablet Take 325 mg by mouth every 6 (six) hours as needed for Pain.    atorvastatin (LIPITOR) 10 MG tablet     busPIRone (BUSPAR) 5 MG Tab Take 5 mg by mouth once daily.    calcium carb/magnesium hydrox (MYLANTA ORAL) Take by mouth daily as needed.    cinnamon bark (CINNAMON ORAL) Take 1,000 mg by mouth 2 (two) times daily.    diphenhydrAMINE (SOMINEX) 25 mg tablet Take 25 mg by mouth daily as needed for Insomnia.    escitalopram oxalate (LEXAPRO) 10 MG tablet Take 10 mg by mouth once daily.    fluticasone-umeclidin-vilanter (TRELEGY ELLIPTA) 100-62.5-25 mcg DsDv Inhale into the lungs daily as needed.    levothyroxine (SYNTHROID) 100 MCG tablet Take 100 mcg by mouth once daily.    magnesium oxide 400 mg Cap Take 1 capsule by mouth 2 (two) times daily.    metFORMIN (GLUCOPHAGE) 500 MG tablet Take 500 mg by mouth 2 (two) times daily with meals.    montelukast (SINGULAIR) 10 mg tablet Take 10 mg by mouth every evening.    pantoprazole (PROTONIX) 40 MG tablet Take 40 mg by mouth once  "daily.    wheat dextrin (BENEFIBER SUGAR FREE, DEXTRIN,) 3 gram/4 gram PwPk Take by mouth daily as needed.    aspirin (ECOTRIN) 81 MG EC tablet Take 1 tablet (81 mg total) by mouth once daily.    clopidogrel (PLAVIX) 75 mg tablet Take 1 tablet (75 mg total) by mouth once daily.    metoprolol succinate (TOPROL-XL) 50 MG 24 hr tablet Take 1 tablet (50 mg total) by mouth once daily. (Patient taking differently: Take by mouth once daily. 1/2 tablet in am)     No current facility-administered medications for this visit.        Objective Findings:    Vital Signs:  /60   Pulse (!) 54   Temp 98.9 °F (37.2 °C)   Resp 12   Ht 5' 5" (1.651 m)   Wt 78.1 kg (172 lb 2.9 oz)   BMI 28.65 kg/m²   Body mass index is 28.65 kg/m².    Physical Exam:  General Appearance: Well appearing in no acute distress  Head:   Normocephalic, without obvious abnormality  Eyes:    No scleral icterus, EOMI  ENT: Neck supple, Lips, mucosa, and tongue normal; teeth and gums normal  Lungs: CTA bilaterally in anterior and posterior fields, no wheezes, no crackles.  Heart:  Regular rate and rhythm, S1, S2 normal, no murmurs heard  Abdomen: midline long scar upper abd, connects to chest scar.  Abd soft, non tender, non distended with positive bowel sounds in all four quadrants. No hepatosplenomegaly, ascites, or mass  Extremities: 2+ pulses, no clubbing, cyanosis or edema  Skin: No rash  Neurologic: CN II-XII intact, alert, oriented x 3. No asterixis      Labs:  Lab Results   Component Value Date    WBC 8.40 12/18/2017    HGB 9.2 (L) 12/18/2017    HCT 27.9 (L) 12/18/2017    PLT 99 (L) 12/18/2017    INR 1.0 12/18/2017    CREATININE 0.8 12/15/2017    BUN 10 12/15/2017    BILITOT 0.8 12/15/2017    ALT 20 12/15/2017    AST 26 12/15/2017    ALKPHOS 154 (H) 12/15/2017     12/15/2017    K 3.7 12/15/2017     12/15/2017    CO2 24 12/15/2017    HGBA1C 4.7 12/14/2017       Imaging:       Endoscopy:      Assessment:  1. Autoimmune cholangitis "      Autoimmune cholangiopathy: s/p resection of left hepatic lobe and Eron-en-Y anastomosis, now has elevated alk phos in 400 range, 7/2018 and 10/2018. No recent labs.  Patient is asymptomatic, specifically no itching or jaundice.      Will monitor with CMP and start ursodiol 300 mg BID if alk phos remains elevated.  Not in favor of immunosuppression at age 79, unless T bili starts to increase.         Recommendations:  -  Labs today and every 4 months x 12:    CMP  -  If alk phos elevated, will start ursodiol 300 mg QD or BID  -  Return in 1 year  -  Send copy of note to:  Carroll Nguyen MD and Lyla Bryan M.D.       Follow up in about 1 year (around 4/18/2020).      Order summary:  Orders Placed This Encounter   Procedures    Comprehensive metabolic panel       Thank you so much for allowing me to participate in the care of Robby Cee MD

## 2019-04-18 NOTE — PATIENT INSTRUCTIONS
Recommendations:  -  Labs today and every 4 months x 12:    CMP  -  If alk phos elevated, will start ursodiol 300 mg QD or BID  -  Return in 1 year  -  Send copy of note to:  Carroll Nguyen MD and Lyla Bryan M.D.

## 2019-07-23 ENCOUNTER — TELEPHONE (OUTPATIENT)
Dept: HEPATOLOGY | Facility: CLINIC | Age: 79
End: 2019-07-23

## 2019-07-23 NOTE — TELEPHONE ENCOUNTER
----- Message from Jeaneth Lucas sent at 7/23/2019  8:14 AM CDT -----  Test Results    Type of Test: blood work  Date of Test: 2 months  Communication Preference: [phone]  Additional Information: pls contact w/results

## 2019-07-23 NOTE — TELEPHONE ENCOUNTER
Called patient and got the number to request the labs to be faxed to us. Patient wants results from 4 months ago but the outside location they went to didn't fax the labs to our office. Tried calling the number the patient provided [phone] twice and it just rings no one picks up.

## 2019-07-24 ENCOUNTER — TELEPHONE (OUTPATIENT)
Dept: HEPATOLOGY | Facility: CLINIC | Age: 79
End: 2019-07-24

## 2019-07-24 LAB
ALBUMIN/GLOB SERPL ELPH: 1.1 {RATIO}
BUN/CREAT SERPL: 18.1
EXT ALBUMIN: 3.7
EXT ALKALINE PHOSPHATASE: 190
EXT ALT: 13
EXT ANION GAP: 8
EXT AST: 17
EXT BILIRUBIN TOTAL: 0.7
EXT BUN: 19
EXT CALCIUM: 9
EXT CHLORIDE: 103
EXT CO2: 26
EXT CREATININE: 1 MG/DL
EXT EGFR IF AFRICAN AMERICAN: 83
EXT EGFR IF NON AFRICAN AMERICAN: 68
EXT GLUCOSE: 98
EXT POTASSIUM: 4.7
EXT PROTEIN TOTAL: 7.1
EXT SODIUM: 137 MMOL/L

## 2019-07-25 ENCOUNTER — TELEPHONE (OUTPATIENT)
Dept: HEPATOLOGY | Facility: CLINIC | Age: 79
End: 2019-07-25

## 2019-07-25 NOTE — TELEPHONE ENCOUNTER
Called patient and spoke with his wife. She asked for me to mail the results to her and I mailed them out.

## 2019-07-25 NOTE — TELEPHONE ENCOUNTER
----- Message from Dinah Cee MD sent at 7/25/2019 12:18 PM CDT -----  Please inform patient results are OK.

## 2019-11-25 ENCOUNTER — LAB VISIT (OUTPATIENT)
Dept: LAB | Facility: HOSPITAL | Age: 79
End: 2019-11-25
Attending: INTERNAL MEDICINE
Payer: MEDICARE

## 2019-11-25 DIAGNOSIS — R60.1 ANASARCA: ICD-10-CM

## 2019-11-25 DIAGNOSIS — R78.81 BACTEREMIA: Primary | ICD-10-CM

## 2019-11-25 LAB
ANION GAP SERPL CALC-SCNC: 13 MMOL/L (ref 8–16)
BUN SERPL-MCNC: 21 MG/DL (ref 8–23)
CALCIUM SERPL-MCNC: 8.1 MG/DL (ref 8.7–10.5)
CHLORIDE SERPL-SCNC: 96 MMOL/L (ref 95–110)
CO2 SERPL-SCNC: 26 MMOL/L (ref 23–29)
CREAT SERPL-MCNC: 1.1 MG/DL (ref 0.5–1.4)
EST. GFR  (AFRICAN AMERICAN): >60 ML/MIN/1.73 M^2
EST. GFR  (NON AFRICAN AMERICAN): >60 ML/MIN/1.73 M^2
GLUCOSE SERPL-MCNC: 95 MG/DL (ref 70–110)
POTASSIUM SERPL-SCNC: 4.5 MMOL/L (ref 3.5–5.1)
SODIUM SERPL-SCNC: 135 MMOL/L (ref 136–145)

## 2019-11-25 PROCEDURE — 36415 COLL VENOUS BLD VENIPUNCTURE: CPT

## 2019-11-25 PROCEDURE — 80048 BASIC METABOLIC PNL TOTAL CA: CPT

## 2020-01-01 ENCOUNTER — LAB VISIT (OUTPATIENT)
Dept: LAB | Facility: HOSPITAL | Age: 80
End: 2020-01-01
Attending: INTERNAL MEDICINE
Payer: MEDICARE

## 2020-01-01 DIAGNOSIS — R94.5 NONSPECIFIC ABNORMAL RESULTS OF LIVER FUNCTION STUDY: Primary | ICD-10-CM

## 2020-01-01 LAB
ALBUMIN SERPL BCP-MCNC: 2.4 G/DL (ref 3.5–5.2)
ALP SERPL-CCNC: 254 U/L (ref 55–135)
ALT SERPL W/O P-5'-P-CCNC: 16 U/L (ref 10–44)
AMMONIA PLAS-SCNC: 34 UMOL/L (ref 10–50)
ANION GAP SERPL CALC-SCNC: 10 MMOL/L (ref 8–16)
AST SERPL-CCNC: 30 U/L (ref 10–40)
BILIRUB SERPL-MCNC: 1.2 MG/DL (ref 0.1–1)
BNP SERPL-MCNC: 400 PG/ML (ref 0–99)
BUN SERPL-MCNC: 18 MG/DL (ref 8–23)
CALCIUM SERPL-MCNC: 8.1 MG/DL (ref 8.7–10.5)
CHLORIDE SERPL-SCNC: 100 MMOL/L (ref 95–110)
CO2 SERPL-SCNC: 26 MMOL/L (ref 23–29)
CREAT SERPL-MCNC: 1.3 MG/DL (ref 0.5–1.4)
ERYTHROCYTE [DISTWIDTH] IN BLOOD BY AUTOMATED COUNT: 14.4 % (ref 11.5–14.5)
EST. GFR  (AFRICAN AMERICAN): 59.6 ML/MIN/1.73 M^2
EST. GFR  (NON AFRICAN AMERICAN): 51.5 ML/MIN/1.73 M^2
GLUCOSE SERPL-MCNC: 105 MG/DL (ref 70–110)
HCT VFR BLD AUTO: 27.8 % (ref 40–54)
HGB BLD-MCNC: 8.8 G/DL (ref 14–18)
MCH RBC QN AUTO: 29.9 PG (ref 27–31)
MCHC RBC AUTO-ENTMCNC: 31.7 G/DL (ref 32–36)
MCV RBC AUTO: 95 FL (ref 82–98)
PLATELET # BLD AUTO: 156 K/UL (ref 150–350)
PMV BLD AUTO: 10.8 FL (ref 9.2–12.9)
POTASSIUM SERPL-SCNC: 4.3 MMOL/L (ref 3.5–5.1)
PROT SERPL-MCNC: 6.8 G/DL (ref 6–8.4)
RBC # BLD AUTO: 2.94 M/UL (ref 4.6–6.2)
SODIUM SERPL-SCNC: 136 MMOL/L (ref 136–145)
WBC # BLD AUTO: 8.92 K/UL (ref 3.9–12.7)

## 2020-01-01 PROCEDURE — 36415 COLL VENOUS BLD VENIPUNCTURE: CPT

## 2020-01-01 PROCEDURE — 82140 ASSAY OF AMMONIA: CPT

## 2020-01-01 PROCEDURE — 85027 COMPLETE CBC AUTOMATED: CPT

## 2020-01-01 PROCEDURE — 80053 COMPREHEN METABOLIC PANEL: CPT

## 2020-01-01 PROCEDURE — 83880 ASSAY OF NATRIURETIC PEPTIDE: CPT

## 2020-05-28 NOTE — PROGRESS NOTES
LM with TM checking on symptoms. Received MD note clearing her to work on 5/28/20.    Patient presented to [facility] last week and pathology reviewed. Recommendation to treat his IgG4 mediated AIC with prednisone.  Rx given; prednisone 40 mg q day x 30 days, then taper by 5 mg weekly for a total course of treatment of 3 months  Baseline IgG4 and LFTs done today  F/U IgG4 and LFTs in one month w RTC

## 2021-01-01 ENCOUNTER — IMMUNIZATION (OUTPATIENT)
Dept: PRIMARY CARE CLINIC | Facility: CLINIC | Age: 81
End: 2021-01-01
Payer: MEDICARE

## 2021-01-01 ENCOUNTER — LAB VISIT (OUTPATIENT)
Dept: LAB | Facility: HOSPITAL | Age: 81
End: 2021-01-01
Attending: INTERNAL MEDICINE
Payer: MEDICARE

## 2021-01-01 ENCOUNTER — HOSPITAL ENCOUNTER (INPATIENT)
Facility: HOSPITAL | Age: 81
LOS: 2 days | DRG: 871 | End: 2021-04-09
Attending: INTERNAL MEDICINE | Admitting: INTERNAL MEDICINE
Payer: MEDICARE

## 2021-01-01 ENCOUNTER — HOSPITAL ENCOUNTER (INPATIENT)
Facility: HOSPITAL | Age: 81
LOS: 2 days | Discharge: HOME | DRG: 442 | End: 2021-02-06
Attending: INTERNAL MEDICINE | Admitting: FAMILY MEDICINE
Payer: MEDICARE

## 2021-01-01 ENCOUNTER — ANESTHESIA EVENT (OUTPATIENT)
Dept: INTENSIVE CARE | Facility: HOSPITAL | Age: 81
DRG: 637 | End: 2021-01-01
Payer: MEDICARE

## 2021-01-01 ENCOUNTER — OFFICE VISIT (OUTPATIENT)
Dept: UROLOGY | Facility: CLINIC | Age: 81
End: 2021-01-01
Payer: MEDICARE

## 2021-01-01 ENCOUNTER — ANESTHESIA (OUTPATIENT)
Dept: INTENSIVE CARE | Facility: HOSPITAL | Age: 81
DRG: 637 | End: 2021-01-01
Payer: MEDICARE

## 2021-01-01 ENCOUNTER — HOSPITAL ENCOUNTER (INPATIENT)
Facility: HOSPITAL | Age: 81
LOS: 1 days | Discharge: ANOTHER HOSPITAL | DRG: 637 | End: 2021-04-07
Attending: EMERGENCY MEDICINE | Admitting: FAMILY MEDICINE
Payer: MEDICARE

## 2021-01-01 ENCOUNTER — TELEPHONE (OUTPATIENT)
Dept: HEPATOLOGY | Facility: CLINIC | Age: 81
End: 2021-01-01

## 2021-01-01 VITALS
BODY MASS INDEX: 29.9 KG/M2 | OXYGEN SATURATION: 97 % | TEMPERATURE: 98 F | WEIGHT: 197.31 LBS | DIASTOLIC BLOOD PRESSURE: 57 MMHG | HEIGHT: 68 IN | HEART RATE: 115 BPM | RESPIRATION RATE: 5 BRPM | SYSTOLIC BLOOD PRESSURE: 122 MMHG

## 2021-01-01 VITALS
OXYGEN SATURATION: 93 % | HEART RATE: 66 BPM | HEIGHT: 68 IN | WEIGHT: 189.13 LBS | BODY MASS INDEX: 28.66 KG/M2 | RESPIRATION RATE: 22 BRPM | DIASTOLIC BLOOD PRESSURE: 43 MMHG | TEMPERATURE: 98 F | SYSTOLIC BLOOD PRESSURE: 95 MMHG

## 2021-01-01 VITALS
RESPIRATION RATE: 13 BRPM | HEIGHT: 72 IN | HEART RATE: 50 BPM | SYSTOLIC BLOOD PRESSURE: 144 MMHG | BODY MASS INDEX: 21.26 KG/M2 | OXYGEN SATURATION: 99 % | TEMPERATURE: 98 F | WEIGHT: 157 LBS | DIASTOLIC BLOOD PRESSURE: 62 MMHG

## 2021-01-01 VITALS
WEIGHT: 165.38 LBS | OXYGEN SATURATION: 96 % | DIASTOLIC BLOOD PRESSURE: 51 MMHG | HEART RATE: 75 BPM | RESPIRATION RATE: 17 BRPM | SYSTOLIC BLOOD PRESSURE: 104 MMHG | TEMPERATURE: 98 F | BODY MASS INDEX: 22.4 KG/M2 | HEIGHT: 72 IN

## 2021-01-01 DIAGNOSIS — K83.09 CHOLANGITIS: ICD-10-CM

## 2021-01-01 DIAGNOSIS — R18.8 OTHER ASCITES: ICD-10-CM

## 2021-01-01 DIAGNOSIS — I25.810 CORONARY ARTERY DISEASE INVOLVING CORONARY BYPASS GRAFT OF NATIVE HEART WITHOUT ANGINA PECTORIS: ICD-10-CM

## 2021-01-01 DIAGNOSIS — Z23 NEED FOR VACCINATION: Primary | ICD-10-CM

## 2021-01-01 DIAGNOSIS — I10 ESSENTIAL HYPERTENSION: ICD-10-CM

## 2021-01-01 DIAGNOSIS — R57.9 SHOCK, UNSPECIFIED: ICD-10-CM

## 2021-01-01 DIAGNOSIS — K83.09 AUTOIMMUNE CHOLANGITIS: Primary | ICD-10-CM

## 2021-01-01 DIAGNOSIS — D89.84 IGG4-RELATED SCLEROSING CHOLANGITIS: ICD-10-CM

## 2021-01-01 DIAGNOSIS — I47.20 VENTRICULAR TACHYCARDIA: ICD-10-CM

## 2021-01-01 DIAGNOSIS — E03.4 HYPOTHYROIDISM DUE TO ACQUIRED ATROPHY OF THYROID: ICD-10-CM

## 2021-01-01 DIAGNOSIS — J96.02 ACUTE RESPIRATORY FAILURE WITH HYPOXIA AND HYPERCARBIA: ICD-10-CM

## 2021-01-01 DIAGNOSIS — I24.9 ACS (ACUTE CORONARY SYNDROME): ICD-10-CM

## 2021-01-01 DIAGNOSIS — I48.92 PAROXYSMAL ATRIAL FLUTTER: ICD-10-CM

## 2021-01-01 DIAGNOSIS — K72.00 SUBACUTE LIVER FAILURE WITHOUT HEPATIC COMA: ICD-10-CM

## 2021-01-01 DIAGNOSIS — K83.09 IGG4-RELATED SCLEROSING CHOLANGITIS: ICD-10-CM

## 2021-01-01 DIAGNOSIS — K83.09 AUTOIMMUNE CHOLANGITIS: ICD-10-CM

## 2021-01-01 DIAGNOSIS — K72.10: Primary | ICD-10-CM

## 2021-01-01 DIAGNOSIS — R65.21 SEPTIC SHOCK: ICD-10-CM

## 2021-01-01 DIAGNOSIS — E16.2 HYPOGLYCEMIA: Primary | ICD-10-CM

## 2021-01-01 DIAGNOSIS — E87.20 LACTIC ACIDOSIS: ICD-10-CM

## 2021-01-01 DIAGNOSIS — D63.8 ANEMIA, CHRONIC DISEASE: ICD-10-CM

## 2021-01-01 DIAGNOSIS — R06.02 SHORTNESS OF BREATH: ICD-10-CM

## 2021-01-01 DIAGNOSIS — E03.9 ACQUIRED HYPOTHYROIDISM: ICD-10-CM

## 2021-01-01 DIAGNOSIS — R35.0 URINARY FREQUENCY: Primary | ICD-10-CM

## 2021-01-01 DIAGNOSIS — R93.5 ABNORMAL CT OF THE ABDOMEN: ICD-10-CM

## 2021-01-01 DIAGNOSIS — A41.9 SEPTIC SHOCK: ICD-10-CM

## 2021-01-01 DIAGNOSIS — K72.00 SUBACUTE LIVER FAILURE WITHOUT HEPATIC COMA: Primary | ICD-10-CM

## 2021-01-01 DIAGNOSIS — I77.9 BILATERAL CAROTID ARTERY DISEASE, UNSPECIFIED TYPE: ICD-10-CM

## 2021-01-01 DIAGNOSIS — R33.9 URINARY RETENTION: ICD-10-CM

## 2021-01-01 DIAGNOSIS — J96.01 ACUTE RESPIRATORY FAILURE WITH HYPOXIA AND HYPERCARBIA: ICD-10-CM

## 2021-01-01 DIAGNOSIS — R41.82 AMS (ALTERED MENTAL STATUS): ICD-10-CM

## 2021-01-01 DIAGNOSIS — I50.43 ACUTE ON CHRONIC COMBINED SYSTOLIC AND DIASTOLIC CONGESTIVE HEART FAILURE: ICD-10-CM

## 2021-01-01 DIAGNOSIS — R78.81 GRAM-NEGATIVE BACTEREMIA: ICD-10-CM

## 2021-01-01 DIAGNOSIS — I50.42 CHRONIC COMBINED SYSTOLIC AND DIASTOLIC HEART FAILURE: ICD-10-CM

## 2021-01-01 LAB
ABO + RH BLD: NORMAL
ALBUMIN FLD-MCNC: 0.3 G/DL
ALBUMIN SERPL BCP-MCNC: 2.1 G/DL (ref 3.5–5.2)
ALBUMIN SERPL BCP-MCNC: 2.1 G/DL (ref 3.5–5.2)
ALBUMIN SERPL BCP-MCNC: 2.2 G/DL (ref 3.5–5.2)
ALBUMIN SERPL BCP-MCNC: 2.3 G/DL (ref 3.5–5.2)
ALBUMIN SERPL BCP-MCNC: 2.4 G/DL (ref 3.5–5.2)
ALBUMIN SERPL BCP-MCNC: 2.5 G/DL (ref 3.5–5.2)
ALBUMIN SERPL BCP-MCNC: 2.6 G/DL (ref 3.5–5.2)
ALBUMIN SERPL BCP-MCNC: 2.6 G/DL (ref 3.5–5.2)
ALBUMIN SERPL BCP-MCNC: 2.7 G/DL (ref 3.5–5.2)
ALBUMIN SERPL BCP-MCNC: 2.8 G/DL (ref 3.5–5.2)
ALLENS TEST: ABNORMAL
ALP SERPL-CCNC: 107 U/L (ref 55–135)
ALP SERPL-CCNC: 111 U/L (ref 55–135)
ALP SERPL-CCNC: 116 U/L (ref 55–135)
ALP SERPL-CCNC: 123 U/L (ref 55–135)
ALP SERPL-CCNC: 137 U/L (ref 55–135)
ALP SERPL-CCNC: 150 U/L (ref 55–135)
ALP SERPL-CCNC: 155 U/L (ref 55–135)
ALP SERPL-CCNC: 160 U/L (ref 55–135)
ALT SERPL W/O P-5'-P-CCNC: 10 U/L (ref 10–44)
ALT SERPL W/O P-5'-P-CCNC: 11 U/L (ref 10–44)
ALT SERPL W/O P-5'-P-CCNC: 12 U/L (ref 10–44)
ALT SERPL W/O P-5'-P-CCNC: 13 U/L (ref 10–44)
ALT SERPL W/O P-5'-P-CCNC: 33 U/L (ref 10–44)
ALT SERPL W/O P-5'-P-CCNC: 42 U/L (ref 10–44)
ALT SERPL W/O P-5'-P-CCNC: 74 U/L (ref 10–44)
ALT SERPL W/O P-5'-P-CCNC: 83 U/L (ref 10–44)
AMMONIA PLAS-SCNC: 14 UMOL/L (ref 10–50)
AMMONIA PLAS-SCNC: 59 UMOL/L (ref 10–50)
AMMONIA PLAS-SCNC: 60 UMOL/L (ref 10–50)
AMMONIA PLAS-SCNC: 68 UMOL/L (ref 10–50)
AMMONIA PLAS-SCNC: 79 UMOL/L (ref 10–50)
AMPHET+METHAMPHET UR QL: NEGATIVE
ANA SER QL IF: NORMAL
ANION GAP SERPL CALC-SCNC: 10 MMOL/L (ref 8–16)
ANION GAP SERPL CALC-SCNC: 11 MMOL/L (ref 8–16)
ANION GAP SERPL CALC-SCNC: 13 MMOL/L (ref 8–16)
ANION GAP SERPL CALC-SCNC: 13 MMOL/L (ref 8–16)
ANION GAP SERPL CALC-SCNC: 16 MMOL/L (ref 8–16)
ANION GAP SERPL CALC-SCNC: 17 MMOL/L (ref 8–16)
ANION GAP SERPL CALC-SCNC: 17 MMOL/L (ref 8–16)
ANION GAP SERPL CALC-SCNC: 18 MMOL/L (ref 8–16)
ANION GAP SERPL CALC-SCNC: 19 MMOL/L (ref 8–16)
ANION GAP SERPL CALC-SCNC: 5 MMOL/L (ref 8–16)
ANION GAP SERPL CALC-SCNC: 7 MMOL/L (ref 8–16)
ANION GAP SERPL CALC-SCNC: 8 MMOL/L (ref 8–16)
ANISOCYTOSIS BLD QL SMEAR: SLIGHT
APPEARANCE FLD: CLEAR
APTT BLDCRRT: 27.6 SEC (ref 21–32)
APTT BLDCRRT: 44.8 SEC (ref 21–32)
APTT BLDCRRT: 54.3 SEC (ref 21–32)
APTT BLDCRRT: 62 SEC (ref 21–32)
AST SERPL-CCNC: 127 U/L (ref 10–40)
AST SERPL-CCNC: 144 U/L (ref 10–40)
AST SERPL-CCNC: 16 U/L (ref 10–40)
AST SERPL-CCNC: 18 U/L (ref 10–40)
AST SERPL-CCNC: 20 U/L (ref 10–40)
AST SERPL-CCNC: 202 U/L (ref 10–40)
AST SERPL-CCNC: 34 U/L (ref 10–40)
AST SERPL-CCNC: 97 U/L (ref 10–40)
AV INDEX (PROSTH): 0.81
AV MEAN GRADIENT: 3 MMHG
AV PEAK GRADIENT: 4 MMHG
AV VALVE AREA: 2.67 CM2
AV VELOCITY RATIO: 0.84
BACTERIA #/AREA URNS AUTO: ABNORMAL /HPF
BACTERIA BLD CULT: ABNORMAL
BACTERIA SPEC AEROBE CULT: NO GROWTH
BACTERIA SPEC ANAEROBE CULT: NORMAL
BACTERIA UR CULT: ABNORMAL
BACTERIA UR CULT: NO GROWTH
BARBITURATES UR QL SCN>200 NG/ML: NEGATIVE
BASOPHILS # BLD AUTO: 0.06 K/UL (ref 0–0.2)
BASOPHILS # BLD AUTO: 0.08 K/UL (ref 0–0.2)
BASOPHILS # BLD AUTO: 0.09 K/UL (ref 0–0.2)
BASOPHILS # BLD AUTO: 0.14 K/UL (ref 0–0.2)
BASOPHILS # BLD AUTO: 0.17 K/UL (ref 0–0.2)
BASOPHILS # BLD AUTO: ABNORMAL K/UL (ref 0–0.2)
BASOPHILS # BLD AUTO: ABNORMAL K/UL (ref 0–0.2)
BASOPHILS NFR BLD: 0 % (ref 0–1.9)
BASOPHILS NFR BLD: 0 % (ref 0–1.9)
BASOPHILS NFR BLD: 0.3 % (ref 0–1.9)
BASOPHILS NFR BLD: 0.6 % (ref 0–1.9)
BASOPHILS NFR BLD: 0.7 % (ref 0–1.9)
BASOPHILS NFR BLD: 0.8 % (ref 0–1.9)
BASOPHILS NFR BLD: 1.2 % (ref 0–1.9)
BASOPHILS NFR BLD: 3 % (ref 0–1.9)
BENZODIAZ UR QL SCN>200 NG/ML: NORMAL
BILIRUB DIRECT SERPL-MCNC: 2.7 MG/DL (ref 0.1–0.3)
BILIRUB DIRECT SERPL-MCNC: 4 MG/DL (ref 0.1–0.3)
BILIRUB SERPL-MCNC: 0.7 MG/DL (ref 0.1–1)
BILIRUB SERPL-MCNC: 1.2 MG/DL (ref 0.1–1)
BILIRUB SERPL-MCNC: 1.3 MG/DL (ref 0.1–1)
BILIRUB SERPL-MCNC: 2.5 MG/DL (ref 0.1–1)
BILIRUB SERPL-MCNC: 4.1 MG/DL (ref 0.1–1)
BILIRUB SERPL-MCNC: 4.3 MG/DL (ref 0.1–1)
BILIRUB SERPL-MCNC: 4.6 MG/DL (ref 0.1–1)
BILIRUB SERPL-MCNC: 7.2 MG/DL (ref 0.1–1)
BILIRUB UR QL STRIP: ABNORMAL
BILIRUB UR QL STRIP: NEGATIVE
BLD GP AB SCN CELLS X3 SERPL QL: NORMAL
BNP SERPL-MCNC: 2619 PG/ML (ref 0–99)
BNP SERPL-MCNC: 556 PG/ML (ref 0–99)
BODY FLD TYPE: NORMAL
BODY FLUID SOURCE, LDH: NORMAL
BSA FOR ECHO PROCEDURE: 2.09 M2
BUN SERPL-MCNC: 11 MG/DL (ref 8–23)
BUN SERPL-MCNC: 13 MG/DL (ref 8–23)
BUN SERPL-MCNC: 15 MG/DL (ref 8–23)
BUN SERPL-MCNC: 20 MG/DL (ref 8–23)
BUN SERPL-MCNC: 21 MG/DL (ref 8–23)
BUN SERPL-MCNC: 24 MG/DL (ref 8–23)
BUN SERPL-MCNC: 24 MG/DL (ref 8–23)
BUN SERPL-MCNC: 25 MG/DL (ref 8–23)
BUN SERPL-MCNC: 6 MG/DL (ref 8–23)
BUN SERPL-MCNC: 7 MG/DL (ref 8–23)
BUN SERPL-MCNC: 8 MG/DL (ref 8–23)
BUN SERPL-MCNC: 8 MG/DL (ref 8–23)
BUN SERPL-MCNC: 9 MG/DL (ref 8–23)
BUN SERPL-MCNC: 9 MG/DL (ref 8–23)
BURR CELLS BLD QL SMEAR: ABNORMAL
BURR CELLS BLD QL SMEAR: ABNORMAL
BZE UR QL SCN: NEGATIVE
C3 SERPL-MCNC: 54 MG/DL (ref 50–180)
C4 SERPL-MCNC: 17 MG/DL (ref 11–44)
CA-I BLDV-SCNC: 0.75 MMOL/L (ref 1.06–1.42)
CA-I BLDV-SCNC: 0.83 MMOL/L (ref 1.06–1.42)
CA-I BLDV-SCNC: 0.93 MMOL/L (ref 1.06–1.42)
CA-I BLDV-SCNC: 1.03 MMOL/L (ref 1.06–1.42)
CALCIUM SERPL-MCNC: 6.3 MG/DL (ref 8.7–10.5)
CALCIUM SERPL-MCNC: 6.6 MG/DL (ref 8.7–10.5)
CALCIUM SERPL-MCNC: 6.7 MG/DL (ref 8.7–10.5)
CALCIUM SERPL-MCNC: 6.7 MG/DL (ref 8.7–10.5)
CALCIUM SERPL-MCNC: 6.8 MG/DL (ref 8.7–10.5)
CALCIUM SERPL-MCNC: 6.9 MG/DL (ref 8.7–10.5)
CALCIUM SERPL-MCNC: 6.9 MG/DL (ref 8.7–10.5)
CALCIUM SERPL-MCNC: 7 MG/DL (ref 8.7–10.5)
CALCIUM SERPL-MCNC: 7.1 MG/DL (ref 8.7–10.5)
CALCIUM SERPL-MCNC: 7.4 MG/DL (ref 8.7–10.5)
CALCIUM SERPL-MCNC: 7.8 MG/DL (ref 8.7–10.5)
CALCIUM SERPL-MCNC: 7.9 MG/DL (ref 8.7–10.5)
CALCIUM SERPL-MCNC: 8.3 MG/DL (ref 8.7–10.5)
CALCIUM SERPL-MCNC: 8.5 MG/DL (ref 8.7–10.5)
CANCER AG19-9 SERPL-ACNC: 68 U/ML (ref 2–40)
CANNABINOIDS UR QL SCN: NEGATIVE
CEA SERPL-MCNC: 3.4 NG/ML (ref 0–5)
CHLORIDE SERPL-SCNC: 100 MMOL/L (ref 95–110)
CHLORIDE SERPL-SCNC: 101 MMOL/L (ref 95–110)
CHLORIDE SERPL-SCNC: 102 MMOL/L (ref 95–110)
CHLORIDE SERPL-SCNC: 102 MMOL/L (ref 95–110)
CHLORIDE SERPL-SCNC: 103 MMOL/L (ref 95–110)
CHLORIDE SERPL-SCNC: 104 MMOL/L (ref 95–110)
CHLORIDE SERPL-SCNC: 107 MMOL/L (ref 95–110)
CHLORIDE SERPL-SCNC: 108 MMOL/L (ref 95–110)
CHLORIDE SERPL-SCNC: 109 MMOL/L (ref 95–110)
CHLORIDE SERPL-SCNC: 109 MMOL/L (ref 95–110)
CHLORIDE SERPL-SCNC: 113 MMOL/L (ref 95–110)
CHLORIDE SERPL-SCNC: 97 MMOL/L (ref 95–110)
CHLORIDE SERPL-SCNC: 99 MMOL/L (ref 95–110)
CHLORIDE SERPL-SCNC: 99 MMOL/L (ref 95–110)
CHLORIDE UR-SCNC: 85 MMOL/L (ref 25–200)
CLARITY UR REFRACT.AUTO: ABNORMAL
CLARITY UR: CLEAR
CO2 SERPL-SCNC: 12 MMOL/L (ref 23–29)
CO2 SERPL-SCNC: 14 MMOL/L (ref 23–29)
CO2 SERPL-SCNC: 15 MMOL/L (ref 23–29)
CO2 SERPL-SCNC: 17 MMOL/L (ref 23–29)
CO2 SERPL-SCNC: 20 MMOL/L (ref 23–29)
CO2 SERPL-SCNC: 20 MMOL/L (ref 23–29)
CO2 SERPL-SCNC: 21 MMOL/L (ref 23–29)
CO2 SERPL-SCNC: 21 MMOL/L (ref 23–29)
CO2 SERPL-SCNC: 22 MMOL/L (ref 23–29)
CO2 SERPL-SCNC: 23 MMOL/L (ref 23–29)
CO2 SERPL-SCNC: 23 MMOL/L (ref 23–29)
CO2 SERPL-SCNC: 25 MMOL/L (ref 23–29)
CO2 SERPL-SCNC: 26 MMOL/L (ref 23–29)
CO2 SERPL-SCNC: 27 MMOL/L (ref 23–29)
COLOR FLD: YELLOW
COLOR UR AUTO: ABNORMAL
COLOR UR: YELLOW
CREAT SERPL-MCNC: 0.7 MG/DL (ref 0.5–1.4)
CREAT SERPL-MCNC: 0.9 MG/DL (ref 0.5–1.4)
CREAT SERPL-MCNC: 1 MG/DL (ref 0.5–1.4)
CREAT SERPL-MCNC: 1.1 MG/DL (ref 0.5–1.4)
CREAT SERPL-MCNC: 1.1 MG/DL (ref 0.5–1.4)
CREAT SERPL-MCNC: 1.2 MG/DL (ref 0.5–1.4)
CREAT SERPL-MCNC: 1.3 MG/DL (ref 0.5–1.4)
CREAT SERPL-MCNC: 1.3 MG/DL (ref 0.5–1.4)
CREAT SERPL-MCNC: 1.4 MG/DL (ref 0.5–1.4)
CREAT SERPL-MCNC: 1.4 MG/DL (ref 0.5–1.4)
CREAT SERPL-MCNC: 1.5 MG/DL (ref 0.5–1.4)
CREAT SERPL-MCNC: 2 MG/DL (ref 0.5–1.4)
CREAT SERPL-MCNC: 2.1 MG/DL (ref 0.5–1.4)
CREAT SERPL-MCNC: 2.2 MG/DL (ref 0.5–1.4)
CREAT UR-MCNC: 34 MG/DL (ref 23–375)
CREAT UR-MCNC: 34 MG/DL (ref 23–375)
CREAT UR-MCNC: 50.9 MG/DL (ref 23–375)
CV ECHO LV RWT: 0.26 CM
D DIMER PPP IA.FEU-MCNC: >33 MG/L FEU
DELSYS: ABNORMAL
DIFFERENTIAL METHOD: ABNORMAL
DOP CALC AO PEAK VEL: 0.94 M/S
DOP CALC AO VTI: 21.4 CM
DOP CALC LVOT AREA: 3.3 CM2
DOP CALC LVOT DIAMETER: 2.05 CM
DOP CALC LVOT PEAK VEL: 0.79 M/S
DOP CALC LVOT STROKE VOLUME: 57.24 CM3
DOP CALCLVOT PEAK VEL VTI: 17.35 CM
E/E' RATIO: 17.38 M/S
ECHO LV POSTERIOR WALL: 0.69 CM (ref 0.6–1.1)
EJECTION FRACTION: 35 %
EOSINOPHIL # BLD AUTO: 0 K/UL (ref 0–0.5)
EOSINOPHIL # BLD AUTO: 0 K/UL (ref 0–0.5)
EOSINOPHIL # BLD AUTO: 0.1 K/UL (ref 0–0.5)
EOSINOPHIL # BLD AUTO: 0.2 K/UL (ref 0–0.5)
EOSINOPHIL # BLD AUTO: 0.2 K/UL (ref 0–0.5)
EOSINOPHIL # BLD AUTO: ABNORMAL K/UL (ref 0–0.5)
EOSINOPHIL # BLD AUTO: ABNORMAL K/UL (ref 0–0.5)
EOSINOPHIL NFR BLD: 0 % (ref 0–8)
EOSINOPHIL NFR BLD: 0.7 % (ref 0–8)
EOSINOPHIL NFR BLD: 1.8 % (ref 0–8)
EOSINOPHIL NFR BLD: 2.7 % (ref 0–8)
EOSINOPHIL NFR FLD MANUAL: 1 %
ERYTHROCYTE [DISTWIDTH] IN BLOOD BY AUTOMATED COUNT: 15.8 % (ref 11.5–14.5)
ERYTHROCYTE [DISTWIDTH] IN BLOOD BY AUTOMATED COUNT: 16 % (ref 11.5–14.5)
ERYTHROCYTE [DISTWIDTH] IN BLOOD BY AUTOMATED COUNT: 16.3 % (ref 11.5–14.5)
ERYTHROCYTE [DISTWIDTH] IN BLOOD BY AUTOMATED COUNT: 16.7 % (ref 11.5–14.5)
ERYTHROCYTE [DISTWIDTH] IN BLOOD BY AUTOMATED COUNT: 16.7 % (ref 11.5–14.5)
ERYTHROCYTE [DISTWIDTH] IN BLOOD BY AUTOMATED COUNT: 16.9 % (ref 11.5–14.5)
ERYTHROCYTE [SEDIMENTATION RATE] IN BLOOD BY WESTERGREN METHOD: 28 MM/H
ERYTHROCYTE [SEDIMENTATION RATE] IN BLOOD BY WESTERGREN METHOD: 30 MM/H
EST. GFR  (AFRICAN AMERICAN): 31.5 ML/MIN/1.73 M^2
EST. GFR  (AFRICAN AMERICAN): 33.4 ML/MIN/1.73 M^2
EST. GFR  (AFRICAN AMERICAN): 35.4 ML/MIN/1.73 M^2
EST. GFR  (AFRICAN AMERICAN): 50.1 ML/MIN/1.73 M^2
EST. GFR  (AFRICAN AMERICAN): 54.5 ML/MIN/1.73 M^2
EST. GFR  (AFRICAN AMERICAN): 54.5 ML/MIN/1.73 M^2
EST. GFR  (AFRICAN AMERICAN): 59.6 ML/MIN/1.73 M^2
EST. GFR  (AFRICAN AMERICAN): 59.6 ML/MIN/1.73 M^2
EST. GFR  (AFRICAN AMERICAN): >60 ML/MIN/1.73 M^2
EST. GFR  (NON AFRICAN AMERICAN): 27.3 ML/MIN/1.73 M^2
EST. GFR  (NON AFRICAN AMERICAN): 28.9 ML/MIN/1.73 M^2
EST. GFR  (NON AFRICAN AMERICAN): 30.6 ML/MIN/1.73 M^2
EST. GFR  (NON AFRICAN AMERICAN): 43.3 ML/MIN/1.73 M^2
EST. GFR  (NON AFRICAN AMERICAN): 47.1 ML/MIN/1.73 M^2
EST. GFR  (NON AFRICAN AMERICAN): 47.1 ML/MIN/1.73 M^2
EST. GFR  (NON AFRICAN AMERICAN): 51.5 ML/MIN/1.73 M^2
EST. GFR  (NON AFRICAN AMERICAN): 51.5 ML/MIN/1.73 M^2
EST. GFR  (NON AFRICAN AMERICAN): 56.8 ML/MIN/1.73 M^2
EST. GFR  (NON AFRICAN AMERICAN): >60 ML/MIN/1.73 M^2
ESTIMATED AVG GLUCOSE: 85 MG/DL (ref 68–131)
ETHANOL SERPL-MCNC: <5 MG/DL
FACT VIII ACT/NOR PPP: 72 % (ref 60–170)
FIBRINOGEN PPP-MCNC: 102 MG/DL (ref 182–366)
FIBRINOGEN PPP-MCNC: 82 MG/DL (ref 182–366)
FIBRINOGEN PPP-MCNC: 95 MG/DL (ref 182–366)
FIO2: 100
FIO2: 40
FIO2: 50
FIO2: 70
FLOW: 15
FRACTIONAL SHORTENING: 9 % (ref 28–44)
GLUCOSE SERPL-MCNC: 113 MG/DL (ref 70–110)
GLUCOSE SERPL-MCNC: 122 MG/DL (ref 70–110)
GLUCOSE SERPL-MCNC: 141 MG/DL (ref 70–110)
GLUCOSE SERPL-MCNC: 36 MG/DL (ref 70–110)
GLUCOSE SERPL-MCNC: 58 MG/DL (ref 70–110)
GLUCOSE SERPL-MCNC: 58 MG/DL (ref 70–110)
GLUCOSE SERPL-MCNC: 63 MG/DL (ref 70–110)
GLUCOSE SERPL-MCNC: 63 MG/DL (ref 70–110)
GLUCOSE SERPL-MCNC: 72 MG/DL (ref 70–110)
GLUCOSE SERPL-MCNC: 72 MG/DL (ref 70–110)
GLUCOSE SERPL-MCNC: 73 MG/DL (ref 70–110)
GLUCOSE SERPL-MCNC: 75 MG/DL (ref 70–110)
GLUCOSE SERPL-MCNC: 78 MG/DL (ref 70–110)
GLUCOSE SERPL-MCNC: 83 MG/DL (ref 70–110)
GLUCOSE SERPL-MCNC: 95 MG/DL (ref 70–110)
GLUCOSE SERPL-MCNC: 97 MG/DL (ref 70–110)
GLUCOSE UR QL STRIP: ABNORMAL
GLUCOSE UR QL STRIP: NEGATIVE
GRAM STN SPEC: NORMAL
HAPTOGLOB SERPL-MCNC: 118 MG/DL (ref 30–250)
HAV IGM SERPL QL IA: NEGATIVE
HBA1C MFR BLD: 4.6 % (ref 4–5.6)
HBV CORE IGM SERPL QL IA: NEGATIVE
HBV SURFACE AG SERPL QL IA: NEGATIVE
HCO3 UR-SCNC: 11.7 MMOL/L (ref 24–28)
HCO3 UR-SCNC: 14 MMOL/L (ref 24–28)
HCO3 UR-SCNC: 15.5 MMOL/L (ref 24–28)
HCO3 UR-SCNC: 18.6 MMOL/L (ref 24–28)
HCO3 UR-SCNC: 23.3 MMOL/L (ref 24–28)
HCO3 UR-SCNC: 23.3 MMOL/L (ref 24–28)
HCO3 UR-SCNC: 25.5 MMOL/L (ref 24–28)
HCT VFR BLD AUTO: 21.8 % (ref 40–54)
HCT VFR BLD AUTO: 22.4 % (ref 40–54)
HCT VFR BLD AUTO: 22.5 % (ref 40–54)
HCT VFR BLD AUTO: 23.9 % (ref 40–54)
HCT VFR BLD AUTO: 27.9 % (ref 40–54)
HCT VFR BLD AUTO: 28.9 % (ref 40–54)
HCT VFR BLD AUTO: 31.9 % (ref 40–54)
HCT VFR BLD AUTO: 32.2 % (ref 40–54)
HCT VFR BLD CALC: 22 %PCV (ref 36–54)
HCV AB SERPL QL IA: NEGATIVE
HGB BLD-MCNC: 10.5 G/DL (ref 14–18)
HGB BLD-MCNC: 10.5 G/DL (ref 14–18)
HGB BLD-MCNC: 7.3 G/DL (ref 14–18)
HGB BLD-MCNC: 7.6 G/DL (ref 14–18)
HGB BLD-MCNC: 7.7 G/DL (ref 14–18)
HGB BLD-MCNC: 7.8 G/DL (ref 14–18)
HGB BLD-MCNC: 9 G/DL (ref 14–18)
HGB BLD-MCNC: 9.3 G/DL (ref 14–18)
HGB UR QL STRIP: ABNORMAL
HGB UR QL STRIP: NEGATIVE
HYALINE CASTS UR QL AUTO: 0 /LPF
HYPOCHROMIA BLD QL SMEAR: ABNORMAL
IMM GRANULOCYTES # BLD AUTO: 0.07 K/UL (ref 0–0.04)
IMM GRANULOCYTES # BLD AUTO: 0.08 K/UL (ref 0–0.04)
IMM GRANULOCYTES # BLD AUTO: 0.08 K/UL (ref 0–0.04)
IMM GRANULOCYTES # BLD AUTO: 0.98 K/UL (ref 0–0.04)
IMM GRANULOCYTES # BLD AUTO: 1.14 K/UL (ref 0–0.04)
IMM GRANULOCYTES # BLD AUTO: ABNORMAL K/UL (ref 0–0.04)
IMM GRANULOCYTES NFR BLD AUTO: 0.8 % (ref 0–0.5)
IMM GRANULOCYTES NFR BLD AUTO: 0.8 % (ref 0–0.5)
IMM GRANULOCYTES NFR BLD AUTO: 1 % (ref 0–0.5)
IMM GRANULOCYTES NFR BLD AUTO: 2.6 % (ref 0–0.5)
IMM GRANULOCYTES NFR BLD AUTO: 3.2 % (ref 0–0.5)
IMM GRANULOCYTES NFR BLD AUTO: ABNORMAL % (ref 0–0.5)
INR PPP: 1.1 (ref 0.8–1.2)
INR PPP: 1.7 (ref 0.8–1.2)
INR PPP: 1.8 (ref 0.8–1.2)
INR PPP: 1.9 (ref 0.8–1.2)
INR PPP: 2 (ref 0.8–1.2)
INR PPP: 2.6 (ref 0.8–1.2)
INTERVENTRICULAR SEPTUM: 0.63 CM (ref 0.6–1.1)
KETONES UR QL STRIP: NEGATIVE
KETONES UR QL STRIP: NEGATIVE
LA MAJOR: 5.26 CM
LA MINOR: 5.37 CM
LA WIDTH: 3.44 CM
LACTATE SERPL-SCNC: 1.3 MMOL/L (ref 0.5–2.2)
LACTATE SERPL-SCNC: 1.3 MMOL/L (ref 0.5–2.2)
LACTATE SERPL-SCNC: 10 MMOL/L (ref 0.5–2.2)
LACTATE SERPL-SCNC: 10.6 MMOL/L (ref 0.5–2.2)
LACTATE SERPL-SCNC: 4 MMOL/L (ref 0.5–2.2)
LACTATE SERPL-SCNC: 4.3 MMOL/L (ref 0.5–2.2)
LACTATE SERPL-SCNC: 5 MMOL/L (ref 0.5–2.2)
LACTATE SERPL-SCNC: 5.4 MMOL/L (ref 0.5–2.2)
LACTATE SERPL-SCNC: 5.4 MMOL/L (ref 0.5–2.2)
LACTATE SERPL-SCNC: 5.6 MMOL/L (ref 0.5–2.2)
LACTATE SERPL-SCNC: 5.9 MMOL/L (ref 0.5–2.2)
LACTATE SERPL-SCNC: 8.5 MMOL/L (ref 0.5–2.2)
LACTATE SERPL-SCNC: 9.5 MMOL/L (ref 0.5–2.2)
LACTATE SERPL-SCNC: 9.5 MMOL/L (ref 0.5–2.2)
LACTATE SERPL-SCNC: 9.9 MMOL/L (ref 0.5–2.2)
LDH FLD L TO P-CCNC: 63 U/L
LDH SERPL L TO P-CCNC: 132 U/L (ref 110–260)
LDH SERPL L TO P-CCNC: 446 U/L (ref 110–260)
LEFT ATRIUM SIZE: 4.29 CM
LEFT ATRIUM VOLUME INDEX MOD: 28.5 ML/M2
LEFT ATRIUM VOLUME INDEX: 32.5 ML/M2
LEFT ATRIUM VOLUME MOD: 58.43 CM3
LEFT ATRIUM VOLUME: 66.66 CM3
LEFT INTERNAL DIMENSION IN SYSTOLE: 4.81 CM (ref 2.1–4)
LEFT VENTRICLE DIASTOLIC VOLUME INDEX: 66.34 ML/M2
LEFT VENTRICLE DIASTOLIC VOLUME: 135.99 ML
LEFT VENTRICLE MASS INDEX: 58 G/M2
LEFT VENTRICLE SYSTOLIC VOLUME INDEX: 52.7 ML/M2
LEFT VENTRICLE SYSTOLIC VOLUME: 108.09 ML
LEFT VENTRICULAR INTERNAL DIMENSION IN DIASTOLE: 5.31 CM (ref 3.5–6)
LEFT VENTRICULAR MASS: 118.51 G
LEUKOCYTE ESTERASE UR QL STRIP: NEGATIVE
LEUKOCYTE ESTERASE UR QL STRIP: NEGATIVE
LIPASE SERPL-CCNC: 29 U/L (ref 4–60)
LIPASE SERPL-CCNC: 33 U/L (ref 4–60)
LV LATERAL E/E' RATIO: 14.13 M/S
LV SEPTAL E/E' RATIO: 22.6 M/S
LYMPHOCYTES # BLD AUTO: 0.4 K/UL (ref 1–4.8)
LYMPHOCYTES # BLD AUTO: 1.1 K/UL (ref 1–4.8)
LYMPHOCYTES # BLD AUTO: 1.7 K/UL (ref 1–4.8)
LYMPHOCYTES # BLD AUTO: 1.7 K/UL (ref 1–4.8)
LYMPHOCYTES # BLD AUTO: 1.8 K/UL (ref 1–4.8)
LYMPHOCYTES # BLD AUTO: ABNORMAL K/UL (ref 1–4.8)
LYMPHOCYTES # BLD AUTO: ABNORMAL K/UL (ref 1–4.8)
LYMPHOCYTES NFR BLD: 0 % (ref 18–48)
LYMPHOCYTES NFR BLD: 1 % (ref 18–48)
LYMPHOCYTES NFR BLD: 1.4 % (ref 18–48)
LYMPHOCYTES NFR BLD: 15 % (ref 18–48)
LYMPHOCYTES NFR BLD: 16.6 % (ref 18–48)
LYMPHOCYTES NFR BLD: 19.4 % (ref 18–48)
LYMPHOCYTES NFR BLD: 2.4 % (ref 18–48)
LYMPHOCYTES NFR BLD: 22.1 % (ref 18–48)
LYMPHOCYTES NFR FLD MANUAL: 30 %
MAGNESIUM SERPL-MCNC: 1.4 MG/DL (ref 1.6–2.6)
MAGNESIUM SERPL-MCNC: 1.7 MG/DL (ref 1.6–2.6)
MAGNESIUM SERPL-MCNC: 1.7 MG/DL (ref 1.6–2.6)
MAGNESIUM SERPL-MCNC: 1.8 MG/DL (ref 1.6–2.6)
MAGNESIUM SERPL-MCNC: 1.8 MG/DL (ref 1.6–2.6)
MAGNESIUM SERPL-MCNC: 1.9 MG/DL (ref 1.6–2.6)
MAGNESIUM SERPL-MCNC: 1.9 MG/DL (ref 1.6–2.6)
MAGNESIUM SERPL-MCNC: 2.2 MG/DL (ref 1.6–2.6)
MCH RBC QN AUTO: 29.7 PG (ref 27–31)
MCH RBC QN AUTO: 30.2 PG (ref 27–31)
MCH RBC QN AUTO: 30.2 PG (ref 27–31)
MCH RBC QN AUTO: 30.4 PG (ref 27–31)
MCH RBC QN AUTO: 30.4 PG (ref 27–31)
MCH RBC QN AUTO: 30.6 PG (ref 27–31)
MCHC RBC AUTO-ENTMCNC: 32.2 G/DL (ref 32–36)
MCHC RBC AUTO-ENTMCNC: 32.3 G/DL (ref 32–36)
MCHC RBC AUTO-ENTMCNC: 32.4 G/DL (ref 32–36)
MCHC RBC AUTO-ENTMCNC: 32.6 G/DL (ref 32–36)
MCHC RBC AUTO-ENTMCNC: 32.6 G/DL (ref 32–36)
MCHC RBC AUTO-ENTMCNC: 32.9 G/DL (ref 32–36)
MCHC RBC AUTO-ENTMCNC: 34.4 G/DL (ref 32–36)
MCHC RBC AUTO-ENTMCNC: 34.9 G/DL (ref 32–36)
MCV RBC AUTO: 88 FL (ref 82–98)
MCV RBC AUTO: 89 FL (ref 82–98)
MCV RBC AUTO: 92 FL (ref 82–98)
MCV RBC AUTO: 93 FL (ref 82–98)
MCV RBC AUTO: 94 FL (ref 82–98)
MESOTHL CELL NFR FLD MANUAL: 13 %
METAMYELOCYTES NFR BLD MANUAL: 10 %
MICROSCOPIC COMMENT: ABNORMAL
MIN VOL: 11.9
MIN VOL: 14
MODE: ABNORMAL
MONOCYTES # BLD AUTO: 0.7 K/UL (ref 0.3–1)
MONOCYTES # BLD AUTO: 0.9 K/UL (ref 0.3–1)
MONOCYTES # BLD AUTO: 1.9 K/UL (ref 0.3–1)
MONOCYTES # BLD AUTO: ABNORMAL K/UL (ref 0.3–1)
MONOCYTES # BLD AUTO: ABNORMAL K/UL (ref 0.3–1)
MONOCYTES NFR BLD: 0 % (ref 4–15)
MONOCYTES NFR BLD: 1 % (ref 4–15)
MONOCYTES NFR BLD: 2 % (ref 4–15)
MONOCYTES NFR BLD: 2.3 % (ref 4–15)
MONOCYTES NFR BLD: 4.3 % (ref 4–15)
MONOCYTES NFR BLD: 7.6 % (ref 4–15)
MONOCYTES NFR BLD: 8.9 % (ref 4–15)
MONOCYTES NFR BLD: 9 % (ref 4–15)
MONOS+MACROS NFR FLD MANUAL: 45 %
MV PEAK E VEL: 1.13 M/S
NEUTROPHILS # BLD AUTO: 28 K/UL (ref 1.8–7.7)
NEUTROPHILS # BLD AUTO: 39.8 K/UL (ref 1.8–7.7)
NEUTROPHILS # BLD AUTO: 4.9 K/UL (ref 1.8–7.7)
NEUTROPHILS # BLD AUTO: 6.4 K/UL (ref 1.8–7.7)
NEUTROPHILS # BLD AUTO: 7.2 K/UL (ref 1.8–7.7)
NEUTROPHILS # BLD AUTO: ABNORMAL K/UL (ref 1.8–7.7)
NEUTROPHILS NFR BLD: 60 % (ref 38–73)
NEUTROPHILS NFR BLD: 64 % (ref 38–73)
NEUTROPHILS NFR BLD: 70.8 % (ref 38–73)
NEUTROPHILS NFR BLD: 71.1 % (ref 38–73)
NEUTROPHILS NFR BLD: 73 % (ref 38–73)
NEUTROPHILS NFR BLD: 90.4 % (ref 38–73)
NEUTROPHILS NFR BLD: 92.5 % (ref 38–73)
NEUTROPHILS NFR BLD: 96 % (ref 38–73)
NEUTROPHILS NFR FLD MANUAL: 11 %
NEUTS BAND NFR BLD MANUAL: 27 %
NEUTS BAND NFR BLD MANUAL: 4 %
NEUTS BAND NFR BLD MANUAL: 8 %
NITRITE UR QL STRIP: NEGATIVE
NITRITE UR QL STRIP: NEGATIVE
NON-SQ EPI CELLS #/AREA URNS AUTO: 5 /HPF
NRBC BLD-RTO: 0 /100 WBC
OPIATES UR QL SCN: NEGATIVE
OVALOCYTES BLD QL SMEAR: ABNORMAL
PCO2 BLDA: 28.7 MMHG (ref 35–45)
PCO2 BLDA: 30 MMHG (ref 35–45)
PCO2 BLDA: 31.2 MMHG (ref 35–45)
PCO2 BLDA: 39.4 MMHG (ref 35–45)
PCO2 BLDA: 44.9 MMHG (ref 35–45)
PCO2 BLDA: 47.3 MMHG (ref 35–45)
PCO2 BLDA: 64.6 MMHG (ref 35–45)
PCP UR QL SCN>25 NG/ML: NEGATIVE
PEEP: 10
PEEP: 12
PEEP: 8
PEEP: 8
PH SMN: 6.99 [PH] (ref 7.35–7.45)
PH SMN: 7.08 [PH] (ref 7.35–7.45)
PH SMN: 7.08 [PH] (ref 7.35–7.45)
PH SMN: 7.23 [PH] (ref 7.35–7.45)
PH SMN: 7.48 [PH] (ref 7.35–7.45)
PH SMN: 7.5 [PH] (ref 7.35–7.45)
PH SMN: 7.56 [PH] (ref 7.35–7.45)
PH UR STRIP: 7 [PH] (ref 5–8)
PH UR STRIP: 8 [PH] (ref 5–8)
PHOSPHATE SERPL-MCNC: 1.2 MG/DL (ref 2.7–4.5)
PHOSPHATE SERPL-MCNC: 1.4 MG/DL (ref 2.7–4.5)
PHOSPHATE SERPL-MCNC: 1.9 MG/DL (ref 2.7–4.5)
PHOSPHATE SERPL-MCNC: 1.9 MG/DL (ref 2.7–4.5)
PHOSPHATE SERPL-MCNC: 2.6 MG/DL (ref 2.7–4.5)
PHOSPHATE SERPL-MCNC: 2.6 MG/DL (ref 2.7–4.5)
PHOSPHATE SERPL-MCNC: 2.8 MG/DL (ref 2.7–4.5)
PHOSPHATE SERPL-MCNC: 2.8 MG/DL (ref 2.7–4.5)
PHOSPHATE SERPL-MCNC: 3.9 MG/DL (ref 2.7–4.5)
PIP: 28
PIP: 35
PISA TR MAX VEL: 2.82 M/S
PLATELET # BLD AUTO: 102 K/UL (ref 150–450)
PLATELET # BLD AUTO: 13 K/UL (ref 150–450)
PLATELET # BLD AUTO: 159 K/UL (ref 150–350)
PLATELET # BLD AUTO: 179 K/UL (ref 150–350)
PLATELET # BLD AUTO: 185 K/UL (ref 150–350)
PLATELET # BLD AUTO: 38 K/UL (ref 150–450)
PLATELET # BLD AUTO: 63 K/UL (ref 150–450)
PLATELET # BLD AUTO: 9 K/UL (ref 150–450)
PLATELET BLD QL SMEAR: ABNORMAL
PMV BLD AUTO: 10 FL (ref 9.2–12.9)
PMV BLD AUTO: 10.4 FL (ref 9.2–12.9)
PMV BLD AUTO: 10.4 FL (ref 9.2–12.9)
PMV BLD AUTO: 10.8 FL (ref 9.2–12.9)
PMV BLD AUTO: 11.5 FL (ref 9.2–12.9)
PMV BLD AUTO: 12.2 FL (ref 9.2–12.9)
PMV BLD AUTO: ABNORMAL FL (ref 9.2–12.9)
PMV BLD AUTO: ABNORMAL FL (ref 9.2–12.9)
PO2 BLDA: 106 MMHG (ref 80–100)
PO2 BLDA: 142 MMHG (ref 80–100)
PO2 BLDA: 261 MMHG (ref 80–100)
PO2 BLDA: 368 MMHG (ref 80–100)
PO2 BLDA: 68 MMHG (ref 80–100)
PO2 BLDA: 75 MMHG (ref 80–100)
PO2 BLDA: 79 MMHG (ref 80–100)
POC BE: -16 MMOL/L
POC BE: -16 MMOL/L
POC BE: -18 MMOL/L
POC BE: -9 MMOL/L
POC BE: 0 MMOL/L
POC BE: 0 MMOL/L
POC BE: 3 MMOL/L
POC IONIZED CALCIUM: 1.54 MMOL/L (ref 1.06–1.42)
POC SATURATED O2: 100 % (ref 95–100)
POC SATURATED O2: 100 % (ref 95–100)
POC SATURATED O2: 85 % (ref 95–100)
POC SATURATED O2: 95 % (ref 95–100)
POC SATURATED O2: 97 % (ref 95–100)
POC TCO2: 13 MMOL/L (ref 23–27)
POC TCO2: 15 MMOL/L (ref 23–27)
POC TCO2: 17 MMOL/L (ref 23–27)
POC TCO2: 20 MMOL/L (ref 23–27)
POC TCO2: 24 MMOL/L (ref 23–27)
POC TCO2: 24 MMOL/L (ref 23–27)
POC TCO2: 26 MMOL/L (ref 23–27)
POCT GLUCOSE: 103 MG/DL (ref 70–110)
POCT GLUCOSE: 103 MG/DL (ref 70–110)
POCT GLUCOSE: 106 MG/DL (ref 70–110)
POCT GLUCOSE: 106 MG/DL (ref 70–110)
POCT GLUCOSE: 108 MG/DL (ref 70–110)
POCT GLUCOSE: 110 MG/DL (ref 70–110)
POCT GLUCOSE: 122 MG/DL (ref 70–110)
POCT GLUCOSE: 129 MG/DL (ref 70–110)
POCT GLUCOSE: 133 MG/DL (ref 70–110)
POCT GLUCOSE: 142 MG/DL (ref 70–110)
POCT GLUCOSE: 147 MG/DL (ref 70–110)
POCT GLUCOSE: 152 MG/DL (ref 70–110)
POCT GLUCOSE: 180 MG/DL (ref 70–110)
POCT GLUCOSE: 195 MG/DL (ref 70–110)
POCT GLUCOSE: 59 MG/DL (ref 70–110)
POCT GLUCOSE: 61 MG/DL (ref 70–110)
POCT GLUCOSE: 62 MG/DL (ref 70–110)
POCT GLUCOSE: 64 MG/DL (ref 70–110)
POCT GLUCOSE: 66 MG/DL (ref 70–110)
POCT GLUCOSE: 68 MG/DL (ref 70–110)
POCT GLUCOSE: 70 MG/DL (ref 70–110)
POCT GLUCOSE: 71 MG/DL (ref 70–110)
POCT GLUCOSE: 72 MG/DL (ref 70–110)
POCT GLUCOSE: 73 MG/DL (ref 70–110)
POCT GLUCOSE: 77 MG/DL (ref 70–110)
POCT GLUCOSE: 78 MG/DL (ref 70–110)
POCT GLUCOSE: 79 MG/DL (ref 70–110)
POCT GLUCOSE: 80 MG/DL (ref 70–110)
POCT GLUCOSE: 81 MG/DL (ref 70–110)
POCT GLUCOSE: 82 MG/DL (ref 70–110)
POCT GLUCOSE: 83 MG/DL (ref 70–110)
POCT GLUCOSE: 85 MG/DL (ref 70–110)
POCT GLUCOSE: 85 MG/DL (ref 70–110)
POCT GLUCOSE: 88 MG/DL (ref 70–110)
POCT GLUCOSE: 90 MG/DL (ref 70–110)
POCT GLUCOSE: 90 MG/DL (ref 70–110)
POCT GLUCOSE: 93 MG/DL (ref 70–110)
POCT GLUCOSE: <20 MG/DL (ref 70–110)
POIKILOCYTOSIS BLD QL SMEAR: SLIGHT
POLYCHROMASIA BLD QL SMEAR: ABNORMAL
POLYCHROMASIA BLD QL SMEAR: ABNORMAL
POTASSIUM BLD-SCNC: 3.8 MMOL/L (ref 3.5–5.1)
POTASSIUM SERPL-SCNC: 3.5 MMOL/L (ref 3.5–5.1)
POTASSIUM SERPL-SCNC: 3.6 MMOL/L (ref 3.5–5.1)
POTASSIUM SERPL-SCNC: 3.6 MMOL/L (ref 3.5–5.1)
POTASSIUM SERPL-SCNC: 3.7 MMOL/L (ref 3.5–5.1)
POTASSIUM SERPL-SCNC: 3.7 MMOL/L (ref 3.5–5.1)
POTASSIUM SERPL-SCNC: 3.8 MMOL/L (ref 3.5–5.1)
POTASSIUM SERPL-SCNC: 3.8 MMOL/L (ref 3.5–5.1)
POTASSIUM SERPL-SCNC: 3.9 MMOL/L (ref 3.5–5.1)
POTASSIUM SERPL-SCNC: 4 MMOL/L (ref 3.5–5.1)
POTASSIUM SERPL-SCNC: 4 MMOL/L (ref 3.5–5.1)
POTASSIUM SERPL-SCNC: 4.1 MMOL/L (ref 3.5–5.1)
POTASSIUM SERPL-SCNC: 4.2 MMOL/L (ref 3.5–5.1)
POTASSIUM SERPL-SCNC: 4.2 MMOL/L (ref 3.5–5.1)
POTASSIUM SERPL-SCNC: 4.5 MMOL/L (ref 3.5–5.1)
POTASSIUM UR-SCNC: 10 MMOL/L (ref 15–95)
PROCALCITONIN SERPL IA-MCNC: 216.22 NG/ML
PROT FLD-MCNC: <1 G/DL
PROT SERPL-MCNC: 4.9 G/DL (ref 6–8.4)
PROT SERPL-MCNC: 4.9 G/DL (ref 6–8.4)
PROT SERPL-MCNC: 5.1 G/DL (ref 6–8.4)
PROT SERPL-MCNC: 5.2 G/DL (ref 6–8.4)
PROT SERPL-MCNC: 5.4 G/DL (ref 6–8.4)
PROT SERPL-MCNC: 5.7 G/DL (ref 6–8.4)
PROT SERPL-MCNC: 6.1 G/DL (ref 6–8.4)
PROT SERPL-MCNC: 6.8 G/DL (ref 6–8.4)
PROT UR QL STRIP: ABNORMAL
PROT UR QL STRIP: NEGATIVE
PROT UR-MCNC: >1500 MG/DL (ref 0–15)
PROT/CREAT UR: ABNORMAL MG/G{CREAT} (ref 0–0.2)
PROTHROMBIN TIME: 11.4 SEC (ref 9–12.5)
PROTHROMBIN TIME: 17 SEC (ref 9–12.5)
PROTHROMBIN TIME: 19.4 SEC (ref 9–12.5)
PROTHROMBIN TIME: 19.6 SEC (ref 9–12.5)
PROTHROMBIN TIME: 21.1 SEC (ref 9–12.5)
PROTHROMBIN TIME: 26.8 SEC (ref 9–12.5)
QEF: 37 %
RA MAJOR: 5.75 CM
RA PRESSURE: 3 MMHG
RA WIDTH: 5.47 CM
RBC # BLD AUTO: 2.42 M/UL (ref 4.6–6.2)
RBC # BLD AUTO: 2.48 M/UL (ref 4.6–6.2)
RBC # BLD AUTO: 2.52 M/UL (ref 4.6–6.2)
RBC # BLD AUTO: 2.57 M/UL (ref 4.6–6.2)
RBC # BLD AUTO: 2.98 M/UL (ref 4.6–6.2)
RBC # BLD AUTO: 3.13 M/UL (ref 4.6–6.2)
RBC # BLD AUTO: 3.43 M/UL (ref 4.6–6.2)
RBC # BLD AUTO: 3.45 M/UL (ref 4.6–6.2)
RBC #/AREA URNS AUTO: 24 /HPF (ref 0–4)
RIGHT ATRIAL AREA: 30 CM2
RIGHT VENTRICULAR END-DIASTOLIC DIMENSION: 4.25 CM
RV MID DIAMA: 0.7 CM
RV TISSUE DOPPLER FREE WALL SYSTOLIC VELOCITY 1 (APICAL 4 CHAMBER VIEW): 5.76 CM/S
SAMPLE: ABNORMAL
SARS-COV-2 RDRP RESP QL NAA+PROBE: NEGATIVE
SARS-COV-2 RDRP RESP QL NAA+PROBE: NEGATIVE
SINUS: 3.74 CM
SITE: ABNORMAL
SODIUM BLD-SCNC: 138 MMOL/L (ref 136–145)
SODIUM SERPL-SCNC: 131 MMOL/L (ref 136–145)
SODIUM SERPL-SCNC: 134 MMOL/L (ref 136–145)
SODIUM SERPL-SCNC: 135 MMOL/L (ref 136–145)
SODIUM SERPL-SCNC: 135 MMOL/L (ref 136–145)
SODIUM SERPL-SCNC: 136 MMOL/L (ref 136–145)
SODIUM SERPL-SCNC: 136 MMOL/L (ref 136–145)
SODIUM SERPL-SCNC: 137 MMOL/L (ref 136–145)
SODIUM SERPL-SCNC: 138 MMOL/L (ref 136–145)
SODIUM SERPL-SCNC: 139 MMOL/L (ref 136–145)
SODIUM SERPL-SCNC: 139 MMOL/L (ref 136–145)
SODIUM SERPL-SCNC: 140 MMOL/L (ref 136–145)
SODIUM SERPL-SCNC: 141 MMOL/L (ref 136–145)
SODIUM UR-SCNC: 100 MMOL/L (ref 20–250)
SP GR UR STRIP: 1.02 (ref 1–1.03)
SP GR UR STRIP: >=1.03 (ref 1–1.03)
SP02: 94
SP02: 97
SP02: 98
SPECIMEN SOURCE: NORMAL
SPECIMEN SOURCE: NORMAL
SQUAMOUS #/AREA URNS AUTO: 2 /HPF
T4 FREE SERPL-MCNC: 0.67 NG/DL (ref 0.71–1.51)
T4 FREE SERPL-MCNC: 3.47 NG/DL (ref 0.71–1.51)
TDI LATERAL: 0.08 M/S
TDI SEPTAL: 0.05 M/S
TDI: 0.07 M/S
TOXIC GRANULES BLD QL SMEAR: PRESENT
TOXICOLOGY INFORMATION: NORMAL
TR MAX PG: 32 MMHG
TRICUSPID ANNULAR PLANE SYSTOLIC EXCURSION: 1.2 CM
TROPONIN I SERPL DL<=0.01 NG/ML-MCNC: 0.01 NG/ML (ref 0.02–0.5)
TROPONIN I SERPL DL<=0.01 NG/ML-MCNC: 0.04 NG/ML (ref 0.02–0.5)
TROPONIN I SERPL DL<=0.01 NG/ML-MCNC: 0.04 NG/ML (ref 0.02–0.5)
TROPONIN I SERPL DL<=0.01 NG/ML-MCNC: 0.92 NG/ML (ref 0.02–0.5)
TROPONIN I SERPL DL<=0.01 NG/ML-MCNC: 0.98 NG/ML (ref 0–0.03)
TROPONIN I SERPL DL<=0.01 NG/ML-MCNC: 1.1 NG/ML (ref 0–0.03)
TSH SERPL DL<=0.005 MIU/L-ACNC: 46.73 UIU/ML (ref 0.34–5.6)
TSH SERPL DL<=0.005 MIU/L-ACNC: 52.2 UIU/ML (ref 0.34–5.6)
TV REST PULMONARY ARTERY PRESSURE: 35 MMHG
URN SPEC COLLECT METH UR: ABNORMAL
URN SPEC COLLECT METH UR: NORMAL
UROBILINOGEN UR STRIP-ACNC: NEGATIVE EU/DL
VANCOMYCIN SERPL-MCNC: 17.1 UG/ML
VANCOMYCIN SERPL-MCNC: 6.8 UG/ML
VANCOMYCIN SERPL-MCNC: 7 UG/ML
VT: 450
VT: 490
WBC # BLD AUTO: 10.05 K/UL (ref 3.9–12.7)
WBC # BLD AUTO: 10.1 K/UL (ref 3.9–12.7)
WBC # BLD AUTO: 23.69 K/UL (ref 3.9–12.7)
WBC # BLD AUTO: 30.22 K/UL (ref 3.9–12.7)
WBC # BLD AUTO: 44.07 K/UL (ref 3.9–12.7)
WBC # BLD AUTO: 5.38 K/UL (ref 3.9–12.7)
WBC # BLD AUTO: 7.69 K/UL (ref 3.9–12.7)
WBC # BLD AUTO: 9.04 K/UL (ref 3.9–12.7)
WBC # BLD AUTO: 9.7 K/UL (ref 3.9–12.7)
WBC # FLD: 76 /CU MM
WBC #/AREA URNS AUTO: 23 /HPF (ref 0–5)

## 2021-01-01 PROCEDURE — 25000003 PHARM REV CODE 250: Performed by: NURSE PRACTITIONER

## 2021-01-01 PROCEDURE — 25000003 PHARM REV CODE 250: Performed by: FAMILY MEDICINE

## 2021-01-01 PROCEDURE — 83735 ASSAY OF MAGNESIUM: CPT | Performed by: NURSE PRACTITIONER

## 2021-01-01 PROCEDURE — 99291 PR CRITICAL CARE, E/M 30-74 MINUTES: ICD-10-PCS | Mod: ,,, | Performed by: NURSE PRACTITIONER

## 2021-01-01 PROCEDURE — 1101F PT FALLS ASSESS-DOCD LE1/YR: CPT | Mod: CPTII,S$GLB,, | Performed by: STUDENT IN AN ORGANIZED HEALTH CARE EDUCATION/TRAINING PROGRAM

## 2021-01-01 PROCEDURE — 99900035 HC TECH TIME PER 15 MIN (STAT)

## 2021-01-01 PROCEDURE — P9047 ALBUMIN (HUMAN), 25%, 50ML: HCPCS | Mod: JG | Performed by: FAMILY MEDICINE

## 2021-01-01 PROCEDURE — 80202 ASSAY OF VANCOMYCIN: CPT | Performed by: INTERNAL MEDICINE

## 2021-01-01 PROCEDURE — 85610 PROTHROMBIN TIME: CPT | Performed by: EMERGENCY MEDICINE

## 2021-01-01 PROCEDURE — 63600175 PHARM REV CODE 636 W HCPCS: Performed by: STUDENT IN AN ORGANIZED HEALTH CARE EDUCATION/TRAINING PROGRAM

## 2021-01-01 PROCEDURE — 3078F DIAST BP <80 MM HG: CPT | Mod: CPTII,S$GLB,, | Performed by: STUDENT IN AN ORGANIZED HEALTH CARE EDUCATION/TRAINING PROGRAM

## 2021-01-01 PROCEDURE — 99900026 HC AIRWAY MAINTENANCE (STAT)

## 2021-01-01 PROCEDURE — 99291 PR CRITICAL CARE, E/M 30-74 MINUTES: ICD-10-PCS | Mod: 25,GC,, | Performed by: INTERNAL MEDICINE

## 2021-01-01 PROCEDURE — 91300 COVID-19, MRNA, LNP-S, PF, 30 MCG/0.3 ML DOSE VACCINE: CPT | Mod: S$GLB,,, | Performed by: FAMILY MEDICINE

## 2021-01-01 PROCEDURE — 85384 FIBRINOGEN ACTIVITY: CPT | Performed by: INTERNAL MEDICINE

## 2021-01-01 PROCEDURE — 11000001 HC ACUTE MED/SURG PRIVATE ROOM

## 2021-01-01 PROCEDURE — 71045 X-RAY EXAM CHEST 1 VIEW: CPT | Mod: TC,FY

## 2021-01-01 PROCEDURE — 1125F AMNT PAIN NOTED PAIN PRSNT: CPT | Mod: S$GLB,,, | Performed by: STUDENT IN AN ORGANIZED HEALTH CARE EDUCATION/TRAINING PROGRAM

## 2021-01-01 PROCEDURE — 91300 COVID-19, MRNA, LNP-S, PF, 30 MCG/0.3 ML DOSE VACCINE: ICD-10-PCS | Mod: S$GLB,,, | Performed by: FAMILY MEDICINE

## 2021-01-01 PROCEDURE — 80053 COMPREHEN METABOLIC PANEL: CPT

## 2021-01-01 PROCEDURE — 81003 URINALYSIS AUTO W/O SCOPE: CPT | Mod: 59

## 2021-01-01 PROCEDURE — 82803 BLOOD GASES ANY COMBINATION: CPT

## 2021-01-01 PROCEDURE — 87040 BLOOD CULTURE FOR BACTERIA: CPT | Mod: 59 | Performed by: NURSE PRACTITIONER

## 2021-01-01 PROCEDURE — 83036 HEMOGLOBIN GLYCOSYLATED A1C: CPT | Performed by: STUDENT IN AN ORGANIZED HEALTH CARE EDUCATION/TRAINING PROGRAM

## 2021-01-01 PROCEDURE — 63600175 PHARM REV CODE 636 W HCPCS: Performed by: FAMILY MEDICINE

## 2021-01-01 PROCEDURE — 83605 ASSAY OF LACTIC ACID: CPT | Performed by: INTERNAL MEDICINE

## 2021-01-01 PROCEDURE — 99223 PR INITIAL HOSPITAL CARE,LEVL III: ICD-10-PCS | Mod: GC,,, | Performed by: INTERNAL MEDICINE

## 2021-01-01 PROCEDURE — 84443 ASSAY THYROID STIM HORMONE: CPT | Performed by: EMERGENCY MEDICINE

## 2021-01-01 PROCEDURE — 3078F PR MOST RECENT DIASTOLIC BLOOD PRESSURE < 80 MM HG: ICD-10-PCS | Mod: CPTII,S$GLB,, | Performed by: STUDENT IN AN ORGANIZED HEALTH CARE EDUCATION/TRAINING PROGRAM

## 2021-01-01 PROCEDURE — 82330 ASSAY OF CALCIUM: CPT | Performed by: STUDENT IN AN ORGANIZED HEALTH CARE EDUCATION/TRAINING PROGRAM

## 2021-01-01 PROCEDURE — P9047 ALBUMIN (HUMAN), 25%, 50ML: HCPCS | Mod: JG | Performed by: HOSPITALIST

## 2021-01-01 PROCEDURE — 82962 GLUCOSE BLOOD TEST: CPT

## 2021-01-01 PROCEDURE — 83615 LACTATE (LD) (LDH) ENZYME: CPT | Mod: 91 | Performed by: STUDENT IN AN ORGANIZED HEALTH CARE EDUCATION/TRAINING PROGRAM

## 2021-01-01 PROCEDURE — 84439 ASSAY OF FREE THYROXINE: CPT

## 2021-01-01 PROCEDURE — 49082 ABD PARACENTESIS: CPT

## 2021-01-01 PROCEDURE — 85730 THROMBOPLASTIN TIME PARTIAL: CPT | Performed by: INTERNAL MEDICINE

## 2021-01-01 PROCEDURE — 82140 ASSAY OF AMMONIA: CPT | Performed by: EMERGENCY MEDICINE

## 2021-01-01 PROCEDURE — 36556 INSERT NON-TUNNEL CV CATH: CPT | Mod: ,,, | Performed by: ANESTHESIOLOGY

## 2021-01-01 PROCEDURE — 25500020 PHARM REV CODE 255: Performed by: EMERGENCY MEDICINE

## 2021-01-01 PROCEDURE — 25500020 PHARM REV CODE 255: Performed by: INTERNAL MEDICINE

## 2021-01-01 PROCEDURE — 84100 ASSAY OF PHOSPHORUS: CPT | Performed by: NURSE PRACTITIONER

## 2021-01-01 PROCEDURE — 63600175 PHARM REV CODE 636 W HCPCS: Performed by: PHYSICIAN ASSISTANT

## 2021-01-01 PROCEDURE — 70450 CT HEAD/BRAIN W/O DYE: CPT | Mod: TC

## 2021-01-01 PROCEDURE — 99222 PR INITIAL HOSPITAL CARE,LEVL II: ICD-10-PCS | Mod: AI,,, | Performed by: FAMILY MEDICINE

## 2021-01-01 PROCEDURE — 63600175 PHARM REV CODE 636 W HCPCS: Performed by: NURSE PRACTITIONER

## 2021-01-01 PROCEDURE — 89051 BODY FLUID CELL COUNT: CPT | Performed by: NURSE PRACTITIONER

## 2021-01-01 PROCEDURE — 93010 EKG 12-LEAD: ICD-10-PCS | Mod: ,,, | Performed by: INTERNAL MEDICINE

## 2021-01-01 PROCEDURE — 37799 UNLISTED PX VASCULAR SURGERY: CPT

## 2021-01-01 PROCEDURE — 82140 ASSAY OF AMMONIA: CPT

## 2021-01-01 PROCEDURE — 85610 PROTHROMBIN TIME: CPT | Performed by: NURSE PRACTITIONER

## 2021-01-01 PROCEDURE — 85379 FIBRIN DEGRADATION QUANT: CPT | Performed by: EMERGENCY MEDICINE

## 2021-01-01 PROCEDURE — 84484 ASSAY OF TROPONIN QUANT: CPT | Performed by: NURSE PRACTITIONER

## 2021-01-01 PROCEDURE — 90935 PR HEMODIALYSIS, ONE EVALUATION: ICD-10-PCS | Mod: ,,, | Performed by: INTERNAL MEDICINE

## 2021-01-01 PROCEDURE — 84484 ASSAY OF TROPONIN QUANT: CPT

## 2021-01-01 PROCEDURE — 80069 RENAL FUNCTION PANEL: CPT | Performed by: STUDENT IN AN ORGANIZED HEALTH CARE EDUCATION/TRAINING PROGRAM

## 2021-01-01 PROCEDURE — 99223 1ST HOSP IP/OBS HIGH 75: CPT | Mod: AI,,, | Performed by: FAMILY MEDICINE

## 2021-01-01 PROCEDURE — 25000003 PHARM REV CODE 250: Performed by: EMERGENCY MEDICINE

## 2021-01-01 PROCEDURE — 25000242 PHARM REV CODE 250 ALT 637 W/ HCPCS: Performed by: FAMILY MEDICINE

## 2021-01-01 PROCEDURE — 83735 ASSAY OF MAGNESIUM: CPT | Mod: 91 | Performed by: STUDENT IN AN ORGANIZED HEALTH CARE EDUCATION/TRAINING PROGRAM

## 2021-01-01 PROCEDURE — 80053 COMPREHEN METABOLIC PANEL: CPT | Performed by: NURSE PRACTITIONER

## 2021-01-01 PROCEDURE — 1159F MED LIST DOCD IN RCRD: CPT | Mod: S$GLB,,, | Performed by: STUDENT IN AN ORGANIZED HEALTH CARE EDUCATION/TRAINING PROGRAM

## 2021-01-01 PROCEDURE — 96374 THER/PROPH/DIAG INJ IV PUSH: CPT

## 2021-01-01 PROCEDURE — 99222 1ST HOSP IP/OBS MODERATE 55: CPT | Mod: AI,,, | Performed by: FAMILY MEDICINE

## 2021-01-01 PROCEDURE — 83605 ASSAY OF LACTIC ACID: CPT | Performed by: EMERGENCY MEDICINE

## 2021-01-01 PROCEDURE — 25000003 PHARM REV CODE 250: Performed by: STUDENT IN AN ORGANIZED HEALTH CARE EDUCATION/TRAINING PROGRAM

## 2021-01-01 PROCEDURE — 31500 PR INSERT, EMERGENCY ENDOTRACH AIRWAY: ICD-10-PCS | Mod: GC,,, | Performed by: EMERGENCY MEDICINE

## 2021-01-01 PROCEDURE — 85025 COMPLETE CBC W/AUTO DIFF WBC: CPT

## 2021-01-01 PROCEDURE — 84484 ASSAY OF TROPONIN QUANT: CPT | Performed by: EMERGENCY MEDICINE

## 2021-01-01 PROCEDURE — 99291 CRITICAL CARE FIRST HOUR: CPT | Mod: 25

## 2021-01-01 PROCEDURE — 27000221 HC OXYGEN, UP TO 24 HOURS

## 2021-01-01 PROCEDURE — 20000000 HC ICU ROOM

## 2021-01-01 PROCEDURE — 71275 CT ANGIOGRAPHY CHEST: CPT | Mod: TC

## 2021-01-01 PROCEDURE — 83615 LACTATE (LD) (LDH) ENZYME: CPT

## 2021-01-01 PROCEDURE — 93005 ELECTROCARDIOGRAM TRACING: CPT

## 2021-01-01 PROCEDURE — 90945 DIALYSIS ONE EVALUATION: CPT

## 2021-01-01 PROCEDURE — 87088 URINE BACTERIA CULTURE: CPT | Performed by: STUDENT IN AN ORGANIZED HEALTH CARE EDUCATION/TRAINING PROGRAM

## 2021-01-01 PROCEDURE — 99233 SBSQ HOSP IP/OBS HIGH 50: CPT | Mod: GC,,, | Performed by: INTERNAL MEDICINE

## 2021-01-01 PROCEDURE — 84157 ASSAY OF PROTEIN OTHER: CPT | Performed by: NURSE PRACTITIONER

## 2021-01-01 PROCEDURE — 85384 FIBRINOGEN ACTIVITY: CPT | Performed by: STUDENT IN AN ORGANIZED HEALTH CARE EDUCATION/TRAINING PROGRAM

## 2021-01-01 PROCEDURE — 85007 BL SMEAR W/DIFF WBC COUNT: CPT | Performed by: NURSE PRACTITIONER

## 2021-01-01 PROCEDURE — 83735 ASSAY OF MAGNESIUM: CPT | Performed by: HOSPITALIST

## 2021-01-01 PROCEDURE — 83605 ASSAY OF LACTIC ACID: CPT | Mod: 91 | Performed by: NURSE PRACTITIONER

## 2021-01-01 PROCEDURE — 63600175 PHARM REV CODE 636 W HCPCS: Performed by: HOSPITALIST

## 2021-01-01 PROCEDURE — 86160 COMPLEMENT ANTIGEN: CPT | Mod: 59 | Performed by: STUDENT IN AN ORGANIZED HEALTH CARE EDUCATION/TRAINING PROGRAM

## 2021-01-01 PROCEDURE — 63600175 PHARM REV CODE 636 W HCPCS: Performed by: EMERGENCY MEDICINE

## 2021-01-01 PROCEDURE — 94761 N-INVAS EAR/PLS OXIMETRY MLT: CPT

## 2021-01-01 PROCEDURE — 80069 RENAL FUNCTION PANEL: CPT | Mod: 91 | Performed by: STUDENT IN AN ORGANIZED HEALTH CARE EDUCATION/TRAINING PROGRAM

## 2021-01-01 PROCEDURE — 82042 OTHER SOURCE ALBUMIN QUAN EA: CPT | Performed by: NURSE PRACTITIONER

## 2021-01-01 PROCEDURE — 87205 SMEAR GRAM STAIN: CPT | Performed by: NURSE PRACTITIONER

## 2021-01-01 PROCEDURE — 82040 ASSAY OF SERUM ALBUMIN: CPT

## 2021-01-01 PROCEDURE — 87075 CULTR BACTERIA EXCEPT BLOOD: CPT

## 2021-01-01 PROCEDURE — 63600175 PHARM REV CODE 636 W HCPCS: Performed by: INTERNAL MEDICINE

## 2021-01-01 PROCEDURE — 83880 ASSAY OF NATRIURETIC PEPTIDE: CPT

## 2021-01-01 PROCEDURE — 99292 PR CRITICAL CARE, ADDL 30 MIN: ICD-10-PCS | Mod: 25,GC,, | Performed by: INTERNAL MEDICINE

## 2021-01-01 PROCEDURE — 84145 PROCALCITONIN (PCT): CPT | Performed by: NURSE PRACTITIONER

## 2021-01-01 PROCEDURE — 36415 COLL VENOUS BLD VENIPUNCTURE: CPT

## 2021-01-01 PROCEDURE — 84133 ASSAY OF URINE POTASSIUM: CPT | Performed by: STUDENT IN AN ORGANIZED HEALTH CARE EDUCATION/TRAINING PROGRAM

## 2021-01-01 PROCEDURE — 94640 AIRWAY INHALATION TREATMENT: CPT

## 2021-01-01 PROCEDURE — 81001 URINALYSIS AUTO W/SCOPE: CPT | Performed by: NURSE PRACTITIONER

## 2021-01-01 PROCEDURE — 86038 ANTINUCLEAR ANTIBODIES: CPT | Performed by: STUDENT IN AN ORGANIZED HEALTH CARE EDUCATION/TRAINING PROGRAM

## 2021-01-01 PROCEDURE — 85730 THROMBOPLASTIN TIME PARTIAL: CPT | Performed by: STUDENT IN AN ORGANIZED HEALTH CARE EDUCATION/TRAINING PROGRAM

## 2021-01-01 PROCEDURE — 99223 1ST HOSP IP/OBS HIGH 75: CPT | Mod: GC,,, | Performed by: INTERNAL MEDICINE

## 2021-01-01 PROCEDURE — 70450 CT HEAD/BRAIN W/O DYE: CPT | Mod: 26,,, | Performed by: RADIOLOGY

## 2021-01-01 PROCEDURE — 27100171 HC OXYGEN HIGH FLOW UP TO 24 HOURS

## 2021-01-01 PROCEDURE — 82436 ASSAY OF URINE CHLORIDE: CPT | Performed by: STUDENT IN AN ORGANIZED HEALTH CARE EDUCATION/TRAINING PROGRAM

## 2021-01-01 PROCEDURE — 94003 VENT MGMT INPAT SUBQ DAY: CPT

## 2021-01-01 PROCEDURE — 3077F PR MOST RECENT SYSTOLIC BLOOD PRESSURE >= 140 MM HG: ICD-10-PCS | Mod: CPTII,S$GLB,, | Performed by: STUDENT IN AN ORGANIZED HEALTH CARE EDUCATION/TRAINING PROGRAM

## 2021-01-01 PROCEDURE — 74174 CTA CHEST ABDOMEN PELVIS: ICD-10-PCS | Mod: 26,,, | Performed by: RADIOLOGY

## 2021-01-01 PROCEDURE — 83690 ASSAY OF LIPASE: CPT | Performed by: EMERGENCY MEDICINE

## 2021-01-01 PROCEDURE — 80076 HEPATIC FUNCTION PANEL: CPT | Performed by: STUDENT IN AN ORGANIZED HEALTH CARE EDUCATION/TRAINING PROGRAM

## 2021-01-01 PROCEDURE — 83880 ASSAY OF NATRIURETIC PEPTIDE: CPT | Performed by: NURSE PRACTITIONER

## 2021-01-01 PROCEDURE — 80100008 HC CRRT DAILY MAINTENANCE

## 2021-01-01 PROCEDURE — 80053 COMPREHEN METABOLIC PANEL: CPT | Mod: 91 | Performed by: HOSPITALIST

## 2021-01-01 PROCEDURE — 99204 PR OFFICE/OUTPT VISIT, NEW, LEVL IV, 45-59 MIN: ICD-10-PCS | Mod: S$GLB,,, | Performed by: STUDENT IN AN ORGANIZED HEALTH CARE EDUCATION/TRAINING PROGRAM

## 2021-01-01 PROCEDURE — 63600175 PHARM REV CODE 636 W HCPCS

## 2021-01-01 PROCEDURE — 85025 COMPLETE CBC W/AUTO DIFF WBC: CPT | Performed by: NURSE PRACTITIONER

## 2021-01-01 PROCEDURE — 3288F FALL RISK ASSESSMENT DOCD: CPT | Mod: CPTII,S$GLB,, | Performed by: STUDENT IN AN ORGANIZED HEALTH CARE EDUCATION/TRAINING PROGRAM

## 2021-01-01 PROCEDURE — C9113 INJ PANTOPRAZOLE SODIUM, VIA: HCPCS | Performed by: FAMILY MEDICINE

## 2021-01-01 PROCEDURE — 82378 CARCINOEMBRYONIC ANTIGEN: CPT | Performed by: NURSE PRACTITIONER

## 2021-01-01 PROCEDURE — 83605 ASSAY OF LACTIC ACID: CPT | Mod: 91 | Performed by: HOSPITALIST

## 2021-01-01 PROCEDURE — 86900 BLOOD TYPING SEROLOGIC ABO: CPT | Performed by: NURSE PRACTITIONER

## 2021-01-01 PROCEDURE — 71045 X-RAY EXAM CHEST 1 VIEW: CPT | Mod: 26,,, | Performed by: RADIOLOGY

## 2021-01-01 PROCEDURE — 25000003 PHARM REV CODE 250

## 2021-01-01 PROCEDURE — 99291 CRITICAL CARE FIRST HOUR: CPT | Mod: 25,GC,, | Performed by: INTERNAL MEDICINE

## 2021-01-01 PROCEDURE — 87086 URINE CULTURE/COLONY COUNT: CPT

## 2021-01-01 PROCEDURE — 0002A COVID-19, MRNA, LNP-S, PF, 30 MCG/0.3 ML DOSE VACCINE: ICD-10-PCS | Mod: CV19,S$GLB,, | Performed by: FAMILY MEDICINE

## 2021-01-01 PROCEDURE — 85025 COMPLETE CBC W/AUTO DIFF WBC: CPT | Performed by: EMERGENCY MEDICINE

## 2021-01-01 PROCEDURE — 85610 PROTHROMBIN TIME: CPT

## 2021-01-01 PROCEDURE — 87070 CULTURE OTHR SPECIMN AEROBIC: CPT

## 2021-01-01 PROCEDURE — 71045 XR CHEST AP PORTABLE: ICD-10-PCS | Mod: 26,,, | Performed by: RADIOLOGY

## 2021-01-01 PROCEDURE — 90935 HEMODIALYSIS ONE EVALUATION: CPT | Mod: ,,, | Performed by: INTERNAL MEDICINE

## 2021-01-01 PROCEDURE — 99292 CRITICAL CARE ADDL 30 MIN: CPT | Mod: 25,GC,, | Performed by: INTERNAL MEDICINE

## 2021-01-01 PROCEDURE — 74174 CTA ABD&PLVS W/CONTRAST: CPT | Mod: 26,,, | Performed by: RADIOLOGY

## 2021-01-01 PROCEDURE — 74174 CTA ABD&PLVS W/CONTRAST: CPT | Mod: TC

## 2021-01-01 PROCEDURE — 87070 CULTURE OTHR SPECIMN AEROBIC: CPT | Performed by: NURSE PRACTITIONER

## 2021-01-01 PROCEDURE — 25000003 PHARM REV CODE 250: Performed by: INTERNAL MEDICINE

## 2021-01-01 PROCEDURE — 99233 PR SUBSEQUENT HOSPITAL CARE,LEVL III: ICD-10-PCS | Mod: GC,,, | Performed by: INTERNAL MEDICINE

## 2021-01-01 PROCEDURE — 85610 PROTHROMBIN TIME: CPT | Mod: 91 | Performed by: HOSPITALIST

## 2021-01-01 PROCEDURE — 84300 ASSAY OF URINE SODIUM: CPT | Performed by: STUDENT IN AN ORGANIZED HEALTH CARE EDUCATION/TRAINING PROGRAM

## 2021-01-01 PROCEDURE — 99291 CRITICAL CARE FIRST HOUR: CPT | Mod: ,,, | Performed by: NURSE PRACTITIONER

## 2021-01-01 PROCEDURE — 85730 THROMBOPLASTIN TIME PARTIAL: CPT

## 2021-01-01 PROCEDURE — 84484 ASSAY OF TROPONIN QUANT: CPT | Mod: 91 | Performed by: HOSPITALIST

## 2021-01-01 PROCEDURE — 85007 BL SMEAR W/DIFF WBC COUNT: CPT | Performed by: HOSPITALIST

## 2021-01-01 PROCEDURE — 85027 COMPLETE CBC AUTOMATED: CPT | Performed by: HOSPITALIST

## 2021-01-01 PROCEDURE — 71045 XR CHEST 1 VIEW FOR LINE/TUBE PLACEMENT: ICD-10-PCS | Mod: 26,,, | Performed by: RADIOLOGY

## 2021-01-01 PROCEDURE — 87186 SC STD MICRODIL/AGAR DIL: CPT | Performed by: NURSE PRACTITIONER

## 2021-01-01 PROCEDURE — 51702 INSERT TEMP BLADDER CATH: CPT

## 2021-01-01 PROCEDURE — 25000003 PHARM REV CODE 250: Performed by: HOSPITALIST

## 2021-01-01 PROCEDURE — U0002 COVID-19 LAB TEST NON-CDC: HCPCS

## 2021-01-01 PROCEDURE — 86301 IMMUNOASSAY TUMOR CA 19-9: CPT | Performed by: NURSE PRACTITIONER

## 2021-01-01 PROCEDURE — 82077 ASSAY SPEC XCP UR&BREATH IA: CPT

## 2021-01-01 PROCEDURE — S0030 INJECTION, METRONIDAZOLE: HCPCS | Performed by: HOSPITALIST

## 2021-01-01 PROCEDURE — 1101F PR PT FALLS ASSESS DOC 0-1 FALLS W/OUT INJ PAST YR: ICD-10-PCS | Mod: CPTII,S$GLB,, | Performed by: STUDENT IN AN ORGANIZED HEALTH CARE EDUCATION/TRAINING PROGRAM

## 2021-01-01 PROCEDURE — 93010 ELECTROCARDIOGRAM REPORT: CPT | Mod: ,,, | Performed by: INTERNAL MEDICINE

## 2021-01-01 PROCEDURE — 99999 PR PBB SHADOW E&M-EST. PATIENT-LVL V: CPT | Mod: PBBFAC,,, | Performed by: STUDENT IN AN ORGANIZED HEALTH CARE EDUCATION/TRAINING PROGRAM

## 2021-01-01 PROCEDURE — 99204 OFFICE O/P NEW MOD 45 MIN: CPT | Mod: S$GLB,,, | Performed by: STUDENT IN AN ORGANIZED HEALTH CARE EDUCATION/TRAINING PROGRAM

## 2021-01-01 PROCEDURE — 85240 CLOT FACTOR VIII AHG 1 STAGE: CPT | Performed by: STUDENT IN AN ORGANIZED HEALTH CARE EDUCATION/TRAINING PROGRAM

## 2021-01-01 PROCEDURE — 86160 COMPLEMENT ANTIGEN: CPT | Performed by: STUDENT IN AN ORGANIZED HEALTH CARE EDUCATION/TRAINING PROGRAM

## 2021-01-01 PROCEDURE — 0002A COVID-19, MRNA, LNP-S, PF, 30 MCG/0.3 ML DOSE VACCINE: CPT | Mod: CV19,S$GLB,, | Performed by: FAMILY MEDICINE

## 2021-01-01 PROCEDURE — 71275 CTA CHEST ABDOMEN PELVIS: ICD-10-PCS | Mod: 26,,, | Performed by: RADIOLOGY

## 2021-01-01 PROCEDURE — 87077 CULTURE AEROBIC IDENTIFY: CPT | Performed by: STUDENT IN AN ORGANIZED HEALTH CARE EDUCATION/TRAINING PROGRAM

## 2021-01-01 PROCEDURE — 96361 HYDRATE IV INFUSION ADD-ON: CPT | Mod: 59

## 2021-01-01 PROCEDURE — 94002 VENT MGMT INPAT INIT DAY: CPT

## 2021-01-01 PROCEDURE — 3077F SYST BP >= 140 MM HG: CPT | Mod: CPTII,S$GLB,, | Performed by: STUDENT IN AN ORGANIZED HEALTH CARE EDUCATION/TRAINING PROGRAM

## 2021-01-01 PROCEDURE — 0001A COVID-19, MRNA, LNP-S, PF, 30 MCG/0.3 ML DOSE VACCINE: ICD-10-PCS | Mod: CV19,S$GLB,, | Performed by: FAMILY MEDICINE

## 2021-01-01 PROCEDURE — 84443 ASSAY THYROID STIM HORMONE: CPT

## 2021-01-01 PROCEDURE — 84484 ASSAY OF TROPONIN QUANT: CPT | Mod: 91 | Performed by: STUDENT IN AN ORGANIZED HEALTH CARE EDUCATION/TRAINING PROGRAM

## 2021-01-01 PROCEDURE — 87070 CULTURE OTHR SPECIMN AEROBIC: CPT | Mod: 59 | Performed by: NURSE PRACTITIONER

## 2021-01-01 PROCEDURE — 36556 CENTRAL LINE: ICD-10-PCS | Mod: ,,, | Performed by: ANESTHESIOLOGY

## 2021-01-01 PROCEDURE — 82140 ASSAY OF AMMONIA: CPT | Performed by: INTERNAL MEDICINE

## 2021-01-01 PROCEDURE — 83615 LACTATE (LD) (LDH) ENZYME: CPT | Performed by: NURSE PRACTITIONER

## 2021-01-01 PROCEDURE — 87077 CULTURE AEROBIC IDENTIFY: CPT | Performed by: NURSE PRACTITIONER

## 2021-01-01 PROCEDURE — 80048 BASIC METABOLIC PNL TOTAL CA: CPT | Mod: 91 | Performed by: STUDENT IN AN ORGANIZED HEALTH CARE EDUCATION/TRAINING PROGRAM

## 2021-01-01 PROCEDURE — 63600175 PHARM REV CODE 636 W HCPCS: Mod: JG | Performed by: FAMILY MEDICINE

## 2021-01-01 PROCEDURE — 99999 PR PBB SHADOW E&M-EST. PATIENT-LVL V: ICD-10-PCS | Mod: PBBFAC,,, | Performed by: STUDENT IN AN ORGANIZED HEALTH CARE EDUCATION/TRAINING PROGRAM

## 2021-01-01 PROCEDURE — 83010 ASSAY OF HAPTOGLOBIN QUANT: CPT | Performed by: STUDENT IN AN ORGANIZED HEALTH CARE EDUCATION/TRAINING PROGRAM

## 2021-01-01 PROCEDURE — 99285 EMERGENCY DEPT VISIT HI MDM: CPT | Mod: 25

## 2021-01-01 PROCEDURE — 83605 ASSAY OF LACTIC ACID: CPT

## 2021-01-01 PROCEDURE — 31500 INSERT EMERGENCY AIRWAY: CPT | Mod: GC,,, | Performed by: EMERGENCY MEDICINE

## 2021-01-01 PROCEDURE — 87040 BLOOD CULTURE FOR BACTERIA: CPT | Mod: 59 | Performed by: EMERGENCY MEDICINE

## 2021-01-01 PROCEDURE — 87186 SC STD MICRODIL/AGAR DIL: CPT | Performed by: EMERGENCY MEDICINE

## 2021-01-01 PROCEDURE — 71275 CT ANGIOGRAPHY CHEST: CPT | Mod: 26,,, | Performed by: RADIOLOGY

## 2021-01-01 PROCEDURE — 85048 AUTOMATED LEUKOCYTE COUNT: CPT

## 2021-01-01 PROCEDURE — 87205 SMEAR GRAM STAIN: CPT | Mod: 59 | Performed by: NURSE PRACTITIONER

## 2021-01-01 PROCEDURE — 83690 ASSAY OF LIPASE: CPT

## 2021-01-01 PROCEDURE — 80074 ACUTE HEPATITIS PANEL: CPT | Performed by: NURSE PRACTITIONER

## 2021-01-01 PROCEDURE — 80053 COMPREHEN METABOLIC PANEL: CPT | Performed by: EMERGENCY MEDICINE

## 2021-01-01 PROCEDURE — 83605 ASSAY OF LACTIC ACID: CPT | Mod: 91 | Performed by: STUDENT IN AN ORGANIZED HEALTH CARE EDUCATION/TRAINING PROGRAM

## 2021-01-01 PROCEDURE — U0002 COVID-19 LAB TEST NON-CDC: HCPCS | Performed by: EMERGENCY MEDICINE

## 2021-01-01 PROCEDURE — 87077 CULTURE AEROBIC IDENTIFY: CPT | Mod: 59 | Performed by: EMERGENCY MEDICINE

## 2021-01-01 PROCEDURE — 36415 COLL VENOUS BLD VENIPUNCTURE: CPT | Performed by: INTERNAL MEDICINE

## 2021-01-01 PROCEDURE — 87205 SMEAR GRAM STAIN: CPT

## 2021-01-01 PROCEDURE — 99223 PR INITIAL HOSPITAL CARE,LEVL III: ICD-10-PCS | Mod: AI,,, | Performed by: FAMILY MEDICINE

## 2021-01-01 PROCEDURE — 1159F PR MEDICATION LIST DOCUMENTED IN MEDICAL RECORD: ICD-10-PCS | Mod: S$GLB,,, | Performed by: STUDENT IN AN ORGANIZED HEALTH CARE EDUCATION/TRAINING PROGRAM

## 2021-01-01 PROCEDURE — 87086 URINE CULTURE/COLONY COUNT: CPT | Performed by: NURSE PRACTITIONER

## 2021-01-01 PROCEDURE — 3288F PR FALLS RISK ASSESSMENT DOCUMENTED: ICD-10-PCS | Mod: CPTII,S$GLB,, | Performed by: STUDENT IN AN ORGANIZED HEALTH CARE EDUCATION/TRAINING PROGRAM

## 2021-01-01 PROCEDURE — 80307 DRUG TEST PRSMV CHEM ANLYZR: CPT

## 2021-01-01 PROCEDURE — 82570 ASSAY OF URINE CREATININE: CPT | Performed by: STUDENT IN AN ORGANIZED HEALTH CARE EDUCATION/TRAINING PROGRAM

## 2021-01-01 PROCEDURE — 85027 COMPLETE CBC AUTOMATED: CPT | Performed by: NURSE PRACTITIONER

## 2021-01-01 PROCEDURE — 87075 CULTR BACTERIA EXCEPT BLOOD: CPT | Performed by: NURSE PRACTITIONER

## 2021-01-01 PROCEDURE — 96360 HYDRATION IV INFUSION INIT: CPT

## 2021-01-01 PROCEDURE — 1125F PR PAIN SEVERITY QUANTIFIED, PAIN PRESENT: ICD-10-PCS | Mod: S$GLB,,, | Performed by: STUDENT IN AN ORGANIZED HEALTH CARE EDUCATION/TRAINING PROGRAM

## 2021-01-01 PROCEDURE — 87186 SC STD MICRODIL/AGAR DIL: CPT | Performed by: STUDENT IN AN ORGANIZED HEALTH CARE EDUCATION/TRAINING PROGRAM

## 2021-01-01 PROCEDURE — 70450 CT HEAD WITHOUT CONTRAST: ICD-10-PCS | Mod: 26,,, | Performed by: RADIOLOGY

## 2021-01-01 PROCEDURE — P9047 ALBUMIN (HUMAN), 25%, 50ML: HCPCS | Mod: JG | Performed by: STUDENT IN AN ORGANIZED HEALTH CARE EDUCATION/TRAINING PROGRAM

## 2021-01-01 PROCEDURE — 0001A COVID-19, MRNA, LNP-S, PF, 30 MCG/0.3 ML DOSE VACCINE: CPT | Mod: CV19,S$GLB,, | Performed by: FAMILY MEDICINE

## 2021-01-01 PROCEDURE — 84439 ASSAY OF FREE THYROXINE: CPT | Performed by: EMERGENCY MEDICINE

## 2021-01-01 RX ORDER — ALBUTEROL SULFATE 0.83 MG/ML
2.5 SOLUTION RESPIRATORY (INHALATION) 3 TIMES DAILY
COMMUNITY

## 2021-01-01 RX ORDER — FENTANYL CITRATE-0.9 % NACL/PF 10 MCG/ML
0-200 PLASTIC BAG, INJECTION (ML) INTRAVENOUS CONTINUOUS
Status: DISCONTINUED | OUTPATIENT
Start: 2021-01-01 | End: 2021-01-01 | Stop reason: HOSPADM

## 2021-01-01 RX ORDER — ETOMIDATE 2 MG/ML
INJECTION INTRAVENOUS
Status: COMPLETED
Start: 2021-01-01 | End: 2021-01-01

## 2021-01-01 RX ORDER — ALBUMIN HUMAN 250 G/1000ML
25 SOLUTION INTRAVENOUS ONCE
Status: COMPLETED | OUTPATIENT
Start: 2021-01-01 | End: 2021-01-01

## 2021-01-01 RX ORDER — ONDANSETRON 2 MG/ML
4 INJECTION INTRAMUSCULAR; INTRAVENOUS EVERY 8 HOURS PRN
Status: DISCONTINUED | OUTPATIENT
Start: 2021-01-01 | End: 2021-01-01 | Stop reason: HOSPADM

## 2021-01-01 RX ORDER — GLUCAGON 1 MG
1 KIT INJECTION
Status: DISCONTINUED | OUTPATIENT
Start: 2021-01-01 | End: 2021-01-01 | Stop reason: HOSPADM

## 2021-01-01 RX ORDER — INSULIN ASPART 100 [IU]/ML
0-5 INJECTION, SOLUTION INTRAVENOUS; SUBCUTANEOUS EVERY 6 HOURS PRN
Status: DISCONTINUED | OUTPATIENT
Start: 2021-01-01 | End: 2021-01-01 | Stop reason: HOSPADM

## 2021-01-01 RX ORDER — SPIRONOLACTONE 25 MG/1
50 TABLET ORAL 2 TIMES DAILY
Status: DISCONTINUED | OUTPATIENT
Start: 2021-01-01 | End: 2021-01-01 | Stop reason: HOSPADM

## 2021-01-01 RX ORDER — CLOPIDOGREL BISULFATE 75 MG/1
75 TABLET ORAL DAILY
Status: DISCONTINUED | OUTPATIENT
Start: 2021-01-01 | End: 2021-01-01 | Stop reason: HOSPADM

## 2021-01-01 RX ORDER — SODIUM CHLORIDE 9 MG/ML
INJECTION, SOLUTION INTRAVENOUS
Status: COMPLETED | OUTPATIENT
Start: 2021-01-01 | End: 2021-01-01

## 2021-01-01 RX ORDER — MORPHINE SULFATE 2 MG/ML
2 INJECTION, SOLUTION INTRAMUSCULAR; INTRAVENOUS
Status: DISCONTINUED | OUTPATIENT
Start: 2021-01-01 | End: 2021-01-01 | Stop reason: HOSPADM

## 2021-01-01 RX ORDER — MAGNESIUM SULFATE HEPTAHYDRATE 40 MG/ML
2 INJECTION, SOLUTION INTRAVENOUS ONCE
Status: COMPLETED | OUTPATIENT
Start: 2021-01-01 | End: 2021-01-01

## 2021-01-01 RX ORDER — FUROSEMIDE 40 MG/1
40 TABLET ORAL DAILY
COMMUNITY

## 2021-01-01 RX ORDER — FAMOTIDINE 10 MG/ML
20 INJECTION INTRAVENOUS DAILY
Status: DISCONTINUED | OUTPATIENT
Start: 2021-01-01 | End: 2021-01-01

## 2021-01-01 RX ORDER — LORAZEPAM 2 MG/ML
2 INJECTION INTRAMUSCULAR EVERY 30 MIN PRN
Status: DISCONTINUED | OUTPATIENT
Start: 2021-01-01 | End: 2021-01-01 | Stop reason: HOSPADM

## 2021-01-01 RX ORDER — NOREPINEPHRINE BITARTRATE/D5W 4MG/250ML
0-3 PLASTIC BAG, INJECTION (ML) INTRAVENOUS CONTINUOUS
Status: DISCONTINUED | OUTPATIENT
Start: 2021-01-01 | End: 2021-01-01 | Stop reason: HOSPADM

## 2021-01-01 RX ORDER — HEPARIN SODIUM 5000 [USP'U]/ML
5000 INJECTION, SOLUTION INTRAVENOUS; SUBCUTANEOUS EVERY 12 HOURS
Status: DISCONTINUED | OUTPATIENT
Start: 2021-01-01 | End: 2021-01-01

## 2021-01-01 RX ORDER — HYDROCODONE BITARTRATE AND ACETAMINOPHEN 500; 5 MG/1; MG/1
TABLET ORAL CONTINUOUS
Status: ACTIVE | OUTPATIENT
Start: 2021-01-01 | End: 2021-01-01

## 2021-01-01 RX ORDER — LEVOTHYROXINE SODIUM 25 UG/1
75 TABLET ORAL
Status: DISCONTINUED | OUTPATIENT
Start: 2021-01-01 | End: 2021-01-01

## 2021-01-01 RX ORDER — PROPOFOL 10 MG/ML
INJECTION, EMULSION INTRAVENOUS
Status: DISPENSED
Start: 2021-01-01 | End: 2021-01-01

## 2021-01-01 RX ORDER — ETOMIDATE 2 MG/ML
10 INJECTION INTRAVENOUS ONCE
Status: COMPLETED | OUTPATIENT
Start: 2021-01-01 | End: 2021-01-01

## 2021-01-01 RX ORDER — ESCITALOPRAM OXALATE 10 MG/1
10 TABLET ORAL DAILY
Status: DISCONTINUED | OUTPATIENT
Start: 2021-01-01 | End: 2021-01-01 | Stop reason: HOSPADM

## 2021-01-01 RX ORDER — FLUDROCORTISONE ACETATE 0.1 MG/1
100 TABLET ORAL DAILY
Status: DISCONTINUED | OUTPATIENT
Start: 2021-01-01 | End: 2021-01-01

## 2021-01-01 RX ORDER — ASPIRIN 81 MG/1
81 TABLET ORAL DAILY
Status: DISCONTINUED | OUTPATIENT
Start: 2021-01-01 | End: 2021-01-01 | Stop reason: HOSPADM

## 2021-01-01 RX ORDER — METRONIDAZOLE 500 MG/100ML
INJECTION, SOLUTION INTRAVENOUS
Status: DISCONTINUED
Start: 2021-01-01 | End: 2021-01-01 | Stop reason: HOSPADM

## 2021-01-01 RX ORDER — MUPIROCIN 20 MG/G
OINTMENT TOPICAL 2 TIMES DAILY
Status: DISCONTINUED | OUTPATIENT
Start: 2021-01-01 | End: 2021-01-01 | Stop reason: HOSPADM

## 2021-01-01 RX ORDER — PROPOFOL 10 MG/ML
INJECTION, EMULSION INTRAVENOUS
Status: COMPLETED
Start: 2021-01-01 | End: 2021-01-01

## 2021-01-01 RX ORDER — METRONIDAZOLE 500 MG/100ML
500 INJECTION, SOLUTION INTRAVENOUS
Status: DISCONTINUED | OUTPATIENT
Start: 2021-01-01 | End: 2021-01-01 | Stop reason: HOSPADM

## 2021-01-01 RX ORDER — SODIUM BICARBONATE 1 MEQ/ML
SYRINGE (ML) INTRAVENOUS
Status: COMPLETED
Start: 2021-01-01 | End: 2021-01-01

## 2021-01-01 RX ORDER — FUROSEMIDE 20 MG/1
40 TABLET ORAL 2 TIMES DAILY
Status: DISCONTINUED | OUTPATIENT
Start: 2021-01-01 | End: 2021-01-01

## 2021-01-01 RX ORDER — MAGNESIUM SULFATE HEPTAHYDRATE 40 MG/ML
2 INJECTION, SOLUTION INTRAVENOUS
Status: DISPENSED | OUTPATIENT
Start: 2021-01-01 | End: 2021-01-01

## 2021-01-01 RX ORDER — LANOLIN ALCOHOL/MO/W.PET/CERES
400 CREAM (GRAM) TOPICAL 2 TIMES DAILY
Status: DISCONTINUED | OUTPATIENT
Start: 2021-01-01 | End: 2021-01-01 | Stop reason: HOSPADM

## 2021-01-01 RX ORDER — DIAZEPAM 2 MG/1
2 TABLET ORAL EVERY 12 HOURS PRN
Status: DISCONTINUED | OUTPATIENT
Start: 2021-01-01 | End: 2021-01-01 | Stop reason: HOSPADM

## 2021-01-01 RX ORDER — FERROUS SULFATE 325(65) MG
325 TABLET, DELAYED RELEASE (ENTERIC COATED) ORAL 2 TIMES DAILY
COMMUNITY

## 2021-01-01 RX ORDER — SUCCINYLCHOLINE CHLORIDE 20 MG/ML
100 INJECTION INTRAMUSCULAR; INTRAVENOUS ONCE
Status: DISCONTINUED | OUTPATIENT
Start: 2021-01-01 | End: 2021-01-01

## 2021-01-01 RX ORDER — PANTOPRAZOLE SODIUM 40 MG/10ML
40 INJECTION, POWDER, LYOPHILIZED, FOR SOLUTION INTRAVENOUS DAILY
Status: DISCONTINUED | OUTPATIENT
Start: 2021-01-01 | End: 2021-01-01 | Stop reason: HOSPADM

## 2021-01-01 RX ORDER — LEVOTHYROXINE SODIUM 150 UG/1
150 TABLET ORAL DAILY
Status: DISCONTINUED | OUTPATIENT
Start: 2021-01-01 | End: 2021-01-01

## 2021-01-01 RX ORDER — INDOMETHACIN 25 MG/1
50 CAPSULE ORAL ONCE
Status: COMPLETED | OUTPATIENT
Start: 2021-01-01 | End: 2021-01-01

## 2021-01-01 RX ORDER — ONDANSETRON 2 MG/ML
4 INJECTION INTRAMUSCULAR; INTRAVENOUS EVERY 6 HOURS PRN
Status: DISCONTINUED | OUTPATIENT
Start: 2021-01-01 | End: 2021-01-01 | Stop reason: HOSPADM

## 2021-01-01 RX ORDER — ROCURONIUM BROMIDE 10 MG/ML
INJECTION, SOLUTION INTRAVENOUS
Status: COMPLETED
Start: 2021-01-01 | End: 2021-01-01

## 2021-01-01 RX ORDER — NADOLOL 40 MG/1
20 TABLET ORAL DAILY
COMMUNITY

## 2021-01-01 RX ORDER — CHOLESTYRAMINE 4 G/9G
1 POWDER, FOR SUSPENSION ORAL 2 TIMES DAILY
Status: DISCONTINUED | OUTPATIENT
Start: 2021-01-01 | End: 2021-01-01 | Stop reason: HOSPADM

## 2021-01-01 RX ORDER — MUPIROCIN 20 MG/G
OINTMENT TOPICAL 2 TIMES DAILY
Status: DISCONTINUED | OUTPATIENT
Start: 2021-01-01 | End: 2021-01-01

## 2021-01-01 RX ORDER — FLUCONAZOLE 2 MG/ML
400 INJECTION, SOLUTION INTRAVENOUS
Status: DISCONTINUED | OUTPATIENT
Start: 2021-01-01 | End: 2021-01-01

## 2021-01-01 RX ORDER — SUCCINYLCHOLINE CHLORIDE 20 MG/ML
INJECTION INTRAMUSCULAR; INTRAVENOUS
Status: DISPENSED
Start: 2021-01-01 | End: 2021-01-01

## 2021-01-01 RX ORDER — METOPROLOL SUCCINATE 25 MG/1
25 TABLET, EXTENDED RELEASE ORAL DAILY
Status: DISCONTINUED | OUTPATIENT
Start: 2021-01-01 | End: 2021-01-01

## 2021-01-01 RX ORDER — INSULIN ASPART 100 [IU]/ML
0-5 INJECTION, SOLUTION INTRAVENOUS; SUBCUTANEOUS EVERY 6 HOURS PRN
Status: DISCONTINUED | OUTPATIENT
Start: 2021-01-01 | End: 2021-01-01

## 2021-01-01 RX ORDER — DEXTROSE MONOHYDRATE 100 MG/ML
INJECTION, SOLUTION INTRAVENOUS CONTINUOUS
Status: DISCONTINUED | OUTPATIENT
Start: 2021-01-01 | End: 2021-01-01 | Stop reason: HOSPADM

## 2021-01-01 RX ORDER — ONDANSETRON 4 MG/1
4 TABLET, FILM COATED ORAL EVERY 8 HOURS PRN
Status: ON HOLD | COMMUNITY
End: 2021-01-01 | Stop reason: ALTCHOICE

## 2021-01-01 RX ORDER — SODIUM CHLORIDE 0.9 % (FLUSH) 0.9 %
10 SYRINGE (ML) INJECTION
Status: DISCONTINUED | OUTPATIENT
Start: 2021-01-01 | End: 2021-01-01 | Stop reason: HOSPADM

## 2021-01-01 RX ORDER — CIPROFLOXACIN 250 MG/1
250 TABLET, FILM COATED ORAL EVERY 12 HOURS
Qty: 6 TABLET | Refills: 0 | Status: SHIPPED | OUTPATIENT
Start: 2021-01-01 | End: 2021-01-01

## 2021-01-01 RX ORDER — PROPRANOLOL HYDROCHLORIDE 10 MG/1
10 TABLET ORAL 2 TIMES DAILY
Status: DISCONTINUED | OUTPATIENT
Start: 2021-01-01 | End: 2021-01-01

## 2021-01-01 RX ORDER — IBUPROFEN 200 MG
16 TABLET ORAL
Status: DISCONTINUED | OUTPATIENT
Start: 2021-01-01 | End: 2021-01-01 | Stop reason: HOSPADM

## 2021-01-01 RX ORDER — MONTELUKAST SODIUM 10 MG/1
10 TABLET ORAL NIGHTLY
Status: DISCONTINUED | OUTPATIENT
Start: 2021-01-01 | End: 2021-01-01 | Stop reason: HOSPADM

## 2021-01-01 RX ORDER — MEROPENEM AND SODIUM CHLORIDE 1 G/50ML
1 INJECTION, SOLUTION INTRAVENOUS
Status: DISCONTINUED | OUTPATIENT
Start: 2021-01-01 | End: 2021-01-01

## 2021-01-01 RX ORDER — LACTULOSE 10 G/15ML
20 SOLUTION ORAL; RECTAL 3 TIMES DAILY
COMMUNITY

## 2021-01-01 RX ORDER — FUROSEMIDE 10 MG/ML
40 INJECTION INTRAMUSCULAR; INTRAVENOUS
Status: DISCONTINUED | OUTPATIENT
Start: 2021-01-01 | End: 2021-01-01 | Stop reason: HOSPADM

## 2021-01-01 RX ORDER — LACTULOSE 10 G/15ML
30 SOLUTION ORAL 3 TIMES DAILY
Status: DISCONTINUED | OUTPATIENT
Start: 2021-01-01 | End: 2021-01-01 | Stop reason: HOSPADM

## 2021-01-01 RX ORDER — DIPHENOXYLATE HYDROCHLORIDE AND ATROPINE SULFATE 2.5; .025 MG/1; MG/1
1 TABLET ORAL 4 TIMES DAILY PRN
Status: ON HOLD | COMMUNITY
End: 2021-01-01 | Stop reason: ALTCHOICE

## 2021-01-01 RX ORDER — NOREPINEPHRINE BITARTRATE/D5W 4MG/250ML
PLASTIC BAG, INJECTION (ML) INTRAVENOUS
Status: COMPLETED
Start: 2021-01-01 | End: 2021-01-01

## 2021-01-01 RX ORDER — POTASSIUM CHLORIDE 14.9 MG/ML
20 INJECTION INTRAVENOUS ONCE
Status: COMPLETED | OUTPATIENT
Start: 2021-01-01 | End: 2021-01-01

## 2021-01-01 RX ORDER — CHLORHEXIDINE GLUCONATE ORAL RINSE 1.2 MG/ML
15 SOLUTION DENTAL 2 TIMES DAILY
Status: DISCONTINUED | OUTPATIENT
Start: 2021-01-01 | End: 2021-01-01

## 2021-01-01 RX ORDER — LEVOTHYROXINE SODIUM 88 UG/1
88 TABLET ORAL
Status: DISCONTINUED | OUTPATIENT
Start: 2021-01-01 | End: 2021-01-01 | Stop reason: HOSPADM

## 2021-01-01 RX ORDER — LACTULOSE 10 G/15ML
10 SOLUTION ORAL 3 TIMES DAILY
Status: DISCONTINUED | OUTPATIENT
Start: 2021-01-01 | End: 2021-01-01

## 2021-01-01 RX ORDER — PANTOPRAZOLE SODIUM 40 MG/1
40 TABLET, DELAYED RELEASE ORAL DAILY
Status: DISCONTINUED | OUTPATIENT
Start: 2021-01-01 | End: 2021-01-01 | Stop reason: HOSPADM

## 2021-01-01 RX ORDER — DEXTROSE MONOHYDRATE AND SODIUM CHLORIDE 5; .9 G/100ML; G/100ML
INJECTION, SOLUTION INTRAVENOUS
Status: COMPLETED | OUTPATIENT
Start: 2021-01-01 | End: 2021-01-01

## 2021-01-01 RX ORDER — MONTELUKAST SODIUM 4 MG/1
2 TABLET, CHEWABLE ORAL 2 TIMES DAILY
COMMUNITY

## 2021-01-01 RX ORDER — CALCIUM GLUCONATE 98 MG/ML
INJECTION, SOLUTION INTRAVENOUS
Status: DISCONTINUED
Start: 2021-01-01 | End: 2021-01-01 | Stop reason: HOSPADM

## 2021-01-01 RX ORDER — PROPOFOL 10 MG/ML
0-50 INJECTION, EMULSION INTRAVENOUS CONTINUOUS
Status: DISCONTINUED | OUTPATIENT
Start: 2021-01-01 | End: 2021-01-01 | Stop reason: HOSPADM

## 2021-01-01 RX ORDER — DIAZEPAM 2 MG/1
2 TABLET ORAL EVERY 12 HOURS PRN
COMMUNITY

## 2021-01-01 RX ORDER — PHENYLEPHRINE HCL IN 0.9% NACL 1 MG/10 ML
SYRINGE (ML) INTRAVENOUS
Status: COMPLETED
Start: 2021-01-01 | End: 2021-01-01

## 2021-01-01 RX ORDER — FERROUS SULFATE 300 MG/5ML
300 LIQUID (ML) ORAL DAILY
Status: DISCONTINUED | OUTPATIENT
Start: 2021-01-01 | End: 2021-01-01 | Stop reason: HOSPADM

## 2021-01-01 RX ORDER — SPIRONOLACTONE 25 MG/1
50 TABLET ORAL 2 TIMES DAILY
Status: DISCONTINUED | OUTPATIENT
Start: 2021-01-01 | End: 2021-01-01

## 2021-01-01 RX ORDER — NOREPINEPHRINE BITARTRATE/D5W 4MG/250ML
0-3 PLASTIC BAG, INJECTION (ML) INTRAVENOUS CONTINUOUS
Status: DISCONTINUED | OUTPATIENT
Start: 2021-01-01 | End: 2021-01-01

## 2021-01-01 RX ORDER — IBUPROFEN 200 MG
24 TABLET ORAL
Status: DISCONTINUED | OUTPATIENT
Start: 2021-01-01 | End: 2021-01-01 | Stop reason: HOSPADM

## 2021-01-01 RX ORDER — SPIRONOLACTONE 50 MG/1
100 TABLET, FILM COATED ORAL DAILY
COMMUNITY

## 2021-01-01 RX ORDER — DIPHENOXYLATE HYDROCHLORIDE AND ATROPINE SULFATE 2.5; .025 MG/1; MG/1
1 TABLET ORAL 4 TIMES DAILY PRN
Status: DISCONTINUED | OUTPATIENT
Start: 2021-01-01 | End: 2021-01-01 | Stop reason: HOSPADM

## 2021-01-01 RX ORDER — LACTULOSE 10 G/15ML
20 SOLUTION ORAL
Status: COMPLETED | OUTPATIENT
Start: 2021-01-01 | End: 2021-01-01

## 2021-01-01 RX ORDER — DIAZEPAM 2 MG/1
2 TABLET ORAL EVERY 6 HOURS PRN
Status: DISCONTINUED | OUTPATIENT
Start: 2021-01-01 | End: 2021-01-01 | Stop reason: HOSPADM

## 2021-01-01 RX ORDER — ALBUMIN HUMAN 250 G/1000ML
100 SOLUTION INTRAVENOUS ONCE
Status: COMPLETED | OUTPATIENT
Start: 2021-01-01 | End: 2021-01-01

## 2021-01-01 RX ORDER — ALBUTEROL SULFATE 0.83 MG/ML
2.5 SOLUTION RESPIRATORY (INHALATION) EVERY 6 HOURS
Status: DISCONTINUED | OUTPATIENT
Start: 2021-01-01 | End: 2021-01-01 | Stop reason: HOSPADM

## 2021-01-01 RX ORDER — LEVOTHYROXINE SODIUM 25 UG/1
100 TABLET ORAL ONCE
Status: DISCONTINUED | OUTPATIENT
Start: 2021-01-01 | End: 2021-01-01

## 2021-01-01 RX ORDER — NAPROXEN SODIUM 220 MG/1
81 TABLET, FILM COATED ORAL DAILY
Status: DISCONTINUED | OUTPATIENT
Start: 2021-01-01 | End: 2021-01-01

## 2021-01-01 RX ADMIN — FLUDROCORTISONE ACETATE 100 MCG: 0.1 TABLET ORAL at 08:04

## 2021-01-01 RX ADMIN — SODIUM BICARBONATE: 84 INJECTION, SOLUTION INTRAVENOUS at 04:04

## 2021-01-01 RX ADMIN — SODIUM CHLORIDE 1000 ML: 9 INJECTION, SOLUTION INTRAVENOUS at 11:04

## 2021-01-01 RX ADMIN — Medication 150 MCG/HR: at 12:04

## 2021-01-01 RX ADMIN — RIFAXIMIN 550 MG: 550 TABLET ORAL at 08:04

## 2021-01-01 RX ADMIN — VANCOMYCIN HYDROCHLORIDE 1250 MG: 1.25 INJECTION, POWDER, LYOPHILIZED, FOR SOLUTION INTRAVENOUS at 02:04

## 2021-01-01 RX ADMIN — POTASSIUM CHLORIDE 20 MEQ: 200 INJECTION, SOLUTION INTRAVENOUS at 08:04

## 2021-01-01 RX ADMIN — CEFTRIAXONE 1 G: 1 INJECTION, SOLUTION INTRAVENOUS at 04:04

## 2021-01-01 RX ADMIN — ALBUMIN HUMAN 25 G: 0.25 SOLUTION INTRAVENOUS at 05:04

## 2021-01-01 RX ADMIN — CEFTRIAXONE 1 G: 1 INJECTION, SOLUTION INTRAVENOUS at 10:02

## 2021-01-01 RX ADMIN — FAMOTIDINE 20 MG: 10 INJECTION INTRAVENOUS at 08:04

## 2021-01-01 RX ADMIN — FUROSEMIDE 40 MG: 10 INJECTION, SOLUTION INTRAMUSCULAR; INTRAVENOUS at 06:02

## 2021-01-01 RX ADMIN — CEFTRIAXONE 1 G: 1 INJECTION, SOLUTION INTRAVENOUS at 09:02

## 2021-01-01 RX ADMIN — PROPOFOL 10 MCG/KG/MIN: 10 INJECTION, EMULSION INTRAVENOUS at 09:04

## 2021-01-01 RX ADMIN — LACTULOSE 10 G: 10 SOLUTION ORAL at 04:04

## 2021-01-01 RX ADMIN — MEROPENEM 2 G: 1 INJECTION, POWDER, FOR SOLUTION INTRAVENOUS at 02:04

## 2021-01-01 RX ADMIN — HYDROCORTISONE SODIUM SUCCINATE 100 MG: 100 INJECTION, POWDER, FOR SOLUTION INTRAMUSCULAR; INTRAVENOUS at 05:04

## 2021-01-01 RX ADMIN — CHOLESTYRAMINE 4 G: 4 POWDER, FOR SUSPENSION ORAL at 09:02

## 2021-01-01 RX ADMIN — CALCIUM GLUCONATE 1000 MG: 98 INJECTION, SOLUTION INTRAVENOUS at 06:04

## 2021-01-01 RX ADMIN — CHOLESTYRAMINE 4 G: 4 POWDER, FOR SUSPENSION ORAL at 08:02

## 2021-01-01 RX ADMIN — SPIRONOLACTONE 50 MG: 25 TABLET ORAL at 09:02

## 2021-01-01 RX ADMIN — MUPIROCIN: 20 OINTMENT TOPICAL at 09:04

## 2021-01-01 RX ADMIN — Medication 400 MG: at 09:02

## 2021-01-01 RX ADMIN — MICAFUNGIN SODIUM 100 MG: 20 INJECTION, POWDER, LYOPHILIZED, FOR SOLUTION INTRAVENOUS at 12:04

## 2021-01-01 RX ADMIN — VASOPRESSIN 0.04 UNITS/MIN: 20 INJECTION INTRAVENOUS at 04:04

## 2021-01-01 RX ADMIN — MAGNESIUM SULFATE 2 G: 2 INJECTION INTRAVENOUS at 10:04

## 2021-01-01 RX ADMIN — LACTULOSE 30 G: 20 SOLUTION ORAL at 03:02

## 2021-01-01 RX ADMIN — INDOMETHACIN 50 MEQ: 25 CAPSULE ORAL at 07:04

## 2021-01-01 RX ADMIN — VASOPRESSIN 0.04 UNITS/MIN: 20 INJECTION INTRAVENOUS at 02:04

## 2021-01-01 RX ADMIN — NOREPINEPHRINE BITARTRATE 0.6 MCG/KG/MIN: 1 INJECTION, SOLUTION, CONCENTRATE INTRAVENOUS at 10:04

## 2021-01-01 RX ADMIN — PROPOFOL 10 MCG/KG/MIN: 10 INJECTION, EMULSION INTRAVENOUS at 08:04

## 2021-01-01 RX ADMIN — RIFAXIMIN 550 MG: 550 TABLET ORAL at 10:04

## 2021-01-01 RX ADMIN — DEXTROSE MONOHYDRATE 12.5 G: 25 INJECTION, SOLUTION INTRAVENOUS at 09:04

## 2021-01-01 RX ADMIN — PIPERACILLIN AND TAZOBACTAM 4.5 G: 4; .5 INJECTION, POWDER, LYOPHILIZED, FOR SOLUTION INTRAVENOUS; PARENTERAL at 05:04

## 2021-01-01 RX ADMIN — Medication: at 08:04

## 2021-01-01 RX ADMIN — DEXTROSE MONOHYDRATE 12.5 G: 25 INJECTION, SOLUTION INTRAVENOUS at 06:04

## 2021-01-01 RX ADMIN — MUPIROCIN: 20 OINTMENT TOPICAL at 08:04

## 2021-01-01 RX ADMIN — HYDROCORTISONE SODIUM SUCCINATE 100 MG: 100 INJECTION, POWDER, FOR SOLUTION INTRAMUSCULAR; INTRAVENOUS at 02:04

## 2021-01-01 RX ADMIN — DEXTROSE AND SODIUM CHLORIDE 125 ML/HR: 5; .9 INJECTION, SOLUTION INTRAVENOUS at 11:04

## 2021-01-01 RX ADMIN — MAGNESIUM SULFATE 2 G: 2 INJECTION INTRAVENOUS at 06:04

## 2021-01-01 RX ADMIN — NOREPINEPHRINE BITARTRATE 0.52 MCG/KG/MIN: 1 INJECTION, SOLUTION, CONCENTRATE INTRAVENOUS at 12:04

## 2021-01-01 RX ADMIN — VASOPRESSIN 0.04 UNITS/MIN: 20 INJECTION INTRAVENOUS at 08:04

## 2021-01-01 RX ADMIN — MUPIROCIN: 20 OINTMENT TOPICAL at 10:04

## 2021-01-01 RX ADMIN — LEVOTHYROXINE SODIUM 88 MCG: 88 TABLET ORAL at 05:02

## 2021-01-01 RX ADMIN — FLUDROCORTISONE ACETATE 100 MCG: 0.1 TABLET ORAL at 02:04

## 2021-01-01 RX ADMIN — NOREPINEPHRINE BITARTRATE 0.62 MCG/KG/MIN: 1 INJECTION, SOLUTION, CONCENTRATE INTRAVENOUS at 03:04

## 2021-01-01 RX ADMIN — MAGNESIUM SULFATE IN WATER 2 G: 40 INJECTION, SOLUTION INTRAVENOUS at 11:04

## 2021-01-01 RX ADMIN — NOREPINEPHRINE BITARTRATE 1 MCG/KG/MIN: 1 INJECTION, SOLUTION, CONCENTRATE INTRAVENOUS at 09:04

## 2021-01-01 RX ADMIN — LACTULOSE 10 G: 10 SOLUTION ORAL at 10:04

## 2021-01-01 RX ADMIN — DEXTROSE 25 ML/HR: 10 SOLUTION INTRAVENOUS at 06:04

## 2021-01-01 RX ADMIN — SPIRONOLACTONE 50 MG: 25 TABLET ORAL at 08:02

## 2021-01-01 RX ADMIN — MINERAL SUPPLEMENT IRON 300 MG / 5 ML STRENGTH LIQUID 100 PER BOX UNFLAVORED 300 MG: at 08:02

## 2021-01-01 RX ADMIN — LACTULOSE 30 G: 20 SOLUTION ORAL at 06:02

## 2021-01-01 RX ADMIN — PROPOFOL: 10 INJECTION, EMULSION INTRAVENOUS at 08:04

## 2021-01-01 RX ADMIN — IOHEXOL 100 ML: 350 INJECTION, SOLUTION INTRAVENOUS at 02:04

## 2021-01-01 RX ADMIN — SODIUM CHLORIDE 1000 ML: 9 INJECTION, SOLUTION INTRAVENOUS at 04:04

## 2021-01-01 RX ADMIN — DEXTROSE MONOHYDRATE 25 G: 25 INJECTION, SOLUTION INTRAVENOUS at 11:04

## 2021-01-01 RX ADMIN — PANTOPRAZOLE SODIUM 40 MG: 40 INJECTION, POWDER, FOR SOLUTION INTRAVENOUS at 08:02

## 2021-01-01 RX ADMIN — Medication 150 MCG/HR: at 04:04

## 2021-01-01 RX ADMIN — SODIUM CHLORIDE 1000 ML: 0.9 INJECTION, SOLUTION INTRAVENOUS at 10:04

## 2021-01-01 RX ADMIN — POTASSIUM CHLORIDE 20 MEQ: 200 INJECTION, SOLUTION INTRAVENOUS at 06:04

## 2021-01-01 RX ADMIN — ALBUMIN HUMAN 25 G: 0.25 SOLUTION INTRAVENOUS at 10:02

## 2021-01-01 RX ADMIN — SODIUM BICARBONATE 50 MEQ: 84 INJECTION, SOLUTION INTRAVENOUS at 07:04

## 2021-01-01 RX ADMIN — NOREPINEPHRINE BITARTRATE 0.53 MCG/KG/MIN: 1 INJECTION, SOLUTION, CONCENTRATE INTRAVENOUS at 11:04

## 2021-01-01 RX ADMIN — MAGNESIUM SULFATE 2 G: 2 INJECTION INTRAVENOUS at 09:04

## 2021-01-01 RX ADMIN — RIFAXIMIN 550 MG: 550 TABLET ORAL at 09:04

## 2021-01-01 RX ADMIN — FAMOTIDINE 20 MG: 10 INJECTION INTRAVENOUS at 10:04

## 2021-01-01 RX ADMIN — SODIUM CHLORIDE: 0.9 INJECTION, SOLUTION INTRAVENOUS at 11:02

## 2021-01-01 RX ADMIN — PROPOFOL 5 MCG/KG/MIN: 10 INJECTION, EMULSION INTRAVENOUS at 03:04

## 2021-01-01 RX ADMIN — NOREPINEPHRINE BITARTRATE 0.5 MCG/KG/MIN: 1 INJECTION, SOLUTION, CONCENTRATE INTRAVENOUS at 12:04

## 2021-01-01 RX ADMIN — DEXTROSE MONOHYDRATE 12.5 G: 25 INJECTION, SOLUTION INTRAVENOUS at 03:04

## 2021-01-01 RX ADMIN — MINERAL SUPPLEMENT IRON 300 MG / 5 ML STRENGTH LIQUID 100 PER BOX UNFLAVORED 300 MG: at 09:02

## 2021-01-01 RX ADMIN — HYDROCORTISONE SODIUM SUCCINATE 100 MG: 100 INJECTION, POWDER, FOR SOLUTION INTRAMUSCULAR; INTRAVENOUS at 03:04

## 2021-01-01 RX ADMIN — PROPOFOL 10 MCG/KG/MIN: 10 INJECTION, EMULSION INTRAVENOUS at 12:04

## 2021-01-01 RX ADMIN — MEROPENEM 2 G: 1 INJECTION, POWDER, FOR SOLUTION INTRAVENOUS at 03:04

## 2021-01-01 RX ADMIN — FUROSEMIDE 40 MG: 10 INJECTION, SOLUTION INTRAMUSCULAR; INTRAVENOUS at 05:02

## 2021-01-01 RX ADMIN — DEXTROSE MONOHYDRATE 12.5 G: 25 INJECTION, SOLUTION INTRAVENOUS at 12:04

## 2021-01-01 RX ADMIN — ETOMIDATE 10 MG: 2 INJECTION INTRAVENOUS at 09:04

## 2021-01-01 RX ADMIN — DIAZEPAM 2 MG: 2 TABLET ORAL at 07:02

## 2021-01-01 RX ADMIN — HYDROCORTISONE SODIUM SUCCINATE 100 MG: 100 INJECTION, POWDER, FOR SOLUTION INTRAMUSCULAR; INTRAVENOUS at 09:04

## 2021-01-01 RX ADMIN — MEROPENEM 2 G: 1 INJECTION, POWDER, FOR SOLUTION INTRAVENOUS at 04:04

## 2021-01-01 RX ADMIN — Medication 25 MCG/HR: at 08:04

## 2021-01-01 RX ADMIN — Medication 0.02 MCG/KG/MIN: at 09:04

## 2021-01-01 RX ADMIN — PANTOPRAZOLE SODIUM 40 MG: 40 INJECTION, POWDER, FOR SOLUTION INTRAVENOUS at 09:02

## 2021-01-01 RX ADMIN — ALBUTEROL SULFATE 2.5 MG: 2.5 SOLUTION RESPIRATORY (INHALATION) at 06:04

## 2021-01-01 RX ADMIN — SODIUM CHLORIDE: 0.9 INJECTION, SOLUTION INTRAVENOUS at 12:04

## 2021-01-01 RX ADMIN — SODIUM PHOSPHATE, MONOBASIC, MONOHYDRATE 39.99 MMOL: 276; 142 INJECTION, SOLUTION INTRAVENOUS at 08:04

## 2021-01-01 RX ADMIN — PIPERACILLIN AND TAZOBACTAM 4.5 G: 4; .5 INJECTION, POWDER, LYOPHILIZED, FOR SOLUTION INTRAVENOUS; PARENTERAL at 12:04

## 2021-01-01 RX ADMIN — LACTULOSE 30 G: 20 SOLUTION ORAL at 09:02

## 2021-01-01 RX ADMIN — DEXTROSE MONOHYDRATE 25 G: 25 INJECTION, SOLUTION INTRAVENOUS at 06:04

## 2021-01-01 RX ADMIN — Medication 400 MG: at 08:02

## 2021-01-01 RX ADMIN — ALBUMIN (HUMAN) 100 G: 12.5 SOLUTION INTRAVENOUS at 04:04

## 2021-01-01 RX ADMIN — ONDANSETRON 4 MG: 2 INJECTION INTRAMUSCULAR; INTRAVENOUS at 01:02

## 2021-01-01 RX ADMIN — FLUCONAZOLE, SODIUM CHLORIDE 400 MG: 2 INJECTION INTRAVENOUS at 11:04

## 2021-01-01 RX ADMIN — MICAFUNGIN SODIUM 100 MG: 20 INJECTION, POWDER, LYOPHILIZED, FOR SOLUTION INTRAVENOUS at 01:04

## 2021-01-01 RX ADMIN — NOREPINEPHRINE BITARTRATE 0.44 MCG/KG/MIN: 1 INJECTION, SOLUTION, CONCENTRATE INTRAVENOUS at 11:04

## 2021-01-01 RX ADMIN — LACTULOSE 10 G: 10 SOLUTION ORAL at 09:04

## 2021-01-01 RX ADMIN — VASOPRESSIN 0.04 UNITS/MIN: 20 INJECTION INTRAVENOUS at 06:04

## 2021-01-01 RX ADMIN — CHOLESTYRAMINE 4 G: 4 POWDER, FOR SUSPENSION ORAL at 11:02

## 2021-01-01 RX ADMIN — HUMAN ALBUMIN MICROSPHERES AND PERFLUTREN 0.66 MG: 10; .22 INJECTION, SOLUTION INTRAVENOUS at 10:04

## 2021-01-01 RX ADMIN — LACTULOSE 20 G: 20 SOLUTION ORAL at 12:02

## 2021-01-01 RX ADMIN — Medication 0.6 MG: at 02:04

## 2021-01-01 RX ADMIN — METRONIDAZOLE 500 MG: 500 INJECTION, SOLUTION INTRAVENOUS at 10:04

## 2021-01-01 RX ADMIN — LORAZEPAM 2 MG: 2 INJECTION INTRAMUSCULAR; INTRAVENOUS at 02:04

## 2021-01-01 RX ADMIN — VANCOMYCIN HYDROCHLORIDE 1250 MG: 1.25 INJECTION, POWDER, LYOPHILIZED, FOR SOLUTION INTRAVENOUS at 08:04

## 2021-01-01 RX ADMIN — CALCIUM GLUCONATE 1000 MG: 94 INJECTION, SOLUTION INTRAVENOUS at 05:04

## 2021-01-01 RX ADMIN — LACTULOSE 30 G: 20 SOLUTION ORAL at 08:02

## 2021-01-01 RX ADMIN — DIAZEPAM 2 MG: 2 TABLET ORAL at 12:02

## 2021-01-01 RX ADMIN — HYDROCORTISONE SODIUM SUCCINATE 100 MG: 100 INJECTION, POWDER, FOR SOLUTION INTRAMUSCULAR; INTRAVENOUS at 10:04

## 2021-01-01 RX ADMIN — VASOPRESSIN 0.04 UNITS/MIN: 20 INJECTION INTRAVENOUS at 11:04

## 2021-01-01 RX ADMIN — Medication 0.6 MCG/KG/MIN: at 02:04

## 2021-01-01 RX ADMIN — ROCURONIUM BROMIDE 50 MG: 10 INJECTION, SOLUTION INTRAVENOUS at 12:04

## 2021-01-01 RX ADMIN — SODIUM CHLORIDE: 0.9 INJECTION, SOLUTION INTRAVENOUS at 09:04

## 2021-02-04 PROBLEM — R41.82 AMS (ALTERED MENTAL STATUS): Status: ACTIVE | Noted: 2021-01-01

## 2021-02-04 PROBLEM — K72.00 SUBACUTE LIVER FAILURE WITHOUT HEPATIC COMA: Status: ACTIVE | Noted: 2021-01-01

## 2021-02-04 PROBLEM — R18.8 OTHER ASCITES: Status: ACTIVE | Noted: 2021-01-01

## 2021-02-04 PROBLEM — E03.4 HYPOTHYROIDISM DUE TO ACQUIRED ATROPHY OF THYROID: Status: ACTIVE | Noted: 2021-01-01

## 2021-02-06 PROBLEM — R41.82 AMS (ALTERED MENTAL STATUS): Status: RESOLVED | Noted: 2021-01-01 | Resolved: 2021-01-01

## 2021-02-06 PROBLEM — R18.8 OTHER ASCITES: Status: RESOLVED | Noted: 2021-01-01 | Resolved: 2021-01-01

## 2021-04-06 PROBLEM — A41.9 SEPTIC SHOCK: Status: ACTIVE | Noted: 2021-01-01

## 2021-04-06 PROBLEM — R65.21 SEPTIC SHOCK: Status: ACTIVE | Noted: 2021-01-01

## 2021-04-06 PROBLEM — R06.02 SHORTNESS OF BREATH: Status: ACTIVE | Noted: 2021-01-01

## 2021-04-06 PROBLEM — G93.41 ACUTE METABOLIC ENCEPHALOPATHY: Status: ACTIVE | Noted: 2021-01-01

## 2021-04-06 PROBLEM — D69.6 THROMBOCYTOPENIA: Status: ACTIVE | Noted: 2021-01-01

## 2021-04-06 PROBLEM — N17.9 AKI (ACUTE KIDNEY INJURY): Status: ACTIVE | Noted: 2021-01-01

## 2021-04-06 PROBLEM — I50.42 CHRONIC COMBINED SYSTOLIC AND DIASTOLIC CONGESTIVE HEART FAILURE: Status: ACTIVE | Noted: 2021-01-01

## 2021-04-06 PROBLEM — R79.89 ELEVATED TROPONIN: Status: ACTIVE | Noted: 2021-01-01

## 2021-04-07 PROBLEM — I50.43 ACUTE ON CHRONIC COMBINED SYSTOLIC AND DIASTOLIC CONGESTIVE HEART FAILURE: Status: ACTIVE | Noted: 2021-01-01

## 2021-04-07 PROBLEM — E11.649 TYPE 2 DIABETES MELLITUS WITH HYPOGLYCEMIA, WITHOUT LONG-TERM CURRENT USE OF INSULIN: Status: ACTIVE | Noted: 2021-01-01

## 2021-04-07 PROBLEM — K74.4 SECONDARY BILIARY CIRRHOSIS: Status: ACTIVE | Noted: 2021-01-01

## 2021-04-07 PROBLEM — K83.09 CHOLANGITIS: Status: ACTIVE | Noted: 2021-01-01

## 2021-04-07 PROBLEM — I85.00 ESOPHAGEAL VARICES WITHOUT BLEEDING: Status: ACTIVE | Noted: 2021-01-01

## 2021-04-07 PROBLEM — E87.20 NORMAL ANION GAP METABOLIC ACIDOSIS: Status: ACTIVE | Noted: 2021-01-01

## 2021-04-09 PROBLEM — Z51.5 COMFORT MEASURES ONLY STATUS: Status: ACTIVE | Noted: 2021-01-01

## 2021-04-10 LAB
BACTERIA SPEC AEROBE CULT: ABNORMAL
BACTERIA SPEC AEROBE CULT: NO GROWTH
GRAM STN SPEC: ABNORMAL
GRAM STN SPEC: ABNORMAL

## 2021-04-12 LAB
BACTERIA BLD CULT: NORMAL
BACTERIA BLD CULT: NORMAL

## 2021-04-14 LAB — BACTERIA SPEC ANAEROBE CULT: NORMAL

## 2021-08-14 NOTE — ASSESSMENT & PLAN NOTE
- Trop I peaked at 28; now decreasing  - STEMI, Cardiology following; agatroban drip, plavix loaded  - 2D echo from 10/11/17: normal EF (60-65%) with severe LA enlargement, recent EF down to 25%.  - Continue aspirin, Plavix  - Add Atorvastatin 80mg per Cards.   - Patient to f/u with Interventional Cards as outpatient       14-Aug-2021

## 2022-05-10 NOTE — ANESTHESIA PROCEDURE NOTES
Arterial    Diagnosis: Liver Ca    Patient location during procedure: done in OR  Procedure start time: 8/3/2017 7:51 AM  Timeout: 8/3/2017 7:51 AM  Procedure end time: 8/3/2017 7:51 AM  Staffing  Anesthesiologist: NENA OCONNOR JR  Resident/CRNA: LEAH DEGROOT  Performed: resident/CRNA   Anesthesiologist was present at the time of the procedure.  Preanesthetic Checklist  Completed: patient identified, site marked, surgical consent, pre-op evaluation, timeout performed, IV checked, risks and benefits discussed, monitors and equipment checked and anesthesia consent givenArterial  Skin Prep: chlorhexidine gluconate  Local Infiltration: none  Orientation: right  Location: radial  Catheter Size: 20 G  Catheter placement by Anatomical landmarks. Heme positive aspiration all ports.Insertion Attempts: 1  Assessment  Dressing: secured with tape and tegaderm             no deformity, pain or tenderness, no restriction of movement

## 2025-01-08 NOTE — HPI
Case of 76 y/o male PMHx hypothyroidism, AHTN, HLD, CAD s/p CABG, anemia and autoimmune cholangitis was admitted on 10/11/17. 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 Currently on steroid taper. Evaluated in clinic on 10/11/17 for follow up mentioned at that time being fatigued, experiencing chills, during that encounter was found to be jaundice and was admitted to [facility]. During admission found to have B/C positive for GNR and GPR. CT scan with evidence of 7.2 cm gas collection in the hepatic dome and adjacent gas and fluid collection in the resection bed 2.7 cm. Was resuscitated with 2 L of NS was transferred to SICU due to desaturation and hypotension. Was started on zosyn, vanc and fluconazole and NRB mask. Required 2 PRBC transfusion due to anemia. Was evaluated by cardiology due to increasing troponin's determined to by type 2 NSTEMI. IR performed placement of drain yesterday. At this time off pressors. Consulted to ID for further recommendations.       full weight-bearing

## (undated) DEVICE — SUT SILK 2-0 STRANDS 30IN

## (undated) DEVICE — SUT 1 48IN PDS II VIO MONO

## (undated) DEVICE — BLADE SURG CARBON STEEL SZ11

## (undated) DEVICE — SUT SILK 2-0 SH 18IN BLACK

## (undated) DEVICE — SUT 2-0 SILK 30IN BLK BRAID

## (undated) DEVICE — TOWEL OR XRAY WHITE 17X26IN

## (undated) DEVICE — STAPLER ECHELON ARTC 60MM 28CM

## (undated) DEVICE — DRAPE ABDOMINAL TIBURON 14X11

## (undated) DEVICE — SET DECANTER MEDICHOICE

## (undated) DEVICE — APPLIER CLIP LIAGCLIP 9.375IN

## (undated) DEVICE — EVACUATOR WOUND BULB 100CC

## (undated) DEVICE — SEE MEDLINE ITEM 157128

## (undated) DEVICE — SUT 2-0 12-18IN SILK

## (undated) DEVICE — SUT PROLENE 4-0 RB-1 BL MO

## (undated) DEVICE — PAD K-THERMIA 24IN X 60IN

## (undated) DEVICE — NDL HYPO A BEVEL 22X1 1/2

## (undated) DEVICE — KIT SAHARA DRAPE DRAW/LIFT

## (undated) DEVICE — RELOAD PROXIMATE CUT BLUE 55MM

## (undated) DEVICE — SEE MEDLINE ITEM 146313

## (undated) DEVICE — STAPLER SKIN PROXIMATE WIDE

## (undated) DEVICE — TRAY CATH UM FOLEY SIL W 16FR

## (undated) DEVICE — CONTAINER SPECIMEN STRL 4OZ

## (undated) DEVICE — SUT 0 18IN SILK BLK BRAIDE

## (undated) DEVICE — SEE MEDLINE ITEM 146292

## (undated) DEVICE — ELECTRODE EXTENDED BLADE

## (undated) DEVICE — SEE MEDLINE ITEM 152622

## (undated) DEVICE — SUT SILK 3-0 STRANDS 30IN

## (undated) DEVICE — SEE MEDLINE ITEM 156902

## (undated) DEVICE — SUT 3-0 12-18IN SILK

## (undated) DEVICE — SPONGE LAP 18X18 PREWASHED

## (undated) DEVICE — KIT PROBE COVER WITH GEL

## (undated) DEVICE — RELOAD ECHELON FLEX WHT 60MM

## (undated) DEVICE — SUT SILK SH 2-0 30IN MP BLK

## (undated) DEVICE — ELECTRODE REM PLYHSV RETURN 9

## (undated) DEVICE — SEE MEDLINE ITEM 157144

## (undated) DEVICE — CUTTER PROXIMATE BLUE 55MM

## (undated) DEVICE — GAUZE SPONGE 4X4 12PLY

## (undated) DEVICE — DRAIN CHANNEL ROUND 15FR

## (undated) DEVICE — GOWN SURGICAL X-LARGE

## (undated) DEVICE — SUT SILK 3-0 SH 18IN BLACK

## (undated) DEVICE — SUT PROLENE 3-0 SH DA 36 BL

## (undated) DEVICE — SYR 30CC LUER LOCK

## (undated) DEVICE — GOWN SMART IMP BREATHABLE XXLG

## (undated) DEVICE — SUT PROLENE 5-0 36 V-5 V-5

## (undated) DEVICE — SUT SILK 3-0 SH DETACH 30IN

## (undated) DEVICE — SUT VICRYL BR 1 GEN 27 CT-1

## (undated) DEVICE — TUBE PENROSE DRAIN 18INX5/8IN

## (undated) DEVICE — GAUZE SPONGE PEANUT STRL

## (undated) DEVICE — APPLICATOR CHLORAPREP ORN 26ML

## (undated) DEVICE — DRESSING ADH ISLAND 3.6 X 14